# Patient Record
Sex: MALE | Race: WHITE | NOT HISPANIC OR LATINO | Employment: OTHER | URBAN - METROPOLITAN AREA
[De-identification: names, ages, dates, MRNs, and addresses within clinical notes are randomized per-mention and may not be internally consistent; named-entity substitution may affect disease eponyms.]

---

## 2017-04-11 ENCOUNTER — OFFICE VISIT (OUTPATIENT)
Dept: INTERNAL MEDICINE | Facility: CLINIC | Age: 82
End: 2017-04-11

## 2017-04-11 VITALS
BODY MASS INDEX: 28.09 KG/M2 | HEIGHT: 67 IN | WEIGHT: 179 LBS | DIASTOLIC BLOOD PRESSURE: 76 MMHG | SYSTOLIC BLOOD PRESSURE: 132 MMHG

## 2017-04-11 DIAGNOSIS — G47.00 INSOMNIA, UNSPECIFIED TYPE: ICD-10-CM

## 2017-04-11 DIAGNOSIS — I10 ESSENTIAL HYPERTENSION: ICD-10-CM

## 2017-04-11 DIAGNOSIS — E78.2 MIXED HYPERLIPIDEMIA: ICD-10-CM

## 2017-04-11 DIAGNOSIS — E11.8 TYPE 2 DIABETES MELLITUS WITH COMPLICATION, WITHOUT LONG-TERM CURRENT USE OF INSULIN (HCC): Primary | ICD-10-CM

## 2017-04-11 DIAGNOSIS — N40.1 BENIGN NON-NODULAR PROSTATIC HYPERPLASIA WITH LOWER URINARY TRACT SYMPTOMS: ICD-10-CM

## 2017-04-11 DIAGNOSIS — E03.9 ACQUIRED HYPOTHYROIDISM: ICD-10-CM

## 2017-04-11 LAB — HBA1C MFR BLD: 6.5 %

## 2017-04-11 PROCEDURE — 83036 HEMOGLOBIN GLYCOSYLATED A1C: CPT | Performed by: INTERNAL MEDICINE

## 2017-04-11 PROCEDURE — 99214 OFFICE O/P EST MOD 30 MIN: CPT | Performed by: INTERNAL MEDICINE

## 2017-04-11 RX ORDER — AMLODIPINE BESYLATE 2.5 MG/1
2.5 TABLET ORAL DAILY
Qty: 90 TABLET | Refills: 2 | Status: SHIPPED | OUTPATIENT
Start: 2017-04-11 | End: 2017-10-10 | Stop reason: SDUPTHER

## 2017-04-11 RX ORDER — PREGABALIN 75 MG/1
75 CAPSULE ORAL 2 TIMES DAILY
Qty: 180 CAPSULE | Refills: 0 | Status: SHIPPED | OUTPATIENT
Start: 2017-04-11 | End: 2017-07-11 | Stop reason: SDUPTHER

## 2017-04-11 RX ORDER — LEVOTHYROXINE SODIUM 0.05 MG/1
50 TABLET ORAL DAILY
Qty: 90 TABLET | Refills: 1 | Status: SHIPPED | OUTPATIENT
Start: 2017-04-11 | End: 2017-09-23 | Stop reason: SDUPTHER

## 2017-04-11 RX ORDER — LISINOPRIL 40 MG/1
40 TABLET ORAL DAILY
Qty: 90 TABLET | Refills: 2 | Status: SHIPPED | OUTPATIENT
Start: 2017-04-11 | End: 2017-07-11 | Stop reason: SDUPTHER

## 2017-04-11 RX ORDER — ZOLPIDEM TARTRATE 10 MG/1
10 TABLET ORAL NIGHTLY
Qty: 90 TABLET | Refills: 0 | Status: SHIPPED | OUTPATIENT
Start: 2017-04-11 | End: 2017-07-11 | Stop reason: SDUPTHER

## 2017-04-11 RX ORDER — TAMSULOSIN HYDROCHLORIDE 0.4 MG/1
1 CAPSULE ORAL DAILY
Qty: 90 CAPSULE | Refills: 2 | Status: SHIPPED | OUTPATIENT
Start: 2017-04-11 | End: 2017-07-11 | Stop reason: SDUPTHER

## 2017-04-11 RX ORDER — ATORVASTATIN CALCIUM 20 MG/1
20 TABLET, FILM COATED ORAL NIGHTLY
Qty: 90 TABLET | Refills: 2 | Status: SHIPPED | OUTPATIENT
Start: 2017-04-11 | End: 2017-12-02 | Stop reason: SDUPTHER

## 2017-04-11 NOTE — PROGRESS NOTES
Chief Complaint   Patient presents with   • Follow-up     Insomnia, diabetes       History of Present Illness  83 y.o.  gentleman, accompanied by his wife, presents for DM follow-up as well as zolpidem/lyrica RXs.  Returned from [place] 1 week ago.  Has resumed going to the gym; hurt neck last week.  Notes decreased exercise and suboptimal nutrition; not drinking sodas.    Review of Systems  Neck pain is improving.  Denies CP, palpitations, SOB, falls, lightheadedness.  No s/e to lyrica or zolpidem.  Has baselines numbness/tingling in the feet, which is stable with lyrica. All other ROS reviewed and negative.    [facility]  The following portions of the patient's history were reviewed and updated as appropriate: allergies, current medications, past family history, past medical history, past social history, past surgical history and problem list.      Current Outpatient Prescriptions:   •  amLODIPine (NORVASC) 2.5 MG tablet, Take 1 tablet by mouth Daily., Disp: 90 tablet, Rfl: 2  •  aspirin 81 MG tablet, Take 1 tablet by mouth daily., Disp: , Rfl:   •  atorvastatin (LIPITOR) 20 MG tablet, Take 1 tablet by mouth Every Night., Disp: 90 tablet, Rfl: 2  •  desmopressin (DDAVP) 0.2 MG tablet, Take 1 tablet by mouth every night., Disp: , Rfl:   •  glucose blood (FREESTYLE TEST STRIPS) test strip, Inject 1 stick under the skin daily., Disp: , Rfl:   •  Lancets misc, Use as directed, Disp: , Rfl:   •  levothyroxine (SYNTHROID, LEVOTHROID) 50 MCG tablet, Take 1 tablet by mouth Daily., Disp: 90 tablet, Rfl: 1  •  lisinopril (PRINIVIL,ZESTRIL) 40 MG tablet, Take 1 tablet by mouth Daily., Disp: 90 tablet, Rfl: 2  •  Misc Natural Products (GLUCOSAMINE CHONDROITIN ADV) tablet, Take  by mouth., Disp: , Rfl:   •  nabumetone (RELAFEN) 750 MG tablet, Take 1 tablet by mouth every 12 (twelve) hours., Disp: , Rfl:   •  omeprazole (PriLOSEC) 20 MG capsule, Take 1 capsule by mouth Daily., Disp: 90 capsule, Rfl: 1  •  pregabalin (LYRICA) 75  "MG capsule, Take 1 capsule by mouth 2 (Two) Times a Day., Disp: 180 capsule, Rfl: 0  •  tamsulosin (FLOMAX) 0.4 MG capsule 24 hr capsule, Take 1 capsule by mouth Daily., Disp: 90 capsule, Rfl: 2  •  Umeclidinium-Vilanterol 62.5-25 MCG/INH aerosol powder , Inhale 1 puff daily., Disp: 1 each, Rfl: 5  •  zolpidem (AMBIEN) 10 MG tablet, Take 1 tablet by mouth Every Night., Disp: 90 tablet, Rfl: 0    VITALS:  /76  Ht 67\" (170.2 cm)  Wt 179 lb (81.2 kg)  BMI 28.04 kg/m2    Physical Exam   Constitutional: He is oriented to person, place, and time. He appears well-developed and well-nourished.   Eyes: Conjunctivae and EOM are normal.   glasses   Cardiovascular: Normal rate, regular rhythm and normal heart sounds.    Pulmonary/Chest: Effort normal and breath sounds normal.   Abdominal: Soft. Bowel sounds are normal.    Filipe had a diabetic foot exam performed today.    Vascular Status -  His exam exhibits no right foot edema. His exam exhibits no left foot edema.   Skin Integrity  -  His right foot skin is intact.     Filipe 's left foot skin is intact. .  Neurological: He is alert and oriented to person, place, and time.   Psychiatric: He has a normal mood and affect. His behavior is normal.   Nursing note and vitals reviewed.      LABS  Results for orders placed or performed in visit on 04/11/17   POC Glycosylated Hemoglobin (Hb A1C)   Result Value Ref Range    Hemoglobin A1C 6.5 %   previously 6.7 (12/16)    ASSESSMENT/PLAN  Problem List Items Addressed This Visit     Diabetes mellitus with periph neuropathy - Primary     BG control improving with A1C 6.5; cont judicious and appropriate use of lyrica 75mg BID #180, 0RF.  LETHA was reviewed and appropriate.  The patient has read and signed the [facility] Controlled Substance Contract 10/16. Patient has been counseled and is aware of side effects, risks, potential for addiction/tolerance, interactions, and how to take medication correctly.            Relevant " Medications    pregabalin (LYRICA) 75 MG capsule    Other Relevant Orders    POC Glycosylated Hemoglobin (Hb A1C) (Completed)    BPH     Stable on tamsulosin 0.4mg QD #90, 2RF         Relevant Medications    tamsulosin (FLOMAX) 0.4 MG capsule 24 hr capsule    Hyperlipidemia     Notes cutting dose in half (down to 20mg dose for the last few weeks) for the last several weeks due to leg pains; will f/u lipids in 3 mos and decide whether 20mg atorvastatin #90, 2RF is adequate versus option to change to rosuvastatin         Relevant Medications    atorvastatin (LIPITOR) 20 MG tablet    Hypertension     BP stable on lisin 40mg QD and amlodipine 2.5mg QD, both #90, 2RF         Relevant Medications    amLODIPine (NORVASC) 2.5 MG tablet    lisinopril (PRINIVIL,ZESTRIL) 40 MG tablet    Hypothyroidism    Relevant Medications    levothyroxine (SYNTHROID, LEVOTHROID) 50 MCG tablet    Insomnia     Judicious and appropriate use of zolpidem 10mg qhs prn #90, 0RF (exception made due to insurance).  LETHA was reviewed and appropriate.  The patient has read and signed the [facility] Controlled Substance Contract 10/16. Patient has been counseled and is aware of side effects, risks, potential for addiction/tolerance, interactions, and how to take medication correctly.            Relevant Medications    zolpidem (AMBIEN) 10 MG tablet          FOLLOW-UP  1. Health maintenance - last eye exam with Dr. Moulton summer 2016 - will request records  2. RTC in 3 mos for zolpidem/lyrica RXs with LETHA and A1C, microalb, lipids    Electronically signed by:    Delilah Tatum MD  04/11/2017

## 2017-04-11 NOTE — ASSESSMENT & PLAN NOTE
BG control improving with A1C 6.5; cont judicious and appropriate use of lyrica 75mg BID #180, 0RF.  LETHA was reviewed and appropriate.  The patient has read and signed the [facility] Controlled Substance Contract 10/16. Patient has been counseled and is aware of side effects, risks, potential for addiction/tolerance, interactions, and how to take medication correctly.

## 2017-04-11 NOTE — ASSESSMENT & PLAN NOTE
Notes cutting dose in half (down to 20mg dose for the last few weeks) for the last several weeks due to leg pains; will f/u lipids in 3 mos and decide whether 20mg atorvastatin #90, 2RF is adequate versus option to change to rosuvastatin

## 2017-05-04 ENCOUNTER — TELEPHONE (OUTPATIENT)
Dept: INTERNAL MEDICINE | Facility: CLINIC | Age: 82
End: 2017-05-04

## 2017-06-13 ENCOUNTER — TELEPHONE (OUTPATIENT)
Dept: INTERNAL MEDICINE | Facility: CLINIC | Age: 82
End: 2017-06-13

## 2017-06-13 NOTE — TELEPHONE ENCOUNTER
PATIENT CALLED BACK IN REGARDS TO THE PRIOR AUTH ON THE PATIENT'S MEDICATION. SHE STATES THAT SHE WOULD LIKE FOR YOU TO GIVE HER A CALL BACK AS SOON AS POSSIBLE. THE PATIENT'S CALL BACK NUMBER [phone]

## 2017-06-15 NOTE — TELEPHONE ENCOUNTER
Spoke to Alden at [employer].     Script was being held due to birth date discrepancy.     Verified patient's date of birth and demographics. Script will be mailed out today.

## 2017-07-05 ENCOUNTER — LAB (OUTPATIENT)
Dept: INTERNAL MEDICINE | Facility: CLINIC | Age: 82
End: 2017-07-05

## 2017-07-05 DIAGNOSIS — N18.2 CHRONIC KIDNEY DISEASE, STAGE II (MILD): Primary | ICD-10-CM

## 2017-07-05 DIAGNOSIS — I10 ESSENTIAL HYPERTENSION: ICD-10-CM

## 2017-07-05 DIAGNOSIS — E78.2 MIXED HYPERLIPIDEMIA: ICD-10-CM

## 2017-07-05 DIAGNOSIS — R80.9 MICROALBUMINURIA: ICD-10-CM

## 2017-07-05 DIAGNOSIS — E11.8 TYPE 2 DIABETES MELLITUS WITH COMPLICATION, WITHOUT LONG-TERM CURRENT USE OF INSULIN (HCC): ICD-10-CM

## 2017-07-05 LAB
ARTICHOKE IGE QN: 62 MG/DL (ref 0–130)
CHOLEST SERPL-MCNC: 116 MG/DL (ref 0–200)
HBA1C MFR BLD: 6.2 % (ref 4.8–5.6)
HDLC SERPL-MCNC: 36 MG/DL (ref 40–60)
TRIGL SERPL-MCNC: 87 MG/DL (ref 0–150)

## 2017-07-05 PROCEDURE — 82570 ASSAY OF URINE CREATININE: CPT | Performed by: INTERNAL MEDICINE

## 2017-07-05 PROCEDURE — 80061 LIPID PANEL: CPT | Performed by: INTERNAL MEDICINE

## 2017-07-05 PROCEDURE — 83036 HEMOGLOBIN GLYCOSYLATED A1C: CPT | Performed by: INTERNAL MEDICINE

## 2017-07-05 PROCEDURE — 82043 UR ALBUMIN QUANTITATIVE: CPT | Performed by: INTERNAL MEDICINE

## 2017-07-06 LAB
CREAT 24H UR-MCNC: 117.8 MG/DL
MICROALB/CRT. RATIO UR: 37.2 MG/G CREAT (ref 0–30)
MICROALBUMIN UR-MCNC: 43.8 UG/ML

## 2017-07-11 ENCOUNTER — OFFICE VISIT (OUTPATIENT)
Dept: INTERNAL MEDICINE | Facility: CLINIC | Age: 82
End: 2017-07-11

## 2017-07-11 VITALS
BODY MASS INDEX: 28.19 KG/M2 | SYSTOLIC BLOOD PRESSURE: 128 MMHG | WEIGHT: 180 LBS | OXYGEN SATURATION: 98 % | HEART RATE: 62 BPM | DIASTOLIC BLOOD PRESSURE: 60 MMHG

## 2017-07-11 DIAGNOSIS — K21.9 GASTROESOPHAGEAL REFLUX DISEASE WITHOUT ESOPHAGITIS: ICD-10-CM

## 2017-07-11 DIAGNOSIS — N40.1 BENIGN NON-NODULAR PROSTATIC HYPERPLASIA WITH LOWER URINARY TRACT SYMPTOMS: ICD-10-CM

## 2017-07-11 DIAGNOSIS — I10 ESSENTIAL HYPERTENSION: Primary | ICD-10-CM

## 2017-07-11 DIAGNOSIS — E11.8 TYPE 2 DIABETES MELLITUS WITH COMPLICATION, WITHOUT LONG-TERM CURRENT USE OF INSULIN (HCC): ICD-10-CM

## 2017-07-11 DIAGNOSIS — E78.2 MIXED HYPERLIPIDEMIA: ICD-10-CM

## 2017-07-11 DIAGNOSIS — G47.00 INSOMNIA, UNSPECIFIED TYPE: ICD-10-CM

## 2017-07-11 PROCEDURE — 99214 OFFICE O/P EST MOD 30 MIN: CPT | Performed by: INTERNAL MEDICINE

## 2017-07-11 RX ORDER — PREGABALIN 75 MG/1
75 CAPSULE ORAL 2 TIMES DAILY
Qty: 180 CAPSULE | Refills: 0 | Status: SHIPPED | OUTPATIENT
Start: 2017-07-11 | End: 2017-10-10 | Stop reason: SDUPTHER

## 2017-07-11 RX ORDER — ZOLPIDEM TARTRATE 10 MG/1
10 TABLET ORAL NIGHTLY
Qty: 90 TABLET | Refills: 0 | Status: SHIPPED | OUTPATIENT
Start: 2017-07-11 | End: 2017-07-11 | Stop reason: SDUPTHER

## 2017-07-11 RX ORDER — ZOLPIDEM TARTRATE 5 MG/1
5 TABLET ORAL NIGHTLY
Qty: 90 TABLET | Refills: 0 | Status: SHIPPED | OUTPATIENT
Start: 2017-07-11 | End: 2017-10-10 | Stop reason: SDUPTHER

## 2017-07-11 RX ORDER — LISINOPRIL 40 MG/1
40 TABLET ORAL DAILY
Qty: 90 TABLET | Refills: 1 | Status: SHIPPED | OUTPATIENT
Start: 2017-07-11 | End: 2017-10-10 | Stop reason: SDUPTHER

## 2017-07-11 RX ORDER — OMEPRAZOLE 20 MG/1
20 CAPSULE, DELAYED RELEASE ORAL DAILY
Qty: 90 CAPSULE | Refills: 1 | Status: SHIPPED | OUTPATIENT
Start: 2017-07-11 | End: 2017-10-10 | Stop reason: SDUPTHER

## 2017-07-11 RX ORDER — FLUOCINONIDE TOPICAL SOLUTION USP, 0.05% 0.5 MG/ML
SOLUTION TOPICAL
COMMUNITY
Start: 2017-06-19 | End: 2022-04-19

## 2017-07-11 RX ORDER — KETOCONAZOLE 20 MG/ML
1 SHAMPOO TOPICAL 2 TIMES WEEKLY
COMMUNITY
Start: 2017-06-19

## 2017-07-11 RX ORDER — TAMSULOSIN HYDROCHLORIDE 0.4 MG/1
1 CAPSULE ORAL DAILY
Qty: 90 CAPSULE | Refills: 1 | Status: SHIPPED | OUTPATIENT
Start: 2017-07-11 | End: 2017-12-05 | Stop reason: SDUPTHER

## 2017-07-11 NOTE — PROGRESS NOTES
Chief Complaint   Patient presents with   • Type 2 diabetes mellitus with complication   • Insomnia   • Hypertension   • Hyperlipidemia   • Med Refill     omeprazole; lyrica; lisinopril; flomax; ambien       History of Present Illness  83 y.o.  gentleman presents for sugar and chol f/u as well as zolpidem/lyrica RFs.  No s/e to meds or other concerns; reports however only taking 1/2 zolpidem pill at nighttime - therefore taking 5mg dose only.  Plans to go back down to [place] in 1/18 and likely will fly this time.  Has not gotten a new dog.    Review of Systems  ROS neg for CP, palpitations, SOB, lightheadedness, falls.  Has baseline arthritis.      Reports Dr. Moulotn is retiring this month and he will be updating eye exam with the new associate upcoming. All other ROS reviewed and negative.    [facility]  The following portions of the patient's history were reviewed and updated as appropriate: allergies, current medications, past family history, past medical history, past social history, past surgical history and problem list.      Current Outpatient Prescriptions:   •  amLODIPine (NORVASC) 2.5 MG tablet, QD  •  aspirin 81 MG tablet, Take 1 tablet by mouth daily.  •  atorvastatin (LIPITOR) 20 MG tablet, QHS  •  desmopressin (DDAVP) 0.2 MG tablet, QHS  •  fluocinonide (LIDEX) 0.05 % external solution,  •  glucose blood (FREESTYLE TEST STRIPS) test strip,  •  ketoconazole (NIZORAL) 2 % shampoo, ,  •  Lancets misc, Use as directed,  •  levothyroxine 50 MCG tablet,QD  •  lisinopril (PRINIVIL,ZESTRIL) 40 MG tablet, QD  •  Misc Natural Products (GLUCOSAMINE CHONDROITIN ADV) tablet, QD  •  nabumetone (RELAFEN) 750 MG tablet, q12  •  omeprazole (priLOSEC) 20 MG capsule, QD  •  pregabalin (LYRICA) 75 MG capsule BID  •  tamsulosin (FLOMAX) 0.4 MG capsule QD  •  Umeclidinium-Vilanterol 62.5-25 MCG/INH aerosol powder ,1 puff QD  •  zolpidem (AMBIEN) 10 MG tablet, 1/2 qhs prn    VITALS:  /60  Pulse 62  Wt 180 lb (81.6  kg)  SpO2 98%  BMI 28.19 kg/m2    Physical Exam   Constitutional: He is oriented to person, place, and time. He appears well-developed and well-nourished.   Eyes: Conjunctivae and EOM are normal.   glasses   Cardiovascular: Normal rate, regular rhythm and normal heart sounds.    Pulmonary/Chest: Effort normal and breath sounds normal.   Abdominal: Soft. Bowel sounds are normal.   Neurological: He is alert and oriented to person, place, and time.   Psychiatric: He has a normal mood and affect. His behavior is normal.   Nursing note and vitals reviewed.      LABS  Results for orders placed or performed in visit on 07/05/17   Hemoglobin A1c   Result Value Ref Range    Hemoglobin A1C 6.20 (H) 4.80 - 5.60 %   Microalbumin / Creatinine Urine Ratio   Result Value Ref Range    Creatinine, Urine 117.8 Not Estab. mg/dL    Microalbumin, Urine 43.8 Not Estab. ug/mL    Microalbumin/Creatinine Ratio Urine 37.2 (H) 0.0 - 30.0 mg/g creat   Lipid panel   Result Value Ref Range    Total Cholesterol 116 0 - 200 mg/dL    Triglycerides 87 0 - 150 mg/dL    HDL Cholesterol 36 (L) 40 - 60 mg/dL    LDL Cholesterol  62 0 - 130 mg/dL   4/17 A1C 6.5  12/16 microalb 49.1    ASSESSMENT/PLAN  Problem List Items Addressed This Visit     Diabetes mellitus with periph neuropathy     Commended patient on cont'd improvement in BG control with A1C 6.2; encouraged reg phys activity to decr insulin resistance, moderation in unhealthy starches/sweets; f/u A1C in 6 mos    Continue appropriate use of lyrica 75mg BID #180, 0RF (exception given for 90d RX due to cost).  LETHA was reviewed and appropriate.  The patient has read and signed the [facility] Controlled Substance Contract 10/16. Patient has been counseled and is aware of side effects, risks, potential for addiction/tolerance, interactions, and how to take medication correctly.              Relevant Medications    pregabalin (LYRICA) 75 MG capsule    BPH    Relevant Medications    tamsulosin  (FLOMAX) 0.4 MG capsule 24 hr capsule    Gastroesophageal reflux disease     Stable on omeprazole 20mg QD #90, 1RF         Relevant Medications    omeprazole (priLOSEC) 20 MG capsule    Hyperlipidemia     Lipids remain stable/at goal despite decreased dose of atorvastatin; cont 20mg QD dose         Hypertension - Primary     BP stable on amlodipine 2.5mg QD, lisinopril 40mg QD #90, 3RF         Relevant Medications    lisinopril (PRINIVIL,ZESTRIL) 40 MG tablet    Insomnia     Judicious and appropriate use of zolpidem 5mg qhs prn #90, RF (exception given for 90d RX due to cost).  LETHA was reviewed and appropriate.  The patient has read and signed the [facility] Controlled Substance Contract 10/16. Patient has been counseled and is aware of side effects, risks, potential for addiction/tolerance, interactions, and how to take medication correctly.            Relevant Medications    zolpidem (AMBIEN) 5 MG tablet          FOLLOW-UP  1. RTC 3 mos for zolpidem/lyrica RXs with LETHA and contract, and A1C  2. RTC 6 mos (after 12/20/17) for wellness with fasting labs, LETHA, and zoplpidem/lyrica RXs  3. Needs updated DM eye exam and requested patient ask for ophtho to send us note    Electronically signed by:    Delilah Tatum MD  07/11/2017

## 2017-07-12 NOTE — ASSESSMENT & PLAN NOTE
Commended patient on cont'd improvement in BG control with A1C 6.2; encouraged reg phys activity to decr insulin resistance, moderation in unhealthy starches/sweets; f/u A1C in 6 mos    Continue appropriate use of lyrica 75mg BID #180, 0RF (exception given for 90d RX due to cost).  LETHA was reviewed and appropriate.  The patient has read and signed the [facility] Controlled Substance Contract 10/16. Patient has been counseled and is aware of side effects, risks, potential for addiction/tolerance, interactions, and how to take medication correctly.

## 2017-07-12 NOTE — ASSESSMENT & PLAN NOTE
Judicious and appropriate use of zolpidem 5mg qhs prn #90, RF (exception given for 90d RX due to cost).  LETHA was reviewed and appropriate.  The patient has read and signed the [facility] Controlled Substance Contract 10/16. Patient has been counseled and is aware of side effects, risks, potential for addiction/tolerance, interactions, and how to take medication correctly.

## 2017-09-11 ENCOUNTER — TELEPHONE (OUTPATIENT)
Dept: INTERNAL MEDICINE | Facility: CLINIC | Age: 82
End: 2017-09-11

## 2017-09-11 NOTE — TELEPHONE ENCOUNTER
Needs OV for further eval and treatment; gout is not on his problem list; needs updated uric level    Even if it is gout, cannot start allopurinol during acute gout attack

## 2017-09-11 NOTE — TELEPHONE ENCOUNTER
PATIENT HAS GUAT ON LEFT HAND OF MIDDLE FINGER  VERY SWOLLEN AND SKIN IS CRACKING.  PATIENT IS REQUESTING AN RX BE CALLED IN TO [facility]@ [phone]  REQUESTING ALPURINOL (?).  PATIENT HAS BEEN DRINKING A LOT OF CHERRY JUICE AND THIS HAS BEEN GOING ON FOR ABOUT ONE MONTH

## 2017-09-12 PROBLEM — I73.9 PERIPHERAL ARTERIAL DISEASE: Status: ACTIVE | Noted: 2017-09-12

## 2017-09-15 ENCOUNTER — OFFICE VISIT (OUTPATIENT)
Dept: INTERNAL MEDICINE | Facility: CLINIC | Age: 82
End: 2017-09-15

## 2017-09-15 VITALS
SYSTOLIC BLOOD PRESSURE: 124 MMHG | BODY MASS INDEX: 27.62 KG/M2 | WEIGHT: 176.37 LBS | DIASTOLIC BLOOD PRESSURE: 66 MMHG

## 2017-09-15 DIAGNOSIS — M10.042 ACUTE IDIOPATHIC GOUT OF LEFT HAND: Primary | ICD-10-CM

## 2017-09-15 DIAGNOSIS — N18.2 CHRONIC KIDNEY DISEASE, STAGE II (MILD): ICD-10-CM

## 2017-09-15 DIAGNOSIS — I10 ESSENTIAL HYPERTENSION: ICD-10-CM

## 2017-09-15 LAB
ANION GAP SERPL CALCULATED.3IONS-SCNC: 7 MMOL/L (ref 3–11)
BUN BLD-MCNC: 21 MG/DL (ref 9–23)
BUN/CREAT SERPL: 21 (ref 7–25)
CALCIUM SPEC-SCNC: 9 MG/DL (ref 8.7–10.4)
CHLORIDE SERPL-SCNC: 103 MMOL/L (ref 99–109)
CO2 SERPL-SCNC: 29 MMOL/L (ref 20–31)
CREAT BLD-MCNC: 1 MG/DL (ref 0.6–1.3)
GFR SERPL CREATININE-BSD FRML MDRD: 71 ML/MIN/1.73
GLUCOSE BLD-MCNC: 138 MG/DL (ref 70–100)
POTASSIUM BLD-SCNC: 4.3 MMOL/L (ref 3.5–5.5)
SODIUM BLD-SCNC: 139 MMOL/L (ref 132–146)
URATE SERPL-MCNC: 7.1 MG/DL (ref 3.7–9.2)

## 2017-09-15 PROCEDURE — G0008 ADMIN INFLUENZA VIRUS VAC: HCPCS | Performed by: INTERNAL MEDICINE

## 2017-09-15 PROCEDURE — 80048 BASIC METABOLIC PNL TOTAL CA: CPT | Performed by: INTERNAL MEDICINE

## 2017-09-15 PROCEDURE — 99214 OFFICE O/P EST MOD 30 MIN: CPT | Performed by: INTERNAL MEDICINE

## 2017-09-15 PROCEDURE — 84550 ASSAY OF BLOOD/URIC ACID: CPT | Performed by: INTERNAL MEDICINE

## 2017-09-15 PROCEDURE — 90662 IIV NO PRSV INCREASED AG IM: CPT | Performed by: INTERNAL MEDICINE

## 2017-09-15 NOTE — ASSESSMENT & PLAN NOTE
Discussed with dxs of CKD and CHF, he should not be taking nabumetone; patient is not sure and will check his med list - states wife is in charge and he has no idea what he is taking

## 2017-09-15 NOTE — PROGRESS NOTES
Chief Complaint   Patient presents with   • Swollen left pointer finger       History of Present Illness  83 y.o.  gentleman presents for further eval of swollen left index finger.  HPI started about 2 weeks ago with much more swollen and red index finger.  Was throbbing and now getting better.  Has not taken anything for it.  States dx of gout per ortho when right knee surgery was done.  Has never otherwise had gout evaluated.  No other joints have been affected and the last episode with this same finger was 2009.    Does not want any medication for the pain.  NOtes swelling and redness improving.  Denies related drainage or other sxs.    Review of Systems  ROS (+) for swollen left index finger with decreased throbbing pain, no drainage, numbness/tingling.  No other fingers or other joints affected.  Denies fevers. All other ROS reviewed and negative.    [facility]  The following portions of the patient's history were reviewed and updated as appropriate: allergies, current medications, past family history, past medical history, past social history, past surgical history and problem list.      Current Outpatient Prescriptions:   •  amLODIPine (NORVASC) 2.5 MG tablet, Take 1 tablet by mouth Daily., Disp: 90 tablet, Rfl: 2  •  aspirin 81 MG tablet, Take 1 tablet by mouth daily., Disp: , Rfl:   •  atorvastatin (LIPITOR) 20 MG tablet, Take 1 tablet by mouth Every Night., Disp: 90 tablet, Rfl: 2  •  desmopressin (DDAVP) 0.2 MG tablet, Take 1 tablet by mouth every night., Disp: , Rfl:   •  fluocinonide (LIDEX) 0.05 % external solution, , Disp: , Rfl:   •  glucose blood (FREESTYLE TEST STRIPS) test strip, Inject 1 stick under the skin daily., Disp: , Rfl:   •  ketoconazole (NIZORAL) 2 % shampoo, , Disp: , Rfl:   •  Lancets misc, Use as directed, Disp: , Rfl:   •  levothyroxine (SYNTHROID, LEVOTHROID) 50 MCG tablet, Take 1 tablet by mouth Daily., Disp: 90 tablet, Rfl: 1  •  lisinopril (PRINIVIL,ZESTRIL) 40 MG tablet,  Take 1 tablet by mouth Daily., Disp: 90 tablet, Rfl: 1  •  Misc Natural Products (GLUCOSAMINE CHONDROITIN ADV) tablet, Take  by mouth., Disp: , Rfl:   •  nabumetone (RELAFEN) 750 MG tablet, Take 1 tablet by mouth every 12 (twelve) hours., Disp: , Rfl:   •  omeprazole (priLOSEC) 20 MG capsule, Take 1 capsule by mouth Daily., Disp: 90 capsule, Rfl: 1  •  pregabalin (LYRICA) 75 MG capsule, Take 1 capsule by mouth 2 (Two) Times a Day., Disp: 180 capsule, Rfl: 0  •  tamsulosin (FLOMAX) 0.4 MG capsule 24 hr capsule, Take 1 capsule by mouth Daily., Disp: 90 capsule, Rfl: 1  •  Umeclidinium-Vilanterol 62.5-25 MCG/INH aerosol powder , Inhale 1 puff daily., Disp: 1 each, Rfl: 5  •  zolpidem (AMBIEN) 5 MG tablet, Take 1 tablet by mouth Every Night., Disp: 90 tablet, Rfl: 0    VITALS:  /66  Wt 176 lb 5.9 oz (80 kg)  BMI 27.62 kg/m2    Physical Exam   Constitutional: He is oriented to person, place, and time. He appears well-developed and well-nourished.   Eyes: Conjunctivae and EOM are normal.   Wears glasses   Cardiovascular: Normal rate, regular rhythm and normal heart sounds.    Pulmonary/Chest: Effort normal and breath sounds normal.   Musculoskeletal:   Left index finger limited flexion due to severe swelling with redness and warmth; < 2sec CR   Neurological: He is alert and oriented to person, place, and time.   Skin: Skin is warm and dry. There is erythema (severely swollen erythematous left index finger DIP joint with mild fluctuance, mild increased warmth, minimally tender to palpation, faint suggestion of tophaceous changes subcutaneously).   Psychiatric: He has a normal mood and affect. His behavior is normal.   Not in a good mood today   Nursing note and vitals reviewed.      LABS  Results for orders placed or performed in visit on 07/05/17   Hemoglobin A1c   Result Value Ref Range    Hemoglobin A1C 6.20 (H) 4.80 - 5.60 %   Microalbumin / Creatinine Urine Ratio   Result Value Ref Range    Creatinine, Urine  117.8 Not Estab. mg/dL    Microalbumin, Urine 43.8 Not Estab. ug/mL    Microalbumin/Creatinine Ratio Urine 37.2 (H) 0.0 - 30.0 mg/g creat   Lipid panel   Result Value Ref Range    Total Cholesterol 116 0 - 200 mg/dL    Triglycerides 87 0 - 150 mg/dL    HDL Cholesterol 36 (L) 40 - 60 mg/dL    LDL Cholesterol  62 0 - 130 mg/dL       ASSESSMENT/PLAN  Problem List Items Addressed This Visit     Chronic kidney disease, stage II (mild)     Discussed with dxs of CKD and CHF, he should not be taking nabumetone; patient is not sure and will check his med list - states wife is in charge and he has no idea what he is taking         Hypertension     BP remains stable on current meds           Other Visit Diagnoses     Possible gout left index finger    -  Primary    no prior dx of gout; check uric; discussed mcgowan NSAIDs due to CKD; discussed colchicine, which pt declines; consider allopurinol if (+)gout after sxs subside    Relevant Orders    Basic Metabolic Panel    Uric Acid          FOLLOW-UP  1. Health maintenance - flu vacc given today; eye exam pending (have asked patient to request note for us)  2. RTC 10/10/17 as scheduled for zolpidem and lyrica RXs, will need LETHA, updated contact and A1C as well    Electronically signed by:    Delilah Tatum MD  09/15/2017

## 2017-09-19 NOTE — PROGRESS NOTES
Kidney function is stable    Test for gout, uric acid, is only borderline elevated - is swollen finger continuing to improve?

## 2017-09-23 DIAGNOSIS — E03.9 ACQUIRED HYPOTHYROIDISM: ICD-10-CM

## 2017-09-24 RX ORDER — LEVOTHYROXINE SODIUM 0.05 MG/1
TABLET ORAL
Qty: 90 TABLET | Refills: 1 | Status: SHIPPED | OUTPATIENT
Start: 2017-09-24 | End: 2017-12-22 | Stop reason: SDUPTHER

## 2017-10-10 ENCOUNTER — OFFICE VISIT (OUTPATIENT)
Dept: INTERNAL MEDICINE | Facility: CLINIC | Age: 82
End: 2017-10-10

## 2017-10-10 VITALS
WEIGHT: 178.6 LBS | HEART RATE: 68 BPM | DIASTOLIC BLOOD PRESSURE: 70 MMHG | BODY MASS INDEX: 27.97 KG/M2 | SYSTOLIC BLOOD PRESSURE: 126 MMHG

## 2017-10-10 DIAGNOSIS — J43.9 PULMONARY EMPHYSEMA, UNSPECIFIED EMPHYSEMA TYPE (HCC): ICD-10-CM

## 2017-10-10 DIAGNOSIS — K21.9 GASTROESOPHAGEAL REFLUX DISEASE WITHOUT ESOPHAGITIS: ICD-10-CM

## 2017-10-10 DIAGNOSIS — E11.42 TYPE 2 DIABETES MELLITUS WITH DIABETIC POLYNEUROPATHY, WITHOUT LONG-TERM CURRENT USE OF INSULIN (HCC): Primary | ICD-10-CM

## 2017-10-10 DIAGNOSIS — N18.2 CHRONIC KIDNEY DISEASE, STAGE II (MILD): ICD-10-CM

## 2017-10-10 DIAGNOSIS — G47.00 INSOMNIA, UNSPECIFIED TYPE: ICD-10-CM

## 2017-10-10 DIAGNOSIS — I10 ESSENTIAL HYPERTENSION: ICD-10-CM

## 2017-10-10 DIAGNOSIS — M10.042 ACUTE IDIOPATHIC GOUT OF LEFT HAND: ICD-10-CM

## 2017-10-10 LAB — HBA1C MFR BLD: 5.8 %

## 2017-10-10 PROCEDURE — 83036 HEMOGLOBIN GLYCOSYLATED A1C: CPT | Performed by: INTERNAL MEDICINE

## 2017-10-10 PROCEDURE — 99214 OFFICE O/P EST MOD 30 MIN: CPT | Performed by: INTERNAL MEDICINE

## 2017-10-10 RX ORDER — AMLODIPINE BESYLATE 2.5 MG/1
2.5 TABLET ORAL DAILY
Qty: 90 TABLET | Refills: 0 | Status: SHIPPED | OUTPATIENT
Start: 2017-10-10 | End: 2017-12-05 | Stop reason: SDUPTHER

## 2017-10-10 RX ORDER — LISINOPRIL 40 MG/1
40 TABLET ORAL DAILY
Qty: 90 TABLET | Refills: 0 | Status: SHIPPED | OUTPATIENT
Start: 2017-10-10 | End: 2017-12-22 | Stop reason: SDUPTHER

## 2017-10-10 RX ORDER — OMEPRAZOLE 20 MG/1
20 CAPSULE, DELAYED RELEASE ORAL DAILY
Qty: 90 CAPSULE | Refills: 0 | Status: SHIPPED | OUTPATIENT
Start: 2017-10-10 | End: 2017-12-05 | Stop reason: SDUPTHER

## 2017-10-10 RX ORDER — PREGABALIN 75 MG/1
75 CAPSULE ORAL 2 TIMES DAILY
Qty: 180 CAPSULE | Refills: 0 | Status: SHIPPED | OUTPATIENT
Start: 2017-10-10 | End: 2017-12-22 | Stop reason: SDUPTHER

## 2017-10-10 RX ORDER — ZOLPIDEM TARTRATE 5 MG/1
5 TABLET ORAL NIGHTLY
Qty: 90 TABLET | Refills: 0 | Status: SHIPPED | OUTPATIENT
Start: 2017-10-10 | End: 2017-12-22 | Stop reason: SDUPTHER

## 2017-10-10 NOTE — ASSESSMENT & PLAN NOTE
Judicious and appropriate use of zolpidem 5mg qhs prn #90, RF (exception for patient for 90d supply due to cost).  LETHA was reviewed and appropriate.  The patient has read and signed the [facility] Controlled Substance Contract today 10/10/17. Patient has been counseled and is aware of side effects, risks, potential for addiction/tolerance, interactions, and how to take medication correctly.

## 2017-10-10 NOTE — ASSESSMENT & PLAN NOTE
Commended patient on cont'd improved BG control with A1C 5.8; monitor for hypoglycemia; no DM meds    Judicious and appropriate use of lyrica 75mg BID #180, 0RF (exception for patient for 90d supply due to cost).  LETHA was reviewed and appropriate.  The patient has read and signed the [facility] Controlled Substance Contract today 10/10/17. Patient has been counseled and is aware of side effects, risks, potential for addiction/tolerance, interactions, and how to take medication correctly.

## 2017-10-10 NOTE — PROGRESS NOTES
"Chief Complaint   Patient presents with   • Diabetes   • Osteoarthritis   • Insomnia       History of Present Illness  83 y.o.  gentleman presents for zolpidem/lyrica RFs as well as other med refills and f/u on gout.  Notes left index finger swelling is significantly improved, still swollen and mildly tender, but much improved.  Does not want to take anything for it since last flare-up was 9 yrs ago.    No s/e or other concerns with lyrica or zolpidem.    Has ongoing eval with Dr. Easley with plans for stent to the L. Leg in about 2 weeks.  Notes left foot becomes numb with pain after certain amt of walking.  Is limiting his physical activity.  The numbness sometimes spreads up to the L. Ankle.  R. Leg circulation current acceptable per patient.    Wife wants him to get a \"lung scan\" due to h/o smoking, patient states quitting 25 yrs ago but remote h/o of COPD dx.  Notes h/o incidental nodule removed by Dr. Alonso during heart surgery; was benign.     Review of Systems  ROS (+) for left foot/ankle numbness. ROS (+) for chronic neuropathy and chronic insomnia.  ROS (+) for left index finger gout decreasing, still with some tenderness. All other ROS reviewed and negative.    [facility]  The following portions of the patient's history were reviewed and updated as appropriate: allergies, current medications, past family history, past medical history, past social history, past surgical history and problem list.      Current Outpatient Prescriptions:   •  amLODIPine (NORVASC) 2.5 MG tablet, Take 1 tablet by mouth Daily., Disp: 90 tablet, Rfl: 0  •  aspirin 81 MG tablet, Take 1 tablet by mouth daily., Disp: , Rfl:   •  atorvastatin (LIPITOR) 20 MG tablet, Take 1 tablet by mouth Every Night., Disp: 90 tablet, Rfl: 2  •  desmopressin (DDAVP) 0.2 MG tablet, Take 1 tablet by mouth every night., Disp: , Rfl:   •  fluocinonide (LIDEX) 0.05 % external solution, , Disp: , Rfl:   •  glucose blood (FREESTYLE TEST STRIPS) test " strip, Inject 1 stick under the skin daily., Disp: , Rfl:   •  ketoconazole (NIZORAL) 2 % shampoo, , Disp: , Rfl:   •  Lancets misc, Use as directed, Disp: , Rfl:   •  levothyroxine (SYNTHROID, LEVOTHROID) 50 MCG tablet, TAKE 1 TABLET DAILY, Disp: 90 tablet, Rfl: 1  •  lisinopril (PRINIVIL,ZESTRIL) 40 MG tablet, Take 1 tablet by mouth Daily., Disp: 90 tablet, Rfl: 0  •  Misc Natural Products (GLUCOSAMINE CHONDROITIN ADV) tablet, Take  by mouth., Disp: , Rfl:   •  nabumetone (RELAFEN) 750 MG tablet, Take 1 tablet by mouth every 12 (twelve) hours., Disp: , Rfl:   •  omeprazole (priLOSEC) 20 MG capsule, Take 1 capsule by mouth Daily., Disp: 90 capsule, Rfl: 0  •  pregabalin (LYRICA) 75 MG capsule, Take 1 capsule by mouth 2 (Two) Times a Day., Disp: 180 capsule, Rfl: 0  •  tamsulosin (FLOMAX) 0.4 MG capsule 24 hr capsule, Take 1 capsule by mouth Daily., Disp: 90 capsule, Rfl: 1  •  Umeclidinium-Vilanterol 62.5-25 MCG/INH aerosol powder , Inhale 1 puff daily., Disp: 1 each, Rfl: 5  •  zolpidem (AMBIEN) 5 MG tablet, Take 1 tablet by mouth Every Night., Disp: 90 tablet, Rfl: 0    VITALS:  /70  Pulse 68  Wt 178 lb 9.6 oz (81 kg)  BMI 27.97 kg/m2    Physical Exam   Constitutional: He is oriented to person, place, and time. He appears well-developed and well-nourished.   Eyes: Conjunctivae and EOM are normal.   Cardiovascular: Normal rate, regular rhythm and normal heart sounds.    Pulmonary/Chest: Effort normal and breath sounds normal.   Abdominal: Soft. Bowel sounds are normal.   Musculoskeletal: He exhibits tenderness (distal left index finger with smooth erythema, mildly warm, mildly tend to palpation  middle and sital phalanx, with improved flexion near full currently). He exhibits no edema ( BLE).   Normal gait   Neurological: He is alert and oriented to person, place, and time.   Psychiatric: He has a normal mood and affect. His behavior is normal.   Nursing note and vitals reviewed.      LABS  Results for  orders placed or performed in visit on 10/10/17   POC Glycosylated Hemoglobin (Hb A1C)   Result Value Ref Range    Hemoglobin A1C 5.8 %   9/17 uric 7.1    Low-Dose CT: Lung Cancer Screening  Criteria:  • Age 55-77 years of age (CMS) , 55-80 years of age (Commercial) and;  o Patient Age: 83 y.o.  • 30 pack-year smoking history (# yrs smoked X avg #ppd = pack/yrs); if  pt has quit smoking it must be within <15yrs and;  • Asymptomatic for lung cancer  ICD-10 Codes:  • History of smoking  • Tobacco abuse/addiction  ? Z72.0 (current smoker)  ? Z87.891 (personal history of smoking/nicotine dependence) use with CMS   • Patient Smoking History  History   Smoking Status   • Former Smoker   • Packs/day: 2.00   • Years: 40.00   • Quit date: 1990     Comment: quit 1990s       ASSESSMENT/PLAN  Problem List Items Addressed This Visit     Chronic kidney disease, stage II (mild)     Recent labs per Dr. Easley showed Cr 1.5; rec hydration and repeat BMP prior to PAD vascular intervention         Diabetes mellitus with periph neuropathy - Primary     Commended patient on cont'd improved BG control with A1C 5.8; monitor for hypoglycemia; no DM meds    Judicious and appropriate use of lyrica 75mg BID #180, 0RF (exception for patient for 90d supply due to cost).  LETHA was reviewed and appropriate.  The patient has read and signed the [facility] Controlled Substance Contract today 10/10/17. Patient has been counseled and is aware of side effects, risks, potential for addiction/tolerance, interactions, and how to take medication correctly.            Relevant Medications    pregabalin (LYRICA) 75 MG capsule    Other Relevant Orders    POC Glycosylated Hemoglobin (Hb A1C) (Completed)    COPD     Does not qualify criteria for low-dose lung CT; has occ cough, no hemoptysis; order given for CXR as requested (last one 11/15)           Relevant Orders    XR Chest PA & Lateral    Gastroesophageal reflux disease    Relevant Medications     omeprazole (priLOSEC) 20 MG capsule    Hypertension     BP remains stable on lisin 40mg QD, amlod 2.5mg QD         Relevant Medications    amLODIPine (NORVASC) 2.5 MG tablet    lisinopril (PRINIVIL,ZESTRIL) 40 MG tablet    Insomnia     Judicious and appropriate use of zolpidem 5mg qhs prn #90, RF (exception for patient for 90d supply due to cost).  LETHA was reviewed and appropriate.  The patient has read and signed the [facility] Controlled Substance Contract today 10/10/17. Patient has been counseled and is aware of side effects, risks, potential for addiction/tolerance, interactions, and how to take medication correctly.            Relevant Medications    zolpidem (AMBIEN) 5 MG tablet    Gout     Improving; patient declines meds since last flare-up was 9 yrs ago               FOLLOW-UP  1. Health maintenance - flu vacc 9/17; eye exam pending later this year  2. RTC for wellness 1/5/18 as scheduled; fasting labs the week prior to appt (CBC, CMP, TSH, lipids, UA/micro, uric, A1C, microalb, CPK, FT4, UDS); also will need updated LETHA (contract 10/17)      Electronically signed by:    Delilah Tatum MD  10/10/2017

## 2017-10-10 NOTE — ASSESSMENT & PLAN NOTE
Recent labs per Dr. Easley showed Cr 1.5; rec hydration and repeat BMP prior to PAD vascular intervention

## 2017-10-11 NOTE — ASSESSMENT & PLAN NOTE
Does not qualify criteria for low-dose lung CT; has occ cough, no hemoptysis; order given for CXR as requested (last one 11/15)

## 2017-10-12 ENCOUNTER — HOSPITAL ENCOUNTER (OUTPATIENT)
Dept: GENERAL RADIOLOGY | Facility: HOSPITAL | Age: 82
Discharge: HOME | End: 2017-10-12
Attending: INTERNAL MEDICINE | Admitting: INTERNAL MEDICINE

## 2017-10-12 DIAGNOSIS — J43.9 PULMONARY EMPHYSEMA, UNSPECIFIED EMPHYSEMA TYPE (HCC): ICD-10-CM

## 2017-10-12 PROCEDURE — 71020 HC CHEST PA AND LATERAL: CPT

## 2017-10-16 ENCOUNTER — LAB (OUTPATIENT)
Dept: INTERNAL MEDICINE | Facility: CLINIC | Age: 82
End: 2017-10-16

## 2017-10-16 DIAGNOSIS — N18.2 CHRONIC KIDNEY DISEASE, STAGE II (MILD): Primary | ICD-10-CM

## 2017-10-16 LAB
ANION GAP SERPL CALCULATED.3IONS-SCNC: 6 MMOL/L (ref 3–11)
BUN BLD-MCNC: 23 MG/DL (ref 9–23)
BUN/CREAT SERPL: 19.2 (ref 7–25)
CALCIUM SPEC-SCNC: 8.9 MG/DL (ref 8.7–10.4)
CHLORIDE SERPL-SCNC: 108 MMOL/L (ref 99–109)
CO2 SERPL-SCNC: 29 MMOL/L (ref 20–31)
CREAT BLD-MCNC: 1.2 MG/DL (ref 0.6–1.3)
GFR SERPL CREATININE-BSD FRML MDRD: 58 ML/MIN/1.73
GLUCOSE BLD-MCNC: 83 MG/DL (ref 70–100)
POTASSIUM BLD-SCNC: 4.5 MMOL/L (ref 3.5–5.5)
SODIUM BLD-SCNC: 143 MMOL/L (ref 132–146)

## 2017-10-16 PROCEDURE — 80048 BASIC METABOLIC PNL TOTAL CA: CPT | Performed by: INTERNAL MEDICINE

## 2017-10-16 NOTE — PROGRESS NOTES
Kidney function back down close to normal; drink plenty of water; no further eval needed at this time

## 2017-12-02 DIAGNOSIS — E78.2 MIXED HYPERLIPIDEMIA: ICD-10-CM

## 2017-12-02 RX ORDER — ATORVASTATIN CALCIUM 20 MG/1
TABLET, FILM COATED ORAL
Qty: 90 TABLET | Refills: 2 | Status: SHIPPED | OUTPATIENT
Start: 2017-12-02 | End: 2017-12-22 | Stop reason: SDUPTHER

## 2017-12-05 DIAGNOSIS — K21.9 GASTROESOPHAGEAL REFLUX DISEASE WITHOUT ESOPHAGITIS: ICD-10-CM

## 2017-12-05 DIAGNOSIS — I10 ESSENTIAL HYPERTENSION: ICD-10-CM

## 2017-12-05 DIAGNOSIS — N40.1 BENIGN NON-NODULAR PROSTATIC HYPERPLASIA WITH LOWER URINARY TRACT SYMPTOMS: ICD-10-CM

## 2017-12-05 RX ORDER — AMLODIPINE BESYLATE 2.5 MG/1
TABLET ORAL
Qty: 90 TABLET | Refills: 1 | Status: SHIPPED | OUTPATIENT
Start: 2017-12-05 | End: 2018-06-26 | Stop reason: ALTCHOICE

## 2017-12-05 RX ORDER — OMEPRAZOLE 20 MG/1
CAPSULE, DELAYED RELEASE ORAL
Qty: 90 CAPSULE | Refills: 1 | Status: SHIPPED | OUTPATIENT
Start: 2017-12-05 | End: 2017-12-22 | Stop reason: SDUPTHER

## 2017-12-05 RX ORDER — TAMSULOSIN HYDROCHLORIDE 0.4 MG/1
CAPSULE ORAL
Qty: 90 CAPSULE | Refills: 1 | Status: SHIPPED | OUTPATIENT
Start: 2017-12-05 | End: 2017-12-22 | Stop reason: SDUPTHER

## 2017-12-18 ENCOUNTER — LAB (OUTPATIENT)
Dept: INTERNAL MEDICINE | Facility: CLINIC | Age: 82
End: 2017-12-18

## 2017-12-18 ENCOUNTER — TELEPHONE (OUTPATIENT)
Dept: INTERNAL MEDICINE | Facility: CLINIC | Age: 82
End: 2017-12-18

## 2017-12-18 DIAGNOSIS — M10.042 ACUTE IDIOPATHIC GOUT OF LEFT HAND: ICD-10-CM

## 2017-12-18 DIAGNOSIS — E11.42 TYPE 2 DIABETES MELLITUS WITH DIABETIC POLYNEUROPATHY, WITHOUT LONG-TERM CURRENT USE OF INSULIN (HCC): ICD-10-CM

## 2017-12-18 DIAGNOSIS — E78.2 MIXED HYPERLIPIDEMIA: ICD-10-CM

## 2017-12-18 DIAGNOSIS — N18.2 CHRONIC KIDNEY DISEASE, STAGE II (MILD): Primary | ICD-10-CM

## 2017-12-18 DIAGNOSIS — E03.9 ACQUIRED HYPOTHYROIDISM: ICD-10-CM

## 2017-12-18 DIAGNOSIS — I10 ESSENTIAL HYPERTENSION: ICD-10-CM

## 2017-12-18 DIAGNOSIS — R80.9 MICROALBUMINURIA: ICD-10-CM

## 2017-12-18 DIAGNOSIS — R06.02 SHORTNESS OF BREATH: ICD-10-CM

## 2017-12-18 DIAGNOSIS — Z79.899 HIGH RISK MEDICATION USE: ICD-10-CM

## 2017-12-18 LAB
ALBUMIN SERPL-MCNC: 4 G/DL (ref 3.2–4.8)
ALBUMIN/GLOB SERPL: 1.7 G/DL (ref 1.5–2.5)
ALP SERPL-CCNC: 77 U/L (ref 25–100)
ALT SERPL W P-5'-P-CCNC: 12 U/L (ref 7–40)
ANION GAP SERPL CALCULATED.3IONS-SCNC: 9 MMOL/L (ref 3–11)
ARTICHOKE IGE QN: 62 MG/DL (ref 0–130)
AST SERPL-CCNC: 15 U/L (ref 0–33)
BASOPHILS # BLD AUTO: 0.03 10*3/MM3 (ref 0–0.2)
BASOPHILS NFR BLD AUTO: 0.5 % (ref 0–1)
BILIRUB SERPL-MCNC: 0.5 MG/DL (ref 0.3–1.2)
BILIRUB UR QL STRIP: NEGATIVE
BUN BLD-MCNC: 15 MG/DL (ref 9–23)
BUN/CREAT SERPL: 13.6 (ref 7–25)
CALCIUM SPEC-SCNC: 8.5 MG/DL (ref 8.7–10.4)
CHLORIDE SERPL-SCNC: 103 MMOL/L (ref 99–109)
CHOLEST SERPL-MCNC: 116 MG/DL (ref 0–200)
CK SERPL-CCNC: 55 U/L (ref 26–174)
CLARITY UR: CLEAR
CO2 SERPL-SCNC: 29 MMOL/L (ref 20–31)
COLOR UR: YELLOW
CREAT BLD-MCNC: 1.1 MG/DL (ref 0.6–1.3)
DEPRECATED RDW RBC AUTO: 50.8 FL (ref 37–54)
EOSINOPHIL # BLD AUTO: 0.26 10*3/MM3 (ref 0–0.3)
EOSINOPHIL NFR BLD AUTO: 4.3 % (ref 0–3)
ERYTHROCYTE [DISTWIDTH] IN BLOOD BY AUTOMATED COUNT: 14.4 % (ref 11.3–14.5)
GFR SERPL CREATININE-BSD FRML MDRD: 64 ML/MIN/1.73
GLOBULIN UR ELPH-MCNC: 2.4 GM/DL
GLUCOSE BLD-MCNC: 112 MG/DL (ref 70–100)
GLUCOSE UR STRIP-MCNC: NEGATIVE MG/DL
HBA1C MFR BLD: 6.1 % (ref 4.8–5.6)
HCT VFR BLD AUTO: 43.3 % (ref 38.9–50.9)
HDLC SERPL-MCNC: 41 MG/DL (ref 40–60)
HGB BLD-MCNC: 13.6 G/DL (ref 13.1–17.5)
HGB UR QL STRIP.AUTO: NEGATIVE
IMM GRANULOCYTES # BLD: 0.02 10*3/MM3 (ref 0–0.03)
IMM GRANULOCYTES NFR BLD: 0.3 % (ref 0–0.6)
KETONES UR QL STRIP: NEGATIVE
LEUKOCYTE ESTERASE UR QL STRIP.AUTO: NEGATIVE
LYMPHOCYTES # BLD AUTO: 1.16 10*3/MM3 (ref 0.6–4.8)
LYMPHOCYTES NFR BLD AUTO: 19.3 % (ref 24–44)
MCH RBC QN AUTO: 30 PG (ref 27–31)
MCHC RBC AUTO-ENTMCNC: 31.4 G/DL (ref 32–36)
MCV RBC AUTO: 95.6 FL (ref 80–99)
MONOCYTES # BLD AUTO: 0.55 10*3/MM3 (ref 0–1)
MONOCYTES NFR BLD AUTO: 9.2 % (ref 0–12)
NEUTROPHILS # BLD AUTO: 3.98 10*3/MM3 (ref 1.5–8.3)
NEUTROPHILS NFR BLD AUTO: 66.4 % (ref 41–71)
NITRITE UR QL STRIP: NEGATIVE
PH UR STRIP.AUTO: 7 [PH] (ref 5–8)
PLATELET # BLD AUTO: 183 10*3/MM3 (ref 150–450)
PMV BLD AUTO: 10.9 FL (ref 6–12)
POTASSIUM BLD-SCNC: 4.5 MMOL/L (ref 3.5–5.5)
PROT SERPL-MCNC: 6.4 G/DL (ref 5.7–8.2)
PROT UR QL STRIP: ABNORMAL
RBC # BLD AUTO: 4.53 10*6/MM3 (ref 4.2–5.76)
SODIUM BLD-SCNC: 141 MMOL/L (ref 132–146)
SP GR UR STRIP: 1.02 (ref 1–1.03)
T4 FREE SERPL-MCNC: 1.1 NG/DL (ref 0.89–1.76)
TRIGL SERPL-MCNC: 98 MG/DL (ref 0–150)
TSH SERPL DL<=0.05 MIU/L-ACNC: 2.29 MIU/ML (ref 0.35–5.35)
URATE SERPL-MCNC: 6.1 MG/DL (ref 3.7–9.2)
UROBILINOGEN UR QL STRIP: ABNORMAL
WBC NRBC COR # BLD: 6 10*3/MM3 (ref 3.5–10.8)

## 2017-12-18 PROCEDURE — 81003 URINALYSIS AUTO W/O SCOPE: CPT | Performed by: INTERNAL MEDICINE

## 2017-12-18 PROCEDURE — 83036 HEMOGLOBIN GLYCOSYLATED A1C: CPT | Performed by: INTERNAL MEDICINE

## 2017-12-18 PROCEDURE — 85025 COMPLETE CBC W/AUTO DIFF WBC: CPT | Performed by: INTERNAL MEDICINE

## 2017-12-18 PROCEDURE — 82550 ASSAY OF CK (CPK): CPT | Performed by: INTERNAL MEDICINE

## 2017-12-18 PROCEDURE — 84550 ASSAY OF BLOOD/URIC ACID: CPT | Performed by: INTERNAL MEDICINE

## 2017-12-18 PROCEDURE — 84439 ASSAY OF FREE THYROXINE: CPT | Performed by: INTERNAL MEDICINE

## 2017-12-18 PROCEDURE — 82043 UR ALBUMIN QUANTITATIVE: CPT | Performed by: INTERNAL MEDICINE

## 2017-12-18 PROCEDURE — 84443 ASSAY THYROID STIM HORMONE: CPT | Performed by: INTERNAL MEDICINE

## 2017-12-18 PROCEDURE — 80053 COMPREHEN METABOLIC PANEL: CPT | Performed by: INTERNAL MEDICINE

## 2017-12-18 PROCEDURE — 80061 LIPID PANEL: CPT | Performed by: INTERNAL MEDICINE

## 2017-12-18 PROCEDURE — 82570 ASSAY OF URINE CREATININE: CPT | Performed by: INTERNAL MEDICINE

## 2017-12-18 NOTE — TELEPHONE ENCOUNTER
----- Message from Delilah Tatum MD sent at 12/3/2017 10:14 PM EST -----  Regarding: zolpidem 5mg  Yes - see 7/11/17 note.  Patient stated he was only taking 1/2 tab zolpidem at nighttime, therefore only 5mg QHS, therefore we changed the RX to zolpidem 5mg QHS to accurately reflect what he was taking in addition to taking away the hassle of cutting pills in half.      ----- Message -----     From: Felicity Sandoval MA     Sent: 12/1/2017   1:08 PM       To: Delliah Tatum MD    Script written on 10/10/17 for Ambien was written for 5mg qhs, he has always taken 10mg. Was this intentionally changed?     His wife said she would have reported this earlier but the pills were sent from mail order and could not be returned. He has been taking 2 tablets so he is almost out.

## 2017-12-19 LAB
CREAT 24H UR-MCNC: 94.6 MG/DL
MICROALBUMIN UR-MCNC: 90.7 UG/ML
MICROALBUMIN/CREAT UR: 95.9 MG/G CREAT (ref 0–30)

## 2017-12-22 ENCOUNTER — OFFICE VISIT (OUTPATIENT)
Dept: INTERNAL MEDICINE | Facility: CLINIC | Age: 82
End: 2017-12-22

## 2017-12-22 VITALS
DIASTOLIC BLOOD PRESSURE: 68 MMHG | BODY MASS INDEX: 28.91 KG/M2 | HEART RATE: 68 BPM | WEIGHT: 184.2 LBS | SYSTOLIC BLOOD PRESSURE: 128 MMHG | OXYGEN SATURATION: 94 % | HEIGHT: 67 IN

## 2017-12-22 DIAGNOSIS — Z00.00 MEDICARE ANNUAL WELLNESS VISIT, SUBSEQUENT: Primary | ICD-10-CM

## 2017-12-22 DIAGNOSIS — E03.9 ACQUIRED HYPOTHYROIDISM: ICD-10-CM

## 2017-12-22 DIAGNOSIS — M10.042 ACUTE IDIOPATHIC GOUT OF LEFT HAND: ICD-10-CM

## 2017-12-22 DIAGNOSIS — E78.2 MIXED HYPERLIPIDEMIA: ICD-10-CM

## 2017-12-22 DIAGNOSIS — N40.1 BENIGN NON-NODULAR PROSTATIC HYPERPLASIA WITH LOWER URINARY TRACT SYMPTOMS: ICD-10-CM

## 2017-12-22 DIAGNOSIS — N18.2 CHRONIC KIDNEY DISEASE, STAGE II (MILD): ICD-10-CM

## 2017-12-22 DIAGNOSIS — E11.42 TYPE 2 DIABETES MELLITUS WITH DIABETIC POLYNEUROPATHY, WITHOUT LONG-TERM CURRENT USE OF INSULIN (HCC): ICD-10-CM

## 2017-12-22 DIAGNOSIS — I10 ESSENTIAL HYPERTENSION: ICD-10-CM

## 2017-12-22 DIAGNOSIS — K21.9 GASTROESOPHAGEAL REFLUX DISEASE WITHOUT ESOPHAGITIS: ICD-10-CM

## 2017-12-22 DIAGNOSIS — G47.00 INSOMNIA, UNSPECIFIED TYPE: ICD-10-CM

## 2017-12-22 PROCEDURE — G0444 DEPRESSION SCREEN ANNUAL: HCPCS | Performed by: INTERNAL MEDICINE

## 2017-12-22 PROCEDURE — 93000 ELECTROCARDIOGRAM COMPLETE: CPT | Performed by: INTERNAL MEDICINE

## 2017-12-22 PROCEDURE — G0439 PPPS, SUBSEQ VISIT: HCPCS | Performed by: INTERNAL MEDICINE

## 2017-12-22 PROCEDURE — 99214 OFFICE O/P EST MOD 30 MIN: CPT | Performed by: INTERNAL MEDICINE

## 2017-12-22 RX ORDER — OMEPRAZOLE 20 MG/1
20 CAPSULE, DELAYED RELEASE ORAL DAILY
Qty: 90 CAPSULE | Refills: 3 | Status: SHIPPED | OUTPATIENT
Start: 2017-12-22 | End: 2019-01-03 | Stop reason: SDUPTHER

## 2017-12-22 RX ORDER — TAMSULOSIN HYDROCHLORIDE 0.4 MG/1
1 CAPSULE ORAL DAILY
Qty: 90 CAPSULE | Refills: 3 | Status: SHIPPED | OUTPATIENT
Start: 2017-12-22 | End: 2019-01-03 | Stop reason: SDUPTHER

## 2017-12-22 RX ORDER — LISINOPRIL 40 MG/1
40 TABLET ORAL DAILY
Qty: 90 TABLET | Refills: 3 | Status: SHIPPED | OUTPATIENT
Start: 2017-12-22 | End: 2018-12-17 | Stop reason: SDUPTHER

## 2017-12-22 RX ORDER — LEVOTHYROXINE SODIUM 0.05 MG/1
50 TABLET ORAL DAILY
Qty: 90 TABLET | Refills: 3 | Status: SHIPPED | OUTPATIENT
Start: 2017-12-22 | End: 2019-01-03 | Stop reason: SDUPTHER

## 2017-12-22 RX ORDER — ZOLPIDEM TARTRATE 10 MG/1
10 TABLET ORAL NIGHTLY
Qty: 90 TABLET | Refills: 0 | Status: SHIPPED | OUTPATIENT
Start: 2017-12-22 | End: 2018-04-10 | Stop reason: SDUPTHER

## 2017-12-22 RX ORDER — LISINOPRIL 40 MG/1
40 TABLET ORAL DAILY
Qty: 90 TABLET | Refills: 3 | Status: SHIPPED | OUTPATIENT
Start: 2017-12-22 | End: 2017-12-22 | Stop reason: SDUPTHER

## 2017-12-22 RX ORDER — ATORVASTATIN CALCIUM 20 MG/1
20 TABLET, FILM COATED ORAL DAILY
Qty: 90 TABLET | Refills: 3 | Status: SHIPPED | OUTPATIENT
Start: 2017-12-22 | End: 2019-01-03 | Stop reason: SDUPTHER

## 2017-12-22 RX ORDER — PREGABALIN 75 MG/1
75 CAPSULE ORAL 2 TIMES DAILY
Qty: 180 CAPSULE | Refills: 0 | Status: SHIPPED | OUTPATIENT
Start: 2017-12-22 | End: 2018-02-15 | Stop reason: SDUPTHER

## 2017-12-22 RX ORDER — LEVOTHYROXINE SODIUM 0.05 MG/1
50 TABLET ORAL DAILY
Qty: 90 TABLET | Refills: 1 | Status: SHIPPED | OUTPATIENT
Start: 2017-12-22 | End: 2017-12-22 | Stop reason: SDUPTHER

## 2017-12-22 NOTE — ASSESSMENT & PLAN NOTE
BG control stable with A1C 6.1; encouraged reg phys activity to decr insulin resistance, moderation in unhealthy starches/sweets; f/u A1C in 4 mos    Judicious and appropriate use of lyrica 75mg BID #180, 0RF (exception for 90d dur to insurance).  LETHA was reviewed and appropriate.  The patient has read and signed the [facility] Controlled Substance Contract 10/17. Patient has been counseled and is aware of side effects, risks, potential for addiction/tolerance, interactions, and how to take medication correctly.

## 2017-12-22 NOTE — ASSESSMENT & PLAN NOTE
Judicious and appropriate use of zolpidem back to 10mg qhs prn #90, 0RF (exception made for 90d due to insurance).  LETHA was reviewed and appropriate.  The patient has read and signed the [facility] Controlled Substance Contract 10/17. Patient has been counseled and is aware of side effects, risks, potential for addiction/tolerance, interactions, and how to take medication correctly.

## 2017-12-22 NOTE — ASSESSMENT & PLAN NOTE
Health maintenance - flu vacc 9/17; Prevnar 12/15, PVX 12/14, Tdap 10/11, ZOstavax 9/09; colonosc 1/115, repeat 2020 per Dr. Holland; no further prostate CA screening with age/comorbidities, confirmed with patient; eye exam 2017 with Dr. Quick - will request records again; dental exam done 12/17; (+) seat belt use    Consultants:  Patient Care Team:  Delilah Tatum MD as PCP - General  Burton Holland MD as Consulting Physician (Colon and Rectal Surgery)  Douglas Lazaro MD as Consulting Physician (General Surgery)  Rubio Rossi MD as Consulting Physician (Dermatology)  Douglas Brooks MD as Consulting Physician (Cardiology)  Jim Black MD as Consulting Physician (Cardiothoracic Surgery)  Yusef Bowles MD as Consulting Physician (Nephrology)  Tio Mcnally MD (Inactive) as Consulting Physician (Hand Surgery)  Marisol Easley MD as Consulting Physician (Cardiology)

## 2017-12-22 NOTE — PROGRESS NOTES
Subsequent Medicare Wellness Visit    ANNUAL WELLNESS VISIT    DRUG AND ALCOHOL USE      no alcohol use and no tobacco use    DIET AND PHYSICAL ACTIVITY     Diet: general and low sodium    Exercise: will walk more when gets down to [place]   Exercise Details: walking    MOOD DISORDER AND COGNITIVE SCREENING   Depression Screening Tool Used yes - see PHQ-9   Anxiety Screening Tool Used yes     Mini-Cog Performed   Yes    1. Tell Patient 3 Words apple, ofelia, watch    2. Administer Clock Test normal    3. Recall 3 words  apple, ofelia, watch    4. Number Correct Items 3    FUNCTIONAL ABILITY AND LEVEL OF SAFETY   Hearing no hearing loss     Wears Hearing Aids No       Current Activities Independent      none  - see Funct/Cog Status Intake     Fall Risk Assessment       Has difficulty with walking or balance  No         Timed Up and Go (TUG) Test  5 sec.       If >12 sec, normal    ADVANCED DIRECTIVE has an advance directive - a copy has been provided and is in file    PAIN SCREENING Do you have pain right now? no    Recent Hospitalizations:  No hospitalization(s) within the last year..     MEDICATION REVIEW   - updated and reviewed (see Medication List).   - reviewed for potentially harmful drug-disease interactions in the elderly.   - reviewed for high risk medications in the elderly.   - aspirin use: Yes, takes 81mg QD ASA    BMI  Body mass index is 28.85 kg/(m^2).    Patient's BMI is above normal parameters. Follow-up plan includes:  no follow-up required.    ___________________________________________________  CC: annual wellness visit.    HPI: pleasant 84yo  gentleman, accompanied by his wife, presents for updated wellness visit. Feels well overall without complaints.  States has been taking extra 5mg pills of zolpidem due to poor sleep.  Admits to doing some reading in bed prior to sleep.  No prior s/e with taking 10mg dose of zolpidem.    Review of Systems   Denies headaches, visual changes, CP, SOB,  "cough, abd pain, n/v/d, difficulty with urination, numbness/tingling, falls, mood changes, lightheadedness, hearing changes, rashes.    ROS (+) for occ palpitations.    ROS (+) for chronic insomnia.    Objective       Physical Exam   Constitutional: He is oriented to person, place, and time. He appears well-developed and well-nourished.   HENT:   Head: Normocephalic.   Right Ear: External ear normal.   Left Ear: External ear normal.   Nose: Nose normal.   Mouth/Throat: Oropharynx is clear and moist and mucous membranes are normal. No oropharyngeal exudate.   Eyes: Conjunctivae and EOM are normal. Pupils are equal, round, and reactive to light.   Wears glasses   Neck: Normal range of motion. Neck supple. Carotid bruit is not present (bilaterally). No thyromegaly present.   Cardiovascular: Normal rate, regular rhythm and normal heart sounds.    Pulmonary/Chest: Effort normal and breath sounds normal. No respiratory distress. He has no wheezes. He has no rales.   Abdominal: Soft. Bowel sounds are normal. He exhibits no distension and no mass. There is no hepatosplenomegaly. There is no tenderness.   Musculoskeletal: Normal range of motion. He exhibits no edema (BLE).   Lymphadenopathy:     He has no cervical adenopathy.   Neurological: He is alert and oriented to person, place, and time. He has normal reflexes. He displays normal reflexes. No cranial nerve deficit.   Skin: Skin is warm and dry. No rash noted.   Psychiatric: He has a normal mood and affect. His behavior is normal.   Nursing note and vitals reviewed.     Vitals:    12/22/17 1123   BP: 128/68   Pulse: 68   SpO2: 94%   Weight: 83.6 kg (184 lb 3.2 oz)   Height: 170.2 cm (67\")     LABS:  Results for orders placed or performed in visit on 12/18/17   Urinalysis With / Microscopic If Indicated - Urine, Clean Catch   Result Value Ref Range    Color, UA Yellow Yellow, Straw    Appearance, UA Clear Clear    pH, UA 7.0 5.0 - 8.0    Specific Gravity, UA 1.016 1.001 " - 1.030    Glucose, UA Negative Negative    Ketones, UA Negative Negative    Bilirubin, UA Negative Negative    Blood, UA Negative Negative    Protein, UA Trace (A) Negative    Leuk Esterase, UA Negative Negative    Nitrite, UA Negative Negative    Urobilinogen, UA 0.2 E.U./dL 0.2 - 1.0 E.U./dL   Microalbumin / Creatinine Urine Ratio - Urine, Clean Catch   Result Value Ref Range    Creatinine, Urine 94.6 Not Estab. mg/dL    Microalbumin, Urine 90.7 Not Estab. ug/mL    Microalbumin/Creatinine Ratio 95.9 (H) 0.0 - 30.0 mg/g creat   Comprehensive metabolic panel   Result Value Ref Range    Glucose 112 (H) 70 - 100 mg/dL    BUN 15 9 - 23 mg/dL    Creatinine 1.10 0.60 - 1.30 mg/dL    Sodium 141 132 - 146 mmol/L    Potassium 4.5 3.5 - 5.5 mmol/L    Chloride 103 99 - 109 mmol/L    CO2 29.0 20.0 - 31.0 mmol/L    Calcium 8.5 (L) 8.7 - 10.4 mg/dL    Total Protein 6.4 5.7 - 8.2 g/dL    Albumin 4.00 3.20 - 4.80 g/dL    ALT (SGPT) 12 7 - 40 U/L    AST (SGOT) 15 0 - 33 U/L    Alkaline Phosphatase 77 25 - 100 U/L    Total Bilirubin 0.5 0.3 - 1.2 mg/dL    eGFR Non African Amer 64 >60 mL/min/1.73    Globulin 2.4 gm/dL    A/G Ratio 1.7 1.5 - 2.5 g/dL    BUN/Creatinine Ratio 13.6 7.0 - 25.0    Anion Gap 9.0 3.0 - 11.0 mmol/L   TSH   Result Value Ref Range    TSH 2.294 0.350 - 5.350 mIU/mL   Lipid panel   Result Value Ref Range    Total Cholesterol 116 0 - 200 mg/dL    Triglycerides 98 0 - 150 mg/dL    HDL Cholesterol 41 40 - 60 mg/dL    LDL Cholesterol  62 0 - 130 mg/dL   Hemoglobin A1c   Result Value Ref Range    Hemoglobin A1C 6.10 (H) 4.80 - 5.60 %   CK   Result Value Ref Range    Creatine Kinase 55 26 - 174 U/L   Uric acid   Result Value Ref Range    Uric Acid 6.1 3.7 - 9.2 mg/dL   T4, free   Result Value Ref Range    Free T4 1.10 0.89 - 1.76 ng/dL   CBC Auto Differential   Result Value Ref Range    WBC 6.00 3.50 - 10.80 10*3/mm3    RBC 4.53 4.20 - 5.76 10*6/mm3    Hemoglobin 13.6 13.1 - 17.5 g/dL    Hematocrit 43.3 38.9 - 50.9 %     MCV 95.6 80.0 - 99.0 fL    MCH 30.0 27.0 - 31.0 pg    MCHC 31.4 (L) 32.0 - 36.0 g/dL    RDW 14.4 11.3 - 14.5 %    RDW-SD 50.8 37.0 - 54.0 fl    MPV 10.9 6.0 - 12.0 fL    Platelets 183 150 - 450 10*3/mm3    Neutrophil % 66.4 41.0 - 71.0 %    Lymphocyte % 19.3 (L) 24.0 - 44.0 %    Monocyte % 9.2 0.0 - 12.0 %    Eosinophil % 4.3 (H) 0.0 - 3.0 %    Basophil % 0.5 0.0 - 1.0 %    Immature Grans % 0.3 0.0 - 0.6 %    Neutrophils, Absolute 3.98 1.50 - 8.30 10*3/mm3    Lymphocytes, Absolute 1.16 0.60 - 4.80 10*3/mm3    Monocytes, Absolute 0.55 0.00 - 1.00 10*3/mm3    Eosinophils, Absolute 0.26 0.00 - 0.30 10*3/mm3    Basophils, Absolute 0.03 0.00 - 0.20 10*3/mm3    Immature Grans, Absolute 0.02 0.00 - 0.03 10*3/mm3       ECG 12 Lead  Date/Time: 12/22/2017 12:45 PM  Performed by: AMAYA BOTELLO  Authorized by: AMAYA BOTELLO   Previous ECG: no previous ECG available  Rhythm: sinus rhythm  Ectopy comments: PACs  Rate: normal  BPM: 71  ST Segments: ST segments normal  T Waves: T waves normal  QRS axis: left  Other findings: PRWP  Clinical impression: non-specific ECG          Problem List Items Addressed This Visit     Chronic kidney disease, stage II (mild)     Stable renal function; GFR 64, Cr 1.1         Diabetes mellitus with periph neuropathy     BG control stable with A1C 6.1; encouraged reg phys activity to decr insulin resistance, moderation in unhealthy starches/sweets; f/u A1C in 4 mos    Judicious and appropriate use of lyrica 75mg BID #180, 0RF (exception for 90d dur to insurance).  LETHA was reviewed and appropriate.  The patient has read and signed the [facility] Controlled Substance Contract 10/17. Patient has been counseled and is aware of side effects, risks, potential for addiction/tolerance, interactions, and how to take medication correctly.              Relevant Medications    pregabalin (LYRICA) 75 MG capsule    BPH    Relevant Medications    tamsulosin (FLOMAX) 0.4 MG capsule 24 hr capsule     Gastroesophageal reflux disease    Relevant Medications    omeprazole (priLOSEC) 20 MG capsule    Hyperlipidemia     Lipids, LFTs, CPK stable on atorvastatin 20mg QHS, refill as needed         Relevant Medications    atorvastatin (LIPITOR) 20 MG tablet    Hypertension     BP and electrolytes stable on lisin 40mg QD #90, 3RF and amlo 2.5mg QD, refill as needed         Relevant Medications    lisinopril (PRINIVIL,ZESTRIL) 40 MG tablet    Other Relevant Orders    ECG 12 Lead    Hypothyroidism     Euthyroid on levothyroxine 50mcg QD, refill as needed         Relevant Medications    levothyroxine (SYNTHROID, LEVOTHROID) 50 MCG tablet    Insomnia     Judicious and appropriate use of zolpidem back to 10mg qhs prn #90, 0RF (exception made for 90d due to insurance).  LETHA was reviewed and appropriate.  The patient has read and signed the [facility] Controlled Substance Contract 10/17. Patient has been counseled and is aware of side effects, risks, potential for addiction/tolerance, interactions, and how to take medication correctly.            Relevant Medications    zolpidem (AMBIEN) 10 MG tablet    Medicare annual wellness visit, subsequent - Primary     Health maintenance - flu vacc 9/17; Prevnar 12/15, PVX 12/14, Tdap 10/11, ZOstavax 9/09; colonosc 1/115, repeat 2020 per Dr. Holland; no further prostate CA screening with age/comorbidities, confirmed with patient; eye exam 2017 with Dr. Quick - will request records again; dental exam done 12/17; (+) seat belt use    Consultants:  Patient Care Team:  Delilah Tatum MD as PCP - General  Burton Holland MD as Consulting Physician (Colon and Rectal Surgery)  Douglas Lazaro MD as Consulting Physician (General Surgery)  Rubio Rossi MD as Consulting Physician (Dermatology)  Douglas Brooks MD as Consulting Physician (Cardiology)  Jim Black MD as Consulting Physician (Cardiothoracic Surgery)  Yusef Bowles MD as Consulting Physician  (Nephrology)  Tio Mcnally MD (Inactive) as Consulting Physician (Hand Surgery)  Marisol Easley MD as Consulting Physician (Cardiology)             Gout     asx with uric 6.1; no meds             FOLLOW-UP  RTC 3 mos (after return from [place]) for zolpidem/lyrica RXs with LETHA and A1C

## 2018-01-14 PROBLEM — H25.013 CORTICAL AGE-RELATED CATARACT OF BOTH EYES: Status: ACTIVE | Noted: 2018-01-14

## 2018-01-21 ENCOUNTER — TELEPHONE (OUTPATIENT)
Dept: INTERNAL MEDICINE | Facility: CLINIC | Age: 83
End: 2018-01-21

## 2018-01-21 NOTE — TELEPHONE ENCOUNTER
----- Message from Felicity Sandoval MA sent at 12/26/2017  7:53 AM EST -----      ----- Message -----     From: Delilah Tatum MD     Sent: 12/22/2017  12:51 PM       To: Felicity Sandoval MA    pls request eye exam report from Dr. Khanna (Dr. Horton's office)

## 2018-02-14 DIAGNOSIS — E11.42 TYPE 2 DIABETES MELLITUS WITH DIABETIC POLYNEUROPATHY, WITHOUT LONG-TERM CURRENT USE OF INSULIN (HCC): ICD-10-CM

## 2018-02-14 RX ORDER — PREGABALIN 75 MG/1
75 CAPSULE ORAL 2 TIMES DAILY
Qty: 180 CAPSULE | Refills: 0 | OUTPATIENT
Start: 2018-02-14

## 2018-02-14 NOTE — TELEPHONE ENCOUNTER
PT IS IN [place] AND [facility] HAS LOST THEIR SCRIPT FOR LYRICA.     PLEASE ADVISE.     PT WOULD LIKE THE SCRIPT MAILED TO THEM IN [place]:   [address] [place] [address]

## 2018-02-15 RX ORDER — PREGABALIN 75 MG/1
75 CAPSULE ORAL 2 TIMES DAILY
Qty: 60 CAPSULE | Refills: 0 | Status: SHIPPED | OUTPATIENT
Start: 2018-02-15 | End: 2018-04-10 | Stop reason: SDUPTHER

## 2018-02-15 RX ORDER — PREGABALIN 75 MG/1
75 CAPSULE ORAL 2 TIMES DAILY
Qty: 180 CAPSULE | Refills: 0 | Status: SHIPPED | OUTPATIENT
Start: 2018-02-15 | End: 2018-02-15 | Stop reason: SDUPTHER

## 2018-02-15 NOTE — ASSESSMENT & PLAN NOTE
2/15/18 ongoing issues as patient's son states Lyrica was lost by [facility]; indicated that already giving him exception for 90d RX; he has been advised to address med loss issue with [facility]; will tide him over with 30d supply and plan on continuing 30d RXs going forward unless issue can be resolved with [facility]; ok'd lyrica 75mg BID #60, 0RF

## 2018-02-15 NOTE — TELEPHONE ENCOUNTER
"The only phone number we have is Pt's home phone, which Michael (Pt's son) answered stating he is the one who called. Informed Michael of Dr. Tatum's message, he became very upset saying \"well he doesn't take it for no reason\", informed Michael that he is not on Pt's FAUSTO and we cannot discuss this. Pt states he has talked to Dr. Tatum numerous times in regards to his fathers care and would like to  a 30 day supply and send it to his father.    In the meantime, Mrs. Hamilton called and spoke with Pilar. She states [facility] gave her a tracking number and they are the ones who lost the rx, not the Pt. Pilar informed Pt and his wife that they need to contact [facility] and track this rx as it will not be replaced.     Dr. Tatum, please advise.   "

## 2018-02-15 NOTE — TELEPHONE ENCOUNTER
Delilah Tatum MD   You 19 hours ago (6:33 PM)                 lyrica refused - already giving him an exception to give him 90d and the contract specifically indicates no exceptions made for lost prescriptions.  This is one of the reasons it is refilled on a month-to-month basis.  The only way I will be able to refill this is to go back to month-to-month prescription.  Please apologize for this but it is a controlled medications and the rules are for month-to-month prescriptions (Routing comment)

## 2018-04-10 ENCOUNTER — OFFICE VISIT (OUTPATIENT)
Dept: INTERNAL MEDICINE | Facility: CLINIC | Age: 83
End: 2018-04-10

## 2018-04-10 VITALS
DIASTOLIC BLOOD PRESSURE: 70 MMHG | BODY MASS INDEX: 29.13 KG/M2 | HEART RATE: 76 BPM | RESPIRATION RATE: 16 BRPM | SYSTOLIC BLOOD PRESSURE: 124 MMHG | WEIGHT: 186 LBS

## 2018-04-10 DIAGNOSIS — I10 ESSENTIAL HYPERTENSION: ICD-10-CM

## 2018-04-10 DIAGNOSIS — E11.42 TYPE 2 DIABETES MELLITUS WITH DIABETIC POLYNEUROPATHY, WITHOUT LONG-TERM CURRENT USE OF INSULIN (HCC): ICD-10-CM

## 2018-04-10 DIAGNOSIS — G47.00 INSOMNIA, UNSPECIFIED TYPE: Primary | ICD-10-CM

## 2018-04-10 LAB — HBA1C MFR BLD: 6.1 %

## 2018-04-10 PROCEDURE — 99214 OFFICE O/P EST MOD 30 MIN: CPT | Performed by: INTERNAL MEDICINE

## 2018-04-10 PROCEDURE — 83036 HEMOGLOBIN GLYCOSYLATED A1C: CPT | Performed by: INTERNAL MEDICINE

## 2018-04-10 RX ORDER — PREGABALIN 75 MG/1
75 CAPSULE ORAL 2 TIMES DAILY
Qty: 180 CAPSULE | Refills: 0 | Status: SHIPPED | OUTPATIENT
Start: 2018-04-10 | End: 2018-06-26 | Stop reason: SDUPTHER

## 2018-04-10 RX ORDER — CLOPIDOGREL BISULFATE 75 MG/1
TABLET ORAL
COMMUNITY
Start: 2018-04-05 | End: 2021-06-24

## 2018-04-10 RX ORDER — ZOLPIDEM TARTRATE 10 MG/1
10 TABLET ORAL NIGHTLY
Qty: 90 TABLET | Refills: 0 | Status: SHIPPED | OUTPATIENT
Start: 2018-04-10 | End: 2018-06-26 | Stop reason: SDUPTHER

## 2018-04-10 RX ORDER — LANCETS 30 GAUGE
1 EACH MISCELLANEOUS DAILY
Qty: 100 EACH | Refills: 3 | Status: SHIPPED | OUTPATIENT
Start: 2018-04-10 | End: 2022-04-19

## 2018-04-10 NOTE — ASSESSMENT & PLAN NOTE
Judicious and appropriate use of zolpdeim 10mg qhs prn #90, 0RF (Exception given for 90d due to insurance but no other exceptions).  LETHA was reviewed and appropriate.  The patient has read and signed the [facility] Controlled Substance Contract 10/17. Patient has been counseled and is aware of side effects, risks, potential for addiction/tolerance, interactions, and how to take medication correctly.  RTC 3 mos for next RX

## 2018-04-10 NOTE — PROGRESS NOTES
Chief Complaint   Patient presents with   • Hypertension     fu       History of Present Illness  84 y.o.  gentleman presents for zolpidem and lyrica refills as well as DM and BP follow-up.    Review of Systems  Denies CP, palpitations, SOB, headaches, falls.  All other ROS reviewed and negative.    [facility]  The following portions of the patient's history were reviewed and updated as appropriate: allergies, current medications, past family history, past medical history, past social history, past surgical history and problem list.      Current Outpatient Prescriptions:   •  amLODIPine (NORVASC) 2.5 MG tablet, TAKE 1 TABLET DAILY, Disp: 90 tablet, Rfl: 1  •  aspirin 81 MG tablet, Take 1 tablet by mouth daily., Disp: , Rfl:   •  atorvastatin (LIPITOR) 20 MG tablet, Take 1 tablet by mouth Daily., Disp: 90 tablet, Rfl: 3  •  clopidogrel (PLAVIX) 75 MG tablet, , Disp: , Rfl:   •  desmopressin (DDAVP) 0.2 MG tablet, Take 1 tablet by mouth every night., Disp: , Rfl:   •  fluocinonide (LIDEX) 0.05 % external solution, , Disp: , Rfl:   •  glucose blood (FREESTYLE TEST STRIPS) test strip, 1 each by Other route Daily., Disp: 100 each, Rfl: 3  •  ketoconazole (NIZORAL) 2 % shampoo, , Disp: , Rfl:   •  Lancets misc, 1 Units Daily. Use as directed, Disp: 100 each, Rfl: 3  •  levothyroxine (SYNTHROID, LEVOTHROID) 50 MCG tablet, Take 1 tablet by mouth Daily., Disp: 90 tablet, Rfl: 3  •  lisinopril (PRINIVIL,ZESTRIL) 40 MG tablet, Take 1 tablet by mouth Daily., Disp: 90 tablet, Rfl: 3  •  Misc Natural Products (GLUCOSAMINE CHONDROITIN ADV) tablet, Take  by mouth., Disp: , Rfl:   •  nabumetone (RELAFEN) 750 MG tablet, Take 1 tablet by mouth every 12 (twelve) hours., Disp: , Rfl:   •  omeprazole (priLOSEC) 20 MG capsule, Take 1 capsule by mouth Daily., Disp: 90 capsule, Rfl: 3  •  pregabalin (LYRICA) 75 MG capsule, Take 1 capsule by mouth 2 (Two) Times a Day., Disp: 180 capsule, Rfl: 0  •  tamsulosin (FLOMAX) 0.4 MG capsule 24  hr capsule, Take 1 capsule by mouth Daily., Disp: 90 capsule, Rfl: 3  •  Umeclidinium-Vilanterol 62.5-25 MCG/INH aerosol powder , Inhale 1 puff daily., Disp: 1 each, Rfl: 5  •  zolpidem (AMBIEN) 10 MG tablet, Take 1 tablet by mouth Every Night., Disp: 90 tablet, Rfl: 0    VITALS:  /70 (BP Location: Right arm, Patient Position: Sitting)   Pulse 76   Resp 16   Wt 84.4 kg (186 lb)   BMI 29.13 kg/m²     Physical Exam   Constitutional: He is oriented to person, place, and time. He appears well-developed and well-nourished.   Eyes: Conjunctivae and EOM are normal.   Wears glasses   Cardiovascular: Normal rate, regular rhythm and normal heart sounds.    Pulmonary/Chest: Effort normal and breath sounds normal.   Abdominal: Soft. Bowel sounds are normal.   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   Neurological: He is alert and oriented to person, place, and time.   Psychiatric: He has a normal mood and affect. His behavior is normal.   Nursing note and vitals reviewed.      LABS    12/17 A1C 6.1    ASSESSMENT/PLAN  Problem List Items Addressed This Visit     Diabetes mellitus with periph neuropathy     BG control stable with A1C 6.1; on no DM meds    Judicious and appropriate use of lyrica 75mg BID #180, 0RF (exception made for 90d due to insurance but no other exceptions allowed).  LETHA was reviewed and appropriate.  The patient has read and signed the [facility] Controlled Substance Contract 9/17. Patient has been counseled and is aware of side effects, risks, potential for addiction/tolerance, interactions, and how to take medication correctly.  RTC 3 mos for next RX           Relevant Medications    pregabalin (LYRICA) 75 MG capsule    glucose blood (FREESTYLE TEST STRIPS) test strip    Lancets misc    Other Relevant Orders    POC Glycosylated Hemoglobin (Hb A1C) (Completed)    Hypertension     BP  remains stable; on amlo 2.5mg QD and lisin 40mg QD         Insomnia - Primary     Judicious and appropriate use of zolpdeim 10mg qhs prn #90, 0RF (Exception given for 90d due to insurance but no other exceptions).  LETHA was reviewed and appropriate.  The patient has read and signed the [facility] Controlled Substance Contract 10/17. Patient has been counseled and is aware of side effects, risks, potential for addiction/tolerance, interactions, and how to take medication correctly.  RTC 3 mos for next RX           Relevant Medications    zolpidem (AMBIEN) 10 MG tablet      Other Visit Diagnoses    None.         FOLLOW-UP  RTC 3 mos for zolpidem/lyrica RXs with LETHA (contract 10/17)    Electronically signed by:    Delilah Tatum MD  04/10/2018

## 2018-04-10 NOTE — ASSESSMENT & PLAN NOTE
BG control stable with A1C 6.1; on no DM meds    Judicious and appropriate use of lyrica 75mg BID #180, 0RF (exception made for 90d due to insurance but no other exceptions allowed).  LETHA was reviewed and appropriate.  The patient has read and signed the [facility] Controlled Substance Contract 9/17. Patient has been counseled and is aware of side effects, risks, potential for addiction/tolerance, interactions, and how to take medication correctly.  RTC 3 mos for next RX

## 2018-06-03 PROBLEM — L21.9 SEBORRHEIC DERMATITIS OF SCALP: Status: ACTIVE | Noted: 2018-05-21

## 2018-06-26 ENCOUNTER — OFFICE VISIT (OUTPATIENT)
Dept: INTERNAL MEDICINE | Facility: CLINIC | Age: 83
End: 2018-06-26

## 2018-06-26 ENCOUNTER — TELEPHONE (OUTPATIENT)
Dept: INTERNAL MEDICINE | Facility: CLINIC | Age: 83
End: 2018-06-26

## 2018-06-26 VITALS
DIASTOLIC BLOOD PRESSURE: 70 MMHG | SYSTOLIC BLOOD PRESSURE: 162 MMHG | BODY MASS INDEX: 28.04 KG/M2 | HEART RATE: 70 BPM | RESPIRATION RATE: 18 BRPM | WEIGHT: 179 LBS

## 2018-06-26 DIAGNOSIS — I10 ESSENTIAL HYPERTENSION: ICD-10-CM

## 2018-06-26 DIAGNOSIS — G47.00 INSOMNIA, UNSPECIFIED TYPE: Primary | ICD-10-CM

## 2018-06-26 DIAGNOSIS — E11.42 TYPE 2 DIABETES MELLITUS WITH DIABETIC POLYNEUROPATHY, WITHOUT LONG-TERM CURRENT USE OF INSULIN (HCC): ICD-10-CM

## 2018-06-26 LAB — HBA1C MFR BLD: 6 %

## 2018-06-26 PROCEDURE — 83036 HEMOGLOBIN GLYCOSYLATED A1C: CPT | Performed by: INTERNAL MEDICINE

## 2018-06-26 PROCEDURE — 99214 OFFICE O/P EST MOD 30 MIN: CPT | Performed by: INTERNAL MEDICINE

## 2018-06-26 RX ORDER — AMLODIPINE BESYLATE 5 MG/1
5 TABLET ORAL DAILY
Qty: 90 TABLET | Refills: 3 | Status: SHIPPED | OUTPATIENT
Start: 2018-06-26 | End: 2019-02-28 | Stop reason: SDUPTHER

## 2018-06-26 RX ORDER — ZOLPIDEM TARTRATE 10 MG/1
10 TABLET ORAL NIGHTLY
Qty: 90 TABLET | Refills: 0 | Status: SHIPPED | OUTPATIENT
Start: 2018-06-26 | End: 2018-10-01 | Stop reason: SDUPTHER

## 2018-06-26 RX ORDER — PREGABALIN 75 MG/1
75 CAPSULE ORAL 2 TIMES DAILY
Qty: 180 CAPSULE | Refills: 0 | Status: SHIPPED | OUTPATIENT
Start: 2018-06-26 | End: 2018-10-01 | Stop reason: SDUPTHER

## 2018-06-26 NOTE — ASSESSMENT & PLAN NOTE
Judicious and appropriate use of zolpidem 10mg qhs prn #90, 0RF (exception for 90d made due to insurance).  LETHA was reviewed and appropriate.  The patient has read and signed the [facility] Controlled Substance Contract 10/17. Patient has been counseled and is aware of side effects, risks, potential for addiction/tolerance, interactions, and how to take medication correctly.  RTC 3 mos for next RX with contract

## 2018-06-26 NOTE — TELEPHONE ENCOUNTER
The pt was seen in the office today 06/26 and the check out comment stated for the pt to return in 6 months for a wellness visit. The next available around the time frame was early January. However, the patient will be leaving for [place] at the beginning of January and will be gone until March. Pt wanted to see if he could be worked in at a earlier time than January upon Dr. Tatum's recommendation. Best call back # [phone]

## 2018-06-26 NOTE — ASSESSMENT & PLAN NOTE
BG control stable with A1C 6.0; no DM meds; encouraged reg phys activity to decr insulin resistance, moderation in unhealthy starches/sweets; f/u A1C in 6 mos    Judicious and appropriate use of lyrica 75mg BID #180, 0RF (exception for 90d made due to insurance)..  LETHA was reviewed and appropriate.  The patient has read and signed the [facility] Controlled Substance Contract 10/17. Patient has been counseled and is aware of side effects, risks, potential for addiction/tolerance, interactions, and how to take medication correctly.  RTC 3 mos for next RX with contract

## 2018-06-26 NOTE — ASSESSMENT & PLAN NOTE
BP elevated today with goal > 130/80; on lisin 40mg QD and will incr amlo to 5mg QD #90, 3RF; rec low Na diet, increased aerobic exercise; home BP monitoring and f/u in 1 month (do not rec prn clonidine)

## 2018-06-26 NOTE — TELEPHONE ENCOUNTER
Recommend to patient that it'd have to be the 1st week of Jan because between MCR requirements and his requirements, it'd have to be between xmas and New year's and I'm out of the office that week only.    See if he can do 1/3/18 or 1/4/18 before he leaves for [place]; thanks

## 2018-06-26 NOTE — PROGRESS NOTES
"Chief Complaint   Patient presents with   • Med Refill     ambien and lyrica       History of Present Illness  84 y.o.  gentleman presents for zolpidem/lyrica refills.  States stable without side effects or concerns.      Notes labile BPs, usually 120/70s, but occ spiking. States can feel it when BP is elevated and notes previous prn clonidine for spikes.    Review of Systems  ROS (+) for chronic insomnia.  ROS (+) for occ headaches with elevated BPs.  ROS (+) for leg pains with walking; has had current \"balloons\" in his legs for circulations.  Reports ongoing evaluation/treatment. All other ROS reviewed and negative.    [facility]  The following portions of the patient's history were reviewed and updated as appropriate: allergies, current medications, past family history, past medical history, past social history, past surgical history and problem list.      Current Outpatient Prescriptions:   •  amLODIPine (NORVASC) 2.5 MG tablet, QD  •  aspirin 81 MG tablet, QD  •  atorvastatin (LIPITOR) 20 MG tablet, QD  •  clopidogrel (PLAVIX) 75 MG tablet, ,QD  •  desmopressin (DDAVP) 0.2 MG tablet, QHS  •  fluocinonide (LIDEX) 0.05 % external solution, AD  •  glucose blood (FREESTYLE TEST STRIPS) test strip, 1QD  •  ketoconazole (NIZORAL) 2 % shampoo, QD  •  levothyroxine (SYNTHROID, LEVOTHROID) 50 MCG tablet, QD  •  lisinopril (PRINIVIL,ZESTRIL) 40 MG tablet, QD  •  Misc Natural Products (GLUCOSAMINE CHONDROITIN ADV) tablet, QD  •  nabumetone (RELAFEN) 750 MG tablet, q12  •  omeprazole (priLOSEC) 20 MG capsule, QD  •  pregabalin (LYRICA) 75 MG capsule, BID  •  tamsulosin (FLOMAX) 0.4 MG capsule QHS  •  Umeclidinium-Vilanterol 62.5-25 MCG/INH aerosol powder 1puff QD  •  zolpidem (AMBIEN) 10 MG tablet, QHS    VITALS:  /70 (BP Location: Right arm, Patient Position: Sitting)   Pulse 70   Resp 18   Wt 81.2 kg (179 lb)   BMI 28.04 kg/m²     Physical Exam   Constitutional: He is oriented to person, place, and time. He " appears well-developed and well-nourished.   Eyes: Conjunctivae and EOM are normal.   Wears glasses   Cardiovascular: Normal rate, regular rhythm and normal heart sounds.    Pulmonary/Chest: Effort normal and breath sounds normal. No respiratory distress.   Abdominal: Soft. Bowel sounds are normal.   Musculoskeletal:   Walks without assistance   Neurological: He is alert and oriented to person, place, and time.   Psychiatric: He has a normal mood and affect. His behavior is normal.   Nursing note and vitals reviewed.      LABS  Results for orders placed or performed in visit on 06/26/18   POC Glycosylated Hemoglobin (Hb A1C)   Result Value Ref Range    Hemoglobin A1C 6.0 %       ASSESSMENT/PLAN  Problem List Items Addressed This Visit     Diabetes mellitus with periph neuropathy     BG control stable with A1C 6.0; no DM meds; encouraged reg phys activity to decr insulin resistance, moderation in unhealthy starches/sweets; f/u A1C in 6 mos    Judicious and appropriate use of lyrica 75mg BID #180, 0RF (exception for 90d made due to insurance)..  LETHA was reviewed and appropriate.  The patient has read and signed the [facility] Controlled Substance Contract 10/17. Patient has been counseled and is aware of side effects, risks, potential for addiction/tolerance, interactions, and how to take medication correctly.  RTC 3 mos for next RX with contract             Relevant Medications    pregabalin (LYRICA) 75 MG capsule    Other Relevant Orders    POC Glycosylated Hemoglobin (Hb A1C) (Completed)    Hypertension     BP elevated today with goal > 130/80; on lisin 40mg QD and will incr amlo to 5mg QD #90, 3RF; rec low Na diet, increased aerobic exercise; home BP monitoring and f/u in 1 month (do not rec prn clonidine)         Relevant Medications    amLODIPine (NORVASC) 5 MG tablet    Insomnia - Primary     Judicious and appropriate use of zolpidem 10mg qhs prn #90, 0RF (exception for 90d made due to insurance).   LETHA was reviewed and appropriate.  The patient has read and signed the [facility] Controlled Substance Contract 10/17. Patient has been counseled and is aware of side effects, risks, potential for addiction/tolerance, interactions, and how to take medication correctly.  RTC 3 mos for next RX with contract           Relevant Medications    zolpidem (AMBIEN) 10 MG tablet          FOLLOW-UP  1. Health maintenance - rec Shingrix, counseling given  2. RTC 1 month for BP check (with increased amlo to 5mg QD)  3. RTC 3 mos for next zolpidem and lyrica RXs with LETHA/contract  4. RTC 6 mos for zolpidem/lyrica RXs with LETHA as well as updated wellness visit (after 12/22/18)    Electronically signed by:    Delilah Tatum MD  06/26/2018

## 2018-07-10 ENCOUNTER — TELEPHONE (OUTPATIENT)
Dept: INTERNAL MEDICINE | Facility: CLINIC | Age: 83
End: 2018-07-10

## 2018-07-10 NOTE — TELEPHONE ENCOUNTER
rec'vd PA request for Zolpidem 10mg, submitted but states Pt is inactive. Spoke with Pt's wife and informed her of this. She is going to contact the insurance company and call us back.

## 2018-07-11 NOTE — TELEPHONE ENCOUNTER
JANET SHAVER CALLED TO FOLLOW UP ON PA FOR RADHA'S ZOLPIDEM RX. SHE STATES SHE HAS CLARIFIED COVERAGE WITH HIS INSURANCE PROVIDER.    CALL BACK [phone]

## 2018-07-19 NOTE — TELEPHONE ENCOUNTER
Spoke with Kristen @ [employer] and this was denied, did start appeal while on the phone with her. Spoke with Pt's son and informed him of status, he verbalized understanding and much appr'c.

## 2018-07-31 ENCOUNTER — OFFICE VISIT (OUTPATIENT)
Dept: INTERNAL MEDICINE | Facility: CLINIC | Age: 83
End: 2018-07-31

## 2018-07-31 VITALS
BODY MASS INDEX: 28.07 KG/M2 | DIASTOLIC BLOOD PRESSURE: 70 MMHG | SYSTOLIC BLOOD PRESSURE: 128 MMHG | WEIGHT: 179.25 LBS | RESPIRATION RATE: 18 BRPM | HEART RATE: 72 BPM

## 2018-07-31 DIAGNOSIS — Z71.85 VACCINE COUNSELING: ICD-10-CM

## 2018-07-31 DIAGNOSIS — I10 ESSENTIAL HYPERTENSION: Primary | ICD-10-CM

## 2018-07-31 PROCEDURE — 99213 OFFICE O/P EST LOW 20 MIN: CPT | Performed by: INTERNAL MEDICINE

## 2018-07-31 NOTE — PROGRESS NOTES
Chief Complaint   Patient presents with   • Hypertension     1mo fu       History of Present Illness  84 y.o.  gentleman presents for BP follow-up.  Reports home readings have been averaging 130s/80s.  Denies s/e or other concerns with increased amlodipine dose to 5mg.    Review of Systems  ROS significant resolved gout at the left index finger.  Denies CP, palpitations, SOB.  Denies leg swelling.  All other ROS reviewed and negative.    [facility]  The following portions of the patient's history were reviewed and updated as appropriate: allergies, current medications, past family history, past medical history, past social history, past surgical history and problem list.    Current Outpatient Prescriptions:   •  amLODIPine (NORVASC) 5 MG tablet, QD  •  aspirin 81 MG tablet, QD  •  atorvastatin (LIPITOR) 20 MG tablet, QD  •  clopidogrel (PLAVIX) 75 MG tablet, QD  •  desmopressin (DDAVP) 0.2 MG tablet, QHS  •  fluocinonide (LIDEX) 0.05 % external solution, AD  •  glucose blood (FREESTYLE TEST STRIPS) test strip, QD  •  ketoconazole (NIZORAL) 2 % shampoo, AD  •  Lancets misc, 1 Units Daily  •  levothyroxine (SYNTHROID, LEVOTHROID) 50 MCG tablet, QD  •  lisinopril (PRINIVIL,ZESTRIL) 40 MG tablet, QD  •  Misc Natural Products (GLUCOSAMINE CHONDROITIN ADV) tablet, QD  •  nabumetone (RELAFEN) 750 MG tablet, q12  •  omeprazole (priLOSEC) 20 MG capsule, QD  •  pregabalin (LYRICA) 75 MG capsule, BID  •  tamsulosin (FLOMAX) 0.4 MG capsule QD  •  Umeclidinium-Vilanterol 62.5-25 MCG/INH aerosol powder  1 puff QD  •  zolpidem (AMBIEN) 10 MG tablet, QHS    VITALS:  /70 (BP Location: Right arm, Patient Position: Sitting)   Pulse 72   Resp 18   Wt 81.3 kg (179 lb 4 oz)   BMI 28.07 kg/m²     Physical Exam   Constitutional: He is oriented to person, place, and time. He appears well-developed and well-nourished.   Eyes: Conjunctivae and EOM are normal.   Wear glasses   Cardiovascular: Normal rate, regular rhythm and  normal heart sounds.    Pulmonary/Chest: Effort normal and breath sounds normal.   Abdominal: Soft. Bowel sounds are normal.   Neurological: He is alert and oriented to person, place, and time.   Skin:   Ecchymoses bilat forearms   Psychiatric: He has a normal mood and affect. His behavior is normal.   Nursing note and vitals reviewed.      LABS  Results for orders placed or performed in visit on 06/26/18   POC Glycosylated Hemoglobin (Hb A1C)   Result Value Ref Range    Hemoglobin A1C 6.0 %       ASSESSMENT/PLAN  Problem List Items Addressed This Visit     Hypertension - Primary     BP overall improved, somewhat labile; remains lisin 40mg QD and amlo 5mg QD; rec low Na diet, increased aerobic exercise; home BP monitoring with goal BP < 130/80             Other Visit Diagnoses     Vaccine counseling        rec Shingrix, counseling re: indication, risks, side effects; rec getting at pharmacy and obtaining documentation           FOLLOW-UP  1. Health maintenance - rec Shingrix, counseling done  2. RTC 10/1/18 as scheduled for zolpidem/lyrica RXs with LETHA and contract    Electronically signed by:    Delilah Tatum MD  07/31/2018

## 2018-07-31 NOTE — ASSESSMENT & PLAN NOTE
BP overall improved, somewhat labile; remains lisin 40mg QD and amlo 5mg QD; rec low Na diet, increased aerobic exercise; home BP monitoring with goal BP < 130/80

## 2018-10-01 ENCOUNTER — OFFICE VISIT (OUTPATIENT)
Dept: INTERNAL MEDICINE | Facility: CLINIC | Age: 83
End: 2018-10-01

## 2018-10-01 VITALS
SYSTOLIC BLOOD PRESSURE: 136 MMHG | DIASTOLIC BLOOD PRESSURE: 72 MMHG | BODY MASS INDEX: 28.72 KG/M2 | HEART RATE: 66 BPM | RESPIRATION RATE: 12 BRPM | WEIGHT: 183 LBS | OXYGEN SATURATION: 97 % | HEIGHT: 67 IN

## 2018-10-01 DIAGNOSIS — E11.42 TYPE 2 DIABETES MELLITUS WITH DIABETIC POLYNEUROPATHY, WITHOUT LONG-TERM CURRENT USE OF INSULIN (HCC): ICD-10-CM

## 2018-10-01 DIAGNOSIS — I10 ESSENTIAL HYPERTENSION: ICD-10-CM

## 2018-10-01 DIAGNOSIS — I73.9 PERIPHERAL ARTERIAL DISEASE (HCC): ICD-10-CM

## 2018-10-01 DIAGNOSIS — F51.01 PRIMARY INSOMNIA: Primary | ICD-10-CM

## 2018-10-01 PROCEDURE — 99214 OFFICE O/P EST MOD 30 MIN: CPT | Performed by: INTERNAL MEDICINE

## 2018-10-01 RX ORDER — ZOLPIDEM TARTRATE 10 MG/1
10 TABLET ORAL NIGHTLY
Qty: 90 TABLET | Refills: 0 | Status: SHIPPED | OUTPATIENT
Start: 2018-10-01 | End: 2019-01-03 | Stop reason: SDUPTHER

## 2018-10-01 RX ORDER — PREGABALIN 75 MG/1
75 CAPSULE ORAL 2 TIMES DAILY
Qty: 180 CAPSULE | Refills: 0 | Status: SHIPPED | OUTPATIENT
Start: 2018-10-01 | End: 2019-01-03 | Stop reason: SDUPTHER

## 2018-10-01 NOTE — ASSESSMENT & PLAN NOTE
Judicious and appropriate use of zolpidem 10mg qhs prn #90, 0RF (exception made for insurance).  LETHA was reviewed and appropriate.  The patient has read and signed the [facility] Controlled Substance Contract today 10/1/18. Patient has been counseled and is aware of side effects, risks, potential for addiction/tolerance, interactions, and how to take medication correctly.  RTC 1/3/19 for next RX with wellness

## 2018-10-01 NOTE — ASSESSMENT & PLAN NOTE
BG control stable/improved with A1C 5.4 (was 6.0 in 3.18); encouraged reg phys activity to decr insulin resistance, moderation in unhealthy starches/sweets; f/u A1C in 3 mos    Judicious and appropriate use of lyrica 75mg BID #180, 0RF (exception made for insurance.  LETHA was reviewed and appropriate.  The patient has read and signed the [facility] Controlled Substance Contract today 10/1/18. Patient has been counseled and is aware of side effects, risks, potential for addiction/tolerance, interactions, and how to take medication correctly.  RTC 1/3/19 for next RX with wellness

## 2018-10-01 NOTE — PROGRESS NOTES
"Chief Complaint   Patient presents with   • Hypertension   • Insomnia       History of Present Illness  84 y.o.  gentleman, accompanied by his wife, presents for zolpidem and lyrica RXs as well as BP and sugar f/u.  States legs much less purple and can now walk without leg pains.  Has had his 6th leg circulation procedure just recently.  States there was complication with bleed in left leg of small blood vessel, now recovered.    States BPs labile, averaging 140s systolic.    Review of Systems  ROS significant for no further leg pains.  Denies falls.  Denies CP, palpitations, SOB, leg swelling.  All other ROS reviewed and negative.    [facility]  The following portions of the patient's history were reviewed and updated as appropriate: allergies, current medications, past family history, past medical history, past social history, past surgical history and problem list.      Current Outpatient Prescriptions:   •  amLODIPine (NORVASC) 5 MG tablet, QD  •  aspirin 81 MG tablet, QD  •  atorvastatin (LIPITOR) 20 MG tablet, QD  •  clopidogrel (PLAVIX) 75 MG tablet, QD  •  ketoconazole (NIZORAL) 2 % shampoo, ad  •  levothyroxine (SYNTHROID, LEVOTHROID) 50 MCG QD  •  lisinopril (PRINIVIL,ZESTRIL) 40 MG tablet, QD  •  Misc Natural Products (GLUCOSAMINE CHONDROITIN ADV) tablet, QD  •  nabumetone (RELAFEN) 750 MG tablet, Q12  •  omeprazole (priLOSEC) 20 MG capsule, qd  •  tamsulosin (FLOMAX) 0.4 MG capsule QD  •  Umeclidinium-Vilanterol 62.5-25 MCG/INH aerosol powder ,QD  •  desmopressin (DDAVP) 0.2 MG tablet, QD  •  fluocinonide (LIDEX) 0.05 % external solution, AD  •  pregabalin (LYRICA) 75 MG capsule, BID  •  zolpidem (AMBIEN) 10 MG tablet, QHS    VITALS:  /72 (BP Location: Right arm, Patient Position: Sitting)   Pulse 66   Resp 12   Ht 170.2 cm (67\")   Wt 83 kg (183 lb)   SpO2 97%   BMI 28.66 kg/m²     Physical Exam   Constitutional: He is oriented to person, place, and time. He appears well-developed and " well-nourished.   Eyes: Conjunctivae and EOM are normal.   Wears glasses   Cardiovascular: Normal rate, regular rhythm and normal heart sounds.    Pulmonary/Chest: Effort normal and breath sounds normal.   Abdominal: Soft. Bowel sounds are normal.   Musculoskeletal: He exhibits edema (tr LE edema bilaterally, minimal and symmetric).   Normal gait   Neurological: He is alert and oriented to person, place, and time.   Skin:   Mild BLE hyperpigmentation, symmetric   Psychiatric: He has a normal mood and affect. His behavior is normal.   Nursing note and vitals reviewed.      LABS    Results for orders placed or performed in visit on 06/26/18   POC Glycosylated Hemoglobin (Hb A1C)   Result Value Ref Range    Hemoglobin A1C 6.0 %       ASSESSMENT/PLAN  Problem List Items Addressed This Visit     Diabetes mellitus with periph neuropathy     BG control stable/improved with A1C 5.4 (was 6.0 in 3.18); encouraged reg phys activity to decr insulin resistance, moderation in unhealthy starches/sweets; f/u A1C in 3 mos    Judicious and appropriate use of lyrica 75mg BID #180, 0RF (exception made for insurance.  LETHA was reviewed and appropriate.  The patient has read and signed the [facility] [place] Substance Contract today 10/1/18. Patient has been counseled and is aware of side effects, risks, potential for addiction/tolerance, interactions, and how to take medication correctly.  RTC 1/3/19 for next RX with wellness             Relevant Medications    pregabalin (LYRICA) 75 MG capsule    Hypertension     Repeat BP improved; currently on amlo 5mg QD, lisin 40mg QD; discussed goal BP < 130/80         Insomnia - Primary     Judicious and appropriate use of zolpidem 10mg qhs prn #90, 0RF (exception made for insurance).  LETHA was reviewed and appropriate.  The patient has read and signed the [employer] [facility] [place] Substance Contract today 10/1/18. Patient has been counseled and is aware of side effects, risks,  potential for addiction/tolerance, interactions, and how to take medication correctly.  RTC 1/3/19 for next RX with wellness           Relevant Medications    zolpidem (AMBIEN) 10 MG tablet    Peripheral arterial disease (CMS/HCC)     Now that he is leg-pain-free, encouraged increased walking; discussed also importance of good BG and lipids control; remains on atorvastatin, clopidogrel and ASA               FOLLOW-UP  1. Health maintenance - flu vacc done at [facility] 9/28/18; rec Shingrix and hep A vaccines  2. RTC for wellness 1/3/19; with fasting labs (CBC, CMP, TSH, lipids, UA/micro, UDS, A1C, microalb, CPK, FT4, uric), LETHA, and zolpidem/lyrica RXs    Electronically signed by:    Delilah Tatum MD  10/01/2018

## 2018-12-10 ENCOUNTER — TELEPHONE (OUTPATIENT)
Dept: INTERNAL MEDICINE | Facility: CLINIC | Age: 83
End: 2018-12-10

## 2018-12-10 NOTE — TELEPHONE ENCOUNTER
MRS SHAVER'S INSURANCE CARRIER HAS TOLD MRS SHAVER THAT MR SHAVER WILL NEED TO HAVE HIS PCP WRITE AN RX FOR THIS VACCINE AND THEN HE CAN TAKE THE RX TO HIS LOCAL PHARMACY TO HAVE THIS DONE.     CALL BACK [phone]

## 2018-12-12 ENCOUNTER — TELEPHONE (OUTPATIENT)
Dept: INTERNAL MEDICINE | Facility: CLINIC | Age: 83
End: 2018-12-12

## 2018-12-12 DIAGNOSIS — Z00.00 MEDICARE ANNUAL WELLNESS VISIT, SUBSEQUENT: Primary | ICD-10-CM

## 2018-12-12 DIAGNOSIS — E11.42 TYPE 2 DIABETES MELLITUS WITH DIABETIC POLYNEUROPATHY, WITHOUT LONG-TERM CURRENT USE OF INSULIN (HCC): ICD-10-CM

## 2018-12-12 DIAGNOSIS — M10.042 ACUTE IDIOPATHIC GOUT OF LEFT HAND: ICD-10-CM

## 2018-12-12 DIAGNOSIS — E78.2 MIXED HYPERLIPIDEMIA: ICD-10-CM

## 2018-12-12 DIAGNOSIS — E03.9 ACQUIRED HYPOTHYROIDISM: ICD-10-CM

## 2018-12-12 DIAGNOSIS — F51.01 PRIMARY INSOMNIA: ICD-10-CM

## 2018-12-12 DIAGNOSIS — R80.9 MICROALBUMINURIA: ICD-10-CM

## 2018-12-12 DIAGNOSIS — I10 ESSENTIAL HYPERTENSION: ICD-10-CM

## 2018-12-17 DIAGNOSIS — I10 ESSENTIAL HYPERTENSION: ICD-10-CM

## 2018-12-18 RX ORDER — LISINOPRIL 40 MG/1
40 TABLET ORAL DAILY
Qty: 90 TABLET | Refills: 3 | Status: SHIPPED | OUTPATIENT
Start: 2018-12-18 | End: 2020-01-06 | Stop reason: SDUPTHER

## 2018-12-21 ENCOUNTER — LAB (OUTPATIENT)
Dept: INTERNAL MEDICINE | Facility: CLINIC | Age: 83
End: 2018-12-21

## 2018-12-21 DIAGNOSIS — E11.42 TYPE 2 DIABETES MELLITUS WITH DIABETIC POLYNEUROPATHY, WITHOUT LONG-TERM CURRENT USE OF INSULIN (HCC): ICD-10-CM

## 2018-12-21 DIAGNOSIS — I10 ESSENTIAL HYPERTENSION: ICD-10-CM

## 2018-12-21 DIAGNOSIS — R80.9 MICROALBUMINURIA: ICD-10-CM

## 2018-12-21 DIAGNOSIS — E03.9 ACQUIRED HYPOTHYROIDISM: ICD-10-CM

## 2018-12-21 DIAGNOSIS — M10.042 ACUTE IDIOPATHIC GOUT OF LEFT HAND: ICD-10-CM

## 2018-12-21 DIAGNOSIS — Z00.00 MEDICARE ANNUAL WELLNESS VISIT, SUBSEQUENT: ICD-10-CM

## 2018-12-21 DIAGNOSIS — E78.2 MIXED HYPERLIPIDEMIA: ICD-10-CM

## 2018-12-21 LAB
ALBUMIN SERPL-MCNC: 4.12 G/DL (ref 3.2–4.8)
ALBUMIN/GLOB SERPL: 2 G/DL (ref 1.5–2.5)
ALP SERPL-CCNC: 73 U/L (ref 25–100)
ALT SERPL W P-5'-P-CCNC: 11 U/L (ref 7–40)
ANION GAP SERPL CALCULATED.3IONS-SCNC: 6 MMOL/L (ref 3–11)
ARTICHOKE IGE QN: 59 MG/DL (ref 0–130)
AST SERPL-CCNC: 12 U/L (ref 0–33)
BACTERIA UR QL AUTO: NORMAL /HPF
BASOPHILS # BLD AUTO: 0.02 10*3/MM3 (ref 0–0.2)
BASOPHILS NFR BLD AUTO: 0.4 % (ref 0–1)
BILIRUB SERPL-MCNC: 0.4 MG/DL (ref 0.3–1.2)
BILIRUB UR QL STRIP: NEGATIVE
BUN BLD-MCNC: 18 MG/DL (ref 9–23)
BUN/CREAT SERPL: 14.9 (ref 7–25)
CALCIUM SPEC-SCNC: 8.5 MG/DL (ref 8.7–10.4)
CHLORIDE SERPL-SCNC: 104 MMOL/L (ref 99–109)
CHOLEST SERPL-MCNC: 109 MG/DL (ref 0–200)
CK SERPL-CCNC: 57 U/L (ref 26–174)
CLARITY UR: CLEAR
CO2 SERPL-SCNC: 29 MMOL/L (ref 20–31)
COLOR UR: YELLOW
CREAT BLD-MCNC: 1.21 MG/DL (ref 0.6–1.3)
DEPRECATED RDW RBC AUTO: 51 FL (ref 37–54)
EOSINOPHIL # BLD AUTO: 0.24 10*3/MM3 (ref 0–0.3)
EOSINOPHIL NFR BLD AUTO: 4.4 % (ref 0–3)
ERYTHROCYTE [DISTWIDTH] IN BLOOD BY AUTOMATED COUNT: 14.7 % (ref 11.3–14.5)
GFR SERPL CREATININE-BSD FRML MDRD: 57 ML/MIN/1.73
GLOBULIN UR ELPH-MCNC: 2.1 GM/DL
GLUCOSE BLD-MCNC: 117 MG/DL (ref 70–100)
GLUCOSE UR STRIP-MCNC: NEGATIVE MG/DL
HBA1C MFR BLD: 6.2 % (ref 4.8–5.6)
HCT VFR BLD AUTO: 41.5 % (ref 38.9–50.9)
HDLC SERPL-MCNC: 41 MG/DL (ref 40–60)
HGB BLD-MCNC: 12.8 G/DL (ref 13.1–17.5)
HGB UR QL STRIP.AUTO: NEGATIVE
HYALINE CASTS UR QL AUTO: NORMAL /LPF
IMM GRANULOCYTES # BLD AUTO: 0.02 10*3/MM3 (ref 0–0.03)
IMM GRANULOCYTES NFR BLD AUTO: 0.4 % (ref 0–0.6)
KETONES UR QL STRIP: NEGATIVE
LEUKOCYTE ESTERASE UR QL STRIP.AUTO: NEGATIVE
LYMPHOCYTES # BLD AUTO: 1.43 10*3/MM3 (ref 0.6–4.8)
LYMPHOCYTES NFR BLD AUTO: 26.3 % (ref 24–44)
MCH RBC QN AUTO: 29.2 PG (ref 27–31)
MCHC RBC AUTO-ENTMCNC: 30.8 G/DL (ref 32–36)
MCV RBC AUTO: 94.7 FL (ref 80–99)
MONOCYTES # BLD AUTO: 0.46 10*3/MM3 (ref 0–1)
MONOCYTES NFR BLD AUTO: 8.5 % (ref 0–12)
NEUTROPHILS # BLD AUTO: 3.28 10*3/MM3 (ref 1.5–8.3)
NEUTROPHILS NFR BLD AUTO: 60.4 % (ref 41–71)
NITRITE UR QL STRIP: NEGATIVE
PH UR STRIP.AUTO: 6.5 [PH] (ref 5–8)
PLATELET # BLD AUTO: 188 10*3/MM3 (ref 150–450)
PMV BLD AUTO: 10.5 FL (ref 6–12)
POTASSIUM BLD-SCNC: 4.5 MMOL/L (ref 3.5–5.5)
PROT SERPL-MCNC: 6.2 G/DL (ref 5.7–8.2)
PROT UR QL STRIP: NEGATIVE
RBC # BLD AUTO: 4.38 10*6/MM3 (ref 4.2–5.76)
RBC # UR: NORMAL /HPF
REF LAB TEST METHOD: NORMAL
SODIUM BLD-SCNC: 139 MMOL/L (ref 132–146)
SP GR UR STRIP: 1.01 (ref 1–1.03)
SQUAMOUS #/AREA URNS HPF: NORMAL /HPF
T4 FREE SERPL-MCNC: 1.25 NG/DL (ref 0.89–1.76)
TRIGL SERPL-MCNC: 79 MG/DL (ref 0–150)
TSH SERPL DL<=0.05 MIU/L-ACNC: 1.23 MIU/ML (ref 0.35–5.35)
URATE SERPL-MCNC: 7 MG/DL (ref 3.7–9.2)
UROBILINOGEN UR QL STRIP: NORMAL
WBC NRBC COR # BLD: 5.43 10*3/MM3 (ref 3.5–10.8)
WBC UR QL AUTO: NORMAL /HPF

## 2018-12-21 PROCEDURE — 80061 LIPID PANEL: CPT | Performed by: INTERNAL MEDICINE

## 2018-12-21 PROCEDURE — 84439 ASSAY OF FREE THYROXINE: CPT | Performed by: INTERNAL MEDICINE

## 2018-12-21 PROCEDURE — 82550 ASSAY OF CK (CPK): CPT | Performed by: INTERNAL MEDICINE

## 2018-12-21 PROCEDURE — 82043 UR ALBUMIN QUANTITATIVE: CPT | Performed by: INTERNAL MEDICINE

## 2018-12-21 PROCEDURE — 84443 ASSAY THYROID STIM HORMONE: CPT | Performed by: INTERNAL MEDICINE

## 2018-12-21 PROCEDURE — 83036 HEMOGLOBIN GLYCOSYLATED A1C: CPT | Performed by: INTERNAL MEDICINE

## 2018-12-21 PROCEDURE — 80053 COMPREHEN METABOLIC PANEL: CPT | Performed by: INTERNAL MEDICINE

## 2018-12-21 PROCEDURE — 84550 ASSAY OF BLOOD/URIC ACID: CPT | Performed by: INTERNAL MEDICINE

## 2018-12-21 PROCEDURE — 85025 COMPLETE CBC W/AUTO DIFF WBC: CPT | Performed by: INTERNAL MEDICINE

## 2018-12-21 PROCEDURE — 82570 ASSAY OF URINE CREATININE: CPT | Performed by: INTERNAL MEDICINE

## 2018-12-21 PROCEDURE — 81001 URINALYSIS AUTO W/SCOPE: CPT | Performed by: INTERNAL MEDICINE

## 2018-12-23 LAB
CREAT 24H UR-MCNC: 104.6 MG/DL
MICROALBUMIN UR-MCNC: 29 UG/ML
MICROALBUMIN/CREAT UR: 27.7 MG/G CREAT (ref 0–30)

## 2019-01-03 ENCOUNTER — OFFICE VISIT (OUTPATIENT)
Dept: INTERNAL MEDICINE | Facility: CLINIC | Age: 84
End: 2019-01-03

## 2019-01-03 VITALS
HEIGHT: 67 IN | OXYGEN SATURATION: 100 % | BODY MASS INDEX: 28.88 KG/M2 | DIASTOLIC BLOOD PRESSURE: 64 MMHG | WEIGHT: 184 LBS | HEART RATE: 68 BPM | SYSTOLIC BLOOD PRESSURE: 120 MMHG

## 2019-01-03 DIAGNOSIS — E03.9 ACQUIRED HYPOTHYROIDISM: ICD-10-CM

## 2019-01-03 DIAGNOSIS — K21.9 GASTROESOPHAGEAL REFLUX DISEASE WITHOUT ESOPHAGITIS: ICD-10-CM

## 2019-01-03 DIAGNOSIS — E11.42 TYPE 2 DIABETES MELLITUS WITH DIABETIC POLYNEUROPATHY, WITHOUT LONG-TERM CURRENT USE OF INSULIN (HCC): ICD-10-CM

## 2019-01-03 DIAGNOSIS — M10.042 ACUTE IDIOPATHIC GOUT OF LEFT HAND: ICD-10-CM

## 2019-01-03 DIAGNOSIS — I65.23 BILATERAL CAROTID ARTERY STENOSIS: ICD-10-CM

## 2019-01-03 DIAGNOSIS — Z00.00 MEDICARE ANNUAL WELLNESS VISIT, SUBSEQUENT: Primary | ICD-10-CM

## 2019-01-03 DIAGNOSIS — N18.2 CHRONIC KIDNEY DISEASE, STAGE II (MILD): ICD-10-CM

## 2019-01-03 DIAGNOSIS — E78.2 MIXED HYPERLIPIDEMIA: ICD-10-CM

## 2019-01-03 DIAGNOSIS — F51.01 PRIMARY INSOMNIA: ICD-10-CM

## 2019-01-03 DIAGNOSIS — I73.9 PERIPHERAL ARTERIAL DISEASE (HCC): ICD-10-CM

## 2019-01-03 DIAGNOSIS — I10 ESSENTIAL HYPERTENSION: ICD-10-CM

## 2019-01-03 DIAGNOSIS — N40.1 BENIGN NON-NODULAR PROSTATIC HYPERPLASIA WITH LOWER URINARY TRACT SYMPTOMS: ICD-10-CM

## 2019-01-03 PROCEDURE — 93000 ELECTROCARDIOGRAM COMPLETE: CPT | Performed by: INTERNAL MEDICINE

## 2019-01-03 PROCEDURE — 99214 OFFICE O/P EST MOD 30 MIN: CPT | Performed by: INTERNAL MEDICINE

## 2019-01-03 PROCEDURE — G0444 DEPRESSION SCREEN ANNUAL: HCPCS | Performed by: INTERNAL MEDICINE

## 2019-01-03 PROCEDURE — G0439 PPPS, SUBSEQ VISIT: HCPCS | Performed by: INTERNAL MEDICINE

## 2019-01-03 RX ORDER — LEVOTHYROXINE SODIUM 0.05 MG/1
50 TABLET ORAL DAILY
Qty: 90 TABLET | Refills: 3 | Status: SHIPPED | OUTPATIENT
Start: 2019-01-03 | End: 2019-11-07 | Stop reason: SDUPTHER

## 2019-01-03 RX ORDER — TAMSULOSIN HYDROCHLORIDE 0.4 MG/1
1 CAPSULE ORAL DAILY
Qty: 90 CAPSULE | Refills: 3 | Status: SHIPPED | OUTPATIENT
Start: 2019-01-03 | End: 2020-01-06 | Stop reason: SDUPTHER

## 2019-01-03 RX ORDER — ATORVASTATIN CALCIUM 20 MG/1
20 TABLET, FILM COATED ORAL DAILY
Qty: 90 TABLET | Refills: 3 | Status: SHIPPED | OUTPATIENT
Start: 2019-01-03 | End: 2020-01-06 | Stop reason: SDUPTHER

## 2019-01-03 RX ORDER — PREGABALIN 75 MG/1
75 CAPSULE ORAL 2 TIMES DAILY
Qty: 180 CAPSULE | Refills: 0 | Status: SHIPPED | OUTPATIENT
Start: 2019-01-03 | End: 2019-04-09 | Stop reason: SDUPTHER

## 2019-01-03 RX ORDER — ZOLPIDEM TARTRATE 10 MG/1
10 TABLET ORAL NIGHTLY
Qty: 90 TABLET | Refills: 0 | Status: SHIPPED | OUTPATIENT
Start: 2019-01-03 | End: 2019-04-09 | Stop reason: SDUPTHER

## 2019-01-03 RX ORDER — OMEPRAZOLE 20 MG/1
20 CAPSULE, DELAYED RELEASE ORAL DAILY
Qty: 90 CAPSULE | Refills: 3 | Status: SHIPPED | OUTPATIENT
Start: 2019-01-03 | End: 2020-01-06 | Stop reason: SDUPTHER

## 2019-01-03 NOTE — ASSESSMENT & PLAN NOTE
Judicious and appropriate use of zolpidem 10mg qhs prn #90, 0RF (exception made for insurance).  LETHA was reviewed and appropriate.  The patient has read and signed the [facility] Controlled Substance Contract 10/18. Patient has been counseled and is aware of side effects, risks, potential for addiction/tolerance, interactions, and how to take medication correctly.  RTC 3 mos for next RX

## 2019-01-03 NOTE — ASSESSMENT & PLAN NOTE
On atorvastatin (LDL 59), ASA, plavix; last carotid u/s 2016 with Dr. Douglas Lazaro - overdue to be updated and patient ok with my ordring it upon return from [place] in the spring; note bilat carotid bruits on exam today

## 2019-01-03 NOTE — ASSESSMENT & PLAN NOTE
BP and electrolytes stable; on amlo 5mg QD, lisin 40mg QD; rec low Na diet, increased aerobic exercise; home BP monitoring with goal BP < 130/80

## 2019-01-03 NOTE — ASSESSMENT & PLAN NOTE
BG control stable with A1C 6.2; no meds; encouraged reg phys activity to decr insulin resistance, moderation in unhealthy starches/sweets; f/u A1C in 3 mos    Judicious and appropriate use of lyrica 75mg BID #180, 0RF (exception made due to insurance).  LETHA was reviewed and appropriate.  The patient has read and signed the [facility] Controlled Substance Contract 10/18. Patient has been counseled and is aware of side effects, risks, potential for addiction/tolerance, interactions, and how to take medication correctly.  RTC 3 mos for next RX

## 2019-01-03 NOTE — ASSESSMENT & PLAN NOTE
Lipids remain excellent, LDL 59 with goal < 100, ideally < 70; also stable LFTs, CPK; on atorvastatin 20mg QHS #90, 3RF

## 2019-01-03 NOTE — ASSESSMENT & PLAN NOTE
Health maintenance - flu vacc 9/18; Prevnar 12/15, PVX 12/14, Tdap 10/11, Zostavax 9/09; hep A #1 12/18 [facility] per pt (#2 due in 6/19); rec Shingrix - counseling done re: recs and getting at pharmacy, get on waiting list; colonosc 1/15, repeat 2020 per Dr. Holland; no further prostate CA screening with age/comorbidities, confirmed with pt; eye exam due with Dr. Valenzuela with palnned cataract surgery after return from [place];  dental exam UTD with root canal 12/18; (+) seat belt use    Consultants:  Patient Care Team:  Delilah Tatum MD as PCP - General  Burton Holland MD as Consulting Physician (Colon and Rectal Surgery)  Douglas Lazaro MD as Consulting Physician (General Surgery)  Rubio Rossi MD as Consulting Physician (Dermatology)  Douglas Brooks MD as Consulting Physician (Cardiology)  Jim Black MD as Consulting Physician (Cardiothoracic Surgery)  Yusef Bowles MD as Consulting Physician (Nephrology)  Tio Mcnally MD (Inactive) as Consulting Physician (Hand Surgery)  Marisol Easley MD as Consulting Physician (Cardiology)  Chayito Quick OD (Optometry)  Radha Hirsch APRN as Nurse Practitioner (Otolaryngology)

## 2019-01-03 NOTE — ASSESSMENT & PLAN NOTE
9/18 renal function stable with Cr 1.21, GFR 57; discussed importance of good BG and BP control; on  for nephroprotective effects; avoid NSAIDs (such as ibuprofen or naproxen); repeat in 6 mos

## 2019-01-03 NOTE — PROGRESS NOTES
ANNUAL WELLNESS VISIT    DRUG AND ALCOHOL USE      no alcohol use, no tobacco use and caffeine intake: 4 cups of caffeinated coffee per day    DIET AND PHYSICAL ACTIVITY     Diet: general    Exercise: frequently   Exercise Details: walking    MOOD DISORDER AND COGNITIVE SCREENING   Depression Screening Tool Used yes - see PHQ-9; components reviewed with patient; 15-min counseling done - reviewed questionnaire results with patient; has social interactions, going to [place] tomorrow, spends time with fam/friends, patient without concerns   Anxiety Screening Tool Used yes     Mini-Cog Performed   Yes    1. Tell Patient 3 Words apple table ofelia    2. Administer Clock Test normal    3. Recall 3 words  apple table ofelia    4. Number Correct Items 3    FUNCTIONAL ABILITY AND LEVEL OF SAFETY   Hearing mild hearing loss     Wears Hearing Aids No       Current Activities Independent      none  - see Funct/Cog Status Intake     Fall Risk Assessment       Has difficulty with walking or balance  No         Timed Up and Go (TUG) Test  8 sec.       If >12 sec, normal    ADVANCED DIRECTIVE has an advance directive - a copy HAS NOT been provided    PAIN SCREENING Do you have pain right now? no      If so, 1-10 scale: 0          Do you have pain every day? No      Probable chronic pain: No     Recent Hospitalizations:  No recent hospitalization(s)..     MEDICATION REVIEW   - updated and reviewed (see Medication List).   - reviewed for potentially harmful drug-disease interactions in the elderly.   - reviewed for high risk medications in the elderly.   - aspirin use: Yes    BMI  Body mass index is 28.82 kg/m².    Patient's Body mass index is 28.82 kg/m². BMI is above normal parameters. Recommendations include: exercise counseling and nutrition counseling.    _____________________________________________________  Chief Complaint   Patient presents with   • Medicare Wellness-subsequent       History of Present Illness  84 y.o.   gentleman presents for updated wellness visit.  Feels overall without concerns.  Has had several leg circulation procedures since last summer.  Has been advised to increase walking, which he is trying to do.    Admits to some weight gain, especially in the abdominal area.  Has intentions to increase his exercise.    Reports hepatitis A vaccine from a few weeks ago.  Has not had shingles.  Reports updated eye exam a few weeks ago, cataract surgery has been scheduled.  Had a root canal yesterday; tooth pain is decreased today.    Has not had any gout symptoms, previously present at the left index finger.      States that has been a while since his last carotid ultrasound.    Does not check BGs.    Review of Systems  Denies headaches, visual changes, CP, palpitations, SOB, cough, abd pain, n/v/d, difficulty with urination, numbness/tingling, falls, mood changes, lightheadedness, hearing changes, rashes.    No acute gouty attacks.    ROS (+) for weight gain. ROS (+) for tooth pain due to root canal, decreased today.    ROS (+) for chronic insomnia, takes zolpidem nightly.  Denies concerns with meds.     All other ROS reviewed and negative.    [facility]  The following portions of the patient's history were reviewed and updated as appropriate: allergies, current medications, past family history, past medical history, past social history, past surgical history and problem list.      Current Outpatient Medications:   •  amLODIPine (NORVASC) 5 MG QD  •  aspirin 81 MG tablet, QD  •  clopidogrel (PLAVIX) 75 MG tablet, , DQ  •  desmopressin (DDAVP) 0.2 MG tablet, QHS  •  fluocinonide (LIDEX) 0.05 % external solution prn  •  ketoconazole (NIZORAL) 2 % shampoo, prn  •  lisinopril (PRINIVIL,ZESTRIL) 40 MG tablet, QD  •  GLUCOSAMINE CHONDROITIN ADV) tablet, QD  •  atorvastatin (LIPITOR) 20 MG tablet, QD  •  levothyroxine (SYNTHROID, LEVOTHROID) 50 MCG QD  •  omeprazole (priLOSEC) 20 MG capsule, QD  •  pregabalin (LYRICA) 75 MG capsule,  "BID  •  tamsulosin (FLOMAX) 0.4 MG capsule QHS  •  zolpidem (AMBIEN) 10 MG tablet, QHS    VITALS:  /64   Pulse 68   Ht 170.2 cm (67\")   Wt 83.5 kg (184 lb)   SpO2 100%   BMI 28.82 kg/m²     Physical Exam   Constitutional: He is oriented to person, place, and time. He appears well-developed and well-nourished.   No significant wt change over the last 3 mos; up 5 lbs from 6 mos ago   HENT:   Head: Normocephalic.   Right Ear: External ear normal.   Left Ear: External ear normal.   Nose: Nose normal.   Mouth/Throat: Oropharynx is clear and moist and mucous membranes are normal. No oropharyngeal exudate.   Eyes: Conjunctivae and EOM are normal. Pupils are equal, round, and reactive to light.   Wears glasses   Neck: Normal range of motion. Neck supple. Carotid bruit is not present (bilat carotid bruits, more high-pitched and louder on right compared to left). No thyromegaly present.   Cardiovascular: Normal rate and regular rhythm.   Murmur (II/VI SM) heard.  Pulmonary/Chest: Effort normal and breath sounds normal. No respiratory distress.   Abdominal: Soft. Bowel sounds are normal. He exhibits no distension and no mass. There is no hepatosplenomegaly. There is no tenderness.   Genitourinary:   Genitourinary Comments: Gu/POOJA deferred by patient   Musculoskeletal: Normal range of motion. He exhibits deformity (bony changes at DIP joints of finger, incl left index finger, without erythema or tenderness). He exhibits no edema (BLE).   Normal gait   Lymphadenopathy:     He has no cervical adenopathy.   Neurological: He is alert and oriented to person, place, and time. He has normal reflexes. He displays normal reflexes. No cranial nerve deficit.   Skin: Skin is warm and dry. No rash noted.   Psychiatric: He has a normal mood and affect. His behavior is normal.   Nursing note and vitals reviewed.      LABS  Results for orders placed or performed in visit on 12/21/18   Comprehensive Metabolic Panel   Result Value Ref " Range    Glucose 117 (H) 70 - 100 mg/dL    BUN 18 9 - 23 mg/dL    Creatinine 1.21 0.60 - 1.30 mg/dL    Sodium 139 132 - 146 mmol/L    Potassium 4.5 3.5 - 5.5 mmol/L    Chloride 104 99 - 109 mmol/L    CO2 29.0 20.0 - 31.0 mmol/L    Calcium 8.5 (L) 8.7 - 10.4 mg/dL    Total Protein 6.2 5.7 - 8.2 g/dL    Albumin 4.12 3.20 - 4.80 g/dL    ALT (SGPT) 11 7 - 40 U/L    AST (SGOT) 12 0 - 33 U/L    Alkaline Phosphatase 73 25 - 100 U/L    Total Bilirubin 0.4 0.3 - 1.2 mg/dL    eGFR Non African Amer 57 (L) >60 mL/min/1.73    Globulin 2.1 gm/dL    A/G Ratio 2.0 1.5 - 2.5 g/dL    BUN/Creatinine Ratio 14.9 7.0 - 25.0    Anion Gap 6.0 3.0 - 11.0 mmol/L   Lipid Panel   Result Value Ref Range    Total Cholesterol 109 0 - 200 mg/dL    Triglycerides 79 0 - 150 mg/dL    HDL Cholesterol 41 40 - 60 mg/dL    LDL Cholesterol  59 0 - 130 mg/dL   TSH   Result Value Ref Range    TSH 1.227 0.350 - 5.350 mIU/mL   Microalbumin / Creatinine Urine Ratio - Urine, Clean Catch   Result Value Ref Range    Creatinine, Urine 104.6 Not Estab. mg/dL    Microalbumin, Urine 29.0 Not Estab. ug/mL    Microalbumin/Creatinine Ratio 27.7 0.0 - 30.0 mg/g creat   Hemoglobin A1c   Result Value Ref Range    Hemoglobin A1C 6.20 (H) 4.80 - 5.60 %   CK   Result Value Ref Range    Creatine Kinase 57 26 - 174 U/L   T4, Free   Result Value Ref Range    Free T4 1.25 0.89 - 1.76 ng/dL   Uric Acid   Result Value Ref Range    Uric Acid 7.0 3.7 - 9.2 mg/dL   CBC Auto Differential   Result Value Ref Range    WBC 5.43 3.50 - 10.80 10*3/mm3    RBC 4.38 4.20 - 5.76 10*6/mm3    Hemoglobin 12.8 (L) 13.1 - 17.5 g/dL    Hematocrit 41.5 38.9 - 50.9 %    MCV 94.7 80.0 - 99.0 fL    MCH 29.2 27.0 - 31.0 pg    MCHC 30.8 (L) 32.0 - 36.0 g/dL    RDW 14.7 (H) 11.3 - 14.5 %    RDW-SD 51.0 37.0 - 54.0 fl    MPV 10.5 6.0 - 12.0 fL    Platelets 188 150 - 450 10*3/mm3    Neutrophil % 60.4 41.0 - 71.0 %    Lymphocyte % 26.3 24.0 - 44.0 %    Monocyte % 8.5 0.0 - 12.0 %    Eosinophil % 4.4 (H) 0.0 -  3.0 %    Basophil % 0.4 0.0 - 1.0 %    Immature Grans % 0.4 0.0 - 0.6 %    Neutrophils, Absolute 3.28 1.50 - 8.30 10*3/mm3    Lymphocytes, Absolute 1.43 0.60 - 4.80 10*3/mm3    Monocytes, Absolute 0.46 0.00 - 1.00 10*3/mm3    Eosinophils, Absolute 0.24 0.00 - 0.30 10*3/mm3    Basophils, Absolute 0.02 0.00 - 0.20 10*3/mm3    Immature Grans, Absolute 0.02 0.00 - 0.03 10*3/mm3   Urinalysis without microscopic (no culture) - Urine, Clean Catch   Result Value Ref Range    Color, UA Yellow Yellow, Straw    Appearance, UA Clear Clear    pH, UA 6.5 5.0 - 8.0    Specific Gravity, UA 1.014 1.001 - 1.030    Glucose, UA Negative Negative    Ketones, UA Negative Negative    Bilirubin, UA Negative Negative    Blood, UA Negative Negative    Protein, UA Negative Negative    Leuk Esterase, UA Negative Negative    Nitrite, UA Negative Negative    Urobilinogen, UA 0.2 E.U./dL 0.2 - 1.0 E.U./dL   Urinalysis, Microscopic Only - Urine, Clean Catch   Result Value Ref Range    RBC, UA 0-2 None Seen, 0-2 /HPF    WBC, UA 0-2 None Seen, 0-2 /HPF    Bacteria, UA None Seen None Seen, Trace /HPF    Squamous Epithelial Cells, UA None Seen None Seen, 0-2 /HPF    Hyaline Casts, UA 0-6 0 - 6 /LPF    Methodology Automated Microscopy      6/18 A1C 6.0      ECG 12 Lead  Date/Time: 1/3/2019 12:19 PM  Performed by: Delilah Tatum MD  Authorized by: Delilah Tatum MD   Comparison: compared with previous ECG from 12/22/2017  Similar to previous ECG  Comparison to previous ECG: Except no PACs  Rhythm: sinus rhythm  Rate: normal  BPM: 67  Conduction: conduction normal  Conduction: non-specific intraventricular conduction delay  ST Segments: ST segments normal  T Waves: T waves normal  QRS axis: left  Other findings: PRWP  Clinical impression comment: stable EKG              ASSESSMENT/PLAN  Problem List Items Addressed This Visit     Stenosis of carotid artery     On atorvastatin (LDL 59), ASA, plavix; last carotid u/s 2016 with Dr. Douglas Lazaro - overdue to  be updated and patient ok with my ordring it upon return from [place] in the spring; note bilat carotid bruits on exam today         Chronic kidney disease, stage II (mild)     9/18 renal function stable with Cr 1.21, GFR 57; discussed importance of good BG and BP control; on  for nephroprotective effects; avoid NSAIDs (such as ibuprofen or naproxen); repeat in 6 mos           Relevant Orders    Basic Metabolic Panel    Type 2 diabetes mellitus with diabetic polyneuropathy, without long-term current use of insulin (CMS/[facility])     BG control stable with A1C 6.2; no meds; encouraged reg phys activity to decr insulin resistance, moderation in unhealthy starches/sweets; f/u A1C in 3 mos    Judicious and appropriate use of lyrica 75mg BID #180, 0RF (exception made due to insurance).  LETHA was reviewed and appropriate.  The patient has read and signed the [facility] Controlled Substance Contract 10/18. Patient has been counseled and is aware of side effects, risks, potential for addiction/tolerance, interactions, and how to take medication correctly.  RTC 3 mos for next RX             Relevant Orders    Hemoglobin A1c    BPH     Stable on tamsulosin 0.4mg QHS #90, 3RF         Relevant Medications    tamsulosin (FLOMAX) 0.4 MG capsule 24 hr capsule    Gastroesophageal reflux disease     Stable on omeprazole 20mg QAM #90, 3RF         Relevant Medications    omeprazole (priLOSEC) 20 MG capsule    Hyperlipidemia     Lipids remain excellent, LDL 59 with goal < 100, ideally < 70; also stable LFTs, CPK; on atorvastatin 20mg QHS #90, 3RF         Relevant Medications    atorvastatin (LIPITOR) 20 MG tablet    Hypertension     BP and electrolytes stable; on amlo 5mg QD, lisin 40mg QD; rec low Na diet, increased aerobic exercise; home BP monitoring with goal BP < 130/80           Relevant Orders    ECG 12 Lead    Hypothyroidism     Euthyroid on levothyroxie 50mcg QD #90, 3RF         Relevant Medications    levothyroxine (SYNTHROID,  LEVOTHROID) 50 MCG tablet    pregabalin (LYRICA) 75 MG capsule    Insomnia     Judicious and appropriate use of zolpidem 10mg qhs prn #90, 0RF (exception made for insurance).  LETHA was reviewed and appropriate.  The patient has read and signed the [facility] Controlled Substance Contract 10/18. Patient has been counseled and is aware of side effects, risks, potential for addiction/tolerance, interactions, and how to take medication correctly.  RTC 3 mos for next RX           Relevant Medications    zolpidem (AMBIEN) 10 MG tablet    Medicare annual wellness visit, subsequent - Primary     Health maintenance - flu vacc 9/18; Prevnar 12/15, PVX 12/14, Tdap 10/11, Zostavax 9/09; hep A #1 12/18 [facility] per pt (#2 due in 6/19); rec Shingrix - counseling done re: recs and getting at pharmacy, get on waiting list; colonosc 1/15, repeat 2020 per Dr. Holland; no further prostate CA screening with age/comorbidities, confirmed with pt; eye exam due with Dr. Valenzuela with palnned cataract surgery after return from [place];  dental exam UTD with root canal 12/18; (+) seat belt use    Consultants:  Patient Care Team:  Delilah Tatum MD as PCP - General  Burton Holland MD as Consulting Physician (Colon and Rectal Surgery)  Douglas Lazaro MD as Consulting Physician (General Surgery)  Rubio Rossi MD as Consulting Physician (Dermatology)  Douglas Brooks MD as Consulting Physician (Cardiology)  Jim Black MD as Consulting Physician (Cardiothoracic Surgery)  Yusef Bowles MD as Consulting Physician (Nephrology)  Tio Mcnally MD (Inactive) as Consulting Physician (Hand Surgery)  Marisol Easley MD as Consulting Physician (Cardiology)  Chayito Quick OD (Optometry)  Radha Hirsch APRN as Nurse Practitioner (Otolaryngology)             Peripheral arterial disease (CMS/HCC)     Encouraged consistent walking/exercise; followed by Dr. Easley         Gout     Mildly elevated uric at 7.0;  goal uric < 6.0; currently asx; monitor uric acid diet; f/u in 6 mos         Relevant Orders    Uric Acid          FOLLOW-UP  1. Hep A #2 due in 4/19  2. RTC 3 mos for zolpidem and lyrica RX with [facility] (contract 10/18); plan to order carotis u/s at that time as well  3. Plan to check A1C, uric, BMP at 6 mos f/u appt    Electronically signed by:    Delilah Tatum MD  01/03/2019

## 2019-01-21 DIAGNOSIS — F51.01 PRIMARY INSOMNIA: ICD-10-CM

## 2019-01-21 RX ORDER — ZOLPIDEM TARTRATE 10 MG/1
10 TABLET ORAL NIGHTLY
Qty: 90 TABLET | Refills: 0 | OUTPATIENT
Start: 2019-01-21

## 2019-01-21 NOTE — TELEPHONE ENCOUNTER
PATIENTS WIFE SENT THIS PRESCRIPTION TO [facility]. [facility] HAS NOT RECEIVED THIS PRESCRIPTION. PATIENT IS OUT OF MEDICATION AND IS CURRENTLY IN [place]. SHE NEEDS US TO SEND THIS PRESCRIPTION TO LOCAL PHARMACY IN [place]. PHARMACY IS [facility] [address] [place] [address] PHONE NUMBER [phone]

## 2019-01-22 NOTE — TELEPHONE ENCOUNTER
See patient note re: not getting zolpidem from [facility] and now requesting for RX to be sent to [facility] in [place].    Contract specifically states no exceptions.  Also, an exception was given to them to receive the 90d to save them money.  It is supposed to be a month-to-month prescription.  If we are going to call in to pharmacy, they will need to go back to 30d at local pharmacy.  This is the only way to guarantee that they get the prescription.      We need to abide by the terms of the contract.      Can you call [facility] and find out what happened?

## 2019-01-23 NOTE — TELEPHONE ENCOUNTER
I spoke to Marta at [facility] and they have received the prescription and it ships out on the 24th (tomorrow). I called the only number we have on file for [facility] and spoke to his son since they are out of town. I let him know the medication will ship out tomorrow and he will relay that message to them.

## 2019-02-28 DIAGNOSIS — I10 ESSENTIAL HYPERTENSION: ICD-10-CM

## 2019-02-28 RX ORDER — AMLODIPINE BESYLATE 5 MG/1
5 TABLET ORAL DAILY
Qty: 90 TABLET | Refills: 3 | Status: SHIPPED | OUTPATIENT
Start: 2019-02-28 | End: 2020-01-06 | Stop reason: SDUPTHER

## 2019-04-09 ENCOUNTER — OFFICE VISIT (OUTPATIENT)
Dept: INTERNAL MEDICINE | Facility: CLINIC | Age: 84
End: 2019-04-09

## 2019-04-09 VITALS
DIASTOLIC BLOOD PRESSURE: 56 MMHG | WEIGHT: 181.4 LBS | BODY MASS INDEX: 28.41 KG/M2 | RESPIRATION RATE: 12 BRPM | SYSTOLIC BLOOD PRESSURE: 134 MMHG

## 2019-04-09 DIAGNOSIS — E11.42 TYPE 2 DIABETES MELLITUS WITH DIABETIC POLYNEUROPATHY, WITHOUT LONG-TERM CURRENT USE OF INSULIN (HCC): ICD-10-CM

## 2019-04-09 DIAGNOSIS — F51.01 PRIMARY INSOMNIA: Primary | ICD-10-CM

## 2019-04-09 DIAGNOSIS — I73.9 PERIPHERAL ARTERIAL DISEASE (HCC): ICD-10-CM

## 2019-04-09 DIAGNOSIS — I65.23 BILATERAL CAROTID ARTERY STENOSIS: ICD-10-CM

## 2019-04-09 DIAGNOSIS — I10 ESSENTIAL HYPERTENSION: ICD-10-CM

## 2019-04-09 PROCEDURE — 99214 OFFICE O/P EST MOD 30 MIN: CPT | Performed by: INTERNAL MEDICINE

## 2019-04-09 RX ORDER — ZOLPIDEM TARTRATE 10 MG/1
10 TABLET ORAL NIGHTLY
Qty: 90 TABLET | Refills: 0 | Status: SHIPPED | OUTPATIENT
Start: 2019-04-09 | End: 2019-07-01 | Stop reason: SDUPTHER

## 2019-04-09 RX ORDER — PREGABALIN 75 MG/1
75 CAPSULE ORAL 2 TIMES DAILY
Qty: 180 CAPSULE | Refills: 0 | Status: SHIPPED | OUTPATIENT
Start: 2019-04-09 | End: 2019-07-01 | Stop reason: SDUPTHER

## 2019-04-09 NOTE — ASSESSMENT & PLAN NOTE
Will update A1C at next f/u in 3 mos; currently no meds    Judicious and appropriate use of lyrica 75mg BID #180, 0RF (exception made for 90d due to insurance).  LETHA was reviewed and appropriate.  The patient has read and signed the [facility] Controlled Substance Contract 10/18. Patient has been counseled and is aware of side effects, risks, potential for addiction/tolerance, interactions, and how to take medication correctly.  RTC 3 mos for next RX

## 2019-04-09 NOTE — ASSESSMENT & PLAN NOTE
Judicious and appropriate use of zolpidem 10mg qhs (takes nightly) #90, 0RF (exception made for 90d due to insurance).  LETHA was reviewed and appropriate.  The patient has read and signed the [facility] Controlled Substance Contract 10/18. Patient has been counseled and is aware of side effects, risks, potential for addiction/tolerance, interactions, and how to take medication correctly.  RTC 3 mos for next RX

## 2019-04-09 NOTE — ASSESSMENT & PLAN NOTE
With patient's vascular history, discussed that he benefits from combination aspirin and clopidogrel therapy; however, this comes with a risk of increased bleeding/bruising verbalizes understanding; he also remains on statin therapy

## 2019-04-09 NOTE — PROGRESS NOTES
Chief Complaint   Patient presents with   • Insomnia     zolpidem and lyrica rf       History of Present Illness  85 y.o.  gentleman presents for lyrica and zolpidem RFs.  Remains stable on these meds without s/e or other concerns.  Has returned from [place].      Inquires about whether he should to be taking baby aspirin since he is on another blood thinner.  Notes recent media on aspirin.    Review of Systems  States easy bleeding, even from small injuries.  Denies chest pain, palpitations, shortness of breath, falls.  ROS (+) for chronic insomnia. All other ROS reviewed and negative.    [facility]  The following portions of the patient's history were reviewed and updated as appropriate: allergies, current medications, past family history, past medical history, past social history, past surgical history and problem list.      Current Outpatient Medications:   •  amLODIPine (NORVASC) 5 MG tablet, QD  •  aspirin 81 MG tablet, QD  •  atorvastatin (LIPITOR) 20 MG tablet,QD  •  clopidogrel (PLAVIX) 75 MG tablet, , Disp: , Rfl:   •  desmopressin (DDAVP) 0.2 MG tablet, QD   •  fluocinonide (LIDEX) 0.05 % external solution, ,AD  •  ketoconazole (NIZORAL) 2 % shampoo, AD  •  levothyroxine (SYNTHROID, LEVOTHROID) 50 MCG tablet, QD  •  lisinopril (PRINIVIL,ZESTRIL) 40 MG tablet, QD  •  Misc Natural Products (GLUCOSAMINE CHONDROITIN ADV) tablet, QD  •  omeprazole (priLOSEC) 20 MG capsule, QD  •  pregabalin (LYRICA) 75 MG capsule, BID  •  tamsulosin (FLOMAX) 0.4 MG capsule QHS  •  zolpidem (AMBIEN) 10 MG tablet, QHS      VITALS:  /56 (BP Location: Left arm, Patient Position: Sitting)   Resp 12   Wt 82.3 kg (181 lb 6.4 oz)   BMI 28.41 kg/m²     Physical Exam   Constitutional: He appears well-developed and well-nourished.   Eyes: Conjunctivae and EOM are normal.   Wears glasses   Neck: Carotid bruit is present (bilat).   Cardiovascular: Normal rate, regular rhythm and normal heart sounds.   occ ectopy, pauses    Pulmonary/Chest: Effort normal and breath sounds normal.   Abdominal: Soft. Bowel sounds are normal.   Musculoskeletal:   Normal gait   Neurological: He is alert.   Psychiatric: He has a normal mood and affect. His behavior is normal.   Nursing note and vitals reviewed.      LABS  Results for orders placed or performed in visit on 12/21/18   Comprehensive Metabolic Panel   Result Value Ref Range    Glucose 117 (H) 70 - 100 mg/dL    BUN 18 9 - 23 mg/dL    Creatinine 1.21 0.60 - 1.30 mg/dL    Sodium 139 132 - 146 mmol/L    Potassium 4.5 3.5 - 5.5 mmol/L    Chloride 104 99 - 109 mmol/L    CO2 29.0 20.0 - 31.0 mmol/L    Calcium 8.5 (L) 8.7 - 10.4 mg/dL    Total Protein 6.2 5.7 - 8.2 g/dL    Albumin 4.12 3.20 - 4.80 g/dL    ALT (SGPT) 11 7 - 40 U/L    AST (SGOT) 12 0 - 33 U/L    Alkaline Phosphatase 73 25 - 100 U/L    Total Bilirubin 0.4 0.3 - 1.2 mg/dL    eGFR Non African Amer 57 (L) >60 mL/min/1.73    Globulin 2.1 gm/dL    A/G Ratio 2.0 1.5 - 2.5 g/dL    BUN/Creatinine Ratio 14.9 7.0 - 25.0    Anion Gap 6.0 3.0 - 11.0 mmol/L   Lipid Panel   Result Value Ref Range    Total Cholesterol 109 0 - 200 mg/dL    Triglycerides 79 0 - 150 mg/dL    HDL Cholesterol 41 40 - 60 mg/dL    LDL Cholesterol  59 0 - 130 mg/dL   TSH   Result Value Ref Range    TSH 1.227 0.350 - 5.350 mIU/mL   Microalbumin / Creatinine Urine Ratio - Urine, Clean Catch   Result Value Ref Range    Creatinine, Urine 104.6 Not Estab. mg/dL    Microalbumin, Urine 29.0 Not Estab. ug/mL    Microalbumin/Creatinine Ratio 27.7 0.0 - 30.0 mg/g creat   Hemoglobin A1c   Result Value Ref Range    Hemoglobin A1C 6.20 (H) 4.80 - 5.60 %   CK   Result Value Ref Range    Creatine Kinase 57 26 - 174 U/L   T4, Free   Result Value Ref Range    Free T4 1.25 0.89 - 1.76 ng/dL   Uric Acid   Result Value Ref Range    Uric Acid 7.0 3.7 - 9.2 mg/dL   CBC Auto Differential   Result Value Ref Range    WBC 5.43 3.50 - 10.80 10*3/mm3    RBC 4.38 4.20 - 5.76 10*6/mm3    Hemoglobin 12.8  (L) 13.1 - 17.5 g/dL    Hematocrit 41.5 38.9 - 50.9 %    MCV 94.7 80.0 - 99.0 fL    MCH 29.2 27.0 - 31.0 pg    MCHC 30.8 (L) 32.0 - 36.0 g/dL    RDW 14.7 (H) 11.3 - 14.5 %    RDW-SD 51.0 37.0 - 54.0 fl    MPV 10.5 6.0 - 12.0 fL    Platelets 188 150 - 450 10*3/mm3    Neutrophil % 60.4 41.0 - 71.0 %    Lymphocyte % 26.3 24.0 - 44.0 %    Monocyte % 8.5 0.0 - 12.0 %    Eosinophil % 4.4 (H) 0.0 - 3.0 %    Basophil % 0.4 0.0 - 1.0 %    Immature Grans % 0.4 0.0 - 0.6 %    Neutrophils, Absolute 3.28 1.50 - 8.30 10*3/mm3    Lymphocytes, Absolute 1.43 0.60 - 4.80 10*3/mm3    Monocytes, Absolute 0.46 0.00 - 1.00 10*3/mm3    Eosinophils, Absolute 0.24 0.00 - 0.30 10*3/mm3    Basophils, Absolute 0.02 0.00 - 0.20 10*3/mm3    Immature Grans, Absolute 0.02 0.00 - 0.03 10*3/mm3   Urinalysis without microscopic (no culture) - Urine, Clean Catch   Result Value Ref Range    Color, UA Yellow Yellow, Straw    Appearance, UA Clear Clear    pH, UA 6.5 5.0 - 8.0    Specific Gravity, UA 1.014 1.001 - 1.030    Glucose, UA Negative Negative    Ketones, UA Negative Negative    Bilirubin, UA Negative Negative    Blood, UA Negative Negative    Protein, UA Negative Negative    Leuk Esterase, UA Negative Negative    Nitrite, UA Negative Negative    Urobilinogen, UA 0.2 E.U./dL 0.2 - 1.0 E.U./dL   Urinalysis, Microscopic Only - Urine, Clean Catch   Result Value Ref Range    RBC, UA 0-2 None Seen, 0-2 /HPF    WBC, UA 0-2 None Seen, 0-2 /HPF    Bacteria, UA None Seen None Seen, Trace /HPF    Squamous Epithelial Cells, UA None Seen None Seen, 0-2 /HPF    Hyaline Casts, UA 0-6 0 - 6 /LPF    Methodology Automated Microscopy        ASSESSMENT/PLAN  Problem List Items Addressed This Visit     Stenosis of carotid artery     Update carotid u/s; already on statin, ASA, plavix         Relevant Orders    Duplex Carotid Ultrasound CAR    Type 2 diabetes mellitus with diabetic polyneuropathy, without long-term current use of insulin (CMS/[facility])     Will update A1C  at next f/u in 3 mos; currently no meds    Judicious and appropriate use of lyrica 75mg BID #180, 0RF (exception made for 90d due to insurance).  LETHA was reviewed and appropriate.  The patient has read and signed the [facility] [employer] Substance Contract 10/18. Patient has been counseled and is aware of side effects, risks, potential for addiction/tolerance, interactions, and how to take medication correctly.  RTC 3 mos for next RX           Relevant Medications    pregabalin (LYRICA) 75 MG capsule    Hypertension     BP remains stable, at goal; on amlo 5mg QD, lisin 40mg QD         Insomnia - Primary     Judicious and appropriate use of zolpidem 10mg qhs (takes nightly) #90, 0RF (exception made for 90d due to insurance).  LETHA was reviewed and appropriate.  The patient has read and signed the [facility] [employer] Substance Contract 10/18. Patient has been counseled and is aware of side effects, risks, potential for addiction/tolerance, interactions, and how to take medication correctly.  RTC 3 mos for next RX           Relevant Medications    zolpidem (AMBIEN) 10 MG tablet    Peripheral arterial disease (CMS/HCC)     With patient's vascular history, discussed that he benefits from combination aspirin and clopidogrel therapy; however, this comes with a risk of increased bleeding/bruising verbalizes understanding; he also remains on statin therapy               FOLLOW-UP  1. Health maintenance - HAV 12/18 (#2 due in 6/19); rec Shingrix  2. RTC 3 mos for zolpidem/lyrica RXs with LETHA (contract 10/18) as well as A1C, BMP, uric (prev escribed)    Electronically signed by:    Delilah Tatum MD  04/09/2019

## 2019-04-16 ENCOUNTER — HOSPITAL ENCOUNTER (OUTPATIENT)
Dept: CARDIOLOGY | Facility: HOSPITAL | Age: 84
Discharge: HOME | End: 2019-04-16
Admitting: INTERNAL MEDICINE

## 2019-04-16 DIAGNOSIS — I65.23 BILATERAL CAROTID ARTERY STENOSIS: ICD-10-CM

## 2019-04-16 LAB
BH CV XLRA MEAS LEFT CCA RATIO VEL: 106 CM/SEC
BH CV XLRA MEAS LEFT DIST CCA EDV: 31.4 CM/SEC
BH CV XLRA MEAS LEFT DIST CCA PSV: 107.1 CM/SEC
BH CV XLRA MEAS LEFT DIST ICA EDV: 19.2 CM/SEC
BH CV XLRA MEAS LEFT DIST ICA PSV: 73.3 CM/SEC
BH CV XLRA MEAS LEFT ICA RATIO VEL: 183 CM/SEC
BH CV XLRA MEAS LEFT ICA/CCA RATIO: 1.7
BH CV XLRA MEAS LEFT MID CCA EDV: 20.6 CM/SEC
BH CV XLRA MEAS LEFT MID CCA PSV: 100.2 CM/SEC
BH CV XLRA MEAS LEFT MID ICA EDV: 23.6 CM/SEC
BH CV XLRA MEAS LEFT MID ICA PSV: 141.4 CM/SEC
BH CV XLRA MEAS LEFT PROX CCA EDV: 21.6 CM/SEC
BH CV XLRA MEAS LEFT PROX CCA PSV: 105.1 CM/SEC
BH CV XLRA MEAS LEFT PROX ECA PSV: 169.5 CM/SEC
BH CV XLRA MEAS LEFT PROX ICA EDV: 50.5 CM/SEC
BH CV XLRA MEAS LEFT PROX ICA PSV: 184.1 CM/SEC
BH CV XLRA MEAS LEFT PROX SCLA PSV: 200.6 CM/SEC
BH CV XLRA MEAS LEFT VERTEBRAL A PSV: 54 CM/SEC
BH CV XLRA MEAS RIGHT CCA RATIO VEL: 90.8 CM/SEC
BH CV XLRA MEAS RIGHT DIST CCA EDV: 24.4 CM/SEC
BH CV XLRA MEAS RIGHT DIST CCA PSV: 91.7 CM/SEC
BH CV XLRA MEAS RIGHT DIST ICA EDV: 32.4 CM/SEC
BH CV XLRA MEAS RIGHT DIST ICA PSV: 102.1 CM/SEC
BH CV XLRA MEAS RIGHT ICA RATIO VEL: 107 CM/SEC
BH CV XLRA MEAS RIGHT ICA/CCA RATIO: 1.2
BH CV XLRA MEAS RIGHT MID CCA EDV: 18.7 CM/SEC
BH CV XLRA MEAS RIGHT MID CCA PSV: 87.4 CM/SEC
BH CV XLRA MEAS RIGHT MID ICA EDV: 31.4 CM/SEC
BH CV XLRA MEAS RIGHT MID ICA PSV: 103 CM/SEC
BH CV XLRA MEAS RIGHT PROX CCA EDV: 24.6 CM/SEC
BH CV XLRA MEAS RIGHT PROX CCA PSV: 101.2 CM/SEC
BH CV XLRA MEAS RIGHT PROX ECA PSV: 150.1 CM/SEC
BH CV XLRA MEAS RIGHT PROX ICA EDV: 20.1 CM/SEC
BH CV XLRA MEAS RIGHT PROX ICA PSV: 108.3 CM/SEC
BH CV XLRA MEAS RIGHT PROX SCLA PSV: 149.3 CM/SEC
BH CV XLRA MEAS RIGHT VERTEBRAL A PSV: 42.8 CM/SEC
LEFT ARM BP: NORMAL MMHG
RIGHT ARM BP: NORMAL MMHG

## 2019-04-16 PROCEDURE — 93880 EXTRACRANIAL BILAT STUDY: CPT | Performed by: INTERNAL MEDICINE

## 2019-04-16 PROCEDURE — 93880 EXTRACRANIAL BILAT STUDY: CPT

## 2019-04-17 NOTE — PROGRESS NOTES
Ultrasound of arteries in neck shows plaque on both sides and moderate blockage on the left side; should remains on statin and clopidogrel (pls confirm patient is still on clopidogrel)

## 2019-06-03 PROBLEM — L40.0 PSORIASIS VULGARIS: Status: ACTIVE | Noted: 2018-05-21

## 2019-07-01 ENCOUNTER — OFFICE VISIT (OUTPATIENT)
Dept: INTERNAL MEDICINE | Facility: CLINIC | Age: 84
End: 2019-07-01

## 2019-07-01 VITALS
HEART RATE: 57 BPM | WEIGHT: 177 LBS | BODY MASS INDEX: 27.78 KG/M2 | OXYGEN SATURATION: 98 % | SYSTOLIC BLOOD PRESSURE: 118 MMHG | DIASTOLIC BLOOD PRESSURE: 68 MMHG | HEIGHT: 67 IN

## 2019-07-01 DIAGNOSIS — M10.042 ACUTE IDIOPATHIC GOUT OF LEFT HAND: ICD-10-CM

## 2019-07-01 DIAGNOSIS — N18.2 CHRONIC KIDNEY DISEASE, STAGE II (MILD): ICD-10-CM

## 2019-07-01 DIAGNOSIS — E11.42 TYPE 2 DIABETES MELLITUS WITH DIABETIC POLYNEUROPATHY, WITHOUT LONG-TERM CURRENT USE OF INSULIN (HCC): Primary | ICD-10-CM

## 2019-07-01 DIAGNOSIS — F51.01 PRIMARY INSOMNIA: ICD-10-CM

## 2019-07-01 DIAGNOSIS — I10 ESSENTIAL HYPERTENSION: ICD-10-CM

## 2019-07-01 LAB
ANION GAP SERPL CALCULATED.3IONS-SCNC: 10.4 MMOL/L (ref 5–15)
BUN BLD-MCNC: 23 MG/DL (ref 8–23)
BUN/CREAT SERPL: 17.3 (ref 7–25)
CALCIUM SPEC-SCNC: 9.6 MG/DL (ref 8.6–10.5)
CHLORIDE SERPL-SCNC: 105 MMOL/L (ref 98–107)
CO2 SERPL-SCNC: 26.6 MMOL/L (ref 22–29)
CREAT BLD-MCNC: 1.33 MG/DL (ref 0.76–1.27)
GFR SERPL CREATININE-BSD FRML MDRD: 51 ML/MIN/1.73
GLUCOSE BLD-MCNC: 103 MG/DL (ref 65–99)
HBA1C MFR BLD: 5.8 %
POTASSIUM BLD-SCNC: 4.9 MMOL/L (ref 3.5–5.2)
SODIUM BLD-SCNC: 142 MMOL/L (ref 136–145)
URATE SERPL-MCNC: 6.7 MG/DL (ref 3.4–7)

## 2019-07-01 PROCEDURE — 80048 BASIC METABOLIC PNL TOTAL CA: CPT | Performed by: INTERNAL MEDICINE

## 2019-07-01 PROCEDURE — 99214 OFFICE O/P EST MOD 30 MIN: CPT | Performed by: INTERNAL MEDICINE

## 2019-07-01 PROCEDURE — 83036 HEMOGLOBIN GLYCOSYLATED A1C: CPT | Performed by: INTERNAL MEDICINE

## 2019-07-01 PROCEDURE — 84550 ASSAY OF BLOOD/URIC ACID: CPT | Performed by: INTERNAL MEDICINE

## 2019-07-01 RX ORDER — PREGABALIN 75 MG/1
75 CAPSULE ORAL 2 TIMES DAILY
Qty: 180 CAPSULE | Refills: 0 | Status: SHIPPED | OUTPATIENT
Start: 2019-07-01 | End: 2019-09-30 | Stop reason: SDUPTHER

## 2019-07-01 RX ORDER — ZOLPIDEM TARTRATE 10 MG/1
10 TABLET ORAL NIGHTLY
Qty: 90 TABLET | Refills: 0 | Status: SHIPPED | OUTPATIENT
Start: 2019-07-01 | End: 2019-09-30 | Stop reason: SDUPTHER

## 2019-07-01 NOTE — PROGRESS NOTES
"Chief Complaint   Patient presents with   • Insomnia   • Diabetes       History of Present Illness  85 y.o.  gentleman presents for lyrica and zolpidem RF as well as DM follow-up.  No s/e with lyrica or zolpidem. Has not had any gout flare-ups.  Continues to have leg sxs, pain with walking, unsure whether he wants to pursue further vascular procedures.    Review of Systems  ROS (+) for chronic insomnia and PAD leg pains.  Denies CP, palpitations, SOB, lightheadedness, falls, gout flare-ups. All other ROS reviewed and negative.    [facility]  The following portions of the patient's history were reviewed and updated as appropriate: allergies, current medications, past family history, past medical history, past social history, past surgical history and problem list.      Current Outpatient Medications:   •  amLODIPine (NORVASC) 5 MG QD  •  aspirin 81 MG tablet, QD  •  atorvastatin (LIPITOR) 20 MG QD  •  clopidogrel (PLAVIX) 75 MG QD  •  desmopressin (DDAVP) 0.2 MG QHS  •  fluocinonide (LIDEX) 0.05 % external solution prn  •  ketoconazole (NIZORAL) 2 % shampoo prn  •  levothyroxine 50 MCG QD  •  lisinopril (PRINIVIL,ZESTRIL) 40 MG QD  •  GLUCOSAMINE CHONDROITIN ADV QD  •  omeprazole (priLOSEC) 20 MG QD  •  pregabalin (LYRICA) 75 MG BID  •  tamsulosin (FLOMAX) 0.4 MG QHS  •  zolpidem (AMBIEN) 10 MG QHS      VITALS:  /68   Pulse 57   Ht 170.2 cm (67\")   Wt 80.3 kg (177 lb)   SpO2 98%   BMI 27.72 kg/m²     Physical Exam   Constitutional: He is oriented to person, place, and time. He appears well-developed and well-nourished.   Eyes: Conjunctivae and EOM are normal.   Wears glasses   Cardiovascular: Normal rate, regular rhythm and normal heart sounds.   Pulmonary/Chest: Effort normal and breath sounds normal. No respiratory distress.   Abdominal: Soft. Bowel sounds are normal.   Musculoskeletal:   Normal gait   Neurological: He is alert and oriented to person, place, and time.   Psychiatric: He has a normal " mood and affect. His behavior is normal.   Nursing note and vitals reviewed.      LABS  Results for orders placed or performed in visit on 07/01/19   Basic Metabolic Panel   Result Value Ref Range    Glucose 103 (H) 65 - 99 mg/dL    BUN 23 8 - 23 mg/dL    Creatinine 1.33 (H) 0.76 - 1.27 mg/dL    Sodium 142 136 - 145 mmol/L    Potassium 4.9 3.5 - 5.2 mmol/L    Chloride 105 98 - 107 mmol/L    CO2 26.6 22.0 - 29.0 mmol/L    Calcium 9.6 8.6 - 10.5 mg/dL    eGFR Non African Amer 51 (L) >60 mL/min/1.73    BUN/Creatinine Ratio 17.3 7.0 - 25.0    Anion Gap 10.4 5.0 - 15.0 mmol/L   Uric Acid   Result Value Ref Range    Uric Acid 6.7 3.4 - 7.0 mg/dL   POC Glycosylated Hemoglobin (Hb A1C)   Result Value Ref Range    Hemoglobin A1C 5.8 %     1218 uric 7.0, A1C 6.2, Cr 1.32, GFR 57    ASSESSMENT/PLAN  Problem List Items Addressed This Visit     Chronic kidney disease, stage II (mild)     Update renal function; discussed importance of good BG and BP control as well as recs to avoid NSAIDs; ADDENDUM: worsened Cr 1.33, GFR 51 - will have patient make sure he is well-hydrated and repeat in 1-2 weeks         Relevant Orders    Basic Metabolic Panel    Type 2 diabetes mellitus with diabetic polyneuropathy, without long-term current use of insulin (CMS/[facility]) - Primary     BG control stable/improved with A1C 5.8; no meds; encouraged reg phys activity to decr insulin resistance, moderation in unhealthy starches/sweets; f/u A1C in 6 mos    Judicious and appropriate use of lyrica 75mg BID #180, 0RF (exception made for insurance purposes).  LETHA was reviewed and appropriate.  The patient has read and signed the [facility] Controlled Substance Contract 10/18. Patient has been counseled and is aware of side effects, risks, potential for addiction/tolerance, interactions, and how to take medication correctly.  RTC 3 mos for next RX; needs updated UDS           Relevant Medications    pregabalin (LYRICA) 75 MG capsule    Other Relevant  Orders    POC Glycosylated Hemoglobin (Hb A1C) (Completed)    Hypertension     BP stable, at goal 118/68; remains on amlo 5mg QD, lisin 40mg QD         Insomnia     Chronic insomnia and chronically on zolpidem; Judicious and appropriate use of zolpidem 10mg QHS #90, 0RF (Exception for 90d due to insurance cost).  LETHA was reviewed and appropriate.  The patient has read and signed the [facility] [employer] Substance Contract 10/18. Patient has been counseled and is aware of side effects, risks, potential for addiction/tolerance, interactions, and how to take medication correctly.  RTC 3 mos for next RX; needs updated UDS           Relevant Medications    zolpidem (AMBIEN) 10 MG tablet    Other Relevant Orders    Urine Drug Screen - Urine, Clean Catch    Gout     Asx; discussed goal uric > 6; update uric acid level, no current meds; ADDENDUM: uric mildly improved down to 6.7, since asx, follow clinically               FOLLOW-UP  1. Health maintenance - due for HAV #2; rec Shingrix at pharmacy  2. RTC 3 mos for zolpidem/lyrica RXs with LETHA/contract  3. RTC 6 mos (after 1/3/20) for updated wellness, fasting labs, LETHA/controlled meds    Electronically signed by:    Delilah Tatum MD  07/01/2019

## 2019-07-01 NOTE — ASSESSMENT & PLAN NOTE
Chronic insomnia and chronically on zolpidem; Judicious and appropriate use of zolpidem 10mg QHS #90, 0RF (Exception for 90d due to insurance cost).  LETHA was reviewed and appropriate.  The patient has read and signed the [facility] Controlled Substance Contract 10/18. Patient has been counseled and is aware of side effects, risks, potential for addiction/tolerance, interactions, and how to take medication correctly.  RTC 3 mos for next RX; needs updated UDS

## 2019-07-01 NOTE — ASSESSMENT & PLAN NOTE
BG control stable/improved with A1C 5.8; no meds; encouraged reg phys activity to decr insulin resistance, moderation in unhealthy starches/sweets; f/u A1C in 6 mos    Judicious and appropriate use of lyrica 75mg BID #180, 0RF (exception made for insurance purposes).  LETHA was reviewed and appropriate.  The patient has read and signed the [facility] Controlled Substance Contract 10/18. Patient has been counseled and is aware of side effects, risks, potential for addiction/tolerance, interactions, and how to take medication correctly.  RTC 3 mos for next RX; needs updated UDS

## 2019-07-02 NOTE — PROGRESS NOTES
Gout test improved/stable; electrolytes stable except kidney function worsened.    Avoid NSAIDs; drink more water daily and repeat BMP in 2 weeks (nonfasting, well-hydrated)

## 2019-07-02 NOTE — ASSESSMENT & PLAN NOTE
Update renal function; discussed importance of good BG and BP control as well as recs to avoid NSAIDs; ADDENDUM: worsened Cr 1.33, GFR 51 - will have patient make sure he is well-hydrated and repeat in 1-2 weeks

## 2019-07-02 NOTE — ASSESSMENT & PLAN NOTE
Asx; discussed goal uric > 6; update uric acid level, no current meds; ADDENDUM: uric mildly improved down to 6.7, since asx, follow clinically

## 2019-07-08 ENCOUNTER — TELEPHONE (OUTPATIENT)
Dept: INTERNAL MEDICINE | Facility: CLINIC | Age: 84
End: 2019-07-08

## 2019-07-31 ENCOUNTER — TELEPHONE (OUTPATIENT)
Dept: INTERNAL MEDICINE | Facility: CLINIC | Age: 84
End: 2019-07-31

## 2019-07-31 NOTE — TELEPHONE ENCOUNTER
PLEASE CALL [facility] TO CLARIFY  OF PT  THEY HAVE RX FOR LYRICA FOR THE PT    PLEASE CALL [phone]  REFERENCE NUMBER [id number]

## 2019-10-01 ENCOUNTER — OFFICE VISIT (OUTPATIENT)
Dept: INTERNAL MEDICINE | Facility: CLINIC | Age: 84
End: 2019-10-01

## 2019-10-01 VITALS
WEIGHT: 175 LBS | HEIGHT: 67 IN | BODY MASS INDEX: 27.47 KG/M2 | SYSTOLIC BLOOD PRESSURE: 130 MMHG | OXYGEN SATURATION: 98 % | DIASTOLIC BLOOD PRESSURE: 68 MMHG | HEART RATE: 71 BPM

## 2019-10-01 DIAGNOSIS — F51.01 PRIMARY INSOMNIA: Primary | ICD-10-CM

## 2019-10-01 DIAGNOSIS — N18.2 CHRONIC KIDNEY DISEASE, STAGE II (MILD): ICD-10-CM

## 2019-10-01 DIAGNOSIS — E11.42 TYPE 2 DIABETES MELLITUS WITH DIABETIC POLYNEUROPATHY, WITHOUT LONG-TERM CURRENT USE OF INSULIN (HCC): ICD-10-CM

## 2019-10-01 DIAGNOSIS — I10 ESSENTIAL HYPERTENSION: ICD-10-CM

## 2019-10-01 PROCEDURE — 99214 OFFICE O/P EST MOD 30 MIN: CPT | Performed by: INTERNAL MEDICINE

## 2019-10-01 RX ORDER — ZOLPIDEM TARTRATE 10 MG/1
10 TABLET ORAL NIGHTLY
Qty: 90 TABLET | Refills: 0 | Status: SHIPPED | OUTPATIENT
Start: 2019-10-01 | End: 2020-01-06 | Stop reason: SDUPTHER

## 2019-10-01 RX ORDER — PREGABALIN 75 MG/1
75 CAPSULE ORAL 2 TIMES DAILY
Qty: 180 CAPSULE | Refills: 0 | Status: SHIPPED | OUTPATIENT
Start: 2019-10-01 | End: 2020-01-06 | Stop reason: SDUPTHER

## 2019-10-01 NOTE — ASSESSMENT & PLAN NOTE
Baseline Cr 1.1-1.2; most recent Cr 1.33 with GFR 51; return well-hydrated for repeat BMP; patient does not take NSAIDs

## 2019-10-01 NOTE — ASSESSMENT & PLAN NOTE
Judicious and appropriate use of zolpidem 10mg QHS #90, 0RF (exception made for 90d due to insurance).  LETHA was reviewed and appropriate.  The patient has read and signed the [facility] Controlled Substance Contract today 10/1/19. UDS 7/19. Patient has been counseled and is aware of side effects, risks, potential for addiction/tolerance, interactions, and how to take medication correctly.  RTC 3 mos for next RX with LETHA

## 2019-10-01 NOTE — ASSESSMENT & PLAN NOTE
Stable A1C 5.8 in 7/19;     Judicious and appropriate use of lyrica 75mg BID #180, 0RF (exception made for 90d due to insurance) .  LETHA was reviewed and appropriate.  The patient has read and signed the [facility] Controlled Substance Contract today 10/1/19. UDS 7/19. Patient has been counseled and is aware of side effects, risks, potential for addiction/tolerance, interactions, and how to take medication correctly.  RTC 3 mos for next RX

## 2019-10-01 NOTE — PROGRESS NOTES
"Chief Complaint   Patient presents with   • Insomnia       History of Present Illness  85 y.o.  gentleman presents for zolpidem and lyrica refills, taken chronically and without side effects or concerns today.  States he cannot sleep without zolpidem and still does not get 6 hrs even with zolpidem.    Notes eye exam pending later this month.  Has not done HAV #2 and will be getting flu vacc at .    Does not take any ibuprofen or naproxen.    Review of Systems  Denies CP, palpitations, SOB, lightheadedness, falls.  Has chronic LBP.  States dental visits up to date.  States he rarely has headaches.All other ROS reviewed and negative.    [facility]  The following portions of the patient's history were reviewed and updated as appropriate: allergies, current medications, past family history, past medical history, past social history, past surgical history and problem list.    Current Outpatient Medications:   •  amLODIPine (NORVASC) 5 MG QD  •  aspirin 81 MG tablet, QD  •  atorvastatin (LIPITOR) 20 MG QD  •  clopidogrel (PLAVIX) 75 MG tablet QD  •  desmopressin (DDAVP) 0.2 MG tablet,QD  •  fluocinonide (LIDEX) 0.05 % external solution, ,AD  •  levothyroxine (SYNTHROID, LEVOTHROID) 50 MCG QD  •  lisinopril (PRINIVIL,ZESTRIL) 40 MG QD  •  Misc Natural Products (GLUCOSAMINE CHONDROITIN ADV) QD  •  omeprazole (priLOSEC) 20 MG  QD  •  pregabalin (LYRICA) 75 MG BID  •  tamsulosin (FLOMAX) 0.4 MG capsule QHS  •  zolpidem (AMBIEN) 10 MG QHS (takes nightly)    VITALS:  /68   Pulse 71   Ht 170.2 cm (67\")   Wt 79.4 kg (175 lb)   SpO2 98%   BMI 27.41 kg/m²     Physical Exam   Constitutional: He is oriented to person, place, and time. He appears well-developed and well-nourished.   Eyes: Conjunctivae and EOM are normal.   Wears glasses   Cardiovascular: Normal rate, regular rhythm and normal heart sounds.   Pulmonary/Chest: Effort normal and breath sounds normal. No respiratory distress.   Musculoskeletal:   Normal " stable gait   Neurological: He is alert and oriented to person, place, and time.   Psychiatric: He has a normal mood and affect. His behavior is normal.   Nursing note and vitals reviewed.      LABS  Results for orders placed or performed in visit on 07/01/19   Basic Metabolic Panel   Result Value Ref Range    Glucose 103 (H) 65 - 99 mg/dL    BUN 23 8 - 23 mg/dL    Creatinine 1.33 (H) 0.76 - 1.27 mg/dL    Sodium 142 136 - 145 mmol/L    Potassium 4.9 3.5 - 5.2 mmol/L    Chloride 105 98 - 107 mmol/L    CO2 26.6 22.0 - 29.0 mmol/L    Calcium 9.6 8.6 - 10.5 mg/dL    eGFR Non African Amer 51 (L) >60 mL/min/1.73    BUN/Creatinine Ratio 17.3 7.0 - 25.0    Anion Gap 10.4 5.0 - 15.0 mmol/L   Uric Acid   Result Value Ref Range    Uric Acid 6.7 3.4 - 7.0 mg/dL   POC Glycosylated Hemoglobin (Hb A1C)   Result Value Ref Range    Hemoglobin A1C 5.8 %       ASSESSMENT/PLAN  Problem List Items Addressed This Visit     Type 2 diabetes mellitus with diabetic polyneuropathy, without long-term current use of insulin (CMS/[facility])     Stable A1C 5.8 in 7/19;     Judicious and appropriate use of lyrica 75mg BID #180, 0RF (exception made for 90d due to insurance) .  LETHA was reviewed and appropriate.  The patient has read and signed the [facility] [employer] Substance Contract today 10/1/19. UDS 7/19. Patient has been counseled and is aware of side effects, risks, potential for addiction/tolerance, interactions, and how to take medication correctly.  RTC 3 mos for next RX           Relevant Medications    pregabalin (LYRICA) 75 MG capsule    Insomnia - Primary     Judicious and appropriate use of zolpidem 10mg QHS #90, 0RF (exception made for 90d due to insurance).  LETHA was reviewed and appropriate.  The patient has read and signed the [facility] [employer] Substance Contract today 10/1/19. UDS 7/19. Patient has been counseled and is aware of side effects, risks, potential for addiction/tolerance, interactions, and how to take  medication correctly.  RTC 3 mos for next RX with LETHA           Relevant Medications    zolpidem (AMBIEN) 10 MG tablet    Hypertension     BP remains stable on amlo 5mg QD, lisin 40mg QD         Chronic kidney disease, stage II (mild)     Baseline Cr 1.1-1.2; most recent Cr 1.33 with GFR 51; return well-hydrated for repeat BMP; patient does not take NSAIDs         Relevant Orders    Basic Metabolic Panel          FOLLOW-UP  1. Health maintenance - states he will get flu vacc this wk at ; reminder he needs HAV #2; reminder re: recs for Shingrix; eye exam pending (need office note); dental exams UTD per pt  2. RTC 1/6/20 for wellness; fasting labs the week prior to appt (CBC, CMP, TSH, lipids, UA/micro, UDS, A1C, microalb, CPK, FT4, uric); also LETHA/zolpidem/lyrica (contract 10/19)    Electronically signed by:    Delilah Tatum MD  10/01/2019

## 2019-10-07 ENCOUNTER — LAB (OUTPATIENT)
Dept: INTERNAL MEDICINE | Facility: CLINIC | Age: 84
End: 2019-10-07

## 2019-10-07 DIAGNOSIS — N18.2 CHRONIC KIDNEY DISEASE, STAGE II (MILD): ICD-10-CM

## 2019-10-07 LAB
ANION GAP SERPL CALCULATED.3IONS-SCNC: 11.7 MMOL/L (ref 5–15)
BUN BLD-MCNC: 16 MG/DL (ref 8–23)
BUN/CREAT SERPL: 14.2 (ref 7–25)
CALCIUM SPEC-SCNC: 8.9 MG/DL (ref 8.6–10.5)
CHLORIDE SERPL-SCNC: 101 MMOL/L (ref 98–107)
CO2 SERPL-SCNC: 27.3 MMOL/L (ref 22–29)
CREAT BLD-MCNC: 1.13 MG/DL (ref 0.76–1.27)
GFR SERPL CREATININE-BSD FRML MDRD: 62 ML/MIN/1.73
GLUCOSE BLD-MCNC: 111 MG/DL (ref 65–99)
POTASSIUM BLD-SCNC: 4.7 MMOL/L (ref 3.5–5.2)
SODIUM BLD-SCNC: 140 MMOL/L (ref 136–145)

## 2019-10-07 PROCEDURE — 80048 BASIC METABOLIC PNL TOTAL CA: CPT | Performed by: INTERNAL MEDICINE

## 2019-10-08 ENCOUNTER — TELEPHONE (OUTPATIENT)
Dept: INTERNAL MEDICINE | Facility: CLINIC | Age: 84
End: 2019-10-08

## 2019-10-08 NOTE — TELEPHONE ENCOUNTER
----- Message from Delilah Tatum MD sent at 10/7/2019  8:35 PM EDT -----  Kidney function back to baseline (normal); continue to ensure enough water intake on a daily basis

## 2019-10-10 ENCOUNTER — PRIOR AUTHORIZATION (OUTPATIENT)
Dept: INTERNAL MEDICINE | Facility: CLINIC | Age: 84
End: 2019-10-10

## 2019-10-14 ENCOUNTER — TELEPHONE (OUTPATIENT)
Dept: INTERNAL MEDICINE | Facility: CLINIC | Age: 84
End: 2019-10-14

## 2019-10-14 NOTE — TELEPHONE ENCOUNTER
PATIENT'S SPOUSE CALLED TO CHECK STATUS OF PA FOR ZOLPIDEM. SHE GAVE A #  [phone] FOR PA FOR ZOLPIDEM. SHE SPOKE WITH HIS INSURANCE PROVIDER THIS MORNING AND WAS TOLD THEY HAVE NOT RECEIVED ANYTHING FROM THIS OFFICE YET REGARDING THIS PA.

## 2019-10-30 DIAGNOSIS — K21.9 GASTROESOPHAGEAL REFLUX DISEASE WITHOUT ESOPHAGITIS: ICD-10-CM

## 2019-10-30 RX ORDER — OMEPRAZOLE 20 MG/1
20 CAPSULE, DELAYED RELEASE ORAL DAILY
Qty: 90 CAPSULE | Refills: 3 | OUTPATIENT
Start: 2019-10-30

## 2019-11-07 DIAGNOSIS — E03.9 ACQUIRED HYPOTHYROIDISM: ICD-10-CM

## 2019-11-07 RX ORDER — LEVOTHYROXINE SODIUM 0.05 MG/1
TABLET ORAL
Qty: 90 TABLET | Refills: 4 | Status: SHIPPED | OUTPATIENT
Start: 2019-11-07 | End: 2021-03-12 | Stop reason: SDUPTHER

## 2019-12-23 ENCOUNTER — LAB (OUTPATIENT)
Dept: INTERNAL MEDICINE | Facility: CLINIC | Age: 84
End: 2019-12-23

## 2019-12-23 DIAGNOSIS — E78.2 MIXED HYPERLIPIDEMIA: ICD-10-CM

## 2019-12-23 DIAGNOSIS — E03.9 ACQUIRED HYPOTHYROIDISM: ICD-10-CM

## 2019-12-23 DIAGNOSIS — I10 ESSENTIAL HYPERTENSION: ICD-10-CM

## 2019-12-23 DIAGNOSIS — R80.9 MICROALBUMINURIA: ICD-10-CM

## 2019-12-23 DIAGNOSIS — F51.01 PRIMARY INSOMNIA: ICD-10-CM

## 2019-12-23 DIAGNOSIS — E11.42 TYPE 2 DIABETES MELLITUS WITH DIABETIC POLYNEUROPATHY, WITHOUT LONG-TERM CURRENT USE OF INSULIN (HCC): ICD-10-CM

## 2019-12-23 DIAGNOSIS — Z00.00 MEDICARE ANNUAL WELLNESS VISIT, SUBSEQUENT: Primary | ICD-10-CM

## 2019-12-23 DIAGNOSIS — M10.042 ACUTE IDIOPATHIC GOUT OF LEFT HAND: ICD-10-CM

## 2019-12-23 LAB
ALBUMIN SERPL-MCNC: 4.1 G/DL (ref 3.5–5.2)
ALBUMIN UR-MCNC: 3.2 MG/DL
ALBUMIN/GLOB SERPL: 1.4 G/DL
ALP SERPL-CCNC: 66 U/L (ref 39–117)
ALT SERPL W P-5'-P-CCNC: 8 U/L (ref 1–41)
ANION GAP SERPL CALCULATED.3IONS-SCNC: 11.5 MMOL/L (ref 5–15)
AST SERPL-CCNC: 12 U/L (ref 1–40)
BACTERIA UR QL AUTO: NORMAL /HPF
BASOPHILS # BLD AUTO: 0.01 10*3/MM3 (ref 0–0.2)
BASOPHILS NFR BLD AUTO: 0.2 % (ref 0–1.5)
BILIRUB SERPL-MCNC: 0.3 MG/DL (ref 0.2–1.2)
BILIRUB UR QL STRIP: NEGATIVE
BUN BLD-MCNC: 18 MG/DL (ref 8–23)
BUN/CREAT SERPL: 16.8 (ref 7–25)
CALCIUM SPEC-SCNC: 8.9 MG/DL (ref 8.6–10.5)
CHLORIDE SERPL-SCNC: 104 MMOL/L (ref 98–107)
CHOLEST SERPL-MCNC: 112 MG/DL (ref 0–200)
CK SERPL-CCNC: 55 U/L (ref 20–200)
CLARITY UR: CLEAR
CO2 SERPL-SCNC: 26.5 MMOL/L (ref 22–29)
COLOR UR: YELLOW
CREAT BLD-MCNC: 1.07 MG/DL (ref 0.76–1.27)
CREAT UR-MCNC: 113.8 MG/DL
DEPRECATED RDW RBC AUTO: 44.9 FL (ref 37–54)
EOSINOPHIL # BLD AUTO: 0.13 10*3/MM3 (ref 0–0.4)
EOSINOPHIL NFR BLD AUTO: 2.9 % (ref 0.3–6.2)
ERYTHROCYTE [DISTWIDTH] IN BLOOD BY AUTOMATED COUNT: 14.4 % (ref 12.3–15.4)
GFR SERPL CREATININE-BSD FRML MDRD: 66 ML/MIN/1.73
GLOBULIN UR ELPH-MCNC: 2.9 GM/DL
GLUCOSE BLD-MCNC: 110 MG/DL (ref 65–99)
GLUCOSE UR STRIP-MCNC: NEGATIVE MG/DL
HBA1C MFR BLD: 6.2 % (ref 4.8–5.6)
HCT VFR BLD AUTO: 36.8 % (ref 37.5–51)
HDLC SERPL-MCNC: 44 MG/DL (ref 40–60)
HGB BLD-MCNC: 11.9 G/DL (ref 13–17.7)
HGB UR QL STRIP.AUTO: NEGATIVE
HYALINE CASTS UR QL AUTO: NORMAL /LPF
IMM GRANULOCYTES # BLD AUTO: 0.02 10*3/MM3 (ref 0–0.05)
IMM GRANULOCYTES NFR BLD AUTO: 0.4 % (ref 0–0.5)
KETONES UR QL STRIP: NEGATIVE
LDLC SERPL CALC-MCNC: 54 MG/DL (ref 0–100)
LDLC/HDLC SERPL: 1.24 {RATIO}
LEUKOCYTE ESTERASE UR QL STRIP.AUTO: NEGATIVE
LYMPHOCYTES # BLD AUTO: 1.15 10*3/MM3 (ref 0.7–3.1)
LYMPHOCYTES NFR BLD AUTO: 25.6 % (ref 19.6–45.3)
MCH RBC QN AUTO: 28 PG (ref 26.6–33)
MCHC RBC AUTO-ENTMCNC: 32.3 G/DL (ref 31.5–35.7)
MCV RBC AUTO: 86.6 FL (ref 79–97)
MICROALBUMIN/CREAT UR: 28.1 MG/G
MONOCYTES # BLD AUTO: 0.35 10*3/MM3 (ref 0.1–0.9)
MONOCYTES NFR BLD AUTO: 7.8 % (ref 5–12)
NEUTROPHILS # BLD AUTO: 2.83 10*3/MM3 (ref 1.7–7)
NEUTROPHILS NFR BLD AUTO: 63.1 % (ref 42.7–76)
NITRITE UR QL STRIP: NEGATIVE
NRBC BLD AUTO-RTO: 0 /100 WBC (ref 0–0.2)
PH UR STRIP.AUTO: 6.5 [PH] (ref 5–8)
PLATELET # BLD AUTO: 163 10*3/MM3 (ref 140–450)
PMV BLD AUTO: 10.5 FL (ref 6–12)
POTASSIUM BLD-SCNC: 4.3 MMOL/L (ref 3.5–5.2)
PROT SERPL-MCNC: 7 G/DL (ref 6–8.5)
PROT UR QL STRIP: NEGATIVE
RBC # BLD AUTO: 4.25 10*6/MM3 (ref 4.14–5.8)
RBC # UR: NORMAL /HPF
REF LAB TEST METHOD: NORMAL
SODIUM BLD-SCNC: 142 MMOL/L (ref 136–145)
SP GR UR STRIP: 1.02 (ref 1–1.03)
SQUAMOUS #/AREA URNS HPF: NORMAL /HPF
T4 FREE SERPL-MCNC: 1.25 NG/DL (ref 0.93–1.7)
TRIGL SERPL-MCNC: 68 MG/DL (ref 0–150)
TSH SERPL DL<=0.05 MIU/L-ACNC: 2.86 UIU/ML (ref 0.27–4.2)
URATE SERPL-MCNC: 6.7 MG/DL (ref 3.4–7)
UROBILINOGEN UR QL STRIP: NORMAL
VLDLC SERPL-MCNC: 13.6 MG/DL (ref 5–40)
WBC NRBC COR # BLD: 4.49 10*3/MM3 (ref 3.4–10.8)
WBC UR QL AUTO: NORMAL /HPF

## 2019-12-23 PROCEDURE — 82570 ASSAY OF URINE CREATININE: CPT | Performed by: INTERNAL MEDICINE

## 2019-12-23 PROCEDURE — 84550 ASSAY OF BLOOD/URIC ACID: CPT | Performed by: INTERNAL MEDICINE

## 2019-12-23 PROCEDURE — 82550 ASSAY OF CK (CPK): CPT | Performed by: INTERNAL MEDICINE

## 2019-12-23 PROCEDURE — 82043 UR ALBUMIN QUANTITATIVE: CPT | Performed by: INTERNAL MEDICINE

## 2019-12-23 PROCEDURE — 84439 ASSAY OF FREE THYROXINE: CPT | Performed by: INTERNAL MEDICINE

## 2019-12-23 PROCEDURE — 80061 LIPID PANEL: CPT | Performed by: INTERNAL MEDICINE

## 2019-12-23 PROCEDURE — 81001 URINALYSIS AUTO W/SCOPE: CPT | Performed by: INTERNAL MEDICINE

## 2019-12-23 PROCEDURE — 83036 HEMOGLOBIN GLYCOSYLATED A1C: CPT | Performed by: INTERNAL MEDICINE

## 2019-12-23 PROCEDURE — 80053 COMPREHEN METABOLIC PANEL: CPT | Performed by: INTERNAL MEDICINE

## 2019-12-23 PROCEDURE — 84443 ASSAY THYROID STIM HORMONE: CPT | Performed by: INTERNAL MEDICINE

## 2019-12-23 PROCEDURE — 85025 COMPLETE CBC W/AUTO DIFF WBC: CPT | Performed by: INTERNAL MEDICINE

## 2020-01-06 ENCOUNTER — APPOINTMENT (OUTPATIENT)
Dept: LAB | Facility: HOSPITAL | Age: 85
End: 2020-01-06

## 2020-01-06 ENCOUNTER — OFFICE VISIT (OUTPATIENT)
Dept: INTERNAL MEDICINE | Facility: CLINIC | Age: 85
End: 2020-01-06

## 2020-01-06 VITALS
WEIGHT: 175 LBS | SYSTOLIC BLOOD PRESSURE: 128 MMHG | BODY MASS INDEX: 27.47 KG/M2 | HEART RATE: 60 BPM | HEIGHT: 67 IN | OXYGEN SATURATION: 98 % | DIASTOLIC BLOOD PRESSURE: 60 MMHG

## 2020-01-06 DIAGNOSIS — M10.042 ACUTE IDIOPATHIC GOUT OF LEFT HAND: ICD-10-CM

## 2020-01-06 DIAGNOSIS — E11.42 TYPE 2 DIABETES MELLITUS WITH DIABETIC POLYNEUROPATHY, WITHOUT LONG-TERM CURRENT USE OF INSULIN (HCC): ICD-10-CM

## 2020-01-06 DIAGNOSIS — K21.9 GASTROESOPHAGEAL REFLUX DISEASE WITHOUT ESOPHAGITIS: ICD-10-CM

## 2020-01-06 DIAGNOSIS — I10 ESSENTIAL HYPERTENSION: ICD-10-CM

## 2020-01-06 DIAGNOSIS — Z00.00 MEDICARE ANNUAL WELLNESS VISIT, SUBSEQUENT: Primary | ICD-10-CM

## 2020-01-06 DIAGNOSIS — F51.01 PRIMARY INSOMNIA: ICD-10-CM

## 2020-01-06 DIAGNOSIS — N40.1 BENIGN NON-NODULAR PROSTATIC HYPERPLASIA WITH LOWER URINARY TRACT SYMPTOMS: ICD-10-CM

## 2020-01-06 DIAGNOSIS — N18.2 CHRONIC KIDNEY DISEASE, STAGE II (MILD): ICD-10-CM

## 2020-01-06 DIAGNOSIS — E03.9 ACQUIRED HYPOTHYROIDISM: ICD-10-CM

## 2020-01-06 DIAGNOSIS — I65.23 BILATERAL CAROTID ARTERY STENOSIS: ICD-10-CM

## 2020-01-06 DIAGNOSIS — R80.9 MICROALBUMINURIA: ICD-10-CM

## 2020-01-06 DIAGNOSIS — E78.2 MIXED HYPERLIPIDEMIA: ICD-10-CM

## 2020-01-06 DIAGNOSIS — D64.9 NORMOCYTIC ANEMIA: ICD-10-CM

## 2020-01-06 LAB
BASOPHILS # BLD AUTO: 0.01 10*3/MM3 (ref 0–0.2)
BASOPHILS NFR BLD AUTO: 0.2 % (ref 0–1.5)
DEPRECATED RDW RBC AUTO: 45 FL (ref 37–54)
EOSINOPHIL # BLD AUTO: 0.07 10*3/MM3 (ref 0–0.4)
EOSINOPHIL NFR BLD AUTO: 1.4 % (ref 0.3–6.2)
ERYTHROCYTE [DISTWIDTH] IN BLOOD BY AUTOMATED COUNT: 14.5 % (ref 12.3–15.4)
FERRITIN SERPL-MCNC: 33.1 NG/ML (ref 30–400)
FOLATE SERPL-MCNC: 16.4 NG/ML (ref 4.78–24.2)
HCT VFR BLD AUTO: 37.4 % (ref 37.5–51)
HGB BLD-MCNC: 12 G/DL (ref 13–17.7)
IMM GRANULOCYTES # BLD AUTO: 0.03 10*3/MM3 (ref 0–0.05)
IMM GRANULOCYTES NFR BLD AUTO: 0.6 % (ref 0–0.5)
IRON 24H UR-MRATE: 103 MCG/DL (ref 59–158)
IRON SATN MFR SERPL: 24 % (ref 20–50)
LYMPHOCYTES # BLD AUTO: 1.1 10*3/MM3 (ref 0.7–3.1)
LYMPHOCYTES NFR BLD AUTO: 22.4 % (ref 19.6–45.3)
MCH RBC QN AUTO: 27.4 PG (ref 26.6–33)
MCHC RBC AUTO-ENTMCNC: 32.1 G/DL (ref 31.5–35.7)
MCV RBC AUTO: 85.4 FL (ref 79–97)
MONOCYTES # BLD AUTO: 0.35 10*3/MM3 (ref 0.1–0.9)
MONOCYTES NFR BLD AUTO: 7.1 % (ref 5–12)
NEUTROPHILS # BLD AUTO: 3.34 10*3/MM3 (ref 1.7–7)
NEUTROPHILS NFR BLD AUTO: 68.3 % (ref 42.7–76)
NRBC BLD AUTO-RTO: 0 /100 WBC (ref 0–0.2)
PLATELET # BLD AUTO: 178 10*3/MM3 (ref 140–450)
PMV BLD AUTO: 10.7 FL (ref 6–12)
RBC # BLD AUTO: 4.38 10*6/MM3 (ref 4.14–5.8)
RETICS # AUTO: 0.06 10*6/MM3 (ref 0.02–0.13)
RETICS/RBC NFR AUTO: 1.43 % (ref 0.7–1.9)
TIBC SERPL-MCNC: 432 MCG/DL (ref 298–536)
TRANSFERRIN SERPL-MCNC: 290 MG/DL (ref 200–360)
VIT B12 BLD-MCNC: 477 PG/ML (ref 211–946)
WBC NRBC COR # BLD: 4.9 10*3/MM3 (ref 3.4–10.8)

## 2020-01-06 PROCEDURE — 85025 COMPLETE CBC W/AUTO DIFF WBC: CPT | Performed by: INTERNAL MEDICINE

## 2020-01-06 PROCEDURE — 84466 ASSAY OF TRANSFERRIN: CPT | Performed by: INTERNAL MEDICINE

## 2020-01-06 PROCEDURE — 93000 ELECTROCARDIOGRAM COMPLETE: CPT | Performed by: INTERNAL MEDICINE

## 2020-01-06 PROCEDURE — G0439 PPPS, SUBSEQ VISIT: HCPCS | Performed by: INTERNAL MEDICINE

## 2020-01-06 PROCEDURE — 82728 ASSAY OF FERRITIN: CPT | Performed by: INTERNAL MEDICINE

## 2020-01-06 PROCEDURE — 85045 AUTOMATED RETICULOCYTE COUNT: CPT | Performed by: INTERNAL MEDICINE

## 2020-01-06 PROCEDURE — G0444 DEPRESSION SCREEN ANNUAL: HCPCS | Performed by: INTERNAL MEDICINE

## 2020-01-06 PROCEDURE — 82746 ASSAY OF FOLIC ACID SERUM: CPT | Performed by: INTERNAL MEDICINE

## 2020-01-06 PROCEDURE — 82607 VITAMIN B-12: CPT | Performed by: INTERNAL MEDICINE

## 2020-01-06 PROCEDURE — 99497 ADVNCD CARE PLAN 30 MIN: CPT | Performed by: INTERNAL MEDICINE

## 2020-01-06 PROCEDURE — 83540 ASSAY OF IRON: CPT | Performed by: INTERNAL MEDICINE

## 2020-01-06 PROCEDURE — 99397 PER PM REEVAL EST PAT 65+ YR: CPT | Performed by: INTERNAL MEDICINE

## 2020-01-06 RX ORDER — OMEPRAZOLE 20 MG/1
20 CAPSULE, DELAYED RELEASE ORAL DAILY
Qty: 90 CAPSULE | Refills: 3 | Status: SHIPPED | OUTPATIENT
Start: 2020-01-06 | End: 2021-01-08 | Stop reason: SDUPTHER

## 2020-01-06 RX ORDER — ATORVASTATIN CALCIUM 20 MG/1
20 TABLET, FILM COATED ORAL DAILY
Qty: 90 TABLET | Refills: 3 | Status: SHIPPED | OUTPATIENT
Start: 2020-01-06 | End: 2021-01-08 | Stop reason: SDUPTHER

## 2020-01-06 RX ORDER — LISINOPRIL 40 MG/1
40 TABLET ORAL DAILY
Qty: 90 TABLET | Refills: 3 | Status: SHIPPED | OUTPATIENT
Start: 2020-01-06 | End: 2021-01-08 | Stop reason: SDUPTHER

## 2020-01-06 RX ORDER — AMLODIPINE BESYLATE 5 MG/1
5 TABLET ORAL DAILY
Qty: 90 TABLET | Refills: 3 | Status: SHIPPED | OUTPATIENT
Start: 2020-01-06 | End: 2021-01-08 | Stop reason: SDUPTHER

## 2020-01-06 RX ORDER — ZOLPIDEM TARTRATE 10 MG/1
10 TABLET ORAL NIGHTLY
Qty: 90 TABLET | Refills: 0 | Status: SHIPPED | OUTPATIENT
Start: 2020-01-06 | End: 2020-04-06 | Stop reason: SDUPTHER

## 2020-01-06 RX ORDER — PREGABALIN 75 MG/1
75 CAPSULE ORAL 2 TIMES DAILY
Qty: 180 CAPSULE | Refills: 0 | Status: SHIPPED | OUTPATIENT
Start: 2020-01-06 | End: 2020-04-06 | Stop reason: SDUPTHER

## 2020-01-06 RX ORDER — TAMSULOSIN HYDROCHLORIDE 0.4 MG/1
1 CAPSULE ORAL DAILY
Qty: 90 CAPSULE | Refills: 3 | Status: SHIPPED | OUTPATIENT
Start: 2020-01-06 | End: 2021-01-08 | Stop reason: SDUPTHER

## 2020-01-06 NOTE — ASSESSMENT & PLAN NOTE
Stable bord bord uric 6.7; goal < 6 but patient asx and declines meds; cont to encourage gout-friendly diet

## 2020-01-06 NOTE — PROGRESS NOTES
ANNUAL WELLNESS VISIT    DRUG AND ALCOHOL USE      no alcohol use, no tobacco use and caffeine intake: 3 cups of caffeinated coffee per day    DIET AND PHYSICAL ACTIVITY     Diet: general    Exercise: infrequently   Exercise Details: walking    MOOD DISORDER AND COGNITIVE SCREENING   Depression Screening Tool Used yes - see PHQ-9; components reviewed with patient; 15-min counseling done -questionnaire items reviewed with patient and his wife; denies not having pleasure or interest in doing things; denies feeling down or depressed; denies feeling hopeless; has chronic insomnia and takes zolpidem nightly without side effects; denies any issues with appetite and in fact states that he has had intentional weight loss due to decreasing the starches in his diet; denies negative thoughts, feeling badly about himself, difficulty with concentration, or any issues regarding ADLs.  Heading down to [place] in 2 days, enjoys walking on beach there.     Anxiety Screening Tool Used yes     Mini-Cog Performed   Yes    1. Tell Patient 3 Words apple table ofelia    2. Administer Clock Test normal    3. Recall 3 words  apple table ofelia    4. Number Correct Items 3    FUNCTIONAL ABILITY AND LEVEL OF SAFETY   Hearing no hearing loss     Wears Hearing Aids No       Current Activities Independent      none  - see Funct/Cog Status Intake     Fall Risk Assessment       Has difficulty with walking or balance  No         Timed Up and Go (TUG) Test  10 sec.       If >12 sec, normal    ADVANCED DIRECTIVE Patient has an advance directive - a copy has not been provided. Have asked the patient to send this to us to add to record; states that he has advanced directive and living will, sharing that it states he does not want extraordinary life-sustaining measures; wants comfort measures; does not want shocks, intubation, ventilator, feeding tube, prolonged artificial life support.  Family is aware of his desires. 15 min counseling.    PAIN SCREENING Do  you have pain right now? no      If so, 1-10 scale: 0          Do you have pain every day? No      Probable chronic pain: No     Recent Hospitalizations:  No recent hospitalization(s)..     MEDICATION REVIEW   - updated and reviewed (see Medication List).   - reviewed for potentially harmful drug-disease interactions in the elderly.   - reviewed for high risk medications in the elderly.   - aspirin use: Yes    BMI  Body mass index is 27.41 kg/m².    Patient's Body mass index is 27.41 kg/m². BMI is above normal parameters. Recommendations include: exercise counseling and nutrition counseling.    _________________________________________________________  Chief Complaint   Patient presents with   • Medicare Wellness-subsequent   • Diabetes       History of Present Illness  85 y.o.  gentleman, accompanied by his wife, presents for a physical examination with wellness visit.  Is planning to head down to [place] in 2 days time.  States he had updated eye exam as well as dental exam just a few weeks ago 12/19.  Has had regular dental cleanings as well as repair of caps that were loose.    Reports that he has lost 9-10 pounds over the last year intentionally by cutting back his carbohydrates, mainly starches (cornbread).    Has not had any overt bleeding.  Denies any black stools or blood in his stools.  Denies lightheadedness or dizziness as well as any orthostasis. Is interested in updating colonoscopy.    Reports both he and his wife had ears cleaned out 1-2 mos ago and had nl hearing tests as well.    Review of Systems  Denies headaches, visual changes, CP, palpitations, SOB, cough, abd pain, n/v/d, difficulty with urination, numbness/tingling, falls, mood changes, lightheadedness, hearing changes, rashes.    Denies melena, hematochezia, rectal bleeding.    Denies gout sxs (prev in his fingers).     All other ROS reviewed and negative.    [facility]  The following portions of the patient's history were reviewed and  "updated as appropriate: allergies, current medications, past family history, past medical history, past social history, past surgical history and problem list.      Current Outpatient Medications:   •  aspirin 81 MG QD  •  clopidogrel (PLAVIX) 75 MG tablet, QD  •  desmopressin (DDAVP) 0.2 MG table QHS  •  fluocinonide (LIDEX) 0.05 % external solutio AD  •  ketoconazole (NIZORAL) 2 % shampoo, AD  •  levothyroxine 50 MCG QD  •  GLUCOSAMINE CHONDROITIN ADV tablet, QD  •  pregabalin (LYRICA) 75 MG BID  •  amLODIPine (NORVASC) 5 MG QD  •  atorvastatin (LIPITOR) 20 MG QD  •  lisinopril (PRINIVIL,ZESTRIL) 40 MG QD  •  omeprazole (priLOSEC) 20 MG QD  •  tamsulosin (FLOMAX) 0.4 MG QHS  •  zolpidem (AMBIEN) 10 MG qhs    VITALS:  /60   Pulse 60   Ht 170.2 cm (67\")   Wt 79.4 kg (175 lb)   SpO2 98%   BMI 27.41 kg/m²     Physical Exam   Constitutional: He is oriented to person, place, and time. He appears well-developed and well-nourished.   HENT:   Head: Normocephalic.   Right Ear: External ear normal.   Left Ear: External ear normal.   Nose: Nose normal.   Mouth/Throat: Oropharynx is clear and moist and mucous membranes are normal. No oropharyngeal exudate.   Eyes: Pupils are equal, round, and reactive to light. Conjunctivae and EOM are normal.   Wears glasses   Neck: Normal range of motion. Neck supple. Carotid bruit is not present (bilaterally). No thyromegaly present.   Cardiovascular: Normal rate, regular rhythm, normal heart sounds and intact distal pulses.   2+ PT pulses bilaterally   Pulmonary/Chest: Effort normal and breath sounds normal. No respiratory distress. He has no wheezes. He has no rales.   Abdominal: Soft. Bowel sounds are normal. He exhibits no distension and no mass. There is no hepatosplenomegaly. There is no tenderness.   Genitourinary:   Genitourinary Comments: /POOJA deferred per pt   Musculoskeletal: He exhibits no edema.   Lymphadenopathy:     He has no cervical adenopathy.   Neurological: " He is alert and oriented to person, place, and time. He displays normal reflexes. No cranial nerve deficit.   Skin: Skin is warm and dry. No rash noted.   Psychiatric: He has a normal mood and affect. His behavior is normal.   Nursing note and vitals reviewed.        LABS  Results for orders placed or performed in visit on 12/23/19   Comprehensive Metabolic Panel   Result Value Ref Range    Glucose 110 (H) 65 - 99 mg/dL    BUN 18 8 - 23 mg/dL    Creatinine 1.07 0.76 - 1.27 mg/dL    Sodium 142 136 - 145 mmol/L    Potassium 4.3 3.5 - 5.2 mmol/L    Chloride 104 98 - 107 mmol/L    CO2 26.5 22.0 - 29.0 mmol/L    Calcium 8.9 8.6 - 10.5 mg/dL    Total Protein 7.0 6.0 - 8.5 g/dL    Albumin 4.10 3.50 - 5.20 g/dL    ALT (SGPT) 8 1 - 41 U/L    AST (SGOT) 12 1 - 40 U/L    Alkaline Phosphatase 66 39 - 117 U/L    Total Bilirubin 0.3 0.2 - 1.2 mg/dL    eGFR Non African Amer 66 >60 mL/min/1.73    Globulin 2.9 gm/dL    A/G Ratio 1.4 g/dL    BUN/Creatinine Ratio 16.8 7.0 - 25.0    Anion Gap 11.5 5.0 - 15.0 mmol/L   Lipid Panel   Result Value Ref Range    Total Cholesterol 112 0 - 200 mg/dL    Triglycerides 68 0 - 150 mg/dL    HDL Cholesterol 44 40 - 60 mg/dL    LDL Cholesterol  54 0 - 100 mg/dL    VLDL Cholesterol 13.6 5 - 40 mg/dL    LDL/HDL Ratio 1.24    TSH   Result Value Ref Range    TSH 2.860 0.270 - 4.200 uIU/mL   Microalbumin / Creatinine Urine Ratio - Urine, Clean Catch   Result Value Ref Range    Microalbumin/Creatinine Ratio 28.1 mg/g    Creatinine, Urine 113.8 mg/dL    Microalbumin, Urine 3.2 mg/dL   Hemoglobin A1c   Result Value Ref Range    Hemoglobin A1C 6.20 (H) 4.80 - 5.60 %   CK   Result Value Ref Range    Creatine Kinase 55 20 - 200 U/L   T4, Free   Result Value Ref Range    Free T4 1.25 0.93 - 1.70 ng/dL   Uric Acid   Result Value Ref Range    Uric Acid 6.7 3.4 - 7.0 mg/dL   CBC Auto Differential   Result Value Ref Range    WBC 4.49 3.40 - 10.80 10*3/mm3    RBC 4.25 4.14 - 5.80 10*6/mm3    Hemoglobin 11.9 (L) 13.0  - 17.7 g/dL    Hematocrit 36.8 (L) 37.5 - 51.0 %    MCV 86.6 79.0 - 97.0 fL    MCH 28.0 26.6 - 33.0 pg    MCHC 32.3 31.5 - 35.7 g/dL    RDW 14.4 12.3 - 15.4 %    RDW-SD 44.9 37.0 - 54.0 fl    MPV 10.5 6.0 - 12.0 fL    Platelets 163 140 - 450 10*3/mm3    Neutrophil % 63.1 42.7 - 76.0 %    Lymphocyte % 25.6 19.6 - 45.3 %    Monocyte % 7.8 5.0 - 12.0 %    Eosinophil % 2.9 0.3 - 6.2 %    Basophil % 0.2 0.0 - 1.5 %    Immature Grans % 0.4 0.0 - 0.5 %    Neutrophils, Absolute 2.83 1.70 - 7.00 10*3/mm3    Lymphocytes, Absolute 1.15 0.70 - 3.10 10*3/mm3    Monocytes, Absolute 0.35 0.10 - 0.90 10*3/mm3    Eosinophils, Absolute 0.13 0.00 - 0.40 10*3/mm3    Basophils, Absolute 0.01 0.00 - 0.20 10*3/mm3    Immature Grans, Absolute 0.02 0.00 - 0.05 10*3/mm3    nRBC 0.0 0.0 - 0.2 /100 WBC   Urinalysis without microscopic (no culture) - Urine, Clean Catch   Result Value Ref Range    Color, UA Yellow Yellow, Straw    Appearance, UA Clear Clear    pH, UA 6.5 5.0 - 8.0    Specific Gravity, UA 1.019 1.005 - 1.030    Glucose, UA Negative Negative    Ketones, UA Negative Negative    Bilirubin, UA Negative Negative    Blood, UA Negative Negative    Protein, UA Negative Negative    Leuk Esterase, UA Negative Negative    Nitrite, UA Negative Negative    Urobilinogen, UA 0.2 E.U./dL 0.2 - 1.0 E.U./dL   Urinalysis, Microscopic Only - Urine, Clean Catch   Result Value Ref Range    RBC, UA 0-2 None Seen, 0-2 /HPF    WBC, UA 0-2 None Seen, 0-2 /HPF    Bacteria, UA None Seen None Seen /HPF    Squamous Epithelial Cells, UA 0-2 None Seen, 0-2 /HPF    Hyaline Casts, UA 0-2 None Seen /LPF    Methodology Automated Microscopy        ECG 12 Lead  Date/Time: 1/6/2020 7:40 PM  Performed by: Delilah Tatum MD  Authorized by: Delilah Tatum MD   Comparison: compared with previous ECG from 1/3/2019  Similar to previous ECG  Rhythm: sinus rhythm  Ectopy comments: PVC  Rate: normal  BPM: 60  Conduction: non-specific intraventricular conduction delay  ST  Segments: ST segments normal  T Waves: T waves normal  QRS axis: left  Other findings: poor R wave progression  Clinical impression comment: stable EKG            ASSESSMENT/PLAN  Problem List Items Addressed This Visit     Type 2 diabetes mellitus with diabetic polyneuropathy, without long-term current use of insulin (CMS/[facility])     Stable/bord BG control with A1C 6.2 (Was 5.8 in 7/19); encouraged reg phys activity to decr insulin resistance, moderation in unhealthy starches/sweets; f/u A1C in 3 mos           Relevant Medications    pregabalin (LYRICA) 75 MG capsule    Normocytic anemia     NEW abnlity with H&H 11.9/36.8; baseline Hct around 40 over the last 4-5 yrs; asx, denying lightheadedness, dizziness, SOB/MACK, melena/hematochezia, any other obvious bleeding; repeat CBC for verification and also check Fe panel, ferritin, B12, folate, stool guaiacs; due to colonosc this yr per Dr. Holland; note he is leaving for [place] in 2 days - further workup to be able to await return to [place] in spring or whether he needs cont'd eval while in [place] depends on repeat lab results         Relevant Orders    CBC & Differential    Ferritin    Iron Profile    Vitamin B12    Folate    Reticulocytes    CBC Auto Differential    Microalbuminuria     Stable bord microalb/Cr 27.1 with goal < 30; cont to encourage good BG and BP control; minimize NSAIDs         Medicare annual wellness visit, subsequent - Primary     Health maintenance - flu vacc 10/19; Prevnar 12/15, PVX 12/14, Tdap 10/11 (next Td due 2021), Zostavax 9/09; HAV done; rec Shingrix; colonosc 1/15, repeat this yr 2020 per Dr. Holland (note also concerns with new normocytic anemia); no further prostate CA screening with age/comorbidities, confirmed with pt; eye exam 12/19 with Dr. Valenzuela - request records; dental exam 12/19 per pt, also had f/u in the fall to repair loose caps; (+) seat belt use    Consultants:  Patient Care Team:  Delilah Tatum MD as PCP - Burton Lynne,  MD as Consulting Physician (Colon and Rectal Surgery)  Douglas Lazaro MD as Consulting Physician (General Surgery)  Rubio Rossi MD as Consulting Physician (Dermatology)  Douglas Brooks MD (Inactive) as Consulting Physician (Cardiology)  Jim Black MD as Consulting Physician (Cardiothoracic Surgery)  Yusef Bowles MD as Consulting Physician (Nephrology)  Tio Mcnally MD (Inactive) as Consulting Physician (Hand Surgery)  Marisol Easley MD as Consulting Physician (Cardiology)  Chayito Quick OD (Optometry)  Radha Hirsch APRN as Nurse Practitioner (Otolaryngology)             Insomnia     Judicious and appropriate use of zolpidem 10mg QHS #90, 0RF (exception made due to insurance).  LETHA was reviewed and appropriate.  The patient has read and signed the [facility] Controlled Substance Contract 10/19. UDS 7/19. Patient has been counseled and is aware of side effects, risks, potential for addiction/tolerance, interactions, and how to take medication correctly.  RTC 3 mos for next RX with LETHA           Relevant Medications    zolpidem (AMBIEN) 10 MG tablet    Hypothyroidism     Euthyroid on levothyroxine 50mcg QD #90, 3RF         Hypertension     BP bord/stable; on amlo 5mg QD #90, 3RF, lisin 40mg QD #90, 3RF; also takes tamsulosin 0.4mg QHS         Relevant Medications    lisinopril (PRINIVIL,ZESTRIL) 40 MG tablet    amLODIPine (NORVASC) 5 MG tablet    Hyperlipidemia     Lipids at goal, LDL 56 with stable LFT and CPK on atorvastatin 20mg QHS #90, 3RF         Relevant Medications    atorvastatin (LIPITOR) 20 MG tablet    Gout     Stable bord bord uric 6.7; goal < 6 but patient asx and declines meds; cont to encourage gout-friendly diet         Gastroesophageal reflux disease     Stable on omeprazole 20mg QD #90, 3RF         Relevant Medications    omeprazole (priLOSEC) 20 MG capsule    Chronic kidney disease, stage II (mild)     Stable renal function Cr 1.07,  GFR 66; maintain good BP and BG control; minimize NSAIDs; drink enough water daily         BPH     sxs stable on tamsulosin 0.4mg QHS #90, 3RF; no s/e         Relevant Medications    tamsulosin (FLOMAX) 0.4 MG capsule 24 hr capsule    Bilateral carotid artery stenosis     Asx; will order 1-yr f/u carotid u/s at his next visit in 3 mos; remains on statin, ASA, plavix               FOLLOW-UP  RTC 3 mos for zolpidem RX with LETHA; also poan to order 1yr f/u carotid u/s at that visit    Electronically signed by:    Delilah Tatum MD, FACP  01/06/2020    EMR Dragon/Transcription disclaimer:  Parts of this encounter note is an electronic transcription/translation of spoken language to printed text. Electronic translation of spoken language may permit erroneous, or at times, nonsensical words or phrases, to be inadvertently transcribed. Although I have reviewed the note for such errors, some may still exist.

## 2020-01-06 NOTE — ASSESSMENT & PLAN NOTE
Stable bord microalb/Cr 27.1 with goal < 30; cont to encourage good BG and BP control; minimize NSAIDs

## 2020-01-06 NOTE — ASSESSMENT & PLAN NOTE
Stable/bord BG control with A1C 6.2 (Was 5.8 in 7/19); encouraged reg phys activity to decr insulin resistance, moderation in unhealthy starches/sweets; f/u A1C in 3 mos

## 2020-01-06 NOTE — ASSESSMENT & PLAN NOTE
Health maintenance - flu vacc 10/19; Prevnar 12/15, PVX 12/14, Tdap 10/11 (next Td due 2021), Zostavax 9/09; HAV done; rec Shingrix; colonosc 1/15, repeat this yr 2020 per Dr. Holland (note also concerns with new normocytic anemia); no further prostate CA screening with age/comorbidities, confirmed with pt; eye exam 12/19 with Dr. Valenzuela - request records; dental exam 12/19 per pt, also had f/u in the fall to repair loose caps; (+) seat belt use    Consultants:  Patient Care Team:  Delilah Tatum MD as PCP - General  Skyler, Burton ECHEVARRIA MD as Consulting Physician (Colon and Rectal Surgery)  Douglas Lazaro MD as Consulting Physician (General Surgery)  Rubio Rossi MD as Consulting Physician (Dermatology)  Douglas Brooks MD (Inactive) as Consulting Physician (Cardiology)  Jim Black MD as Consulting Physician (Cardiothoracic Surgery)  Yusef Bowles MD as Consulting Physician (Nephrology)  Tio Mcnally MD (Inactive) as Consulting Physician (Hand Surgery)  Marisol Easley MD as Consulting Physician (Cardiology)  Chayito Quick OD (Optometry)  Radha Hirsch APRN as Nurse Practitioner (Otolaryngology)

## 2020-01-06 NOTE — ASSESSMENT & PLAN NOTE
Stable renal function Cr 1.07, GFR 66; maintain good BP and BG control; minimize NSAIDs; drink enough water daily

## 2020-01-06 NOTE — ASSESSMENT & PLAN NOTE
Judicious and appropriate use of zolpidem 10mg QHS #90, 0RF (exception made due to insurance).  LETHA was reviewed and appropriate.  The patient has read and signed the [facility] Controlled Substance Contract 10/19. UDS 7/19. Patient has been counseled and is aware of side effects, risks, potential for addiction/tolerance, interactions, and how to take medication correctly.  RTC 3 mos for next RX with LETHA

## 2020-01-07 NOTE — PROGRESS NOTES
Anemia stable but still mildly anemic; awaiting other parameters of anemia evaluation.  Also needs stool guaiacs x3

## 2020-01-07 NOTE — ASSESSMENT & PLAN NOTE
NEW abnlity with H&H 11.9/36.8; baseline Hct around 40 over the last 4-5 yrs; asx, denying lightheadedness, dizziness, SOB/MACK, melena/hematochezia, any other obvious bleeding; repeat CBC for verification and also check Fe panel, ferritin, B12, folate, stool guaiacs; due to colonosc this yr per Dr. Holland; note he is leaving for [place] in 2 days - further workup to be able to await return to [place] in spring or whether he needs cont'd eval while in [place] depends on repeat lab results

## 2020-01-07 NOTE — PROGRESS NOTES
Iron stores, B12, folate, and iron panel are all normal.    Get stool guaiacs as discussed and will repeat CBC in 3-4 weeks for cont'd f/u of anemia

## 2020-04-06 ENCOUNTER — TELEPHONE (OUTPATIENT)
Dept: INTERNAL MEDICINE | Facility: CLINIC | Age: 85
End: 2020-04-06

## 2020-04-06 DIAGNOSIS — E11.42 TYPE 2 DIABETES MELLITUS WITH DIABETIC POLYNEUROPATHY, WITHOUT LONG-TERM CURRENT USE OF INSULIN (HCC): ICD-10-CM

## 2020-04-06 DIAGNOSIS — F51.01 PRIMARY INSOMNIA: ICD-10-CM

## 2020-04-06 RX ORDER — ZOLPIDEM TARTRATE 10 MG/1
10 TABLET ORAL NIGHTLY
Qty: 90 TABLET | Refills: 0 | Status: CANCELLED | OUTPATIENT
Start: 2020-04-06

## 2020-04-06 RX ORDER — PREGABALIN 75 MG/1
75 CAPSULE ORAL 2 TIMES DAILY
Qty: 180 CAPSULE | Refills: 0 | Status: CANCELLED | OUTPATIENT
Start: 2020-04-06

## 2020-04-06 NOTE — TELEPHONE ENCOUNTER
Patient needs refills on his medication. His son Michael made him an appointment for tomorrow 4/7 at 1:15. Michael would like to know if this appointment can be a video visit or telephone visit.     I've sent him an email to set up [employer].     You can reach Michael at [phone]

## 2020-04-06 NOTE — TELEPHONE ENCOUNTER
Ok for changing to a video visit when his mychart get set up? I already printed out his LETHA as well.

## 2020-04-07 ENCOUNTER — TELEMEDICINE (OUTPATIENT)
Dept: INTERNAL MEDICINE | Facility: CLINIC | Age: 85
End: 2020-04-07

## 2020-04-07 VITALS — RESPIRATION RATE: 12 BRPM

## 2020-04-07 DIAGNOSIS — I10 ESSENTIAL HYPERTENSION: Primary | ICD-10-CM

## 2020-04-07 DIAGNOSIS — E11.42 TYPE 2 DIABETES MELLITUS WITH DIABETIC POLYNEUROPATHY, WITHOUT LONG-TERM CURRENT USE OF INSULIN (HCC): ICD-10-CM

## 2020-04-07 DIAGNOSIS — F51.01 PRIMARY INSOMNIA: ICD-10-CM

## 2020-04-07 PROCEDURE — 99214 OFFICE O/P EST MOD 30 MIN: CPT | Performed by: INTERNAL MEDICINE

## 2020-04-07 RX ORDER — PREGABALIN 75 MG/1
75 CAPSULE ORAL 2 TIMES DAILY
Qty: 180 CAPSULE | Refills: 0 | Status: SHIPPED | OUTPATIENT
Start: 2020-04-07 | End: 2020-07-12 | Stop reason: SDUPTHER

## 2020-04-07 RX ORDER — ZOLPIDEM TARTRATE 10 MG/1
10 TABLET ORAL NIGHTLY
Qty: 90 TABLET | Refills: 0 | Status: SHIPPED | OUTPATIENT
Start: 2020-04-07 | End: 2020-07-12 | Stop reason: SDUPTHER

## 2020-04-07 NOTE — ASSESSMENT & PLAN NOTE
Due for A1C but unable to do in office at this time due to COVID-19 concerns; rec home BG monitoring; for peripheral neuropathy, remains on judicious and appropriate use of lyrica 75mg BID #180, 0RF (90d exception due to insurance).  LETHA was reviewed and appropriate.  The patient has read and signed the [facility] Controlled Substance Contract 10/19. UDS 7/19. Patient has been counseled and is aware of side effects, risks, potential for addiction/tolerance, interactions, and how to take medication correctly.  RTC 3 mos for lyrica RX with LETHA/UDS and A1C

## 2020-04-07 NOTE — PROGRESS NOTES
Chief Complaint   Patient presents with   • Insomnia   • Peripheral Neuropathy     This was an audio and video enabled telemedicine encounter.  History of Present Illness  86 y.o.  gentleman, accompanied by his son and wife, presents by video visit for refills of chronically taken Lyrica for neuropathy and zolpidem for insomnia.  Denies side effects with either medications.  Denies any hallucinations, confusion, or other adverse effects.  Has taken the meds chronically without any concerns.    He reports blood pressures are stable.  He reports that he has been afebrile.  They had a good stay in [place].  Still thinking about getting a new dog.  No other concerns today.  Has not had any falls.    Review of Systems  ROS (+) chronic insomnia and stable peripheral neuropathy.  Denies any falls.  Denies chest pain, palpitations, shortness of breath, headaches, abnormal BPs.  All other ROS reviewed and negative.    [facility]  The following portions of the patient's history were reviewed and updated as appropriate: allergies, current medications, past family history, past medical history, past social history, past surgical history and problem list.      Current Outpatient Medications:   •  amLODIPine (NORVASC) 5 MG QD  •  aspirin 81 MG QD  •  atorvastatin (LIPITOR) 20 MG QD  •  clopidogrel (PLAVIX) 75 MG tablet QD  •  desmopressin (DDAVP) 0.2 MG QHS  •  fluocinonide (LIDEX) 0.05 % external solution, AD  •  ketoconazole (NIZORAL) 2 % shampoo AD  •  levothyroxine (SYNTHROID, LEVOTHROID) 50 MCG QD  •  lisinopril (PRINIVIL,ZESTRIL) 40 MG QD  •  Misc Natural Products (GLUCOSAMINE CHONDROITIN ADV) QD  •  omeprazole (priLOSEC) 20 MG QD  •  pregabalin (LYRICA) 75 MG BID  •  tamsulosin (FLOMAX) 0.4 MG QHS  •  zolpidem (AMBIEN) 10 MG QHS    VITALS:  Resp 12     Physical Exam   Constitutional: He is oriented to person, place, and time. He appears well-developed and well-nourished. No distress.   Eyes: Conjunctivae and EOM are  normal.   Pulmonary/Chest: Effort normal. No respiratory distress.   Neurological: He is alert and oriented to person, place, and time.   Psychiatric: He has a normal mood and affect. His behavior is normal.       LABS  12/19 A1c 6.2    ASSESSMENT/PLAN  Problem List Items Addressed This Visit     Type 2 diabetes mellitus with diabetic polyneuropathy, without long-term current use of insulin (CMS/[facility])     Due for A1C but unable to do in office at this time due to COVID-19 concerns; rec home BG monitoring; for peripheral neuropathy, remains on judicious and appropriate use of lyrica 75mg BID #180, 0RF (90d exception due to insurance).  LETHA was reviewed and appropriate.  The patient has read and signed the [facility] Substance Contract 10/19. UDS 7/19. Patient has been counseled and is aware of side effects, risks, potential for addiction/tolerance, interactions, and how to take medication correctly.  RTC 3 mos for lyrica RX with LETHA/UDS and A1C           Relevant Medications    pregabalin (Lyrica) 75 MG capsule    Insomnia     Judicious and appropriate use of zolpidem 10mg QHS #90, 0RF (90d exception made for insurance).  LETHA was reviewed and appropriate.  The patient has read and signed the [facility] Substance Contract 7/19. UDS 10/19. Patient has been counseled and is aware of side effects, risks, potential for addiction/tolerance, interactions, and how to take medication correctly. RTC 3 mos for next RX with LETHA/UDS           Relevant Medications    zolpidem (AMBIEN) 10 MG tablet    Hypertension - Primary     Patient reports normotensive readings at home; remains on lisin 40mg QD and amlo 5mg QD               FOLLOW-UP  RTC 3 mos for zolpidem/lyrica RXs with LETHA, UDS, and A1C    Electronically signed by:    Delilah Tatum MD, FACP  04/07/2020    EMR Dragon/Transcription disclaimer:  Parts of this encounter note is an electronic transcription/translation of spoken  language to printed text. Electronic translation of spoken language may permit erroneous, or at times, nonsensical words or phrases, to be inadvertently transcribed. Although I have reviewed the note for such errors, some may still exist.

## 2020-04-07 NOTE — ASSESSMENT & PLAN NOTE
Judicious and appropriate use of zolpidem 10mg QHS #90, 0RF (90d exception made for insurance).  LETHA was reviewed and appropriate.  The patient has read and signed the [facility] Controlled Substance Contract 7/19. UDS 10/19. Patient has been counseled and is aware of side effects, risks, potential for addiction/tolerance, interactions, and how to take medication correctly. RTC 3 mos for next RX with LETHA/LYNNE

## 2020-04-07 NOTE — TELEPHONE ENCOUNTER
Left VM with son Michael to call office back, needs to finish setting up mychart and schedule video visit, cannot be audio only for controlled substance refill.

## 2020-07-13 NOTE — ASSESSMENT & PLAN NOTE
BG control stable/good with A1C 5.9; no meds; reminded patient that he needs annual eye exam    Judicious and appropriate use of lyrica 75mg BID #180, 0RF (exception due to insurance).  LETHA was reviewed and appropriate.  The patient has read and signed the [facility] Controlled Substance Contract today 7/14/20. UDS 10/19. Patient has been counseled and is aware of side effects, risks, potential for addiction/tolerance, interactions, and how to take medication correctly.  RTC 3 mos for next RX with LETHA/NGUYỄNS

## 2020-07-14 ENCOUNTER — OFFICE VISIT (OUTPATIENT)
Dept: INTERNAL MEDICINE | Facility: CLINIC | Age: 85
End: 2020-07-14

## 2020-07-14 VITALS
DIASTOLIC BLOOD PRESSURE: 64 MMHG | BODY MASS INDEX: 27 KG/M2 | HEART RATE: 55 BPM | HEIGHT: 67 IN | SYSTOLIC BLOOD PRESSURE: 124 MMHG | WEIGHT: 172 LBS | OXYGEN SATURATION: 100 %

## 2020-07-14 DIAGNOSIS — I10 ESSENTIAL HYPERTENSION: ICD-10-CM

## 2020-07-14 DIAGNOSIS — I73.9 PERIPHERAL ARTERIAL DISEASE (HCC): ICD-10-CM

## 2020-07-14 DIAGNOSIS — E11.42 TYPE 2 DIABETES MELLITUS WITH DIABETIC POLYNEUROPATHY, WITHOUT LONG-TERM CURRENT USE OF INSULIN (HCC): ICD-10-CM

## 2020-07-14 DIAGNOSIS — I49.8 SINUS ARRHYTHMIA: ICD-10-CM

## 2020-07-14 DIAGNOSIS — F51.01 PRIMARY INSOMNIA: Primary | ICD-10-CM

## 2020-07-14 LAB — HBA1C MFR BLD: 5.9 %

## 2020-07-14 PROCEDURE — 83036 HEMOGLOBIN GLYCOSYLATED A1C: CPT | Performed by: INTERNAL MEDICINE

## 2020-07-14 PROCEDURE — 93000 ELECTROCARDIOGRAM COMPLETE: CPT | Performed by: INTERNAL MEDICINE

## 2020-07-14 PROCEDURE — 99214 OFFICE O/P EST MOD 30 MIN: CPT | Performed by: INTERNAL MEDICINE

## 2020-07-14 RX ORDER — ZOLPIDEM TARTRATE 10 MG/1
10 TABLET ORAL NIGHTLY
Qty: 90 TABLET | Refills: 0 | Status: SHIPPED | OUTPATIENT
Start: 2020-07-14 | End: 2020-10-11 | Stop reason: SDUPTHER

## 2020-07-14 RX ORDER — PREGABALIN 75 MG/1
75 CAPSULE ORAL 2 TIMES DAILY
Qty: 180 CAPSULE | Refills: 0 | Status: SHIPPED | OUTPATIENT
Start: 2020-07-14 | End: 2020-10-11 | Stop reason: SDUPTHER

## 2020-07-14 NOTE — PROGRESS NOTES
"Chief Complaint   Patient presents with   • Insomnia       History of Present Illness  86 y.o.  gentleman presents for follow-up for renewals of Lyrica and zolpidem, both taken chronically and without any side effects or concerns.  He notes follow-up with Dr. Kelley as further intervention will be needed for his legs (peripheral vascular disease).  Denies any side effects or concerns with Lyrica and/or zolpidem.  Reports eye exams are done annually, due in the fall.    Reports chronic on and off extra heartbeats.  Denies any palpitations, chest pain, shortness of breath, lightheadedness.    Review of Systems  ROS (+) for chronic insomnia and peripheral neuropathy. ROS (+) for recurrent/persistent leg pains attributed to peripheral vascular disease.  Denies chest pain, palpitations, shortness of breath, lightheadedness, fainting, falls.  Denies fever/chills.  All other ROS reviewed and negative.    [facility]  The following portions of the patient's history were reviewed and updated as appropriate: allergies, current medications, past family history, past medical history, past social history, past surgical history and problem list.    Current Outpatient Medications:   •  amLODIPine (NORVASC) 5 MG QD  •  aspirin 81 MG QD  •  atorvastatin (LIPITOR) 20 MG QD  •  clopidogrel (PLAVIX) 75 MG tablet QD  •  desmopressin (DDAVP) 0.2 MG QHS  •  fluocinonide (LIDEX) 0.05 % external solution,AD  •  ketoconazole (NIZORAL) 2 % shampoo, AD  •  levothyroxine (SYNTHROID, LEVOTHROID) 50 MCG QD  •  lisinopril (PRINIVIL,ZESTRIL) 40 MG QD  •  Misc Natural Products (GLUCOSAMINE CHONDROITIN ADV) tablet QD  •  omeprazole (priLOSEC) 20 MG QD  •  pregabalin (Lyrica) 75 MG BID  •  tamsulosin (FLOMAX) 0.4 MG capsule QHS  •  zolpidem (AMBIEN) 10 MG QHS    VITALS:  /64   Pulse 55   Ht 170.2 cm (67\")   Wt 78 kg (172 lb)   SpO2 100%   BMI 26.94 kg/m²     Physical Exam   Constitutional: He is oriented to person, place, and time. He " appears well-developed and well-nourished.   Eyes: Conjunctivae and EOM are normal.   Wears glasses   Cardiovascular: Normal rate and normal heart sounds.   Bigeminy with occ pauses   Pulmonary/Chest: Effort normal and breath sounds normal. No respiratory distress. He has no wheezes. He has no rales.   Abdominal: Soft. Bowel sounds are normal.   Musculoskeletal: He exhibits no edema (BLE).   Normal gait   Neurological: He is alert and oriented to person, place, and time.   Psychiatric: He has a normal mood and affect. His behavior is normal.   Nursing note and vitals reviewed.        LABS  Results for orders placed or performed in visit on 07/14/20   POC Glycosylated Hemoglobin (Hb A1C)   Result Value Ref Range    Hemoglobin A1C 5.9 %     12/19 A1c 6.2      ECG 12 Lead  Date/Time: 7/14/2020 12:53 PM  Performed by: Delilah Tatum MD  Authorized by: Delilah Tatum MD   Comparison: compared with previous ECG from 1/6/2020  Similar to previous ECG  Rhythm: sinus rhythm and sinus arrhythmia  Rhythm comments: note bigeminy  Rate: normal  BPM: 75  Conduction: conduction normal  Conduction: non-specific intraventricular conduction delay  ST Segments: ST segments normal  T Waves: T waves normal  QRS axis: left  Other findings: poor R wave progression  Clinical impression comment: stable EKG            ASSESSMENT/PLAN  Problem List Items Addressed This Visit        Cardiovascular and Mediastinum    Peripheral arterial disease (CMS/HCC)     Patient notes upcoming intervention per Dr. Easley; discussed that Dr. Easley provides clopidogrel Rx         Hypertension       Endocrine    Type 2 diabetes mellitus with diabetic polyneuropathy, without long-term current use of insulin (CMS/HCC)     BG control stable/good with A1C 5.9; no meds; reminded patient that he needs annual eye exam    Judicious and appropriate use of lyrica 75mg BID #180, 0RF (exception due to insurance).  LETHA was reviewed and appropriate.  The patient has  read and signed the [facility] Controlled Substance Contract today 7/14/20. UDS 10/19. Patient has been counseled and is aware of side effects, risks, potential for addiction/tolerance, interactions, and how to take medication correctly.  RTC 3 mos for next RX with LETHA/LYNNE           Relevant Medications    pregabalin (Lyrica) 75 MG capsule    Other Relevant Orders    POC Glycosylated Hemoglobin (Hb A1C) (Completed)       Other    Insomnia - Primary    Relevant Medications    zolpidem (AMBIEN) 10 MG tablet      Other Visit Diagnoses     Sinus arrhythmia        With bigeminy noted on EKG; hemodynamically stable; copies of EKG provided for patient to take to Dr. Easley later this week          FOLLOW-UP  1. Health maintenance - eye exam pending fall 2020, reminder that we need office note  2. RTC 3 mos for zolpidem/lyrica RXswith LETHA and UDS    Electronically signed by:    Delilah Tatum MD, FACP  07/14/2020       [History reviewed] : History reviewed. [Medications and Allergies reviewed] : Medications and allergies reviewed.

## 2020-07-14 NOTE — ASSESSMENT & PLAN NOTE
Patient notes upcoming intervention per Dr. Easley; discussed that Dr. Easley provides clopidogrel Rx

## 2020-10-11 NOTE — ASSESSMENT & PLAN NOTE
Judicious and appropriate use of lyrica 75mg BID #180, 0RF (exception due to insurance).  LETHA was reviewed and appropriate.  The patient has read and signed the [facility] Controlled Substance Contract 7/14/20. UDS today. Patient has been counseled and is aware of side effects, risks, potential for addiction/tolerance, interactions, and how to take medication correctly.  RTC 3 mos for next RX with LETHA    Request annual eye exam from Dr. Valenzuela (done a few wks ago per pt)

## 2020-10-11 NOTE — ASSESSMENT & PLAN NOTE
Judicious and appropriate use of zolpidem 10mg QHS #90, 0RF (90d exception made for insurance).  LETHA was reviewed and appropriate.  The patient has read and signed the [facility] Controlled Substance Contract 7/20. UDS obtained today Patient has been counseled and is aware of side effects, risks, potential for addiction/tolerance, interactions, and how to take medication correctly. RTC 3 mos for next RX with LETHA

## 2020-10-11 NOTE — ASSESSMENT & PLAN NOTE
rec updated carotid u/s -he will discuss this further with Dr. Easley (appointment tomorrow); remains on atorvastatin, clopidogrel, and ASA

## 2020-10-13 ENCOUNTER — OFFICE VISIT (OUTPATIENT)
Dept: INTERNAL MEDICINE | Facility: CLINIC | Age: 85
End: 2020-10-13

## 2020-10-13 VITALS
HEIGHT: 67 IN | TEMPERATURE: 98 F | WEIGHT: 174 LBS | DIASTOLIC BLOOD PRESSURE: 68 MMHG | BODY MASS INDEX: 27.31 KG/M2 | SYSTOLIC BLOOD PRESSURE: 130 MMHG | HEART RATE: 54 BPM

## 2020-10-13 DIAGNOSIS — F51.01 PRIMARY INSOMNIA: Primary | ICD-10-CM

## 2020-10-13 DIAGNOSIS — E11.42 TYPE 2 DIABETES MELLITUS WITH DIABETIC POLYNEUROPATHY, WITHOUT LONG-TERM CURRENT USE OF INSULIN (HCC): ICD-10-CM

## 2020-10-13 DIAGNOSIS — Z51.81 ENCOUNTER FOR THERAPEUTIC DRUG LEVEL MONITORING: ICD-10-CM

## 2020-10-13 DIAGNOSIS — I10 ESSENTIAL HYPERTENSION: ICD-10-CM

## 2020-10-13 DIAGNOSIS — I65.23 BILATERAL CAROTID ARTERY STENOSIS: ICD-10-CM

## 2020-10-13 PROCEDURE — 99214 OFFICE O/P EST MOD 30 MIN: CPT | Performed by: INTERNAL MEDICINE

## 2020-10-13 RX ORDER — PREGABALIN 75 MG/1
75 CAPSULE ORAL 2 TIMES DAILY
Qty: 180 CAPSULE | Refills: 0 | Status: SHIPPED | OUTPATIENT
Start: 2020-10-13 | End: 2021-01-08 | Stop reason: SDUPTHER

## 2020-10-13 RX ORDER — ZOLPIDEM TARTRATE 10 MG/1
10 TABLET ORAL NIGHTLY
Qty: 90 TABLET | Refills: 0 | Status: SHIPPED | OUTPATIENT
Start: 2020-10-13 | End: 2021-01-08 | Stop reason: SDUPTHER

## 2020-10-13 NOTE — PROGRESS NOTES
"Chief Complaint   Patient presents with   • Insomnia       History of Present Illness  86 y.o.  gentleman, accompanied by his wife presents for Lyrica and Ambien refills.  Takes the Ambien on a nightly basis without side effects.  Has had successful leg surgery for circulation and thinks he will need to go back for another surgery.  He is uncertain when the last carotid ultrasound was.  Remains on Lyrica and feels that it is helping without side effects.    Reports that they both had updated eye exams just a few weeks ago with Dr. Dionisio Valenzuela.    Review of Systems  ROS (+) for chronic insomnia. ROS (+) for chronic leg pain due to circulation but improved after current surgery.  Denies headaches, chest pain, palpitation, shortness of breath, falls.  All other ROS reviewed and negative.    [facility]  The following portions of the patient's history were reviewed and updated as appropriate: allergies, current medications, past family history, past medical history, past social history, past surgical history and problem list.    Current Outpatient Medications:   •  amLODIPine (NORVASC) 5 MG QD  •  aspirin 81 MG QD  •  atorvastatin (LIPITOR) 20 MG QD  •  clopidogrel (PLAVIX) 75 MG QD  •  desmopressin (DDAVP) 0.2 MGQD  •  fluocinonide (LIDEX) 0.05 % external AD  •  ketoconazole (NIZORAL) 2 % shampoo, AD  •  levothyroxine (SYNTHROID, LEVOTHROID) 50 MCG QD  •  lisinopril (PRINIVIL,ZESTRIL) 40 MG QD  •  Misc Natural Products (GLUCOSAMINE CHONDROITIN ADV) QD  •  omeprazole (priLOSEC) 20 MG cQD  •  pregabalin (Lyrica) 75 MG BID  •  tamsulosin (FLOMAX) 0.4 MG QD  •  zolpidem (AMBIEN) 10 MG QHS (nightly)    VITALS:  /68   Pulse 54   Temp 98 °F (36.7 °C)   Ht 170.2 cm (67\")   Wt 78.9 kg (174 lb)   BMI 27.25 kg/m²     Physical Exam  Vitals signs and nursing note reviewed.   Constitutional:       General: He is not in acute distress.     Appearance: Normal appearance.   Eyes:      Extraocular Movements: Extraocular " movements intact.      Conjunctiva/sclera: Conjunctivae normal.      Comments: Wears glasses   Cardiovascular:      Rate and Rhythm: Normal rate and regular rhythm.      Heart sounds: Normal heart sounds.   Pulmonary:      Effort: Pulmonary effort is normal. No respiratory distress.      Breath sounds: Normal breath sounds. No wheezing or rales.   Neurological:      Mental Status: He is alert and oriented to person, place, and time. Mental status is at baseline.      Gait: Gait normal.   Psychiatric:         Mood and Affect: Mood normal.         Behavior: Behavior normal.         LABS  Results for orders placed or performed in visit on 07/14/20   POC Glycosylated Hemoglobin (Hb A1C)    Specimen: Blood   Result Value Ref Range    Hemoglobin A1C 5.9 %       ASSESSMENT/PLAN  Problems Addressed this Visit        Cardiovascular and Mediastinum    Hypertension     BP stable with goal < 130/80; on lisin 40mg QD and amlo 5mg QD         Bilateral carotid artery stenosis     rec updated carotid u/s -he will discuss this further with Dr. Easley (appointment tomorrow); remains on atorvastatin, clopidogrel, and ASA            Endocrine    Type 2 diabetes mellitus with diabetic polyneuropathy, without long-term current use of insulin (CMS/[facility])     Judicious and appropriate use of lyrica 75mg BID #180, 0RF (exception due to insurance).  LETHA was reviewed and appropriate.  The patient has read and signed the [facility] Controlled Substance Contract 7/14/20. UDS today. Patient has been counseled and is aware of side effects, risks, potential for addiction/tolerance, interactions, and how to take medication correctly.  RTC 3 mos for next RX with LETHA    Request annual eye exam from Dr. Valenzuela (done a few wks ago per pt)           Relevant Medications    pregabalin (Lyrica) 75 MG capsule       Other    Insomnia - Primary     Judicious and appropriate use of zolpidem 10mg QHS #90, 0RF (90d exception made for insurance).  LETHA  was reviewed and appropriate.  The patient has read and signed the [facility] Controlled Substance Contract 7/20. UDS obtained today Patient has been counseled and is aware of side effects, risks, potential for addiction/tolerance, interactions, and how to take medication correctly. RTC 3 mos for next RX with LETHA           Relevant Medications    zolpidem (AMBIEN) 10 MG tablet    Other Relevant Orders    Urine Drug Screen - Urine, Clean Catch      Other Visit Diagnoses     Encounter for therapeutic drug level monitoring         Relevant Orders    Urine Drug Screen - Urine, Clean Catch      Diagnoses       Codes Comments    Insomnia    -  Primary ICD-10-CM: F51.01  ICD-9-CM: 307.42     Type 2 diabetes mellitus with diabetic polyneuropathy, without long-term current use of insulin (CMS/[facility])     ICD-10-CM: E11.42  ICD-9-CM: 250.60, 357.2     Bilateral carotid artery stenosis     ICD-10-CM: I65.23  ICD-9-CM: 433.10, 433.30     Hypertension     ICD-10-CM: I10  ICD-9-CM: 401.9     Encounter for therapeutic drug level monitoring      ICD-10-CM: Z51.81  ICD-9-CM: V58.83           FOLLOW-UP  1.  Health maintenance  -flu vaccine already done at pharmacy; request eye exam note from Dr. Dionisio Valenzuela 9/20  2.  RTC for wellness 1/8/21; fasting labs prior to appt (CBC, CMP, TSH, lipids, UA/micro, UDS, A1C, microalb, CPK, FT4, uric), LETHA, and zolpidem/lyrica RXs (contract 7/20)    Electronically signed by:    Delilah Tatum MD, FACP  10/13/2020    EMR Dragon/Transcription disclaimer:  Parts of this encounter note is an electronic transcription/translation of spoken language to printed text. Electronic translation of spoken language may permit erroneous, or at times, nonsensical words or phrases, to be inadvertently transcribed. Although I have reviewed the note for such errors, some may still exist.

## 2020-10-15 ENCOUNTER — LAB (OUTPATIENT)
Dept: LAB | Facility: HOSPITAL | Age: 85
End: 2020-10-15

## 2020-10-27 NOTE — ASSESSMENT & PLAN NOTE
Patient reports normotensive readings at home; remains on lisin 40mg QD and amlo 5mg QD   Subjective:   CC: Follow-up for premature adrenarche, concern for early puberty  History of present illness:  Robert Kay is a 3  y.o. 6  m.o. female who has been followed in endocrine clinic since 8/16/2019 for CC. She was present today with her father. Autism and GERD. Increased body odor first noticed at 15onths of age. Also concern about rapid growth in height. Referred to [facility] for further evaluation. Denies early morning vomiting, fatigue,constipation/diarrhea, polyuria,polydipsia. Denied exposure to exogenous estrogen,lavender/tree oil. Bone age done on 8/3/17 at [facility] 170 was read as 30months(advanced). Labs done on 9/18/2017. were significant for prepubertal LH level, normal thyroid studies,borderline elevated DHEAs. She also had normal 17OH progesterone ruling out late onset CAH. She was joann 2 for [facility] and joann 1 breast development at her visit in August 2019. Bone age xray done at [facility] 450 of 3yrs 6months was 5yrs 9months(advanced) .     Her last visit in endocrine clinic was on 8/13/2020. Since then, she has been in good health, with no significant illnesses. Family report noticing recent breast development. Denies tiredness,constipation, polyuria,polydipsia,acne or vaginal bleed. Reports no interval change in pubic hair. Screening labs done on 8/19/2020 significant for prepubertal LH, FSH and estradiol level. She also had normal 17-OH progesterone, DHEA-S, and testosterone level. Denies any exposure to tea tree or lavender oil. Past Medical History:   Diagnosis Date    Autism     Autism     Developmental delay     GERD (gastroesophageal reflux disease)     Hypertrophy of adenoids     Ill-defined condition     eczema    OM (otitis media), recurrent     Pneumonia     history x2 last june 2018       Social History:  No interval change    Review of Systems:    A comprehensive review of systems was negative except for that written in the HPI.     Medications:  Current Outpatient Medications   Medication Sig    famotidine (PEPCID) 40 mg/5 mL (8 mg/mL) suspension TAKE 1 ML BY MOUTH TWO (2) TIMES A DAY FOR 30 DAYS    Omega-3 Fatty Acids 60- mg cpDR Take  by mouth.  polyethylene glycol 3350 (MIRALAX PO) Take  by mouth.  pediatric multivitamin no.81 (POLY-VI-SOL PO) Take  by mouth.  magnesium hydroxide (PEDIA-LAX PO) Take  by mouth.  cetirizine (ZYRTEC) 5 mg tablet Take  by mouth.  fluticasone propionate (FLONASE ALLERGY RELIEF NA) by Nasal route. No current facility-administered medications for this visit. Allergies: Allergies   Allergen Reactions    Bactrim [Sulfamethoprim] Rash    Augmentin [Amoxicillin-Pot Clavulanate] Diarrhea    Omnicef [Cefdinir] Diarrhea           Objective:       Visit Vitals  Temp 97.1 °F (36.2 °C) (Temporal)   Ht (!) 3' 6.91\" (1.09 m)   Wt 44 lb (20 kg)   BMI 16.80 kg/m²       Height: 76 %ile (Z= 0.71) based on CDC (Girls, 2-20 Years) Stature-for-age data based on Stature recorded on 10/27/2020. Weight: 83 %ile (Z= 0.94) based on CDC (Girls, 2-20 Years) weight-for-age data using vitals from 10/27/2020. BMI: Body mass index is 16.8 kg/m². Percentile: 85 %ile (Z= 1.05) based on CDC (Girls, 2-20 Years) BMI-for-age based on BMI available as of 10/27/2020. Change in height: +7.5 cm in the last 14 months  Change in weight: +3.6 kg in the last 14 months    In general, Camella Ganser is alert, well-appearing and in no acute distress. HEENT: normocephalic, atraumatic. Oropharynx is clear, mucous membranes moist. Neck is supple without lymphadenopathy. Thyroid is smooth and not enlarged. Chest: Clear to auscultation bilaterally. CV: Normal S1/S2 without murmur. Abdomen is soft, nontender, nondistended, no hepatosplenomegaly. Skin is warm, without rash or macules. Extremities are within normal. Neuro demonstrates 2+ patellar reflexes bilaterally.   Sexual development: stage joann 1 breast (more of lipomastia than true breast tissues), joann 2 PH    Laboratory data:  Results for orders placed or performed in visit on 08/13/20   LUTEINIZING HORMONE, PEDIATRIC   Result Value Ref Range    Luteinizing Hormone (LH) 4.192 mIU/mL   FOLLICLE STIMULATING HORMONE   Result Value Ref Range    FSH 8.6 mIU/mL   ESTRADIOL   Result Value Ref Range    Estradiol <5.0 (L) 6.0 - 27.0 pg/mL   TESTOSTERONE, TOTAL, FEMALE/CHILD   Result Value Ref Range    Testosterone, Serum (Total) 5.6 ng/dL   DHEA SULFATE   Result Value Ref Range    DHEA Sulfate 43.2 1.8 - 97.2 ug/dL   17-OH PROGESTERONE LCMS   Result Value Ref Range    17-OH Progesterone 88 0 - 90 ng/dL       Bone age:        Assessment:       Jae Savage is a 3  y.o. 6  m.o. female presenting for follow up of premature adrenarche. She has been in good health since her last visit, and exam today is significant for joann 1 breast (more of lipomastia than true breast tissues), joann 2 PH. Puberty in girls starts on the average between 8 and 14 years. The first sign of puberty in girls is breast development. Exam today of Jae Savaeg shows more of lipomastia than true breast tissue. She is thus not in puberty. Screening labs on the morning revealed prepubertal LH, FSH and estradiol level. Findings more consistent with premature adrenarche; pubic hair, increased body odor without true breast development. Bone age x-ray done at chronological age of 4 years 8 months was 6 years [within 2 standard deviation of the mean]. [de-identified] of girls with premature adrenarche will start puberty on time. A very small subset might start puberty earlier. Reviewed the stages of true puberty and the average age when they occur with father in clinic today. No endocrine intervention at this time. We will like to see her back in clinic in 3 months or sooner if any concerns. Let me know if he notices any increase in breast size, increase in pubic hair or vaginal bleed.        Plan:   Reviewed charts and labs with family  Diagnosis, etiology, pathophysiology, risk/ benefits of rx, proposed eval, and expected follow up discussed with family and all questions answered  Reviewed the stages of puberty and the average age when they occur with family  Follow up in 3 months      Total time: 30minutes  Time spent counseling patient/family: 50%

## 2020-12-29 ENCOUNTER — TELEPHONE (OUTPATIENT)
Dept: INTERNAL MEDICINE | Facility: CLINIC | Age: 85
End: 2020-12-29

## 2020-12-29 DIAGNOSIS — E03.9 ACQUIRED HYPOTHYROIDISM: ICD-10-CM

## 2020-12-29 DIAGNOSIS — E11.42 TYPE 2 DIABETES MELLITUS WITH DIABETIC POLYNEUROPATHY, WITHOUT LONG-TERM CURRENT USE OF INSULIN (HCC): ICD-10-CM

## 2020-12-29 DIAGNOSIS — F51.01 PRIMARY INSOMNIA: ICD-10-CM

## 2020-12-29 DIAGNOSIS — I10 ESSENTIAL HYPERTENSION: ICD-10-CM

## 2020-12-29 DIAGNOSIS — M10.042 ACUTE IDIOPATHIC GOUT OF LEFT HAND: ICD-10-CM

## 2020-12-29 DIAGNOSIS — R80.9 MICROALBUMINURIA: ICD-10-CM

## 2020-12-29 DIAGNOSIS — Z00.00 MEDICARE ANNUAL WELLNESS VISIT, SUBSEQUENT: Primary | ICD-10-CM

## 2020-12-29 DIAGNOSIS — E78.2 MIXED HYPERLIPIDEMIA: ICD-10-CM

## 2021-01-04 ENCOUNTER — LAB (OUTPATIENT)
Dept: LAB | Facility: HOSPITAL | Age: 86
End: 2021-01-04

## 2021-01-04 DIAGNOSIS — R80.9 MICROALBUMINURIA: ICD-10-CM

## 2021-01-04 DIAGNOSIS — E03.9 ACQUIRED HYPOTHYROIDISM: ICD-10-CM

## 2021-01-04 DIAGNOSIS — E11.42 TYPE 2 DIABETES MELLITUS WITH DIABETIC POLYNEUROPATHY, WITHOUT LONG-TERM CURRENT USE OF INSULIN (HCC): ICD-10-CM

## 2021-01-04 DIAGNOSIS — I10 ESSENTIAL HYPERTENSION: ICD-10-CM

## 2021-01-04 DIAGNOSIS — M10.042 ACUTE IDIOPATHIC GOUT OF LEFT HAND: ICD-10-CM

## 2021-01-04 DIAGNOSIS — Z00.00 MEDICARE ANNUAL WELLNESS VISIT, SUBSEQUENT: ICD-10-CM

## 2021-01-04 DIAGNOSIS — E78.2 MIXED HYPERLIPIDEMIA: ICD-10-CM

## 2021-01-04 LAB
ALBUMIN SERPL-MCNC: 4.3 G/DL (ref 3.5–5.2)
ALBUMIN/GLOB SERPL: 2 G/DL
ALP SERPL-CCNC: 76 U/L (ref 39–117)
ALT SERPL W P-5'-P-CCNC: 12 U/L (ref 1–41)
ANION GAP SERPL CALCULATED.3IONS-SCNC: 9.8 MMOL/L (ref 5–15)
AST SERPL-CCNC: 13 U/L (ref 1–40)
BASOPHILS # BLD AUTO: 0.01 10*3/MM3 (ref 0–0.2)
BASOPHILS NFR BLD AUTO: 0.2 % (ref 0–1.5)
BILIRUB SERPL-MCNC: 0.4 MG/DL (ref 0–1.2)
BUN SERPL-MCNC: 16 MG/DL (ref 8–23)
BUN/CREAT SERPL: 14.7 (ref 7–25)
CALCIUM SPEC-SCNC: 9 MG/DL (ref 8.6–10.5)
CHLORIDE SERPL-SCNC: 102 MMOL/L (ref 98–107)
CHOLEST SERPL-MCNC: 100 MG/DL (ref 0–200)
CK SERPL-CCNC: 63 U/L (ref 20–200)
CO2 SERPL-SCNC: 26.2 MMOL/L (ref 22–29)
CREAT SERPL-MCNC: 1.09 MG/DL (ref 0.76–1.27)
DEPRECATED RDW RBC AUTO: 42.8 FL (ref 37–54)
EOSINOPHIL # BLD AUTO: 0.06 10*3/MM3 (ref 0–0.4)
EOSINOPHIL NFR BLD AUTO: 1.2 % (ref 0.3–6.2)
ERYTHROCYTE [DISTWIDTH] IN BLOOD BY AUTOMATED COUNT: 13.9 % (ref 12.3–15.4)
GFR SERPL CREATININE-BSD FRML MDRD: 64 ML/MIN/1.73
GLOBULIN UR ELPH-MCNC: 2.2 GM/DL
GLUCOSE SERPL-MCNC: 99 MG/DL (ref 65–99)
HBA1C MFR BLD: 6.21 % (ref 4.8–5.6)
HCT VFR BLD AUTO: 36.9 % (ref 37.5–51)
HDLC SERPL-MCNC: 49 MG/DL (ref 40–60)
HGB BLD-MCNC: 10.9 G/DL (ref 13–17.7)
IMM GRANULOCYTES # BLD AUTO: 0.02 10*3/MM3 (ref 0–0.05)
IMM GRANULOCYTES NFR BLD AUTO: 0.4 % (ref 0–0.5)
LDLC SERPL CALC-MCNC: 36 MG/DL (ref 0–100)
LDLC/HDLC SERPL: 0.76 {RATIO}
LYMPHOCYTES # BLD AUTO: 1.38 10*3/MM3 (ref 0.7–3.1)
LYMPHOCYTES NFR BLD AUTO: 27.1 % (ref 19.6–45.3)
MCH RBC QN AUTO: 25.3 PG (ref 26.6–33)
MCHC RBC AUTO-ENTMCNC: 29.5 G/DL (ref 31.5–35.7)
MCV RBC AUTO: 85.6 FL (ref 79–97)
MONOCYTES # BLD AUTO: 0.44 10*3/MM3 (ref 0.1–0.9)
MONOCYTES NFR BLD AUTO: 8.6 % (ref 5–12)
NEUTROPHILS NFR BLD AUTO: 3.18 10*3/MM3 (ref 1.7–7)
NEUTROPHILS NFR BLD AUTO: 62.5 % (ref 42.7–76)
NRBC BLD AUTO-RTO: 0 /100 WBC (ref 0–0.2)
PLATELET # BLD AUTO: 173 10*3/MM3 (ref 140–450)
PMV BLD AUTO: 10.1 FL (ref 6–12)
POTASSIUM SERPL-SCNC: 4.2 MMOL/L (ref 3.5–5.2)
PROT SERPL-MCNC: 6.5 G/DL (ref 6–8.5)
RBC # BLD AUTO: 4.31 10*6/MM3 (ref 4.14–5.8)
SODIUM SERPL-SCNC: 138 MMOL/L (ref 136–145)
T4 FREE SERPL-MCNC: 1.21 NG/DL (ref 0.93–1.7)
TRIGL SERPL-MCNC: 70 MG/DL (ref 0–150)
TSH SERPL DL<=0.05 MIU/L-ACNC: 3.62 UIU/ML (ref 0.27–4.2)
URATE SERPL-MCNC: 5.6 MG/DL (ref 3.4–7)
VLDLC SERPL-MCNC: 15 MG/DL (ref 5–40)
WBC # BLD AUTO: 5.09 10*3/MM3 (ref 3.4–10.8)

## 2021-01-04 PROCEDURE — 80061 LIPID PANEL: CPT

## 2021-01-04 PROCEDURE — 84550 ASSAY OF BLOOD/URIC ACID: CPT

## 2021-01-04 PROCEDURE — 82570 ASSAY OF URINE CREATININE: CPT

## 2021-01-04 PROCEDURE — 83036 HEMOGLOBIN GLYCOSYLATED A1C: CPT

## 2021-01-04 PROCEDURE — 80053 COMPREHEN METABOLIC PANEL: CPT

## 2021-01-04 PROCEDURE — 81001 URINALYSIS AUTO W/SCOPE: CPT

## 2021-01-04 PROCEDURE — 84439 ASSAY OF FREE THYROXINE: CPT

## 2021-01-04 PROCEDURE — 82550 ASSAY OF CK (CPK): CPT

## 2021-01-04 PROCEDURE — 85025 COMPLETE CBC W/AUTO DIFF WBC: CPT

## 2021-01-04 PROCEDURE — 84443 ASSAY THYROID STIM HORMONE: CPT

## 2021-01-04 PROCEDURE — 82043 UR ALBUMIN QUANTITATIVE: CPT

## 2021-01-05 LAB
ALBUMIN UR-MCNC: 11 MG/DL
BACTERIA UR QL AUTO: NORMAL /HPF
BILIRUB UR QL STRIP: NEGATIVE
CLARITY UR: CLEAR
COLOR UR: YELLOW
CREAT UR-MCNC: 89.9 MG/DL
GLUCOSE UR STRIP-MCNC: NEGATIVE MG/DL
HGB UR QL STRIP.AUTO: NEGATIVE
HYALINE CASTS UR QL AUTO: NORMAL /LPF
KETONES UR QL STRIP: NEGATIVE
LEUKOCYTE ESTERASE UR QL STRIP.AUTO: NEGATIVE
MICROALBUMIN/CREAT UR: 122.4 MG/G
NITRITE UR QL STRIP: NEGATIVE
PH UR STRIP.AUTO: 6.5 [PH] (ref 5–8)
PROT UR QL STRIP: ABNORMAL
RBC # UR: NORMAL /HPF
REF LAB TEST METHOD: NORMAL
SP GR UR STRIP: 1.02 (ref 1–1.03)
SQUAMOUS #/AREA URNS HPF: NORMAL /HPF
UROBILINOGEN UR QL STRIP: ABNORMAL
WBC UR QL AUTO: NORMAL /HPF

## 2021-01-08 ENCOUNTER — OFFICE VISIT (OUTPATIENT)
Dept: INTERNAL MEDICINE | Facility: CLINIC | Age: 86
End: 2021-01-08

## 2021-01-08 VITALS
BODY MASS INDEX: 27.97 KG/M2 | WEIGHT: 174 LBS | SYSTOLIC BLOOD PRESSURE: 124 MMHG | DIASTOLIC BLOOD PRESSURE: 70 MMHG | HEART RATE: 65 BPM | OXYGEN SATURATION: 98 % | HEIGHT: 66 IN

## 2021-01-08 DIAGNOSIS — Z00.00 MEDICARE ANNUAL WELLNESS VISIT, SUBSEQUENT: Primary | ICD-10-CM

## 2021-01-08 DIAGNOSIS — F51.01 PRIMARY INSOMNIA: ICD-10-CM

## 2021-01-08 DIAGNOSIS — E78.2 MIXED HYPERLIPIDEMIA: ICD-10-CM

## 2021-01-08 DIAGNOSIS — I73.9 PERIPHERAL ARTERIAL DISEASE (HCC): ICD-10-CM

## 2021-01-08 DIAGNOSIS — K21.9 GASTROESOPHAGEAL REFLUX DISEASE WITHOUT ESOPHAGITIS: ICD-10-CM

## 2021-01-08 DIAGNOSIS — E03.9 ACQUIRED HYPOTHYROIDISM: ICD-10-CM

## 2021-01-08 DIAGNOSIS — R80.9 MICROALBUMINURIA: ICD-10-CM

## 2021-01-08 DIAGNOSIS — E11.42 TYPE 2 DIABETES MELLITUS WITH DIABETIC POLYNEUROPATHY, WITHOUT LONG-TERM CURRENT USE OF INSULIN (HCC): ICD-10-CM

## 2021-01-08 DIAGNOSIS — I10 ESSENTIAL HYPERTENSION: ICD-10-CM

## 2021-01-08 DIAGNOSIS — M10.042 ACUTE IDIOPATHIC GOUT OF LEFT HAND: ICD-10-CM

## 2021-01-08 DIAGNOSIS — I65.23 BILATERAL CAROTID ARTERY STENOSIS: ICD-10-CM

## 2021-01-08 DIAGNOSIS — N40.1 BENIGN NON-NODULAR PROSTATIC HYPERPLASIA WITH LOWER URINARY TRACT SYMPTOMS: ICD-10-CM

## 2021-01-08 PROCEDURE — 1159F MED LIST DOCD IN RCRD: CPT | Performed by: INTERNAL MEDICINE

## 2021-01-08 PROCEDURE — 1170F FXNL STATUS ASSESSED: CPT | Performed by: INTERNAL MEDICINE

## 2021-01-08 PROCEDURE — 1126F AMNT PAIN NOTED NONE PRSNT: CPT | Performed by: INTERNAL MEDICINE

## 2021-01-08 PROCEDURE — G0444 DEPRESSION SCREEN ANNUAL: HCPCS | Performed by: INTERNAL MEDICINE

## 2021-01-08 PROCEDURE — 99497 ADVNCD CARE PLAN 30 MIN: CPT | Performed by: INTERNAL MEDICINE

## 2021-01-08 PROCEDURE — G0439 PPPS, SUBSEQ VISIT: HCPCS | Performed by: INTERNAL MEDICINE

## 2021-01-08 PROCEDURE — 99397 PER PM REEVAL EST PAT 65+ YR: CPT | Performed by: INTERNAL MEDICINE

## 2021-01-08 RX ORDER — TAMSULOSIN HYDROCHLORIDE 0.4 MG/1
1 CAPSULE ORAL DAILY
Qty: 90 CAPSULE | Refills: 3 | Status: SHIPPED | OUTPATIENT
Start: 2021-01-08 | End: 2022-01-08 | Stop reason: SDUPTHER

## 2021-01-08 RX ORDER — OMEGA-3S/DHA/EPA/FISH OIL/D3 300MG-1000
400 CAPSULE ORAL DAILY
COMMUNITY

## 2021-01-08 RX ORDER — AMLODIPINE BESYLATE 5 MG/1
5 TABLET ORAL DAILY
Qty: 90 TABLET | Refills: 3 | Status: SHIPPED | OUTPATIENT
Start: 2021-01-08 | End: 2021-06-24 | Stop reason: DRUGHIGH

## 2021-01-08 RX ORDER — LISINOPRIL 40 MG/1
40 TABLET ORAL DAILY
Qty: 90 TABLET | Refills: 3 | Status: SHIPPED | OUTPATIENT
Start: 2021-01-08 | End: 2021-06-24 | Stop reason: ALTCHOICE

## 2021-01-08 RX ORDER — OMEPRAZOLE 20 MG/1
20 CAPSULE, DELAYED RELEASE ORAL DAILY
Qty: 90 CAPSULE | Refills: 3 | Status: SHIPPED | OUTPATIENT
Start: 2021-01-08 | End: 2021-11-01

## 2021-01-08 RX ORDER — ATORVASTATIN CALCIUM 20 MG/1
20 TABLET, FILM COATED ORAL DAILY
Qty: 90 TABLET | Refills: 3 | Status: SHIPPED | OUTPATIENT
Start: 2021-01-08 | End: 2021-06-24 | Stop reason: DRUGHIGH

## 2021-01-08 RX ORDER — ZOLPIDEM TARTRATE 10 MG/1
10 TABLET ORAL NIGHTLY
Qty: 90 TABLET | Refills: 0 | Status: SHIPPED | OUTPATIENT
Start: 2021-01-08 | End: 2021-04-08 | Stop reason: SDUPTHER

## 2021-01-08 RX ORDER — PREGABALIN 75 MG/1
75 CAPSULE ORAL 2 TIMES DAILY
Qty: 180 CAPSULE | Refills: 0 | Status: SHIPPED | OUTPATIENT
Start: 2021-01-08 | End: 2021-04-08 | Stop reason: SDUPTHER

## 2021-01-08 NOTE — ASSESSMENT & PLAN NOTE
BG control worsened with A1C 6.21 (was 5.9 in 7/20); no meds; encouraged reg phys activity to decr insulin resistance, moderation in unhealthy starches/sweets; f/u A1C in 3 mos    For neuropathy, he remains on judicious and appropriate use of lyrica 75mg BID# 180, 2RF.  LETHA was reviewed and appropriate.  The patient has read and signed the [facility] Controlled Substance Contract 7/20. UDS 10/20. Patient has been counseled and is aware of side effects, risks, potential for addiction/tolerance, interactions, and how to take medication correctly.  RTC 3 mos for next RX with LETHA    Patient verbalizes understanding of additional risks of this medication in individuals > 65 yrs of age and wants to continue lyrica 64mg BID and zolpidem 10mg QHS.

## 2021-01-08 NOTE — ASSESSMENT & PLAN NOTE
Significantly worsened at 122.4 with goal < 30; discussed importance of good BG and BP control; avoid NSAIDs; f/u in 3 mos

## 2021-01-08 NOTE — ASSESSMENT & PLAN NOTE
Judicious and appropriate use of zolpidem 10mg QHS #90, 0RF (90d exception made for insurance).  LETHA was reviewed and appropriate.  The patient has read and signed the [facility] Controlled Substance Contract 7/20. UDS 10/20 Patient has been counseled and is aware of side effects, risks, potential for addiction/tolerance, interactions, and how to take medication correctly. RTC 3 mos for next RX with LETHA    Patient verbalizes understanding of additional risks of this medication in individuals > 65 yrs of age and wants to continue zolpidem 10mg QHS with additional risks of falls, decreased mental clarity, confusion

## 2021-01-08 NOTE — ASSESSMENT & PLAN NOTE
Health maintenance - flu vacc 10/20; Prevnar 12/15, PVX 12/14, Tdap 10/11 (due for Td this year - get at pharmacy), Zostavax 9/09; HAV done; rec Shingrix - counseling given; due for colonosc (1/15, repeat 2020 per Dr. Holland); no further prostate CA screening w/ age/comorbidities, confirmed with pt; eye exam 2020; dental exam UTD per pt; (+) seat belt use    Consultants:  Patient Care Team:  Delilah Tatum MD as PCP - General  Enoch, Burton ECHEVARRIA MD as Consulting Physician (Colon and Rectal Surgery)  Douglas Lazaro MD as Consulting Physician (General Surgery)  Rubio Rossi MD as Consulting Physician (Dermatology)  Douglas Brooks MD (Inactive) as Consulting Physician (Cardiology)  Jim Black MD as Consulting Physician (Cardiothoracic Surgery)  Yusef Bowles MD as Consulting Physician (Nephrology)  Tio Mcnally MD (Inactive) as Consulting Physician (Hand Surgery)  Marisol Easley MD as Consulting Physician (Cardiology)  Chayito Quick OD (Optometry)  Radha Hirsch APRN as Nurse Practitioner (Otolaryngology)  Dionisio Valenzuela MD as Consulting Physician (Ophthalmology)

## 2021-01-08 NOTE — PROGRESS NOTES
ANNUAL WELLNESS VISIT    DRUG AND ALCOHOL USE      no alcohol use, tobacco use: snuff and caffeine intake: 2 cups of caffeinated coffee per day    DIET AND PHYSICAL ACTIVITY     Diet: general    Exercise: infrequently   Exercise Details: weights    MOOD DISORDER AND COGNITIVE SCREENING   Depression Screening Tool Used yes - see PHQ-9; components reviewed with patient; 15-min counseling done - questionnaire items reviewed - denies feeling down, depressed, hopeless or not having interests. Denies concerns with appetite, energy, slow thoughts, slow movements, concentration, negative thoughts. Has chronic insomnia and takes nightly zolpidem. Screening is NEG for depression.   Anxiety Screening Tool Used yes     Mini-Cog Performed   Yes    1. Tell Patient 3 Words apple table ofelia    2. Administer Clock Test normal    3. Recall 3 words  apple table ofelia    4. Number Correct Items 3    FUNCTIONAL ABILITY AND LEVEL OF SAFETY   Hearing mild hearing loss     Wears Hearing Aids No       Current Activities Independent      none  - see Funct/Cog Status Intake     Fall Risk Assessment       Has difficulty with walking or balance  No         Timed Up and Go (TUG) Test  8 sec.       If >12 sec, normal    ADVANCED DIRECTIVE  Advance Care Planning   ACP discussion was held with the patient during this visit. Patient has an advance directive (not in EMR), copy requested.  Advance Care Planning Discussion:  16 min or more spent on counseling; patient has advanced directive and living will.  Reviewed desires for end of life care, which is to have comfort care.  Patient does not want extraordinary life-sustaining measures, including no chest compression, shocks, intubation/ventilator use, feeding tube, or prolonged artificial life support.  Reviewed code status options, meanings, desires. Patient 's code status is DNR.   Encouraged patient to ensure family is aware of desires/preferences; wife is present for ACP discussion  today.    PAIN SCREENING Do you have pain right now? no      If so, 1-10 scale: 0     Intermittent     Do you have pain every day? No      Probable chronic pain: No     Recent Hospitalizations:  No recent hospitalization(s)..     MEDICATION REVIEW   - updated and reviewed (see Medication List).   - reviewed for potentially harmful drug-disease interactions in the elderly.   - reviewed for high risk medications in the elderly.   - aspirin use: Yes    BMI  Body mass index is 28.08 kg/m².    Patient's Body mass index is 28.08 kg/m². BMI is above normal parameters. Recommendations include: exercise counseling and nutrition counseling.    _________________________________________________________    Chief Complaint   Patient presents with   • Medicare Wellness-subsequent   • Insomnia       History of Present Illness  86 y.o.  gentleman, accompaneied by his wife, presents for updated phys examination and wellness visit.    Reports no leg pains after leg vascular surgeries; just cannot walk as far in general.    Takes zolpidem nightly without side effects.    Review of Systems  Denies headaches, visual changes, CP, palpitations, SOB, cough, abd pain, n/v/d, difficulty with urination, falls, mood changes, lightheadedness, hearing changes, rashes. ROS (+) for chronic insomnia.     All other ROS reviewed and negative.    Current Outpatient Medications:   •  aspirin 81 MG QD  •  cholecalciferol (VITAMIN D3) 10 MCG (400 UNIT) QD   •  clopidogrel (PLAVIX) 75 MG QD  •  desmopressin (DDAVP) 0.2 MG QHS  •  fluocinonide (LIDEX) 0.05 % external solution AD  •  ketoconazole (NIZORAL) 2 % shampoo AD  •  levothyroxine 50 MCG QD  •  Misc Natural Products (GLUCOSAMINE CHONDROITIN ADV) QD  •  amLODIPine (NORVASC) 5 MG QD  •  atorvastatin (LIPITOR) 20 MG QD  •  lisinopril (PRINIVIL,ZESTRIL) 40 MG QD  •  omeprazole (priLOSEC) 20 MG QD  •  pregabalin (Lyrica) 75 MG BID  •  tamsulosin (FLOMAX) 0.4 MG QHS  •  zolpidem (AMBIEN) 10 MG  "QHS    VITALS:  /70   Pulse 65   Ht 167.6 cm (66\")   Wt 78.9 kg (174 lb)   SpO2 98%   BMI 28.08 kg/m²     Physical Exam  Vitals signs and nursing note reviewed.   Constitutional:       General: He is not in acute distress.     Appearance: Normal appearance. He is not ill-appearing.   HENT:      Right Ear: Tympanic membrane, ear canal and external ear normal.      Left Ear: Tympanic membrane, ear canal and external ear normal.   Eyes:      General:         Right eye: No discharge.         Left eye: No discharge.      Extraocular Movements: Extraocular movements intact.      Conjunctiva/sclera: Conjunctivae normal.      Comments: Wears glasses   Neck:      Vascular: Carotid bruit (left) present.   Cardiovascular:      Rate and Rhythm: Normal rate and regular rhythm.      Heart sounds: Normal heart sounds.   Pulmonary:      Effort: Pulmonary effort is normal. No respiratory distress.      Breath sounds: Normal breath sounds. No wheezing or rales.   Abdominal:      General: Bowel sounds are normal. There is no distension.      Palpations: Abdomen is soft.      Tenderness: There is no abdominal tenderness. There is no guarding.   Musculoskeletal:      Right lower leg: No edema.      Left lower leg: No edema.   Neurological:      Mental Status: He is alert and oriented to person, place, and time. Mental status is at baseline.      Gait: Gait normal.   Psychiatric:         Mood and Affect: Mood normal.         Behavior: Behavior normal.         LABS  Results for orders placed or performed in visit on 01/04/21   Comprehensive Metabolic Panel    Specimen: Blood   Result Value Ref Range    Glucose 99 65 - 99 mg/dL    BUN 16 8 - 23 mg/dL    Creatinine 1.09 0.76 - 1.27 mg/dL    Sodium 138 136 - 145 mmol/L    Potassium 4.2 3.5 - 5.2 mmol/L    Chloride 102 98 - 107 mmol/L    CO2 26.2 22.0 - 29.0 mmol/L    Calcium 9.0 8.6 - 10.5 mg/dL    Total Protein 6.5 6.0 - 8.5 g/dL    Albumin 4.30 3.50 - 5.20 g/dL    ALT (SGPT) 12 " 1 - 41 U/L    AST (SGOT) 13 1 - 40 U/L    Alkaline Phosphatase 76 39 - 117 U/L    Total Bilirubin 0.4 0.0 - 1.2 mg/dL    eGFR Non African Amer 64 >60 mL/min/1.73    Globulin 2.2 gm/dL    A/G Ratio 2.0 g/dL    BUN/Creatinine Ratio 14.7 7.0 - 25.0    Anion Gap 9.8 5.0 - 15.0 mmol/L   Lipid Panel    Specimen: Blood   Result Value Ref Range    Total Cholesterol 100 0 - 200 mg/dL    Triglycerides 70 0 - 150 mg/dL    HDL Cholesterol 49 40 - 60 mg/dL    LDL Cholesterol  36 0 - 100 mg/dL    VLDL Cholesterol 15 5 - 40 mg/dL    LDL/HDL Ratio 0.76    TSH    Specimen: Blood   Result Value Ref Range    TSH 3.620 0.270 - 4.200 uIU/mL   Hemoglobin A1c    Specimen: Blood   Result Value Ref Range    Hemoglobin A1C 6.21 (H) 4.80 - 5.60 %   T4, Free    Specimen: Blood   Result Value Ref Range    Free T4 1.21 0.93 - 1.70 ng/dL   Uric Acid    Specimen: Blood   Result Value Ref Range    Uric Acid 5.6 3.4 - 7.0 mg/dL   CK    Specimen: Blood   Result Value Ref Range    Creatine Kinase 63 20 - 200 U/L   CBC Auto Differential    Specimen: Blood   Result Value Ref Range    WBC 5.09 3.40 - 10.80 10*3/mm3    RBC 4.31 4.14 - 5.80 10*6/mm3    Hemoglobin 10.9 (L) 13.0 - 17.7 g/dL    Hematocrit 36.9 (L) 37.5 - 51.0 %    MCV 85.6 79.0 - 97.0 fL    MCH 25.3 (L) 26.6 - 33.0 pg    MCHC 29.5 (L) 31.5 - 35.7 g/dL    RDW 13.9 12.3 - 15.4 %    RDW-SD 42.8 37.0 - 54.0 fl    MPV 10.1 6.0 - 12.0 fL    Platelets 173 140 - 450 10*3/mm3    Neutrophil % 62.5 42.7 - 76.0 %    Lymphocyte % 27.1 19.6 - 45.3 %    Monocyte % 8.6 5.0 - 12.0 %    Eosinophil % 1.2 0.3 - 6.2 %    Basophil % 0.2 0.0 - 1.5 %    Immature Grans % 0.4 0.0 - 0.5 %    Neutrophils, Absolute 3.18 1.70 - 7.00 10*3/mm3    Lymphocytes, Absolute 1.38 0.70 - 3.10 10*3/mm3    Monocytes, Absolute 0.44 0.10 - 0.90 10*3/mm3    Eosinophils, Absolute 0.06 0.00 - 0.40 10*3/mm3    Basophils, Absolute 0.01 0.00 - 0.20 10*3/mm3    Immature Grans, Absolute 0.02 0.00 - 0.05 10*3/mm3    nRBC 0.0 0.0 - 0.2 /100 WBC    Microalbumin / Creatinine Urine Ratio - Urine, Clean Catch    Specimen: Urine, Clean Catch   Result Value Ref Range    Microalbumin/Creatinine Ratio 122.4 mg/g    Creatinine, Urine 89.9 mg/dL    Microalbumin, Urine 11.0 mg/dL   Urinalysis without microscopic (no culture) - Urine, Clean Catch    Specimen: Urine, Clean Catch   Result Value Ref Range    Color, UA Yellow Yellow, Straw    Appearance, UA Clear Clear    pH, UA 6.5 5.0 - 8.0    Specific Gravity, UA 1.016 1.005 - 1.030    Glucose, UA Negative Negative    Ketones, UA Negative Negative    Bilirubin, UA Negative Negative    Blood, UA Negative Negative    Protein, UA 30 mg/dL (1+) (A) Negative    Leuk Esterase, UA Negative Negative    Nitrite, UA Negative Negative    Urobilinogen, UA 0.2 E.U./dL 0.2 - 1.0 E.U./dL   Urinalysis, Microscopic Only - Urine, Clean Catch    Specimen: Urine, Clean Catch   Result Value Ref Range    RBC, UA 0-2 None Seen, 0-2 /HPF    WBC, UA 0-2 None Seen, 0-2 /HPF    Bacteria, UA None Seen None Seen /HPF    Squamous Epithelial Cells, UA 0-2 None Seen, 0-2 /HPF    Hyaline Casts, UA None Seen None Seen /LPF    Methodology Automated Microscopy        7/20 A1C 5.9    7/20 EKG SR, arrhythmia, nonspecific IVCD and poor R wave progression    12/19 microalb/Cr 28.1    ASSESSMENT/PLAN    Diagnoses and all orders for this visit:    1. Medicare annual wellness visit, subsequent (Primary)  Assessment & Plan:  Health maintenance - flu vacc 10/20; Prevnar 12/15, PVX 12/14, Tdap 10/11 (due for Td this year - get at pharmacy), Zostavax 9/09; HAV done; rec Shingrix - counseling given; due for colonosc (1/15, repeat 2020 per Dr. Holland); no further prostate CA screening w/ age/comorbidities, confirmed with pt; eye exam 2020; dental exam UTD per pt; (+) seat belt use    Consultants:  Patient Care Team:  Delilah Tatum MD as PCP - General Holland, Burton ECHEVARRIA MD as Consulting Physician (Colon and Rectal Surgery)  Douglas Lazaro MD as Consulting Physician  (General Surgery)  Rubio Rossi MD as Consulting Physician (Dermatology)  Douglas Brooks MD (Inactive) as Consulting Physician (Cardiology)  Jim Black MD as Consulting Physician (Cardiothoracic Surgery)  Yusef Bowles MD as Consulting Physician (Nephrology)  Tio Mcnally MD (Inactive) as Consulting Physician (Hand Surgery)  Marisol Easley MD as Consulting Physician (Cardiology)  Chayito Quick OD (Optometry)  Radha Hirsch APRN as Nurse Practitioner (Otolaryngology)  Dionisio Valenzuela MD as Consulting Physician (Ophthalmology)          2. Type 2 diabetes mellitus with diabetic polyneuropathy, without long-term current use of insulin (CMS/[facility])  Assessment & Plan:  BG control worsened with A1C 6.21 (was 5.9 in 7/20); no meds; encouraged reg phys activity to decr insulin resistance, moderation in unhealthy starches/sweets; f/u A1C in 3 mos    For neuropathy, he remains on judicious and appropriate use of lyrica 75mg BID# 180, 2RF.  LETHA was reviewed and appropriate.  The patient has read and signed the [facility] Controlled Substance Contract 7/20. UDS 10/20. Patient has been counseled and is aware of side effects, risks, potential for addiction/tolerance, interactions, and how to take medication correctly.  RTC 3 mos for next RX with LETHA    Patient verbalizes understanding of additional risks of this medication in individuals > 65 yrs of age and wants to continue lyrica 64mg BID and zolpidem 10mg QHS.        Orders:  -     pregabalin (Lyrica) 75 MG capsule; Take 1 capsule by mouth 2 (Two) Times a Day.  Dispense: 180 capsule; Refill: 0    3. Hypertension  Assessment & Plan:  BP stable but much worsened microalb/Cr 122.4; on lisin 40mg QD #90, 3RF and amlo 5mg QD #90, 3RF    Orders:  -     lisinopril (PRINIVIL,ZESTRIL) 40 MG tablet; Take 1 tablet by mouth Daily.  Dispense: 90 tablet; Refill: 3  -     amLODIPine (NORVASC) 5 MG tablet; Take 1 tablet by mouth  Daily.  Dispense: 90 tablet; Refill: 3    4. Hyperlipidemia  Assessment & Plan:  Lipids at goal with LDL 36; goal LDL < 70; cont atorvastatin 20mg QD #90, 3RF    Orders:  -     atorvastatin (LIPITOR) 20 MG tablet; Take 1 tablet by mouth Daily.  Dispense: 90 tablet; Refill: 3    5. Bilateral carotid artery stenosis  Assessment & Plan:  Rec updated carotid u/s; remains on statin, clopidogrel, and ASA; LDL is 36    Orders:  -     Duplex Carotid Ultrasound CAR; Future    6. Microalbuminuria  Assessment & Plan:  Significantly worsened at 122.4 with goal < 30; discussed importance of good BG and BP control; avoid NSAIDs; f/u in 3 mos      7. Gout  Assessment & Plan:  asx with uric 5.6 at goal (< 6.0); no meds      8. Insomnia  Assessment & Plan:  Judicious and appropriate use of zolpidem 10mg QHS #90, 0RF (90d exception made for insurance).  LETHA was reviewed and appropriate.  The patient has read and signed the [facility] Controlled Substance Contract 7/20. UDS 10/20 Patient has been counseled and is aware of side effects, risks, potential for addiction/tolerance, interactions, and how to take medication correctly. RTC 3 mos for next RX with LETHA    Patient verbalizes understanding of additional risks of this medication in individuals > 65 yrs of age and wants to continue zolpidem 10mg QHS with additional risks of falls, decreased mental clarity, confusion      Orders:  -     zolpidem (AMBIEN) 10 MG tablet; Take 1 tablet by mouth Every Night.  Dispense: 90 tablet; Refill: 0    9. Hypothyroidism  Assessment & Plan:  Euthyroid on levothyroxine 50mcg QD #90, 3RF      10. BPH  Assessment & Plan:  Stable on tamsulosin 0.4mg QHS #90, 3RF    Orders:  -     tamsulosin (FLOMAX) 0.4 MG capsule 24 hr capsule; Take 1 capsule by mouth Daily.  Dispense: 90 capsule; Refill: 3    11. Gastroesophageal reflux disease without esophagitis  Assessment & Plan:  Stable on omeprazole 20mg QD #90, 3RF    Orders:  -     omeprazole  (priLOSEC) 20 MG capsule; Take 1 capsule by mouth Daily.  Dispense: 90 capsule; Refill: 3    12. Peripheral arterial disease (CMS/HCC)  Assessment & Plan:  LDL at goal 36 (goal < 70); on atorvastatin 20mg QD, clopidogrel 75mg QD, and ASA 81mg QD; cont to encourage regular walking exercise        FOLLOW-UP  RTC 3 mos for zolpidem/lyrica RX with LETHA and A1C, microalb (contract 7/20, UDS 10/20)    Electronically signed by:    Delilah Tatum MD, FACP  01/08/2021

## 2021-01-09 DIAGNOSIS — K21.9 GASTROESOPHAGEAL REFLUX DISEASE WITHOUT ESOPHAGITIS: ICD-10-CM

## 2021-01-09 RX ORDER — OMEPRAZOLE 20 MG/1
CAPSULE, DELAYED RELEASE ORAL
Qty: 90 CAPSULE | Refills: 3 | OUTPATIENT
Start: 2021-01-09

## 2021-01-09 NOTE — ASSESSMENT & PLAN NOTE
LDL at goal 36 (goal < 70); on atorvastatin 20mg QD, clopidogrel 75mg QD, and ASA 81mg QD; cont to encourage regular walking exercise

## 2021-01-15 PROCEDURE — U0004 COV-19 TEST NON-CDC HGH THRU: HCPCS | Performed by: PHYSICIAN ASSISTANT

## 2021-01-20 ENCOUNTER — APPOINTMENT (OUTPATIENT)
Dept: CARDIOLOGY | Facility: HOSPITAL | Age: 86
End: 2021-01-20

## 2021-02-05 PROCEDURE — U0004 COV-19 TEST NON-CDC HGH THRU: HCPCS | Performed by: NURSE PRACTITIONER

## 2021-02-06 ENCOUNTER — TELEPHONE (OUTPATIENT)
Dept: URGENT CARE | Facility: CLINIC | Age: 86
End: 2021-02-06

## 2021-02-06 NOTE — TELEPHONE ENCOUNTER
----- Message from Becca OH DO sent at 2/6/2021  2:55 PM EST -----  Please notify the patient that he is COVID negative.    Patient notified, KIMBERLY

## 2021-02-08 ENCOUNTER — HOSPITAL ENCOUNTER (OUTPATIENT)
Dept: CARDIOLOGY | Facility: HOSPITAL | Age: 86
Discharge: HOME | End: 2021-02-08
Admitting: INTERNAL MEDICINE

## 2021-02-08 DIAGNOSIS — I65.23 BILATERAL CAROTID ARTERY STENOSIS: ICD-10-CM

## 2021-02-08 LAB
BH CV XLRA MEAS LEFT DIST CCA EDV: 24.6 CM/SEC
BH CV XLRA MEAS LEFT DIST CCA PSV: 144 CM/SEC
BH CV XLRA MEAS LEFT DIST ICA EDV: 18.9 CM/SEC
BH CV XLRA MEAS LEFT DIST ICA PSV: 67.2 CM/SEC
BH CV XLRA MEAS LEFT ICA/CCA RATIO: 1.1
BH CV XLRA MEAS LEFT MID CCA EDV: 32.2 CM/SEC
BH CV XLRA MEAS LEFT MID CCA PSV: 146 CM/SEC
BH CV XLRA MEAS LEFT MID ICA EDV: 36.3 CM/SEC
BH CV XLRA MEAS LEFT MID ICA PSV: 150 CM/SEC
BH CV XLRA MEAS LEFT PROX CCA EDV: 15.8 CM/SEC
BH CV XLRA MEAS LEFT PROX CCA PSV: 97.9 CM/SEC
BH CV XLRA MEAS LEFT PROX ECA EDV: 13.8 CM/SEC
BH CV XLRA MEAS LEFT PROX ECA PSV: 159 CM/SEC
BH CV XLRA MEAS LEFT PROX ICA EDV: 34 CM/SEC
BH CV XLRA MEAS LEFT PROX ICA PSV: 161 CM/SEC
BH CV XLRA MEAS LEFT PROX SCLA PSV: 239 CM/SEC
BH CV XLRA MEAS LEFT VERTEBRAL A EDV: 21.2 CM/SEC
BH CV XLRA MEAS LEFT VERTEBRAL A PSV: 104 CM/SEC
BH CV XLRA MEAS RIGHT DIST CCA EDV: 16.5 CM/SEC
BH CV XLRA MEAS RIGHT DIST CCA PSV: 95.9 CM/SEC
BH CV XLRA MEAS RIGHT DIST ICA EDV: 21.7 CM/SEC
BH CV XLRA MEAS RIGHT DIST ICA PSV: 99.7 CM/SEC
BH CV XLRA MEAS RIGHT ICA/CCA RATIO: 0.9
BH CV XLRA MEAS RIGHT MID CCA EDV: 22.3 CM/SEC
BH CV XLRA MEAS RIGHT MID CCA PSV: 92 CM/SEC
BH CV XLRA MEAS RIGHT MID ICA EDV: 17.6 CM/SEC
BH CV XLRA MEAS RIGHT MID ICA PSV: 99.1 CM/SEC
BH CV XLRA MEAS RIGHT PROX CCA EDV: 15.8 CM/SEC
BH CV XLRA MEAS RIGHT PROX CCA PSV: 75.6 CM/SEC
BH CV XLRA MEAS RIGHT PROX ECA EDV: 21.6 CM/SEC
BH CV XLRA MEAS RIGHT PROX ECA PSV: 146 CM/SEC
BH CV XLRA MEAS RIGHT PROX ICA EDV: 17.6 CM/SEC
BH CV XLRA MEAS RIGHT PROX ICA PSV: 87.4 CM/SEC
BH CV XLRA MEAS RIGHT PROX SCLA PSV: 245 CM/SEC
BH CV XLRA MEAS RIGHT VERTEBRAL A EDV: 10.6 CM/SEC
BH CV XLRA MEAS RIGHT VERTEBRAL A PSV: 51.5 CM/SEC
LEFT ARM BP: NORMAL MMHG
RIGHT ARM BP: NORMAL MMHG

## 2021-02-08 PROCEDURE — 93880 EXTRACRANIAL BILAT STUDY: CPT

## 2021-02-08 PROCEDURE — 93880 EXTRACRANIAL BILAT STUDY: CPT | Performed by: INTERNAL MEDICINE

## 2021-02-10 NOTE — PROGRESS NOTES
Ultrasound of neck arteries show plaque but no significant blockages. Should continue atorvastatin, clopidogrel, and aspirin daily.    There was incidental note of irregular rhythm. If he has upcoming follow-up with cardiology, let them know; otherwise let us know and we will order Holter for further evaluation of abnormal heart rhythm

## 2021-02-25 ENCOUNTER — TELEPHONE (OUTPATIENT)
Dept: INTERNAL MEDICINE | Facility: CLINIC | Age: 86
End: 2021-02-25

## 2021-02-25 NOTE — TELEPHONE ENCOUNTER
He will need OV 1st. We have not discussed breathing problems for a few years (last CXR was normal in 2017).

## 2021-02-25 NOTE — TELEPHONE ENCOUNTER
Pt's wife states that he has some lung problems, breathing problems and Dr. Tatum is aware of them. She states that she wants him to see a pulmonologist for these issues. Can you put in referral for him or does he need an appointment first? She did say that he just gets SOA fast but knows when she should get him to the ER if can't speak in complete sentences or if his O2 goes below 92.

## 2021-02-27 PROCEDURE — U0004 COV-19 TEST NON-CDC HGH THRU: HCPCS | Performed by: FAMILY MEDICINE

## 2021-03-12 DIAGNOSIS — E03.9 ACQUIRED HYPOTHYROIDISM: ICD-10-CM

## 2021-03-12 RX ORDER — LEVOTHYROXINE SODIUM 0.05 MG/1
50 TABLET ORAL DAILY
Qty: 90 TABLET | Refills: 3 | Status: SHIPPED | OUTPATIENT
Start: 2021-03-12 | End: 2022-01-08 | Stop reason: SDUPTHER

## 2021-03-12 NOTE — TELEPHONE ENCOUNTER
Caller: Taylor Hamilton    Relationship: Emergency Contact    Best call back number: [phone]    Medication needed:   Requested Prescriptions     Pending Prescriptions Disp Refills   • levothyroxine (SYNTHROID, LEVOTHROID) 50 MCG tablet 90 tablet 4     Sig: Take 1 tablet by mouth Daily.       When do you need the refill by: TODAY    What details did the patient provide when requesting the medication: PATIENT HAS ABOUT A WEEK OF MEDICATION REMAINING BUT IT IS COMING MAIL ORDER SO NEEDS TO BE SENT TODAY.    Does the patient have less than a 3 day supply:  [x] Yes  [] No    What is the patient's preferred pharmacy: [facility] - [facility], [place] [address] [phone] [facility] [phone]

## 2021-04-08 ENCOUNTER — LAB (OUTPATIENT)
Dept: LAB | Facility: HOSPITAL | Age: 86
End: 2021-04-08

## 2021-04-08 ENCOUNTER — OFFICE VISIT (OUTPATIENT)
Dept: INTERNAL MEDICINE | Facility: CLINIC | Age: 86
End: 2021-04-08

## 2021-04-08 VITALS
HEIGHT: 67 IN | OXYGEN SATURATION: 98 % | HEART RATE: 78 BPM | BODY MASS INDEX: 26.68 KG/M2 | WEIGHT: 170 LBS | SYSTOLIC BLOOD PRESSURE: 122 MMHG | DIASTOLIC BLOOD PRESSURE: 70 MMHG

## 2021-04-08 DIAGNOSIS — F51.01 PRIMARY INSOMNIA: ICD-10-CM

## 2021-04-08 DIAGNOSIS — E11.42 TYPE 2 DIABETES MELLITUS WITH DIABETIC POLYNEUROPATHY, WITHOUT LONG-TERM CURRENT USE OF INSULIN (HCC): Primary | ICD-10-CM

## 2021-04-08 DIAGNOSIS — I10 ESSENTIAL HYPERTENSION: ICD-10-CM

## 2021-04-08 DIAGNOSIS — R80.9 MICROALBUMINURIA: ICD-10-CM

## 2021-04-08 LAB
ALBUMIN UR-MCNC: 9.8 MG/DL
CREAT UR-MCNC: 116.1 MG/DL
HBA1C MFR BLD: 5.7 %
MICROALBUMIN/CREAT UR: 84.4 MG/G

## 2021-04-08 PROCEDURE — 82570 ASSAY OF URINE CREATININE: CPT

## 2021-04-08 PROCEDURE — 83036 HEMOGLOBIN GLYCOSYLATED A1C: CPT | Performed by: INTERNAL MEDICINE

## 2021-04-08 PROCEDURE — 99215 OFFICE O/P EST HI 40 MIN: CPT | Performed by: INTERNAL MEDICINE

## 2021-04-08 PROCEDURE — 82043 UR ALBUMIN QUANTITATIVE: CPT

## 2021-04-08 RX ORDER — PREGABALIN 75 MG/1
75 CAPSULE ORAL 2 TIMES DAILY
Qty: 180 CAPSULE | Refills: 0 | Status: SHIPPED | OUTPATIENT
Start: 2021-04-08 | End: 2021-07-21 | Stop reason: SDUPTHER

## 2021-04-08 RX ORDER — ZOLPIDEM TARTRATE 10 MG/1
10 TABLET ORAL NIGHTLY
Qty: 90 TABLET | Refills: 0 | Status: SHIPPED | OUTPATIENT
Start: 2021-04-08 | End: 2021-07-21 | Stop reason: SDUPTHER

## 2021-04-08 NOTE — PROGRESS NOTES
"Chief Complaint   Patient presents with   • Insomnia   • Diabetes       History of Present Illness  87 y.o.  gentleman, accompanied by his wife,  presents for zolpidem RF for chronic insomnia, taken nightly without side effects or concerns. Also RX for lyrica, taken BID for neuropathy, also no complaints of s/e.    Was seen at [facility] last week for sinus sxs and bronchitis, tx'd with omnicef, antihistamine, and guafenesin. Feels tired; reports no significant cough, just productive some phlegm.  Has already received both COVID-19 vaccines.    Has been busy doing yard work, raising a garden and mowing.    Review of Systems  ROS (+) for chronic insomnia. All other ROS reviewed and negative.    Current Outpatient Medications:   •  albuterol sulfate  (90 Base) MCG/ACT inhaler prn  •  amLODIPine (NORVASC) 5 MG QD  •  aspirin 81 MG QD  •  atorvastatin (LIPITOR) 20 MG QD  •  cefdinir (OMNICEF) 300 MG BID (until 4/10/21)  •  cholecalciferol (VITAMIN D3) 10 MCG (400 UNIT) QD   •  clopidogrel (PLAVIX) 75 MG QD   •  desmopressin (DDAVP) 0.2 MG QD  •  fluocinonide (LIDEX) 0.05 % external sol AD  •  guaiFENesin (MUCINEX) 600 MG prn  •  ketoconazole (NIZORAL) 2 % shampoo AD  •  levothyroxine 50 MCG QD  •  lisinopril  40 MG QD  •  loratadine (Claritin) 5 MG QD  •  GLUCOSAMINE CHONDROITIN ADV AD  •  omeprazole (priLOSEC) 20 MG QD  •  pregabalin (Lyrica) 75 MG BID  •  tamsulosin (FLOMAX) 0.4 MG QHS  •  zolpidem (AMBIEN) 10 MG QHS      VITALS:  /70   Pulse 78   Ht 170.2 cm (67\")   Wt 77.1 kg (170 lb)   SpO2 98%   BMI 26.63 kg/m²     Physical Exam  Constitutional:       General: He is not in acute distress.  Eyes:      Conjunctiva/sclera: Conjunctivae normal.   Pulmonary:      Effort: Pulmonary effort is normal. No respiratory distress.      Breath sounds: No wheezing or rales.   Neurological:      Mental Status: He is alert and oriented to person, place, and time.   Psychiatric:         Mood and Affect: Mood " normal.         Behavior: Behavior normal.         LABS  Results for orders placed or performed in visit on 04/08/21   POC Glycosylated Hemoglobin (Hb A1C)    Specimen: Blood   Result Value Ref Range    Hemoglobin A1C 5.7 %      Results for orders placed or performed during the hospital encounter of 02/27/21   COVID-19 PCR, [facility], NP SWAB IN [facility]SP[facility] MEDIA 24-30 HR TAT - Swab, Nasopharynx    Specimen: Nasopharynx; Swab   Result Value Ref Range    SARS-CoV-2 PHUONG Not Detected Not Detected      1/4/21 A1C 6.21, micro/alb 122.4      ASSESSMENT/PLAN    Diagnoses and all orders for this visit:    1. Type 2 diabetes mellitus with diabetic polyneuropathy, without long-term current use of insulin (CMS/[facility]) (Primary)  Assessment & Plan:  BG control stable/improved with A1C 5.7; no meds; encouraged reg phys activity to decr insulin resistance, moderation in unhealthy starches/sweets; f/u A1C in 3 mos    For neuropathy, he remains on judicious and appropriate use of lyrica 75mg BID# 180, 2RF.  LETHA was reviewed and appropriate.  The patient has read and signed the [facility] Substance Contract 7/20. UDS 10/20. Patient has been counseled and is aware of side effects, risks, potential for addiction/tolerance, interactions, and how to take medication correctly.  RTC 3 mos for next RX with LETHA and contract    Patient verbalizes understanding of additional risks of this medication in individuals > 65 yrs of age and wants to continue lyrica 64mg BID and zolpidem 10mg QHS.          Orders:  -     pregabalin (Lyrica) 75 MG capsule; Take 1 capsule by mouth 2 (Two) Times a Day.  Dispense: 180 capsule; Refill: 0  -     POC Glycosylated Hemoglobin (Hb A1C)    2. Insomnia  Assessment & Plan:  Judicious and appropriate use of zolpidem 10mg QHS #90, 0RF (90d exception made for insurance).  LETHA was reviewed and appropriate.  The patient has read and signed the [employer] Substance  Contract 7/20. UDS 10/20 Patient has been counseled and is aware of side effects, risks, potential for addiction/tolerance, interactions, and how to take medication correctly. RTC 3 mos for next RX with LETHA and marlon    Patient verbalizes understanding of additional risks of this medication in individuals > 65 yrs of age and wants to continue zolpidem 10mg QHS with additional risks of falls, decreased mental clarity, confusion      Orders:  -     zolpidem (AMBIEN) 10 MG tablet; Take 1 tablet by mouth Every Night.  Dispense: 90 tablet; Refill: 0    3. Hypertension  Assessment & Plan:  BP stable 122/70 on lisin 40mg QD and amlo 5mg QD; note microalbuminuria; rec low Na diet, increased aerobic exercise; home BP monitoring with goal BP < 130/80        4. Microalbuminuria  Assessment & Plan:  Repeat urine microalb/Cr; goal < 30; discussed importance of good BG and BP control; cont to avoid NSAIDs    Orders:  -     Microalbumin / Creatinine Urine Ratio - Urine, Clean Catch; Future    ,Time Documentation    Counseled patient and wife Taylor  I spent 25 minutes face to face and 15 minutes minutes non-face to face on today's office visit. Time was spent reviewing patient's previous notes, lab results, test results, vitals, and/or other records as well as examining the patient, providing counseling, coordinating care, answering questions, discussing evaluation and treatment plans, and writing this note.    Total time: 40 minutes      FOLLOW-UP  1. Go to lab for urine microalb/Cr on the way out the door  2. RTC 3 mos for zolpidem and lyrica RXs with LETHA/marlon and A1C    Electronically signed by:    Delilah Tatum MD, FACP  04/08/2021

## 2021-04-08 NOTE — ASSESSMENT & PLAN NOTE
BG control stable/improved with A1C 5.7; no meds; encouraged reg phys activity to decr insulin resistance, moderation in unhealthy starches/sweets; f/u A1C in 3 mos    For neuropathy, he remains on judicious and appropriate use of lyrica 75mg BID# 180, 2RF.  LETHA was reviewed and appropriate.  The patient has read and signed the [facility] Controlled Substance Contract 7/20. UDS 10/20. Patient has been counseled and is aware of side effects, risks, potential for addiction/tolerance, interactions, and how to take medication correctly.  RTC 3 mos for next RX with LETHA and contract    Patient verbalizes understanding of additional risks of this medication in individuals > 65 yrs of age and wants to continue lyrica 64mg BID and zolpidem 10mg QHS.

## 2021-04-08 NOTE — ASSESSMENT & PLAN NOTE
BP stable 122/70 on lisin 40mg QD and amlo 5mg QD; note microalbuminuria; rec low Na diet, increased aerobic exercise; home BP monitoring with goal BP < 130/80

## 2021-04-08 NOTE — ASSESSMENT & PLAN NOTE
Judicious and appropriate use of zolpidem 10mg QHS #90, 0RF (90d exception made for insurance).  LETHA was reviewed and appropriate.  The patient has read and signed the [facility] Controlled Substance Contract 7/20. UDS 10/20 Patient has been counseled and is aware of side effects, risks, potential for addiction/tolerance, interactions, and how to take medication correctly. RTC 3 mos for next RX with LETHA and contract    Patient verbalizes understanding of additional risks of this medication in individuals > 65 yrs of age and wants to continue zolpidem 10mg QHS with additional risks of falls, decreased mental clarity, confusion

## 2021-04-08 NOTE — ASSESSMENT & PLAN NOTE
Repeat urine microalb/Cr; goal < 30; discussed importance of good BG and BP control; cont to avoid NSAIDs

## 2021-04-09 ENCOUNTER — TELEPHONE (OUTPATIENT)
Dept: INTERNAL MEDICINE | Facility: CLINIC | Age: 86
End: 2021-04-09

## 2021-04-09 NOTE — TELEPHONE ENCOUNTER
----- Message from Delilah Tatum MD sent at 4/9/2021  1:33 AM EDT -----  Urine test looking for microscopic protein spilling from the kidneys is improved but still elevated at 84 4 (was 122.4 and the goal is < 30); rec drinking enough water on daily basis, keep BGs and BPs under good control; cont to avoid NSAIDs

## 2021-05-05 PROBLEM — I50.43 ACUTE ON CHRONIC COMBINED SYSTOLIC AND DIASTOLIC HEART FAILURE (HCC): Status: ACTIVE | Noted: 2021-05-05

## 2021-05-06 ENCOUNTER — TRANSITIONAL CARE MANAGEMENT TELEPHONE ENCOUNTER (OUTPATIENT)
Dept: CALL CENTER | Facility: HOSPITAL | Age: 86
End: 2021-05-06

## 2021-05-06 ENCOUNTER — READMISSION MANAGEMENT (OUTPATIENT)
Dept: CALL CENTER | Facility: HOSPITAL | Age: 86
End: 2021-05-06

## 2021-05-06 NOTE — PROGRESS NOTES
TRANSITIONAL CARE HOSPITAL FOLLOW-UP VISIT    Hospitalized at:    []   [facility]  [x]   [place]  []     []   Other -    Outside records reviewed. Pertinent radiology and labs reviewed  []   Records requested    Dates of hospitalization:  Admission - 5/3/21         Discharge - 5/4/21   If transferred to rehab facility, date of discharge : n/a    Hospital Course: admitted from ER without complaints of acute on chronic SOB. Was found to be hypoxic with acute worsening of SOB that night after showering. Had done yardwork that afternoon with decreased phys endurance and increased SOB. Showered to go to bed and did not feel well and headed to ER.    Evaluation included ECHO showing EF 35%. Cardiac enzymes were elevated and heart cath was performed. No intervention was recommended.    Patient empirically tx'd for COPD exacerbation with doxy and prednisone along with new CHF with diuretic and beta-blocker.     Lab/Radiology Results:   5/3/21 stable BMP (Cr 1.2, K 4.2)  CBC with H&H 11.4/39.6, WBC 7.0    5/3/21 ECHO: EF 35%, grade II agarwal dysfunction, mod MR, mild-mod TR, mild pulm HTN (RVSP 41.81mmHg), dilated RV    5/3/21 heart cath - results requested; no intervention recommended per discharge summary    New Medications or Medication Changes:   · Furosemide 40mg QD  · Kdur 10mg QD  · Metoprolol XL 25mg 1/2 QD     Current outpatient and discharge medications have been reconciled for the patient.  Reviewed by: Delilah Tatum MD      Status:  better    Current symptoms:   · SOB/MACK, stable  · Occ palpitations, unchanged  · Occ wheezing, takes spiriva    Post discharge concerns/issues: Concerned with caused of heart failure and acuity of sxs    Treatment plan   Med Changes:  •  reviewed and updated with patient      •  no other changes required     Referrals: none    Risk for Readmission (LACE) No data recorded    Follow-up appointments: [facility] - Dr. Easley on 5/14/21    Discussed with: patient and adult child  Josue  ___________________________________________________________  Chief Complaint   Patient presents with   • Hospital Follow Up Visit   • Congestive Heart Failure   • COPD       History of Present Illness  87 y.o.  gentleman, accompanied by his son Josue, presents for hospital f/u after admission for COPD and CHF exacerbation causing SOB and hypoxia. Admits to slowly decreased exertional capacity over last 6-12 mos but has not noticed acute SOB, leg swelling, or CP. Sleeps only on 1 pillow. Denies awakening gasping for air.    On day of admission, had done yardwork although more tired than usual. Had to sit and rest afterwards. Ate dinner, showered later, and then did not feel well enough to go to bed due to SOB.    ER eval as noted.    Son concerned about caused of CHF new dx. They told patient records indicate nl heart function 10 yrs ago. Will be finished with doxy and pred tomorrow. Using spiriva inhaler. Has not really needed alb inhaler. Has not noticed leg swelling.     Does eat some salt, such as with tomatoes. Does not feel well in general at this time.    Review of Systems  ROS (+) for occ palpitations, no CP. ROS (+) for fatigue/MACK but no significant shortness of breath at rest. ROS (+) for occ wheezing.    Denies fever/chills, abd pain, n/v, urinary sxs. Denies leg swelling. All other ROS reviewed and negative.    Current Outpatient Medications:   •  albuterol sulfate  (90 Base) MCG/ACT inhaler prn  •  amLODIPine (NORVASC) 5 MG QD  •  aspirin 81 MG QD  •  atorvastatin (LIPITOR) 20 MG QD  •  cholecalciferol (VITAMIN D3) 10 MCG (400 UNIT) QD  •  clopidogrel (PLAVIX) 75 MG QD  •  doxycycline (VIBRAMYCIN) 100 MG BID (until tomorrow)  •  furosemide (LASIX) 40 MG QD  •  guaifenesin-dextromethorphan (MUCINEX DM)  MG BID prn  •  ketoconazole (NIZORAL) 2 % shampoo AD  •  levothyroxine (SYNTHROID, LEVOTHROID) 50 MCG QD  •  lisinopril (PRINIVIL,ZESTRIL) 40 MG QD  •  loratadine (Claritin) 5  MG chewable QD  •  metoprolol succinate XL (TOPROL-XL) 25 MG 1/2 QD  •  Misc Natural Products (GLUCOSAMINE CHONDROITIN ADV) tablet QD  •  omeprazole (priLOSEC) 20 MG QD  •  potassium chloride 10 MEQ CR QD  •  predniSONE (DELTASONE) 20 MG 2 QD (until tomorrow)  •  pregabalin (Lyrica) 75 MG BID  •  tamsulosin (FLOMAX) 0.4 MG capsule QHS  •  zolpidem (AMBIEN) 10 MG QHS  •  Spiriva handihaler 1 puff QD    VITALS:  /68   Pulse 71   Temp 96.7 °F (35.9 °C)   Wt 78.7 kg (173 lb 9.6 oz)   SpO2 92%   BMI 27.19 kg/m²     Physical Exam  Vitals and nursing note reviewed.   Constitutional:       General: He is not in acute distress.     Appearance: Normal appearance.   Eyes:      Extraocular Movements: Extraocular movements intact.      Conjunctiva/sclera: Conjunctivae normal.      Comments: Wears glasses   Cardiovascular:      Rate and Rhythm: Normal rate and regular rhythm.      Heart sounds: Normal heart sounds.      Comments: Distant heart sounds; occ ectopy  Pulmonary:      Effort: Pulmonary effort is normal. No respiratory distress.      Breath sounds: Normal breath sounds. No wheezing, rhonchi or rales.      Comments: Speaks in complete sentences; barrel-chested  Chest:      Chest wall: No tenderness.   Abdominal:      General: Bowel sounds are normal.      Palpations: Abdomen is soft.      Tenderness: There is no abdominal tenderness.   Musculoskeletal:      Right lower leg: Edema (2+ pitting edema to mid-shins bilaterally) present.      Left lower leg: Edema present.   Skin:     General: Skin is warm and dry.   Neurological:      Mental Status: He is alert and oriented to person, place, and time. Mental status is at baseline.   Psychiatric:         Mood and Affect: Mood normal.         Behavior: Behavior normal.           LABS  Results for orders placed or performed in visit on 04/08/21   Microalbumin / Creatinine Urine Ratio - Urine, Clean Catch    Specimen: Urine, Clean Catch   Result Value Ref Range     Microalbumin/Creatinine Ratio 84.4 mg/g    Creatinine, Urine 116.1 mg/dL    Microalbumin, Urine 9.8 mg/dL     4/8/21 A1C 5.7    1/4/21 H&H 10.9/36.9, Cr 1.09, GFR 64, A1C 6.21      ECG 12 Lead    Date/Time: 5/7/2021 2:42 PM  Performed by: Delilah Tatum MD  Authorized by: Delilah Tatum MD   Comparison: compared with previous ECG from 7/14/2020  Similar to previous ECG  Rhythm: sinus rhythm  Rate: normal  BPM: 66  Conduction: conduction normal  Conduction: non-specific intraventricular conduction delay  ST Segments: ST segments normal  T Waves: T waves normal  QRS axis: left  Other findings: left atrial abnormality and poor R wave progression  Clinical impression comment: stable EKG              ASSESSMENT/PLAN  Diagnoses and all orders for this visit:    1. Acute on chronic combined systolic and diastolic heart failure (CMS/HCC) (Primary)  Assessment & Plan:  NEW dx of acute systolic and diastolic heart failure with EF 35%; symptomatic with fatigue and decreased exertional capacity; review of chart shows 53% EF on stress test in 2013; 2+ LE edema on exam but patient was unaware of leg swelling; also denies orthopnea/PND; on lasix 40mg QAM and Kdur 10mEq QD - check BMP on the way out the door; has f/u with Dr. Easley on 5/14/21 but wants to transfer care to [facility] cardiology - will put in referral to Dr. Amin; discussed heart failure meds should be optimized - currently lasix, metoprolol, lisinopril, ASA; BP stable - meds cannot be increased; referral to cardiac rehab; rec limiting Na in diet to < 2000mg/day, ideally < 1500mg/day; request records from heart cath at [facility]    Orders:  -     Ambulatory Referral to Cardiology  -     Cardiac Rehab Evaluation; Future  -     ECG 12 Lead    2. Chronic obstructive pulmonary disease with acute exacerbation (CMS/HCC)  Assessment & Plan:  Unsure whether SOB caused by new dx of CHF or also with concomitant COPD exacerbation; finishing doxy and pred tomorrow; change spiriva  handihaler to spiriva respimat 2 puffs QD - sample provided and instructions shown and reviewed in the office      Orders:  -     tiotropium bromide monohydrate (Spiriva Respimat) 2.5 MCG/ACT aerosol solution inhaler; Inhale 2 puffs Daily.  Dispense: 3 each; Refill: 1    3. Normocytic anemia  Assessment & Plan:  F/u CBC with complaints of SOB; note new dx of systolic/diastolic CHF    Orders:  -     CBC & Differential; Future    4. Chronic kidney disease, stage II (mild)  Assessment & Plan:  F/u renal function with addition of lasix and potassium    Orders:  -     Basic Metabolic Panel; Future  -     Magnesium; Future    5. Hypertension  Assessment & Plan:  BP stable but need to be careful of hypotension; currently on lisin 40mg QD, amlo 5mg QD, and metoprolol XL 25mg 1/2 QD      Time Documentation    Counseled patient and son Josue TOMLINSON spent 47 minutes face to face and 35 minutes non-face to face on today's office visit. Time was spent reviewing patient's previous notes, lab results, test results, vitals, and/or other records as well as examining the patient, providing counseling, coordinating care, answering questions, discussing evaluation and treatment plans, and writing this note.    Total time: 82 minutes        FOLLOW-UP  RTC 2 mos for f/u and next zolpidem/lyrica RX with LETHA/marlon and A1C    Electronically signed by:    Delilah Tatum MD, FACP  05/07/2021

## 2021-05-06 NOTE — OUTREACH NOTE
Call Center TCM Note      Responses   [facility] patient discharged from?  Non-   Does the patient have one of the following disease processes/diagnoses(primary or secondary)?  COPD/Pneumonia   TCM attempt successful?  Yes [Taylor-wife]   Call end time  1513   Discharge diagnosis  SOA, Hypoxia, COPD Exac, newly diag CHF   Person spoke with today (if not patient) and relationship  Michael-son   Meds reviewed with patient/caregiver?  Yes   Is the patient having any side effects they believe may be caused by any medication additions or changes?  No   Does the patient have all medications ordered at discharge?  Yes   Is the patient taking all medications as directed (includes completed medication regime)?  Yes   Does the patient have a primary care provider?   Yes   Did the patient receive a copy of their discharge instructions?  Yes   Nursing interventions  Reviewed instructions with patient   What is the patient's perception of their health status since discharge?  Same   Is the patient/caregiver able to teach back signs and symptoms related to disease process for when to call PCP?  Yes   Is the patient/caregiver able to teach back signs and symptoms related to disease process for when to call 911?  Yes   Is the patient/caregiver able to teach back the hierarchy of who to call/visit for symptoms/problems? PCP, Specialist, Home health nurse, Urgent Care, ED, 911  Yes   If the patient is a current smoker, are they able to teach back resources for cessation?  Not a smoker   TCM call completed?  Yes   Wrap up additional comments  -- [Michael, son, takes care of his father,  reports his father is not doing well. He is trying hard to find a cardiologist. RN will send note to office to have [facility] with Dr. Tatum so that PCP can refer patient to cardiologist. ]          Urszula Hawkins RN    5/6/2021, 15:17 EDT

## 2021-05-06 NOTE — OUTREACH NOTE
Prep Survey      Responses   [facility] patient discharged from?  Non-[facility]   Is LACE score < 7 ?  Non-BH Discharge   Emergency Room discharge w/ pulse ox?  No   Eligibility  [facility]   Date of Admission  05/03/21   Date of Discharge  05/04/21   Discharge diagnosis  SOA, Hypoxia, COPD Exac, newly diag CHF   Does the patient have one of the following disease processes/diagnoses(primary or secondary)?  COPD/Pneumonia   Prep survey completed?  Yes          Darby Kessler RN

## 2021-05-07 ENCOUNTER — LAB (OUTPATIENT)
Dept: LAB | Facility: HOSPITAL | Age: 86
End: 2021-05-07

## 2021-05-07 ENCOUNTER — OFFICE VISIT (OUTPATIENT)
Dept: INTERNAL MEDICINE | Facility: CLINIC | Age: 86
End: 2021-05-07

## 2021-05-07 VITALS
TEMPERATURE: 96.7 F | OXYGEN SATURATION: 92 % | WEIGHT: 173.6 LBS | SYSTOLIC BLOOD PRESSURE: 114 MMHG | HEART RATE: 71 BPM | BODY MASS INDEX: 27.19 KG/M2 | DIASTOLIC BLOOD PRESSURE: 68 MMHG

## 2021-05-07 DIAGNOSIS — I10 ESSENTIAL HYPERTENSION: ICD-10-CM

## 2021-05-07 DIAGNOSIS — I50.43 ACUTE ON CHRONIC COMBINED SYSTOLIC AND DIASTOLIC HEART FAILURE (HCC): Primary | ICD-10-CM

## 2021-05-07 DIAGNOSIS — J44.1 CHRONIC OBSTRUCTIVE PULMONARY DISEASE WITH ACUTE EXACERBATION (HCC): ICD-10-CM

## 2021-05-07 DIAGNOSIS — D64.9 NORMOCYTIC ANEMIA: ICD-10-CM

## 2021-05-07 DIAGNOSIS — N18.2 CHRONIC KIDNEY DISEASE, STAGE II (MILD): ICD-10-CM

## 2021-05-07 LAB
ANION GAP SERPL CALCULATED.3IONS-SCNC: 11.9 MMOL/L (ref 5–15)
BASOPHILS # BLD AUTO: 0.01 10*3/MM3 (ref 0–0.2)
BASOPHILS NFR BLD AUTO: 0.1 % (ref 0–1.5)
BUN SERPL-MCNC: 45 MG/DL (ref 8–23)
BUN/CREAT SERPL: 32.4 (ref 7–25)
CALCIUM SPEC-SCNC: 9 MG/DL (ref 8.6–10.5)
CHLORIDE SERPL-SCNC: 106 MMOL/L (ref 98–107)
CO2 SERPL-SCNC: 26.1 MMOL/L (ref 22–29)
CREAT SERPL-MCNC: 1.39 MG/DL (ref 0.76–1.27)
DEPRECATED RDW RBC AUTO: 44.1 FL (ref 37–54)
EOSINOPHIL # BLD AUTO: 0 10*3/MM3 (ref 0–0.4)
EOSINOPHIL NFR BLD AUTO: 0 % (ref 0.3–6.2)
ERYTHROCYTE [DISTWIDTH] IN BLOOD BY AUTOMATED COUNT: 14.5 % (ref 12.3–15.4)
GFR SERPL CREATININE-BSD FRML MDRD: 48 ML/MIN/1.73
GLUCOSE SERPL-MCNC: 152 MG/DL (ref 65–99)
HCT VFR BLD AUTO: 38.5 % (ref 37.5–51)
HGB BLD-MCNC: 11.4 G/DL (ref 13–17.7)
IMM GRANULOCYTES # BLD AUTO: 0.14 10*3/MM3 (ref 0–0.05)
IMM GRANULOCYTES NFR BLD AUTO: 1.7 % (ref 0–0.5)
LYMPHOCYTES # BLD AUTO: 0.55 10*3/MM3 (ref 0.7–3.1)
LYMPHOCYTES NFR BLD AUTO: 6.7 % (ref 19.6–45.3)
MAGNESIUM SERPL-MCNC: 2.4 MG/DL (ref 1.6–2.4)
MCH RBC QN AUTO: 24.9 PG (ref 26.6–33)
MCHC RBC AUTO-ENTMCNC: 29.6 G/DL (ref 31.5–35.7)
MCV RBC AUTO: 84.1 FL (ref 79–97)
MONOCYTES # BLD AUTO: 0.28 10*3/MM3 (ref 0.1–0.9)
MONOCYTES NFR BLD AUTO: 3.4 % (ref 5–12)
NEUTROPHILS NFR BLD AUTO: 7.22 10*3/MM3 (ref 1.7–7)
NEUTROPHILS NFR BLD AUTO: 88.1 % (ref 42.7–76)
NRBC BLD AUTO-RTO: 0.1 /100 WBC (ref 0–0.2)
PLATELET # BLD AUTO: 188 10*3/MM3 (ref 140–450)
PMV BLD AUTO: 10.7 FL (ref 6–12)
POTASSIUM SERPL-SCNC: 4.9 MMOL/L (ref 3.5–5.2)
RBC # BLD AUTO: 4.58 10*6/MM3 (ref 4.14–5.8)
SODIUM SERPL-SCNC: 144 MMOL/L (ref 136–145)
WBC # BLD AUTO: 8.2 10*3/MM3 (ref 3.4–10.8)

## 2021-05-07 PROCEDURE — 85025 COMPLETE CBC W/AUTO DIFF WBC: CPT

## 2021-05-07 PROCEDURE — 99496 TRANSJ CARE MGMT HIGH F2F 7D: CPT | Performed by: INTERNAL MEDICINE

## 2021-05-07 PROCEDURE — 93000 ELECTROCARDIOGRAM COMPLETE: CPT | Performed by: INTERNAL MEDICINE

## 2021-05-07 PROCEDURE — 80048 BASIC METABOLIC PNL TOTAL CA: CPT

## 2021-05-07 PROCEDURE — 83735 ASSAY OF MAGNESIUM: CPT

## 2021-05-07 RX ORDER — PREDNISONE 20 MG/1
20 TABLET ORAL DAILY
COMMUNITY
End: 2021-05-08

## 2021-05-07 RX ORDER — TIOTROPIUM BROMIDE INHALATION SPRAY 3.12 UG/1
2 SPRAY, METERED RESPIRATORY (INHALATION)
Qty: 3 EACH | Refills: 1 | Status: SHIPPED | OUTPATIENT
Start: 2021-05-07 | End: 2021-10-11 | Stop reason: SDUPTHER

## 2021-05-07 RX ORDER — POTASSIUM CHLORIDE 750 MG/1
10 TABLET, FILM COATED, EXTENDED RELEASE ORAL DAILY
COMMUNITY
End: 2022-04-19

## 2021-05-07 RX ORDER — FUROSEMIDE 40 MG/1
40 TABLET ORAL DAILY
COMMUNITY
End: 2021-06-24 | Stop reason: SDUPTHER

## 2021-05-07 RX ORDER — METOPROLOL SUCCINATE 25 MG/1
12.5 TABLET, EXTENDED RELEASE ORAL DAILY
COMMUNITY
Start: 2021-05-04 | End: 2021-06-24 | Stop reason: SDUPTHER

## 2021-05-07 RX ORDER — DOXYCYCLINE HYCLATE 100 MG/1
100 CAPSULE ORAL 2 TIMES DAILY
COMMUNITY
End: 2021-05-08

## 2021-05-08 NOTE — PROGRESS NOTES
Blood counts are stable.    Electrolytes shows potassium at higher limits of normal. Kidney function is worsened. Could be due to the lasix.  Needs to drink a little more water daily. Needs repeat BMP in 1 week for follow-up (nonfasting)

## 2021-05-10 ENCOUNTER — TELEPHONE (OUTPATIENT)
Dept: INTERNAL MEDICINE | Facility: CLINIC | Age: 86
End: 2021-05-10

## 2021-05-10 DIAGNOSIS — I70.1 RENAL ARTERY STENOSIS (HCC): Primary | ICD-10-CM

## 2021-05-10 NOTE — TELEPHONE ENCOUNTER
----- Message from Delilah Tatum MD sent at 5/7/2021 11:51 PM EDT -----  Blood counts are stable.    Electrolytes shows potassium at higher limits of normal. Kidney function is worsened. Could be due to the lasix.  Needs to drink a little more water daily. Needs repeat BMP in 1 week for follow-up (nonfasting)

## 2021-05-10 NOTE — TELEPHONE ENCOUNTER
Pt's wife notified of lab results and verbalized understanding. He will come in Friday for non fasting labs.

## 2021-05-11 ENCOUNTER — DOCUMENTATION (OUTPATIENT)
Dept: CARDIAC REHAB | Facility: HOSPITAL | Age: 86
End: 2021-05-11

## 2021-05-17 ENCOUNTER — TRANSCRIBE ORDERS (OUTPATIENT)
Dept: CARDIAC REHAB | Facility: HOSPITAL | Age: 86
End: 2021-05-17

## 2021-05-17 ENCOUNTER — LAB (OUTPATIENT)
Dept: LAB | Facility: HOSPITAL | Age: 86
End: 2021-05-17

## 2021-05-17 DIAGNOSIS — I70.1 RENAL ARTERY STENOSIS (HCC): ICD-10-CM

## 2021-05-17 DIAGNOSIS — I21.4 NSTEMI, INITIAL EPISODE OF CARE (HCC): Primary | ICD-10-CM

## 2021-05-17 LAB
ANION GAP SERPL CALCULATED.3IONS-SCNC: 8.3 MMOL/L (ref 5–15)
BUN SERPL-MCNC: 17 MG/DL (ref 8–23)
BUN/CREAT SERPL: 14.8 (ref 7–25)
CALCIUM SPEC-SCNC: 8.4 MG/DL (ref 8.6–10.5)
CHLORIDE SERPL-SCNC: 106 MMOL/L (ref 98–107)
CO2 SERPL-SCNC: 27.7 MMOL/L (ref 22–29)
CREAT SERPL-MCNC: 1.15 MG/DL (ref 0.76–1.27)
GFR SERPL CREATININE-BSD FRML MDRD: 60 ML/MIN/1.73
GLUCOSE SERPL-MCNC: 198 MG/DL (ref 65–99)
POTASSIUM SERPL-SCNC: 4.7 MMOL/L (ref 3.5–5.2)
SODIUM SERPL-SCNC: 142 MMOL/L (ref 136–145)

## 2021-05-17 PROCEDURE — 80048 BASIC METABOLIC PNL TOTAL CA: CPT

## 2021-05-19 NOTE — PROGRESS NOTES
Kidney function much improved. Continue to maintain adequate water intake on daily basis. Avoid NSAIDs (ibuprofen or naproxen). Note very elevated  (normal nonfasting sugar < 140)

## 2021-05-20 ENCOUNTER — TELEPHONE (OUTPATIENT)
Dept: INTERNAL MEDICINE | Facility: CLINIC | Age: 86
End: 2021-05-20

## 2021-05-20 NOTE — TELEPHONE ENCOUNTER
----- Message from Delilah Tatum MD sent at 5/19/2021 12:24 AM EDT -----  Kidney function much improved. Continue to maintain adequate water intake on daily basis. Avoid NSAIDs (ibuprofen or naproxen). Note very elevated  (normal nonfasting sugar < 140)

## 2021-06-09 ENCOUNTER — TELEPHONE (OUTPATIENT)
Dept: INTERNAL MEDICINE | Facility: CLINIC | Age: 86
End: 2021-06-09

## 2021-06-09 DIAGNOSIS — E87.5 HYPERKALEMIA: Primary | ICD-10-CM

## 2021-06-09 NOTE — TELEPHONE ENCOUNTER
There are no phone messages in Epic - the last telephone message was a phone call from us 5/20/21 advising him of lab results    His potassium has almost always been at the higher end of normal. Lisinopril also increases potassium but it is a good medication for the heart. It is unlikely that he will be able to take spironolactone.    Will order BMP as requested. Does not have to be fasting.

## 2021-06-09 NOTE — TELEPHONE ENCOUNTER
PT'S SON HAS CALLED ABOUT BLOOD WORK (BASIC METABOLIC PANEL) THAT DR HANSEN FROM THE [facility] WANTS ORDERED    PT'S SON STATED THAT HE CALLED ON Monday 6-7-2021 TO HAVE ORDERS PUT IN (NO ENCOUNTER FOUND), AND HIS FATHER CAME TO HAVE THE BLOOD WORK COMPLETED TODAY 6-9-2021 BUT THERE WERE NO ORDERS IN FOR THE PT    ACCORDING TO THE SON, [facility] SAYS HIS POTASIUMM IS TOO HIGH, NEEDS LOWERED IN ORDER TO START SPIRONOLACTONE    PLEASE ADVISE PT'S SON  ANTIONE MOORESHAVER [phone]

## 2021-06-10 ENCOUNTER — LAB (OUTPATIENT)
Dept: LAB | Facility: HOSPITAL | Age: 86
End: 2021-06-10

## 2021-06-10 DIAGNOSIS — E87.5 HYPERKALEMIA: ICD-10-CM

## 2021-06-10 PROCEDURE — 80048 BASIC METABOLIC PNL TOTAL CA: CPT

## 2021-06-11 ENCOUNTER — TELEPHONE (OUTPATIENT)
Dept: INTERNAL MEDICINE | Facility: CLINIC | Age: 86
End: 2021-06-11

## 2021-06-11 LAB
ANION GAP SERPL CALCULATED.3IONS-SCNC: 9.1 MMOL/L (ref 5–15)
BUN SERPL-MCNC: 19 MG/DL (ref 8–23)
BUN/CREAT SERPL: 16.1 (ref 7–25)
CALCIUM SPEC-SCNC: 8.5 MG/DL (ref 8.6–10.5)
CHLORIDE SERPL-SCNC: 103 MMOL/L (ref 98–107)
CO2 SERPL-SCNC: 27.9 MMOL/L (ref 22–29)
CREAT SERPL-MCNC: 1.18 MG/DL (ref 0.76–1.27)
GFR SERPL CREATININE-BSD FRML MDRD: 58 ML/MIN/1.73
GLUCOSE SERPL-MCNC: 167 MG/DL (ref 65–99)
POTASSIUM SERPL-SCNC: 4.2 MMOL/L (ref 3.5–5.2)
SODIUM SERPL-SCNC: 140 MMOL/L (ref 136–145)

## 2021-06-11 NOTE — TELEPHONE ENCOUNTER
----- Message from Delilah Tatum MD sent at 6/11/2021 12:23 AM EDT -----  Potassium level is normal. He should follow-up with cardiology as planned

## 2021-06-24 ENCOUNTER — TELEPHONE (OUTPATIENT)
Dept: INTERNAL MEDICINE | Facility: CLINIC | Age: 86
End: 2021-06-24

## 2021-06-24 DIAGNOSIS — E78.2 MIXED HYPERLIPIDEMIA: ICD-10-CM

## 2021-06-24 DIAGNOSIS — I25.118 CORONARY ARTERY DISEASE OF NATIVE HEART WITH STABLE ANGINA PECTORIS, UNSPECIFIED VESSEL OR LESION TYPE (HCC): ICD-10-CM

## 2021-06-24 DIAGNOSIS — I50.43 ACUTE ON CHRONIC COMBINED SYSTOLIC AND DIASTOLIC HEART FAILURE (HCC): ICD-10-CM

## 2021-06-24 DIAGNOSIS — I10 ESSENTIAL HYPERTENSION: Primary | ICD-10-CM

## 2021-06-24 RX ORDER — ATORVASTATIN CALCIUM 40 MG/1
40 TABLET, FILM COATED ORAL DAILY
COMMUNITY
Start: 2021-05-14 | End: 2022-02-03 | Stop reason: SDUPTHER

## 2021-06-24 RX ORDER — FUROSEMIDE 40 MG/1
40 TABLET ORAL DAILY
COMMUNITY
Start: 2021-05-14 | End: 2021-07-01 | Stop reason: ALTCHOICE

## 2021-06-24 RX ORDER — CLOPIDOGREL BISULFATE 75 MG/1
75 TABLET ORAL DAILY
COMMUNITY
Start: 2021-05-14

## 2021-06-24 RX ORDER — AMLODIPINE BESYLATE 2.5 MG/1
2.5 TABLET ORAL 2 TIMES DAILY
COMMUNITY
Start: 2021-05-14 | End: 2022-04-14 | Stop reason: SDDI

## 2021-06-24 RX ORDER — METOPROLOL SUCCINATE 25 MG/1
12.5 TABLET, EXTENDED RELEASE ORAL DAILY
COMMUNITY
Start: 2021-05-07 | End: 2021-07-11 | Stop reason: DRUGHIGH

## 2021-06-24 NOTE — TELEPHONE ENCOUNTER
Caller: SIVAKUMAR TALBOT    Relationship: Emergency Contact    Best call back number: [phone]    What orders are you requesting (i.e. lab or imaging): CARDIAC MRI    In what timeframe would the patient need to come in: ASAP    Where will you receive your lab/imaging services: [facility]    Additional notes:[facility] WILL DO ARTERIAL INTERVENTION

## 2021-07-11 DIAGNOSIS — I50.43 ACUTE ON CHRONIC COMBINED SYSTOLIC AND DIASTOLIC HEART FAILURE (HCC): Primary | ICD-10-CM

## 2021-07-11 DIAGNOSIS — I10 ESSENTIAL HYPERTENSION: ICD-10-CM

## 2021-07-11 RX ORDER — METOPROLOL SUCCINATE 50 MG/1
50 TABLET, EXTENDED RELEASE ORAL DAILY
COMMUNITY
Start: 2021-07-01

## 2021-07-11 RX ORDER — BUMETANIDE 1 MG/1
0.5 TABLET ORAL DAILY
COMMUNITY
Start: 2021-07-01

## 2021-07-21 PROBLEM — I50.43 ACUTE ON CHRONIC COMBINED SYSTOLIC AND DIASTOLIC HEART FAILURE (HCC): Chronic | Status: ACTIVE | Noted: 2021-05-05

## 2021-07-21 PROBLEM — D64.9 NORMOCYTIC ANEMIA: Chronic | Status: ACTIVE | Noted: 2020-01-06

## 2021-07-21 PROBLEM — I73.9 PERIPHERAL ARTERIAL DISEASE: Chronic | Status: ACTIVE | Noted: 2017-09-12

## 2021-07-21 PROBLEM — M10.042 ACUTE IDIOPATHIC GOUT OF LEFT HAND: Chronic | Status: ACTIVE | Noted: 2017-10-10

## 2021-07-21 PROBLEM — L40.0 PSORIASIS VULGARIS: Chronic | Status: ACTIVE | Noted: 2018-05-21

## 2021-07-21 PROBLEM — H25.013 CORTICAL AGE-RELATED CATARACT OF BOTH EYES: Chronic | Status: ACTIVE | Noted: 2018-01-14

## 2021-07-22 ENCOUNTER — OFFICE VISIT (OUTPATIENT)
Dept: INTERNAL MEDICINE | Facility: CLINIC | Age: 86
End: 2021-07-22

## 2021-07-22 VITALS
OXYGEN SATURATION: 99 % | BODY MASS INDEX: 26.21 KG/M2 | DIASTOLIC BLOOD PRESSURE: 68 MMHG | HEIGHT: 67 IN | WEIGHT: 167 LBS | SYSTOLIC BLOOD PRESSURE: 118 MMHG | HEART RATE: 47 BPM

## 2021-07-22 DIAGNOSIS — I10 ESSENTIAL HYPERTENSION: Chronic | ICD-10-CM

## 2021-07-22 DIAGNOSIS — F51.01 PRIMARY INSOMNIA: Primary | ICD-10-CM

## 2021-07-22 DIAGNOSIS — E11.42 TYPE 2 DIABETES MELLITUS WITH DIABETIC POLYNEUROPATHY, WITHOUT LONG-TERM CURRENT USE OF INSULIN (HCC): ICD-10-CM

## 2021-07-22 LAB — HBA1C MFR BLD: 6.1 %

## 2021-07-22 PROCEDURE — 99214 OFFICE O/P EST MOD 30 MIN: CPT | Performed by: INTERNAL MEDICINE

## 2021-07-22 PROCEDURE — 83036 HEMOGLOBIN GLYCOSYLATED A1C: CPT | Performed by: INTERNAL MEDICINE

## 2021-07-22 RX ORDER — PREGABALIN 75 MG/1
75 CAPSULE ORAL 2 TIMES DAILY
Qty: 180 CAPSULE | Refills: 0 | Status: SHIPPED | OUTPATIENT
Start: 2021-07-22 | End: 2021-10-06 | Stop reason: SDUPTHER

## 2021-07-22 RX ORDER — ZOLPIDEM TARTRATE 10 MG/1
10 TABLET ORAL NIGHTLY
Qty: 90 TABLET | Refills: 0 | Status: SHIPPED | OUTPATIENT
Start: 2021-07-22 | End: 2021-10-06 | Stop reason: SDUPTHER

## 2021-07-22 NOTE — ASSESSMENT & PLAN NOTE
/68; on amlo 2.5mg BID; metoprolol XL 50mg QD; also on CHF meds - spironolactone 12.5mg QD, entresto 97/103 BID, bumex 1mg QD

## 2021-07-22 NOTE — PROGRESS NOTES
"Chief Complaint   Patient presents with   • Diabetes   • Insomnia       History of Present Illness  87 y.o.  gentleman, accompanied by his wife, presents for zolpidem and lyrica RFs. Denies s/e or other concerns with medication. Has decreased eating potatoes and breads. Ongoing f/u with cardiology but states meds are helping him.    Review of Systems  ROS (+) for chronic insomnia and neuropathy. Breathing is stable; denies CP, palpitations, falls. All other ROS reviewed and negative.      Current Outpatient Medications:   •  albuterol sulfate  (90 Base) MCG/ACT inhaler prn  •  amLODIPine (NORVASC) 2.5 MG BID  •  aspirin 81 MG QD  •  atorvastatin (LIPITOR) 40 MG QD  •  bumetanide (BUMEX) 1 MG QD  •  cholecalciferol (VITAMIN D3) 10 MCG (400 UNIT) QD  •  fluocinonide (LIDEX) 0.05 % external solution AD  •  guaiFENesin (MUCINEX) 600 MG BID prn  •  ketoconazole (NIZORAL) 2 % shampoo AD  •  levothyroxine 50 MCG QD  •  loratadine (Claritin) 5 MG QD  •  metoprolol succinate XL (TOPROL-XL) 50 MG QD  •  Misc Natural Products (GLUCOSAMINE CHONDROITIN ADV) QD  •  omeprazole (priLOSEC) 20 MG QD  •  potassium chloride 10 MEQ QD  •  pregabalin (Lyrica) 75 MG BID  •  sacubitril-valsartan (ENTRESTO)  MG BID  •  spironolactone (ALDACTONE) 12.5 MG QD  •  tamsulosin (FLOMAX) 0.4 MG QHS  •  tiotropium bromide monohydrate (Spiriva Respimat) 2.5 MCG/ACT 2 puffs QD  •  zolpidem (AMBIEN) 10 MG QHS    VITALS:  /68   Pulse (!) 47   Ht 170.2 cm (67\")   Wt 75.8 kg (167 lb)   SpO2 99%   BMI 26.16 kg/m²     Physical Exam  Vitals and nursing note reviewed.   Constitutional:       General: He is not in acute distress.     Appearance: Normal appearance. He is not ill-appearing.   Eyes:      Extraocular Movements: Extraocular movements intact.      Conjunctiva/sclera: Conjunctivae normal.   Cardiovascular:      Rate and Rhythm: Bradycardia present.   Pulmonary:      Effort: Pulmonary effort is normal. No respiratory " distress.   Skin:     Findings: Bruising (extensive ecchymoses bilat forearms) present.   Neurological:      Mental Status: He is alert and oriented to person, place, and time. Mental status is at baseline.   Psychiatric:         Mood and Affect: Mood normal.         Behavior: Behavior normal.         LABS  Results for orders placed or performed in visit on 07/22/21   POC Glycosylated Hemoglobin (Hb A1C)    Specimen: Blood   Result Value Ref Range    Hemoglobin A1C 6.1 %     4/21 A1C 5.7    ASSESSMENT/PLAN    Diagnoses and all orders for this visit:    1. Insomnia (Primary)  Assessment & Plan:  Judicious and appropriate use of zolpidem 10mg QHS #90, 0RF (90d exception made for insurance).  LETHA was reviewed and appropriate.  The patient has read and signed the [facility] Given.to Substance Contract today 7/22/21. UDS 10/20 Patient has been counseled and is aware of side effects, risks, potential for addiction/tolerance, interactions, and how to take medication correctly. RTC 3 mos for next RX with LETHA and UDS    Patient verbalizes understanding of additional risks of this medication in individuals > 65 yrs of age and wants to continue zolpidem 10mg QHS with additional risks of falls, decreased mental clarity, confusion      Orders:  -     zolpidem (AMBIEN) 10 MG tablet; Take 1 tablet by mouth Every Night.  Dispense: 90 tablet; Refill: 0    2. Type 2 diabetes mellitus with diabetic polyneuropathy, without long-term current use of insulin (CMS/[facility])  Assessment & Plan:  BG control stable with A1C 6.1; no meds; ; encouraged reg phys activity to decr insulin resistance, moderation in unhealthy starches/sweets; f/u A1C in 6 mos with next wellness    For neuropathy, he remains on judicious and appropriate use of lyrica 75mg BID# 180, 0RF.  LETHA was reviewed and appropriate.  The patient has read and signed the [facility] Given.to Substance Contract today 7/22/21. UDS 10/20. Patient has been counseled and  is aware of side effects, risks, potential for addiction/tolerance, interactions, and how to take medication correctly.  RTC 3 mos for next RX with LETHA and UDS    Patient verbalizes understanding of additional risks of this medication in individuals > 65 yrs of age and wants to continue lyrica 75mg BID and zolpidem 10mg QHS.        Orders:  -     pregabalin (Lyrica) 75 MG capsule; Take 1 capsule by mouth 2 (Two) Times a Day.  Dispense: 180 capsule; Refill: 0  -     POC Glycosylated Hemoglobin (Hb A1C)    3. Hypertension  Assessment & Plan:  /68; on amlo 2.5mg BID; metoprolol XL 50mg QD; also on CHF meds - spironolactone 12.5mg QD, entresto 97/103 BID, bumex 1mg QD        FOLLOW-UP  1. Health maintenance - COVID19 vacc done; needs to update Td (Tdap 10/11) - counseling given and rec getting at pharmacy  2. RTC 3 mos for next zolpidem/lyrica RX with LETHA and UDS    Electronically signed by:    Delilah Tatum MD, FACP  07/22/2021

## 2021-07-22 NOTE — ASSESSMENT & PLAN NOTE
Judicious and appropriate use of zolpidem 10mg QHS #90, 0RF (90d exception made for insurance).  LETHA was reviewed and appropriate.  The patient has read and signed the [facility] Controlled Substance Contract today 7/22/21. UDS 10/20 Patient has been counseled and is aware of side effects, risks, potential for addiction/tolerance, interactions, and how to take medication correctly. RTC 3 mos for next RX with LETHA and LYNNE    Patient verbalizes understanding of additional risks of this medication in individuals > 65 yrs of age and wants to continue zolpidem 10mg QHS with additional risks of falls, decreased mental clarity, confusion

## 2021-07-22 NOTE — ASSESSMENT & PLAN NOTE
BG control stable with A1C 6.1; no meds; ; encouraged reg phys activity to decr insulin resistance, moderation in unhealthy starches/sweets; f/u A1C in 6 mos with next wellness    For neuropathy, he remains on judicious and appropriate use of lyrica 75mg BID# 180, 0RF.  LETHA was reviewed and appropriate.  The patient has read and signed the [facility] Controlled Substance Contract today 7/22/21. UDS 10/20. Patient has been counseled and is aware of side effects, risks, potential for addiction/tolerance, interactions, and how to take medication correctly.  RTC 3 mos for next RX with LETHA and LYNNE    Patient verbalizes understanding of additional risks of this medication in individuals > 65 yrs of age and wants to continue lyrica 75mg BID and zolpidem 10mg QHS.

## 2021-07-23 ENCOUNTER — TELEPHONE (OUTPATIENT)
Dept: INTERNAL MEDICINE | Facility: CLINIC | Age: 86
End: 2021-07-23

## 2021-07-23 NOTE — TELEPHONE ENCOUNTER
Caller: SIVAKUMAR    Relationship:  CARDIOLOGY/ DR. MURILLO OFFICE    Best call back number: [phone] [phone]    Caller requesting test results: RESULTS    What test was performed: BLOOD WORK    When was the test performed: 07/22/2021    Where was the test performed: [facility]    Additional notes: NA

## 2021-09-15 DIAGNOSIS — I25.118 CORONARY ARTERY DISEASE OF NATIVE HEART WITH STABLE ANGINA PECTORIS, UNSPECIFIED VESSEL OR LESION TYPE (HCC): ICD-10-CM

## 2021-09-15 NOTE — TELEPHONE ENCOUNTER
Caller: Taylor Hamilton    Relationship: Emergency Contact    Best call back number: [phone]    Medication needed:   Requested Prescriptions     Pending Prescriptions Disp Refills   • clopidogrel (PLAVIX) 75 MG tablet 30 tablet      Sig: Take 1 tablet by mouth Daily.       When do you need the refill by: ASAP    What additional details did the patient provide when requesting the medication: PATIENT IS COMPLETELY OUT OF MEDICATION AND WOULD NEED A REFILL ASAP    Does the patient have less than a 3 day supply:  [x] Yes  [] No    What is the patient's preferred pharmacy: [facility] [place] [address] [address] AT [facility] - [phone] [facility] [phone]

## 2021-09-17 RX ORDER — CLOPIDOGREL BISULFATE 75 MG/1
75 TABLET ORAL DAILY
Qty: 30 TABLET | OUTPATIENT
Start: 2021-09-17

## 2021-09-24 PROBLEM — I27.20 PULMONARY HYPERTENSION (HCC): Status: ACTIVE | Noted: 2021-09-24

## 2021-10-07 NOTE — ASSESSMENT & PLAN NOTE
/84 - discussed monitoring for hypotension; reviewed s/sxs low BP; per cards, on metoprolol XL 50mg QD, amlo 2.5mg BID; also takes bumex 2mg QD, entresto 97/103 BID, and spironolactone 12.5mg QD

## 2021-10-07 NOTE — PROGRESS NOTES
"Chief Complaint   Patient presents with   • Insomnia     Follow Up       History of Present Illness  87 y.o.  gentleman presents for zolpidem RF for chronic insomnia. Denies side effects with medication and no falls.    Reports worsened left knee pain. Has had right knee replacement in the past. Got name from neighbor and wants to see Dr. Dillon Judge  Pain clinic for knee injection. Has heart procedure coming out to \"clean out artery\" on 10/26/21. Reports overall function improving after starting entresto.     Reports also planning on bilat cataract surgery in 11/21.     Wants to wean off of gabapentin. Is not having a lot of pain in his legs. Thinks improved circulation has helped and wants to get off of lyrica.    Requesting Spiriva RF.    Review of Systems  ROS (+) for decreased leg pains. ROS (+) for decreased heart function but he states 10% improvement after starting entresto. ROS (+) for decreased vision and planning for cataract surgery - last eye exam with Dr. Dionisio Valenzuela in 8/21. All other ROS reviewed and negative.      Current Outpatient Medications:   •  albuterol sulfate  (90 Base) MCG/ACT inh prn  •  amLODIPine (NORVASC) 2.5 MG BID  •  aspirin 81 MG QD  •  atorvastatin (LIPITOR) 40 MG QD  •  bumetanide (BUMEX) 1 MG 2 QD  •  cholecalciferol (VITAMIN D3) 10 MCG (400 UNIT) QD  •  clopidogrel (PLAVIX) 75 MG QD  •  fluocinonide (LIDEX) 0.05 % external  AD  •  guaiFENesin (MUCINEX) 600 MG bid prn  •  ketoconazole (NIZORAL) 2 % shampoo AD  •  levothyroxine  50 MCG QD  •  loratadine (Claritin) 5 MG QD  •  metoprolol succinate XL (TOPROL-XL) 50 MG QD  •  GLUCOSAMINE CHONDROITIN ADV QD  •  omeprazole (priLOSEC) 20 MG QD  •  potassium chloride 10 MEQ QD  •  pregabalin (Lyrica) 75 MG BID  •  sacubitril-valsartan (ENTRESTO)  MG BID  •  spironolactone (ALDACTONE) 12.5 MG QD  •  tamsulosin (FLOMAX) 0.4 MG QHS  •  tiotropium bromide monohydrate (Spiriva Respimat) 2.5 MCG/ACT aerosol solution 2 " puffs QD  •  zolpidem (AMBIEN) 10 MG QHS      VITALS:  /84   Pulse 78   Wt 74.6 kg (164 lb 6.4 oz)   SpO2 95%   BMI 25.75 kg/m²     Physical Exam  Vitals and nursing note reviewed.   Constitutional:       General: He is not in acute distress.     Appearance: Normal appearance. He is not ill-appearing.   Eyes:      Extraocular Movements: Extraocular movements intact.      Conjunctiva/sclera: Conjunctivae normal.      Comments: Wearing glasses   Pulmonary:      Effort: Pulmonary effort is normal. No respiratory distress.   Musculoskeletal:         General: Deformity (arthritic changes bilat knees, mild swelling, no erythema/warmth) present.      Right lower leg: Edema (mild, symmetric) present.      Left lower leg: Edema present.   Neurological:      Mental Status: He is alert and oriented to person, place, and time. Mental status is at baseline.   Psychiatric:         Mood and Affect: Mood normal.         Behavior: Behavior normal.         LABS  Results for orders placed or performed in visit on 07/22/21   POC Glycosylated Hemoglobin (Hb A1C)    Specimen: Blood   Result Value Ref Range    Hemoglobin A1C 6.1 %         ASSESSMENT/PLAN    Diagnoses and all orders for this visit:    1. Insomnia (Primary)  Assessment & Plan:  Judicious and appropriate use of zolpidem 10mg QHS #90, 0RF (90d exception made for insurance).  LETHA was reviewed and appropriate.  The patient has read and signed the [facility] Controlled Substance Contract 7/22/21. UDS today 10/11/21.  Patient has been counseled and is aware of side effects, risks, potential for addiction/tolerance, interactions, and how to take medication correctly. RTC 3 mos for next RX with LETHA    Patient verbalizes understanding of additional risks of this medication in individuals > 65 yrs of age and wants to continue zolpidem 10mg QHS and lyrica 75mg BID with additional risks of falls, decreased mental clarity, confusion      Orders:  -     zolpidem  (AMBIEN) 10 MG tablet; Take 1 tablet by mouth Every Night.  Dispense: 90 tablet; Refill: 0  -     Drug Analysis,Comp,Oral Fluid - Saliva,; Future  -     Drug Analysis,Comp,Oral Fluid - Saliva,    2. Hypertension  Assessment & Plan:  /84 - discussed monitoring for hypotension; reviewed s/sxs low BP; per cards, on metoprolol XL 50mg QD, amlo 2.5mg BID; also takes bumex 2mg QD, entresto 97/103 BID, and spironolactone 12.5mg QD      3. Type 2 diabetes mellitus with diabetic polyneuropathy, without long-term current use of insulin (HCC)  Assessment & Plan:  F/u A1C in 3 mos with next wellness    For neuropathy, he remains on judicious and appropriate use of lyrica 75mg BID# 60, 0RF as he wants to wean off of med - rec decr to 75mg QHS x 2 weeks, then every other night for 1-2 weeks then stop.  LETHA was reviewed and appropriate.  The patient has read and signed the [facility] Controlled Substance Contract 7/22/21. UDS today 10/11/21. Patient has been counseled and is aware of side effects, risks, potential for addiction/tolerance, interactions, and how to take medication correctly.  No plans to renew unless sxs flare-up off of lyrica.    Patient verbalizes understanding of additional risks of this medication in individuals > 65 yrs of age and wants to continue lyrica 75mg BID and zolpidem 10mg QHS but will be trying to wean off of lyrica as noted    Orders:  -     pregabalin (Lyrica) 75 MG capsule; Take 1 capsule by mouth 2 (Two) Times a Day.  Dispense: 60 capsule; Refill: 0    4. Chronic obstructive pulmonary disease with acute exacerbation (HCC)  Assessment & Plan:  Remains on spiriva 2 puffs QD #3, 3RF      Orders:  -     tiotropium bromide monohydrate (Spiriva Respimat) 2.5 MCG/ACT aerosol solution inhaler; Inhale 2 puffs Daily.  Dispense: 3 each; Refill: 3    5. Osteoarthritis of both knees  Assessment & Plan:  Has h/o R. TKA; now with progressively worsening left knee pain; requesting pain clinic  referral Dr. Dillon Judge at  for injection therapy    Orders:  -     Ambulatory Referral to Pain Management      FOLLOW-UP  1. Health maintenance - COVID19 vacc done, counseling to proceed with Pfizer #3 dose; flu vacc done 9/21; Td TBD at pharmacy per pt; eye exam 8/21 with Dr. Dionisio Valenzuela - will request records, planning on cataract surgery in 11/21 (needs annually)  2. Note patient is weaning off of lyrica  3. RTC for wellness 1/10/22; fasting labs prior to appt (CBC, CMP, TSH, lipids, UA/micro, UDS, A1C, microalb, CPK, FT4, uric), LETHA, and zolpidem/lyrica RXs    Electronically signed by:    Delilah Tatum MD, FACP  10/11/2021

## 2021-10-07 NOTE — ASSESSMENT & PLAN NOTE
Judicious and appropriate use of zolpidem 10mg QHS #90, 0RF (90d exception made for insurance).  LETHA was reviewed and appropriate.  The patient has read and signed the [facility] Controlled Substance Contract 7/22/21. UDS today 10/11/21.  Patient has been counseled and is aware of side effects, risks, potential for addiction/tolerance, interactions, and how to take medication correctly. RTC 3 mos for next RX with LETHA    Patient verbalizes understanding of additional risks of this medication in individuals > 65 yrs of age and wants to continue zolpidem 10mg QHS and lyrica 75mg BID with additional risks of falls, decreased mental clarity, confusion

## 2021-10-07 NOTE — ASSESSMENT & PLAN NOTE
F/u A1C in 3 mos with next wellness    For neuropathy, he remains on judicious and appropriate use of lyrica 75mg BID# 60, 0RF as he wants to wean off of med - rec decr to 75mg QHS x 2 weeks, then every other night for 1-2 weeks then stop.  LETHA was reviewed and appropriate.  The patient has read and signed the [facility] Controlled Substance Contract 7/22/21. UDS today 10/11/21. Patient has been counseled and is aware of side effects, risks, potential for addiction/tolerance, interactions, and how to take medication correctly.  No plans to renew unless sxs flare-up off of lyrica.    Patient verbalizes understanding of additional risks of this medication in individuals > 65 yrs of age and wants to continue lyrica 75mg BID and zolpidem 10mg QHS but will be trying to wean off of lyrica as noted

## 2021-10-11 ENCOUNTER — OFFICE VISIT (OUTPATIENT)
Dept: INTERNAL MEDICINE | Facility: CLINIC | Age: 86
End: 2021-10-11

## 2021-10-11 VITALS
OXYGEN SATURATION: 95 % | WEIGHT: 164.4 LBS | HEART RATE: 78 BPM | DIASTOLIC BLOOD PRESSURE: 84 MMHG | BODY MASS INDEX: 25.75 KG/M2 | SYSTOLIC BLOOD PRESSURE: 100 MMHG

## 2021-10-11 DIAGNOSIS — F51.01 PRIMARY INSOMNIA: Primary | ICD-10-CM

## 2021-10-11 DIAGNOSIS — E11.42 TYPE 2 DIABETES MELLITUS WITH DIABETIC POLYNEUROPATHY, WITHOUT LONG-TERM CURRENT USE OF INSULIN (HCC): Chronic | ICD-10-CM

## 2021-10-11 DIAGNOSIS — I10 ESSENTIAL HYPERTENSION: Chronic | ICD-10-CM

## 2021-10-11 DIAGNOSIS — J44.1 CHRONIC OBSTRUCTIVE PULMONARY DISEASE WITH ACUTE EXACERBATION (HCC): ICD-10-CM

## 2021-10-11 DIAGNOSIS — M17.0 PRIMARY OSTEOARTHRITIS OF BOTH KNEES: ICD-10-CM

## 2021-10-11 PROCEDURE — 99214 OFFICE O/P EST MOD 30 MIN: CPT | Performed by: INTERNAL MEDICINE

## 2021-10-11 RX ORDER — PREGABALIN 75 MG/1
75 CAPSULE ORAL 2 TIMES DAILY
Qty: 60 CAPSULE | Refills: 0 | Status: SHIPPED | OUTPATIENT
Start: 2021-10-11 | End: 2021-11-19 | Stop reason: SDUPTHER

## 2021-10-11 RX ORDER — ZOLPIDEM TARTRATE 10 MG/1
10 TABLET ORAL NIGHTLY
Qty: 90 TABLET | Refills: 0 | Status: SHIPPED | OUTPATIENT
Start: 2021-10-11 | End: 2022-01-08 | Stop reason: SDUPTHER

## 2021-10-11 RX ORDER — LISINOPRIL 40 MG/1
1 TABLET ORAL DAILY
COMMUNITY
End: 2021-11-08 | Stop reason: ALTCHOICE

## 2021-10-11 RX ORDER — TIOTROPIUM BROMIDE INHALATION SPRAY 3.12 UG/1
2 SPRAY, METERED RESPIRATORY (INHALATION)
Qty: 3 EACH | Refills: 3 | Status: SHIPPED | OUTPATIENT
Start: 2021-10-11 | End: 2021-10-19

## 2021-10-11 NOTE — ASSESSMENT & PLAN NOTE
Has h/o R. TKA; now with progressively worsening left knee pain; requesting pain clinic referral Dr. Dillon Judge at  for injection therapy

## 2021-10-15 PROBLEM — H34.232 BRANCH RETINAL ARTERY OCCLUSION OF LEFT EYE: Status: ACTIVE | Noted: 2021-10-15

## 2021-10-18 DIAGNOSIS — J44.1 CHRONIC OBSTRUCTIVE PULMONARY DISEASE WITH ACUTE EXACERBATION (HCC): ICD-10-CM

## 2021-10-18 LAB
6MAM SAL CFM-MCNC: NOT DETECTED NG/ML
6MAM SAL QL CFM: NEGATIVE
ALPRAZ SAL CFM-MCNC: NOT DETECTED NG/ML
AMPHET SAL CFM-MCNC: NOT DETECTED NG/ML
AMPHET SAL QL CFM: NEGATIVE
ANTICONVULSANTS SAL QL CFM: NEGATIVE
BENZODIAZ SAL QL CFM: NEGATIVE
BUPRENORPHINE SAL CFM-MCNC: NOT DETECTED NG/ML
BUPRENORPHINE SAL QL CFM: NEGATIVE
BZE SAL CFM-MCNC: NOT DETECTED NG/ML
CANNABINOIDS SAL QL SCN: NEGATIVE
CARBOXYTHC SAL CFM-MCNC: NOT DETECTED NG/ML
CARISOPRODOL SAL CFM-MCNC: NOT DETECTED NG/ML
CLONAZEPAM SAL CFM-MCNC: NOT DETECTED NG/ML
COC+MET SAL QL CFM: NEGATIVE
COCAINE SAL CFM-MCNC: NOT DETECTED NG/ML
CODEINE SAL CFM-MCNC: NOT DETECTED NG/ML
COTININE SAL CFM-MCNC: 195.1 NG/ML
COTININE SAL QL CFM: ABNORMAL
CYCLOBENZAPRINE SAL CFM-MCNC: NOT DETECTED NG/ML
DHC SAL CFM-MCNC: NOT DETECTED NG/ML
DIAZEPAM SAL CFM-MCNC: NOT DETECTED NG/ML
DULOXETINE SAL CFM-MCNC: NOT DETECTED NG/ML
DULOXETINE SAL QL CFM: NEGATIVE
EDDP SAL QL CFM: NOT DETECTED NG/ML
FENTANYL SAL CFM-MCNC: NEGATIVE NG/ML
FENTANYL SAL QL SCN: NOT DETECTED NG/ML
FLUNITRAZEPAM SAL CFM-MCNC: NOT DETECTED NG/ML
FLURAZEPAM SAL CFM-MCNC: NOT DETECTED NG/ML
GABAPENTIN SAL CFM-MCNC: NOT DETECTED UG/ML
HYDROCODONE SAL CFM-MCNC: NOT DETECTED NG/ML
HYDROMORPHONE SAL CFM-MCNC: NOT DETECTED NG/ML
HYPNOTICS SAL QL CFM: NEGATIVE
LORAZEPAM SAL-MCNC: NOT DETECTED NG/ML
MDMA SAL CFM-MCNC: NOT DETECTED NG/ML
MEPERIDINE SAL CFM-MCNC: NOT DETECTED NG/ML
MEPROBAMATE SAL CFM-MCNC: NOT DETECTED NG/ML
METHADONE SAL CFM-MCNC: NOT DETECTED NG/ML
METHADONE SAL QL CFM: NEGATIVE
METHAMPHET SAL CFM-MCNC: NOT DETECTED NG/ML
MIDAZOLAM SAL CFM-MCNC: NOT DETECTED NG/ML
MORPHINE SAL CFM-MCNC: NOT DETECTED NG/ML
MUSCLE RELAXANTS: NEGATIVE
NARCOTICS SAL QL CFM: NEGATIVE
NORBUPRENORPHINE SAL CFM-MCNC: NOT DETECTED NG/ML
NORDIAZEPAM SAL-MCNC: NOT DETECTED NG/ML
NORHYDROCODONE SAL CFM-MCNC: NOT DETECTED NG/ML
NOROXYCODONE SAL CFM-MCNC: NOT DETECTED NG/ML
OPIATES SAL QL CFM: NEGATIVE
OXAZEPAM SAL CFM-MCNC: NOT DETECTED NG/ML
OXYCODONE SAL CFM-MCNC: NOT DETECTED NG/ML
OXYCODONE SAL QL CFM: NEGATIVE
OXYMORPHONE SAL CFM-MCNC: NOT DETECTED NG/ML
PCP SAL CFM-MCNC: NOT DETECTED NG/ML
PCP SAL QL CFM: NEGATIVE
PREGABALIN SAL CFM-MCNC: NOT DETECTED UG/ML
PROPOXYPH SAL CFM-MCNC: NOT DETECTED NG/ML
TAPENTADOL SAL CFM-MCNC: NOT DETECTED NG/ML
TAPENTADOL SAL QL SCN: NEGATIVE
TEMAZEPAM SAL CFM-MCNC: NOT DETECTED NG/ML
TRAMADOL SAL CFM-MCNC: NOT DETECTED NG/ML
TRIAZOLAM SAL CFM-MCNC: NOT DETECTED NG/ML
ZOLPIDEM SAL CFM-MCNC: NOT DETECTED NG/ML

## 2021-10-19 ENCOUNTER — TELEPHONE (OUTPATIENT)
Dept: INTERNAL MEDICINE | Facility: CLINIC | Age: 86
End: 2021-10-19

## 2021-10-19 RX ORDER — TIOTROPIUM BROMIDE INHALATION SPRAY 3.12 UG/1
SPRAY, METERED RESPIRATORY (INHALATION)
Qty: 12 G | Refills: 3 | Status: SHIPPED | OUTPATIENT
Start: 2021-10-19 | End: 2023-01-12 | Stop reason: SDDI

## 2021-10-19 NOTE — TELEPHONE ENCOUNTER
----- Message from Delilah Tatum MD sent at 10/18/2021  9:45 PM EDT -----  Drug screen is inappropriate. Neg for sleeping pills and positive for nicotine. With all his ongoing heart and circulation issues, he should definitely not be using any tobacco products!  Will not be able to continue zolpidem RXs with inappropriate drug screen. Can drop by for urine drug screen if he would like.

## 2021-10-19 NOTE — PROGRESS NOTES
Drug screen is inappropriate. Neg for sleeping pills and positive for nicotine. With all his ongoing heart and circulation issues, he should definitely not be using any tobacco products!  Will not be able to continue zolpidem RXs with inappropriate drug screen. Can drop by for urine drug screen if he would like.

## 2021-11-01 DIAGNOSIS — K21.9 GASTROESOPHAGEAL REFLUX DISEASE WITHOUT ESOPHAGITIS: ICD-10-CM

## 2021-11-01 RX ORDER — OMEPRAZOLE 20 MG/1
CAPSULE, DELAYED RELEASE ORAL
Qty: 90 CAPSULE | Refills: 3 | Status: SHIPPED | OUTPATIENT
Start: 2021-11-01 | End: 2022-10-27

## 2021-11-11 ENCOUNTER — TELEPHONE (OUTPATIENT)
Dept: INTERNAL MEDICINE | Facility: CLINIC | Age: 86
End: 2021-11-11

## 2021-11-11 NOTE — TELEPHONE ENCOUNTER
PT WOULD LIKE TO GET A REFERRAL FOR DR ROYAL EASTMAN AT [facility] FOR RT HIP PAIN.  HE CURRENTLY SEES THEM FOR HIS KNEE PAIN BUT THEY SAID THEY WOULD NEED A NEW REFERRAL FOR THE HIP ISSUE    I DID EXPLAIN TO HIS WIFE THAT BECAUSE THIS IS A NEW ISSUE, DR BOTELLO MAY REQUEST TO SEE HIM BEFORE PLACING THE REFERRAL. WIFE VOICED UNDERSTANDING.

## 2021-11-12 ENCOUNTER — TELEPHONE (OUTPATIENT)
Dept: INTERNAL MEDICINE | Facility: CLINIC | Age: 86
End: 2021-11-12

## 2021-11-12 NOTE — TELEPHONE ENCOUNTER
Incoming Refill Request      Medication requested (name and dose): pregabalin (Lyrica) 75 MG capsule    Pharmacy where request should be sent: [facility]    Additional details provided by patient: PATIENT HAS ONE WEEK LEFT    Best call back number: [phone]     Does the patient have less than a 3 day supply:  [] Yes  [x] No    Juan Luis Christopher Rep  11/12/21, 08:52 EST

## 2021-11-12 NOTE — TELEPHONE ENCOUNTER
pls remind him that he told us at his visit 10/11/21 that he wanted to wean off of lyrica so I recommended decreasing to 75mg QHS x 2 weeks, then every other night for 2 weeks, then stop. He should be almost finished with that regimen at this time.    Did he not follow the directions I gave him?

## 2021-11-12 NOTE — TELEPHONE ENCOUNTER
We have no documentation of a hip issue so we would need an OV to document medical necessity for insurance to cover the referral

## 2021-11-16 NOTE — TELEPHONE ENCOUNTER
Pt's wife states that he tried to wean off Lyrica but his feet just stayed in too much pain so he is not wantig to try to wean off it, wants to continue on Lyrica.

## 2021-11-16 NOTE — TELEPHONE ENCOUNTER
He has appt on 11/19/21 and can RF at that time. Also we do not send controlled medications to [facility]

## 2021-11-19 ENCOUNTER — OFFICE VISIT (OUTPATIENT)
Dept: INTERNAL MEDICINE | Facility: CLINIC | Age: 86
End: 2021-11-19

## 2021-11-19 VITALS
DIASTOLIC BLOOD PRESSURE: 70 MMHG | BODY MASS INDEX: 26.16 KG/M2 | WEIGHT: 167 LBS | HEART RATE: 59 BPM | SYSTOLIC BLOOD PRESSURE: 132 MMHG | OXYGEN SATURATION: 96 %

## 2021-11-19 DIAGNOSIS — Z79.899 HIGH RISK MEDICATION USE: ICD-10-CM

## 2021-11-19 DIAGNOSIS — M16.0 PRIMARY OSTEOARTHRITIS OF BOTH HIPS: Primary | ICD-10-CM

## 2021-11-19 DIAGNOSIS — E11.42 TYPE 2 DIABETES MELLITUS WITH DIABETIC POLYNEUROPATHY, WITHOUT LONG-TERM CURRENT USE OF INSULIN (HCC): Chronic | ICD-10-CM

## 2021-11-19 DIAGNOSIS — I10 ESSENTIAL HYPERTENSION: Chronic | ICD-10-CM

## 2021-11-19 LAB
EXPIRATION DATE: NORMAL
HBA1C MFR BLD: 6.4 %
Lab: NORMAL

## 2021-11-19 PROCEDURE — 83036 HEMOGLOBIN GLYCOSYLATED A1C: CPT | Performed by: INTERNAL MEDICINE

## 2021-11-19 PROCEDURE — 3044F HG A1C LEVEL LT 7.0%: CPT | Performed by: INTERNAL MEDICINE

## 2021-11-19 PROCEDURE — 99214 OFFICE O/P EST MOD 30 MIN: CPT | Performed by: INTERNAL MEDICINE

## 2021-11-19 RX ORDER — PREGABALIN 75 MG/1
75 CAPSULE ORAL 2 TIMES DAILY
Qty: 60 CAPSULE | Refills: 1 | Status: SHIPPED | OUTPATIENT
Start: 2021-11-19 | End: 2022-01-08 | Stop reason: SDUPTHER

## 2021-11-19 RX ORDER — PREGABALIN 75 MG/1
75 CAPSULE ORAL 2 TIMES DAILY
Qty: 60 CAPSULE | Refills: 1 | Status: SHIPPED | OUTPATIENT
Start: 2021-11-19 | End: 2021-11-19 | Stop reason: SDUPTHER

## 2021-11-19 NOTE — PROGRESS NOTES
Chief Complaint   Patient presents with   • Referral for hip pain     Follow Up   • Diabetes   • Peripheral Neuropathy       History of Present Illness  87 y.o.  gentleman presents for follow-up re: left hip pain and request to continue lyrica. Reports he just had left knee injection and would like injection in the left hip but states they need a separate refill - [facility] Judge at Pain clinic. Reports  worsened feet pain when trying to decrease lyrica so he wants to stay on medication.    Reports no significant leg pains and denies currently any cardiac issues, incl no CP or SOB.    Review of Systems  ROS (+) for hip pain and leg pain, chronic. All other ROS reviewed and negative.    Current Outpatient Medications:   •  albuterol sulfate  (90 Base) MCG/ACT inhaler prn  •  amLODIPine (NORVASC) 2.5 MG BID  •  aspirin 81 MG QD  •  atorvastatin (LIPITOR) 40 MGQD  •  bumetanide (BUMEX) 1 MG 2 QD  •  cholecalciferol (VITAMIN D3) 10 MCG (400 UNIT) QD  •  clopidogrel (PLAVIX) 75 MG QD:   •  fluocinonide (LIDEX) 0.05 % external solution AD  •  guaiFENesin (MUCINEX) 600 MG 2 q12 prn  •  ketoconazole (NIZORAL) 2 % shampoo AD  •  Levothyroxine 50 MCG QD  •  loratadine (Claritin) 5 MG QD  •  metoprolol succinate XL 50 MG QD  • GLUCOSAMINE CHONDROITIN ADV QD   •  omeprazole (priLOSEC) 20 MG QD  •  potassium chloride 10 MEQ CR QD  •  pregabalin (Lyrica) 75 MG BID  •  sacubitril-valsartan (ENTRESTO)  MG BID   •  Spiriva Respimat 2.5 MCG/ACT aerosol solution 2 puffs QD  •  spironolactone (ALDACTONE) 12.5 MG 1/2 QD  •  tamsulosin (FLOMAX) 0.4 MG QHS  •  zolpidem (AMBIEN) 10 MG QHS         VITALS:  /70   Pulse 59   Wt 75.8 kg (167 lb)   SpO2 96%   BMI 26.16 kg/m²     Physical Exam  Vitals and nursing note reviewed.   Constitutional:       General: He is not in acute distress.     Appearance: Normal appearance. He is not ill-appearing.   Eyes:      Extraocular Movements: Extraocular movements intact.       Conjunctiva/sclera: Conjunctivae normal.   Pulmonary:      Effort: Pulmonary effort is normal. No respiratory distress.   Neurological:      Mental Status: He is alert and oriented to person, place, and time. Mental status is at baseline.   Psychiatric:         Mood and Affect: Mood normal.         Behavior: Behavior normal.         LABS  Results for orders placed or performed in visit on 11/19/21   POC Glycosylated Hemoglobin (Hb A1C)    Specimen: Blood   Result Value Ref Range    Hemoglobin A1C 6.4 %    Lot Number [id number]     Expiration Date 08/30/2023      Results for orders placed or performed in visit on 10/11/21   Drug Analysis,Comp,Oral Fluid - ,   Result Value Ref Range    AMPHETAMINE Negative CUTOFF:10    METHAMPHETAMINE Not Detected ng/mL    Amphetamine Confirmation Not Detected ng/mL    MDMA Not Detected ng/mL    Benzodiazepines Negative CUTOFF:0.5    Diazepam Not Detected ng/mL    Desmethyldiazepam Not Detected ng/mL    Oxazepam Not Detected ng/mL    Temazepam Not Detected ng/mL    Alprazolam Not Detected ng/mL    FLURAZEPAM Not Detected ng/mL    Lorazepam Not Detected ng/mL    Triazolam Not Detected ng/mL    Clonazepam Not Detected ng/mL    Midazolam Not Detected ng/mL    Flunitrazepam Not Detected ng/mL    COCAINE / METABOLITE Negative CUTOFF:2.5    COCAINE Not Detected ng/mL    Benzoylecgonine Confirmation Not Detected ng/mL    CANNABINOIDS Negative CUTOFF:2.5    Tetrahydrocannabinol(THC) Not Detected ng/mL    Opiate Class Negative CUTOFF:2.5    Codeine Not Detected ng/mL    Morphine Not Detected ng/mL    Hydrocodone Not Detected ng/mL    Hydromorphone Not Detected ng/mL    Dihydrocodeine Not Detected ng/mL    Norhydrocodone Not Detected ng/mL    6-ACETYLMORPHINE Negative CUTOFF:1.0    6-acetylmorphine Not Detected ng/mL    OXYCODONE CLASS Negative CUTOFF:2.5    Oxycodone Not Detected ng/mL    OXYMORPHONE Not Detected ng/mL    Noroxycodone Not Detected ng/mL    Methadone Negative CUTOFF:5.0     Methadone Not Detected ng/mL    EDDP Not Detected ng/mL    Fentanyl Negative CUTOFF:0.1    FENTANYL Not Detected ng/mL    Buprenorphine, Screen, Urine Negative CUTOFF:1.0    BUPRENORPHINE Not Detected ng/mL    Norbuprenorphine Not Detected ng/mL    Tapentadol Negative CUTOFF:5.0    TAPENTADOL Not Detected ng/mL    OTHER OPIOIDS Negative CUTOFF:5.0    Meperidine Not Detected ng/mL    Propoxyphene Not Detected ng/mL    Tramadol Not Detected ng/mL    Phencyclidine Screen Negative CUTOFF:10    PHENCYCLIDINE Not Detected ng/mL    ANTICONVULSANTS Negative CUTOFF:1.0    Gabapentin Not Detected ug/mL    Pregabalin Not Detected ug/mL    MUSCLE RELAXANTS Negative     Carisoprodol Not Detected CUTOFF:2.5 ng/mL    Meprobamate Level Not Detected CUTOFF:2.5 ng/mL    Cyclobenzaprine (Flexeril) Not Detected CUTOFF:1.0 ng/mL    SEDATIVES/HYPNOTICS Negative CUTOFF:1.0    Zolpidem Not Detected ng/mL    DULOXETINE Negative CUTOFF:5.0    DULOXETINE Not Detected ng/mL    NICOTINE METABOLITE ++POSITIVE++ (A) CUTOFF:5.0    CONTININE 195.1 ng/mL     7/21 A1C 6.1    ASSESSMENT/PLAN    Diagnoses and all orders for this visit:    1. Osteoarthritis of both hips (Primary)  -     Ambulatory Referral to Pain Management    2. Type 2 diabetes mellitus with diabetic polyneuropathy, without long-term current use of insulin (HCC)  Assessment & Plan:  BG control stable with A1C 6.4; on no DM meds but wants to cont Lyrica for DM neuropathy; reviewed that his UDS was inappropriate 10/11/21, neg for both zolpidem and lyrica    Judicious and appropriate use of lyrica 75mg BID #60, 1RF but no further RFs for lyrica or zolpidem if UDS not appropriate again..  LETHA was reviewed and appropriate.  The patient has read and signed the [facility] Controlled Substance Contract 7/21. UDS repeat again - inappropriate 10/11/21, neg for either zolpidem or lyrica - patient states he has to take cup home. Patient has been counseled and is aware of side effects,  risks, potential for addiction/tolerance, interactions, and how to take medication correctly.  RFs are dependent on UDS results    Patient verbalizes understanding of additional risks of this medication in individuals > 65 yrs of age and wants to continue both lyrica and zolpidem..        Orders:  -     Discontinue: pregabalin (Lyrica) 75 MG capsule; Take 1 capsule by mouth 2 (Two) Times a Day.  Dispense: 60 capsule; Refill: 1  -     POC Glycosylated Hemoglobin (Hb A1C)  -     pregabalin (Lyrica) 75 MG capsule; Take 1 capsule by mouth 2 (Two) Times a Day.  Dispense: 60 capsule; Refill: 1    3. Hypertension  Assessment & Plan:  BP stable 132/70; cont to monitor for hypotension; no change in meds      4. High risk medication use  -     Compliance Drug Analysis, Ur - Urine, Clean Catch; Future  -     Compliance Drug Analysis, Ur - Urine, Clean Catch      FOLLOW-UP  1. Health maintenanc e- COVID19 vacc done, including 3rd dose Pfizer; flu vacc 9/21  2. Will send RF for lyrica 12/19/21 if UDS appropriate; otherwise no further zolpidem or lyrica  3. RTC for wellness 1/10/22; fasting labs prior to appt (CBC, CMP, TSH, lipids, UA/micro, A1C, microalb, CPK, FT4, uric)    Electronically signed by:    Delilah Tatum MD, FACP  11/19/2021

## 2021-11-19 NOTE — ASSESSMENT & PLAN NOTE
BG control stable with A1C 6.4; on no DM meds but wants to cont Lyrica for DM neuropathy; reviewed that his UDS was inappropriate 10/11/21, neg for both zolpidem and lyrica    Judicious and appropriate use of lyrica 75mg BID #60, 1RF but no further RFs for lyrica or zolpidem if UDS not appropriate again..  LETHA was reviewed and appropriate.  The patient has read and signed the [facility] Controlled Substance Contract 7/21. UDS repeat again - inappropriate 10/11/21, neg for either zolpidem or lyrica - patient states he has to take cup home. Patient has been counseled and is aware of side effects, risks, potential for addiction/tolerance, interactions, and how to take medication correctly.  RFs are dependent on UDS results    Patient verbalizes understanding of additional risks of this medication in individuals > 65 yrs of age and wants to continue both lyrica and zolpidem..

## 2021-11-26 LAB — DRUGS UR: NORMAL

## 2021-12-21 ENCOUNTER — TELEPHONE (OUTPATIENT)
Dept: INTERNAL MEDICINE | Facility: CLINIC | Age: 86
End: 2021-12-21

## 2021-12-21 DIAGNOSIS — I10 ESSENTIAL HYPERTENSION: ICD-10-CM

## 2021-12-21 DIAGNOSIS — E78.2 MIXED HYPERLIPIDEMIA: ICD-10-CM

## 2021-12-21 DIAGNOSIS — E11.42 TYPE 2 DIABETES MELLITUS WITH DIABETIC POLYNEUROPATHY, WITHOUT LONG-TERM CURRENT USE OF INSULIN (HCC): ICD-10-CM

## 2021-12-21 DIAGNOSIS — M10.042 ACUTE IDIOPATHIC GOUT OF LEFT HAND: ICD-10-CM

## 2021-12-21 DIAGNOSIS — Z00.00 MEDICARE ANNUAL WELLNESS VISIT, SUBSEQUENT: Primary | ICD-10-CM

## 2021-12-21 DIAGNOSIS — E03.9 ACQUIRED HYPOTHYROIDISM: ICD-10-CM

## 2022-01-05 ENCOUNTER — LAB (OUTPATIENT)
Dept: LAB | Facility: HOSPITAL | Age: 87
End: 2022-01-05

## 2022-01-05 DIAGNOSIS — M10.042 ACUTE IDIOPATHIC GOUT OF LEFT HAND: ICD-10-CM

## 2022-01-05 DIAGNOSIS — Z00.00 MEDICARE ANNUAL WELLNESS VISIT, SUBSEQUENT: ICD-10-CM

## 2022-01-05 DIAGNOSIS — E03.9 ACQUIRED HYPOTHYROIDISM: ICD-10-CM

## 2022-01-05 DIAGNOSIS — E11.42 TYPE 2 DIABETES MELLITUS WITH DIABETIC POLYNEUROPATHY, WITHOUT LONG-TERM CURRENT USE OF INSULIN: ICD-10-CM

## 2022-01-05 DIAGNOSIS — E78.2 MIXED HYPERLIPIDEMIA: ICD-10-CM

## 2022-01-05 DIAGNOSIS — I10 ESSENTIAL HYPERTENSION: ICD-10-CM

## 2022-01-05 LAB
ALBUMIN SERPL-MCNC: 4.1 G/DL (ref 3.5–5.2)
ALBUMIN/GLOB SERPL: 1.7 G/DL
ALP SERPL-CCNC: 86 U/L (ref 39–117)
ALT SERPL W P-5'-P-CCNC: 8 U/L (ref 1–41)
ANION GAP SERPL CALCULATED.3IONS-SCNC: 9.3 MMOL/L (ref 5–15)
AST SERPL-CCNC: 15 U/L (ref 1–40)
BASOPHILS # BLD AUTO: 0.01 10*3/MM3 (ref 0–0.2)
BASOPHILS NFR BLD AUTO: 0.3 % (ref 0–1.5)
BILIRUB SERPL-MCNC: 0.3 MG/DL (ref 0–1.2)
BUN SERPL-MCNC: 26 MG/DL (ref 8–23)
BUN/CREAT SERPL: 17.2 (ref 7–25)
CALCIUM SPEC-SCNC: 9.2 MG/DL (ref 8.6–10.5)
CHLORIDE SERPL-SCNC: 105 MMOL/L (ref 98–107)
CHOLEST SERPL-MCNC: 102 MG/DL (ref 0–200)
CK SERPL-CCNC: 57 U/L (ref 20–200)
CO2 SERPL-SCNC: 29.7 MMOL/L (ref 22–29)
CREAT SERPL-MCNC: 1.51 MG/DL (ref 0.76–1.27)
DEPRECATED RDW RBC AUTO: 39.2 FL (ref 37–54)
EOSINOPHIL # BLD AUTO: 0.1 10*3/MM3 (ref 0–0.4)
EOSINOPHIL NFR BLD AUTO: 2.6 % (ref 0.3–6.2)
ERYTHROCYTE [DISTWIDTH] IN BLOOD BY AUTOMATED COUNT: 12.8 % (ref 12.3–15.4)
GFR SERPL CREATININE-BSD FRML MDRD: 44 ML/MIN/1.73
GLOBULIN UR ELPH-MCNC: 2.4 GM/DL
GLUCOSE SERPL-MCNC: 130 MG/DL (ref 65–99)
HBA1C MFR BLD: 6.33 % (ref 4.8–5.6)
HCT VFR BLD AUTO: 37.1 % (ref 37.5–51)
HDLC SERPL-MCNC: 40 MG/DL (ref 40–60)
HGB BLD-MCNC: 11.8 G/DL (ref 13–17.7)
IMM GRANULOCYTES # BLD AUTO: 0.01 10*3/MM3 (ref 0–0.05)
IMM GRANULOCYTES NFR BLD AUTO: 0.3 % (ref 0–0.5)
LDLC SERPL CALC-MCNC: 44 MG/DL (ref 0–100)
LDLC/HDLC SERPL: 1.07 {RATIO}
LYMPHOCYTES # BLD AUTO: 1.51 10*3/MM3 (ref 0.7–3.1)
LYMPHOCYTES NFR BLD AUTO: 39.1 % (ref 19.6–45.3)
MCH RBC QN AUTO: 27.2 PG (ref 26.6–33)
MCHC RBC AUTO-ENTMCNC: 31.8 G/DL (ref 31.5–35.7)
MCV RBC AUTO: 85.5 FL (ref 79–97)
MONOCYTES # BLD AUTO: 0.33 10*3/MM3 (ref 0.1–0.9)
MONOCYTES NFR BLD AUTO: 8.5 % (ref 5–12)
NEUTROPHILS NFR BLD AUTO: 1.9 10*3/MM3 (ref 1.7–7)
NEUTROPHILS NFR BLD AUTO: 49.2 % (ref 42.7–76)
NRBC BLD AUTO-RTO: 0 /100 WBC (ref 0–0.2)
PLATELET # BLD AUTO: 137 10*3/MM3 (ref 140–450)
PMV BLD AUTO: 10 FL (ref 6–12)
POTASSIUM SERPL-SCNC: 4.6 MMOL/L (ref 3.5–5.2)
PROT SERPL-MCNC: 6.5 G/DL (ref 6–8.5)
RBC # BLD AUTO: 4.34 10*6/MM3 (ref 4.14–5.8)
SODIUM SERPL-SCNC: 144 MMOL/L (ref 136–145)
T4 FREE SERPL-MCNC: 1.25 NG/DL (ref 0.93–1.7)
TRIGL SERPL-MCNC: 96 MG/DL (ref 0–150)
TSH SERPL DL<=0.05 MIU/L-ACNC: 4.59 UIU/ML (ref 0.27–4.2)
URATE SERPL-MCNC: 9.8 MG/DL (ref 3.4–7)
VLDLC SERPL-MCNC: 18 MG/DL (ref 5–40)
WBC NRBC COR # BLD: 3.86 10*3/MM3 (ref 3.4–10.8)

## 2022-01-05 PROCEDURE — 80053 COMPREHEN METABOLIC PANEL: CPT

## 2022-01-05 PROCEDURE — 84439 ASSAY OF FREE THYROXINE: CPT

## 2022-01-05 PROCEDURE — 85025 COMPLETE CBC W/AUTO DIFF WBC: CPT

## 2022-01-05 PROCEDURE — 82550 ASSAY OF CK (CPK): CPT

## 2022-01-05 PROCEDURE — 84443 ASSAY THYROID STIM HORMONE: CPT

## 2022-01-05 PROCEDURE — 84550 ASSAY OF BLOOD/URIC ACID: CPT

## 2022-01-05 PROCEDURE — 83036 HEMOGLOBIN GLYCOSYLATED A1C: CPT

## 2022-01-05 PROCEDURE — 80061 LIPID PANEL: CPT

## 2022-01-08 PROBLEM — E11.42 DIABETIC PERIPHERAL NEUROPATHY (HCC): Status: ACTIVE | Noted: 2022-01-08

## 2022-01-08 NOTE — ASSESSMENT & PLAN NOTE
BG control stable with A1C 6.33; no meds; encouraged reg phys activity to decr insulin resistance, moderation in unhealthy starches/sweets; f/u A1C in 3 mos

## 2022-01-08 NOTE — ASSESSMENT & PLAN NOTE
BP stable 118/60; on amlo 2.5mg BID and metoprolol XL 50mg QD, also on bumetanide 1mg 2 QD, spironolactone 25mg 1/2 QD, tamsulosin 0.4mg QHS, and entresto 97/103 BID; all meds per cardiology

## 2022-01-08 NOTE — ASSESSMENT & PLAN NOTE
Cont'd judicious and appropriate use of lyrica 75mg BID# 180, 0RF (exception made due to insurance). LETHA was reviewed and appropriate.  The patient has read and signed the [facility] Controlled Substance Contract 7/22/21. UDS 10/11/21. Patient has been counseled and is aware of side effects, risks, potential for addiction/tolerance, interactions, and how to take medication correctly.  RTC 3 mos for next RX    I cannot see it in the chart but discussed usually lyrica is used after gabapentin failure or inadequate response. Discussed also we would need to figure what dosing of gabapentin would work and discussed risk of sedation, perez daytime dosing. Patient opts to cont with lyrica 75mg BID despite it no longer being on formulary.    Patient verbalizes understanding of additional risks of this medication in individuals > 65 yrs of age and wants to continue lyrica 75mg BID and zolpidem 10mg QHS

## 2022-01-08 NOTE — ASSESSMENT & PLAN NOTE
asx but uric acid level significantly worsened at 9.8; goal < 6; no current meds; discussed dietary component; drink more water daily; repeat level in 3 mos

## 2022-01-08 NOTE — ASSESSMENT & PLAN NOTE
Renal function worsened with Cr 1.51, GFR 44; discussed importance of good BG and BP control as well as avoidance of NSAIDs, perez since he is also on clopidogrel; repeat BMP in 3 mos in well-hydrated state

## 2022-01-08 NOTE — ASSESSMENT & PLAN NOTE
Judicious and appropriate use of zolpidem 10mg QHS #30, 2RF). LETHA was reviewed and appropriate.  The patient has read and signed the [facility] Controlled Substance Contract 7/22/21. UDS 10/11/21.  Patient has been counseled and is aware of side effects, risks, potential for addiction/tolerance, interactions, and how to take medication correctly. RTC 3 mos for next RX with LETHA    Patient verbalizes understanding of additional risks of this medication in individuals > 65 yrs of age and wants to continue zolpidem 10mg QHS and lyrica 75mg BID with additional risks of falls, decreased mental clarity, confusion

## 2022-01-08 NOTE — ASSESSMENT & PLAN NOTE
Health maintenance - COVID19 vacc don, incl 3rd dose Pfizer; flu vacc 9/21; Prevnar 12/15, PVX 12/14, Td due as Tap done 10/11 (good for 10 yrs), Zostavax 9/09; HAV done; rec Shingrix - cousneling given; colonosc 1/15, repeat 2020 per Dr. Holland but patient has opted for no further screening unless abnlities; no further prostate CA screening w/ age/comorbidities, confirmed with pt; eye exam ongoing - right cataract surgery pending this Wed; dental exam ongoing - implants pending per pt; (+) seat belt use    Consultants:  Patient Care Team:  Delilah Tatum MD as PCP - General  Skyler, Burton ECHEVARRIA MD as Consulting Physician (Colon and Rectal Surgery)  Douglas Lazaro MD as Consulting Physician (General Surgery)  Rubio Rossi MD as Consulting Physician (Dermatology)  Douglas Brooks MD (Inactive) as Consulting Physician (Cardiology)  Jim Black MD as Consulting Physician (Cardiothoracic Surgery)  Yusef Bowles MD as Consulting Physician (Nephrology)  Tio Mcnally MD (Inactive) as Consulting Physician (Hand Surgery)  Marisol Easley MD as Consulting Physician (Cardiology)  Chayito Quick OD (Optometry)  Radha Hirsch APRN as Nurse Practitioner (Otolaryngology)  Dionisio Valenzuela MD as Consulting Physician (Ophthalmology)  Elisa Conde MD as Consulting Physician (Pulmonary Disease)  Dillon Judge MD as Consulting Physician (Anesthesiology)

## 2022-01-08 NOTE — PROGRESS NOTES
ANNUAL WELLNESS VISIT    DRUG AND ALCOHOL USE      alcohol intake:1 beers per week, tobacco use: snuff and caffeine intake: 2 cups of caffeinated coffee per day    DIET AND PHYSICAL ACTIVITY     Diet: general    Exercise: infrequently   Exercise Details: generally sedentary    MOOD DISORDER AND COGNITIVE SCREENING   Depression Screening Tool Used yes - see PHQ-9; components reviewed with patient; Denies feeling blue, hopeless, depressed or not having pleasure doing things. Reports no changes concerns with sleep (on chronic zolpidem) and no new concerns with energy. Reports no concerns with appetite. Denies slow thoughts, slow movements, issues with concentration, or negative thoughts. Screening is NEG for active depression.      Anxiety Screening Tool Used yes     Mini-Cog Performed   Yes    1. Tell Patient 3 Words car tie pen    2. Administer Clock Test abnormal    3. Recall 3 words  car tie    4. Number Correct Items 2    FUNCTIONAL ABILITY AND LEVEL OF SAFETY   Hearing mild hearing loss     Wears Hearing Aids No       Current Activities Independent      none  - see Funct/Cog Status Intake     Fall Risk Assessment       Has difficulty with walking or balance  No         Timed Up and Go (TUG) Test  9 sec.       If >12 sec, normal    ADVANCED DIRECTIVE  Advance Care Planning   ACP discussion was held with the patient during this visit. Patient has an advance directive (not in EMR), copy requested.     Advance Care Planning Discussion:  16 min or more spent on counseling; patient has advanced directive and living will.  Reviewed desires for end of life care, which is to have comfort care.  Patient does not want extraordinary life-sustaining measures, including no chest compression, shocks, intubation/ventilator use, feeding tube, or prolonged artificial life support.  Reviewed code status options, meanings, desires. Patient 's code status is Full Code.   Encouraged patient to ensure family is aware of  desires/preferences. Confirmed wife is his healthcare surrogate.    PAIN SCREENING Do you have pain right now? no      If so, 1-10 scale: 0     Intermittent     Do you have pain every day? No      Probable chronic pain: No     Recent Hospitalizations:  Recently treated at the following:  Other: [facility].     MEDICATION REVIEW   - updated and reviewed (see Medication List).   - reviewed for potentially harmful drug-disease interactions in the elderly.   - reviewed for high risk medications in the elderly.   - aspirin use: Yes, 81mg QD and clopidogrel 75mg QD    BMI  Body mass index is 26.79 kg/m².    Patient's Body mass index is 26.79 kg/m². indicating that he is overweight (BMI 25-29.9). Obesity-related health conditions include the following: hypertension, coronary heart disease, diabetes mellitus, dyslipidemias, osteoarthritis, peripheral vascular disease and pulmonary hypertension, COPD, gout. Obesity is unchanged. BMI is is above average; BMI management plan is completed. We discussed portion control and increasing exercise..    _________________________________________________________    Chief Complaint   Patient presents with   • Medicare Wellness-subsequent   • Insomnia   • Diabetes       History of Present Illness  87 y.o.  gentleman, accompanied by his wife, presents for updated phys examination and wellness visit as well as f/u on cholesterol, DM, and BP. Had left cataract surgery a few weeks ago and has right cataract surgery pending on Wed 1/12/22.    States that he was told he had hernia at [facility] and wants surgery for it. States he does not have sxs and does not know where the hernia is.    Denies any further leg pains with walking. Still needs lyrica because feet pains increased with discontinuation. Brings [facility] letter indicating pregabalin is not on formulary but gabapentin is.    Review of Systems  Denies headaches, CP, palpitations, SOB, cough, abd pain, n/v/d,  "difficulty with urination, falls, mood changes, lightheadedness, hearing changes, rashes.    Denies gout flare-up.    ROS (+) for chronic insomnia. ROS (+) for chronic neuropathic pains in legs/feet, worsened when he stopped lyrica..     All other ROS reviewed and negative.    Current Outpatient Medications:   •  albuterol sulfate  (90 Base) MCG/ACT inhaler prn  •  amLODIPine (NORVASC) 2.5 MG BID  •  aspirin 81 MG QD  •  atorvastatin (LIPITOR) 40 MG QD  •  bumetanide (BUMEX) 1 MG 2 QD  •  cholecalciferol (VITAMIN D3) 10 MCG (400 UNIT) QD:   •  clopidogrel (PLAVIX) 75 MG QD  •  fluocinonide (LIDEX) 0.05 % external solution AD  •  guaiFENesin (MUCINEX) 600 MG prn  •  ketoconazole (NIZORAL) 2 % shampoo AD  •  levothyroxine 50 MCG QD  •  loratadine (Claritin) 5 MG QD  •  metoprolol succinate XL (TOPROL-XL) 50 MG QD  •  GLUCOSAMINE CHONDROITIN ADV QD  •  omeprazole (priLOSEC) 20 MG QD  •  potassium chloride 10 MEQ QD  •  pregabalin (Lyrica) 75 MG BID  •  sacubitril-valsartan (ENTRESTO)  MG BID  •  Spiriva Respimat 2.5 MCG/ACT 1 inh QD  •  spironolactone (ALDACTONE) 25mg 1/2 QD  •  tamsulosin (FLOMAX) 0.4 MG QHS  •  zolpidem (AMBIEN) 10 MG QHS      VITALS:  /60   Pulse 65   Ht 167.6 cm (66\")   Wt 75.3 kg (166 lb)   SpO2 97%   BMI 26.79 kg/m²     Physical Exam  Vitals and nursing note reviewed.   Constitutional:       General: He is not in acute distress.     Appearance: Normal appearance. He is well-developed.   HENT:      Head: Normocephalic.      Right Ear: Ear canal and external ear normal.      Left Ear: Ear canal and external ear normal.      Nose: Nose normal.   Eyes:      Extraocular Movements: Extraocular movements intact.      Conjunctiva/sclera: Conjunctivae normal.      Pupils: Pupils are equal, round, and reactive to light.      Comments: Wearing glasses   Neck:      Vascular: No carotid bruit (bilaterally).   Cardiovascular:      Rate and Rhythm: Normal rate and regular rhythm.      " Heart sounds: Normal heart sounds.   Pulmonary:      Effort: Pulmonary effort is normal. No respiratory distress.      Breath sounds: Normal breath sounds.   Abdominal:      General: Bowel sounds are normal. There is no distension.      Palpations: Abdomen is soft. There is no mass.      Tenderness: There is no abdominal tenderness.   Musculoskeletal:      Cervical back: Normal range of motion and neck supple.      Right lower leg: No edema.      Left lower leg: No edema.   Lymphadenopathy:      Cervical: No cervical adenopathy.   Skin:     General: Skin is warm and dry.      Findings: Bruising (left temple - attributed to cataract surgery per patient) present. No rash.   Neurological:      Mental Status: He is alert and oriented to person, place, and time.      Cranial Nerves: No cranial nerve deficit.      Deep Tendon Reflexes: Reflexes normal.   Psychiatric:         Mood and Affect: Mood normal.         Behavior: Behavior normal.         LABS  Results for orders placed or performed in visit on 01/05/22   Comprehensive Metabolic Panel    Specimen: Blood   Result Value Ref Range    Glucose 130 (H) 65 - 99 mg/dL    BUN 26 (H) 8 - 23 mg/dL    Creatinine 1.51 (H) 0.76 - 1.27 mg/dL    Sodium 144 136 - 145 mmol/L    Potassium 4.6 3.5 - 5.2 mmol/L    Chloride 105 98 - 107 mmol/L    CO2 29.7 (H) 22.0 - 29.0 mmol/L    Calcium 9.2 8.6 - 10.5 mg/dL    Total Protein 6.5 6.0 - 8.5 g/dL    Albumin 4.10 3.50 - 5.20 g/dL    ALT (SGPT) 8 1 - 41 U/L    AST (SGOT) 15 1 - 40 U/L    Alkaline Phosphatase 86 39 - 117 U/L    Total Bilirubin 0.3 0.0 - 1.2 mg/dL    eGFR Non African Amer 44 (L) >60 mL/min/1.73    Globulin 2.4 gm/dL    A/G Ratio 1.7 g/dL    BUN/Creatinine Ratio 17.2 7.0 - 25.0    Anion Gap 9.3 5.0 - 15.0 mmol/L   Lipid Panel    Specimen: Blood   Result Value Ref Range    Total Cholesterol 102 0 - 200 mg/dL    Triglycerides 96 0 - 150 mg/dL    HDL Cholesterol 40 40 - 60 mg/dL    LDL Cholesterol  44 0 - 100 mg/dL    VLDL  Cholesterol 18 5 - 40 mg/dL    LDL/HDL Ratio 1.07    TSH    Specimen: Blood   Result Value Ref Range    TSH 4.590 (H) 0.270 - 4.200 uIU/mL   Hemoglobin A1c    Specimen: Blood   Result Value Ref Range    Hemoglobin A1C 6.33 (H) 4.80 - 5.60 %   CK    Specimen: Blood   Result Value Ref Range    Creatine Kinase 57 20 - 200 U/L   T4, Free    Specimen: Blood   Result Value Ref Range    Free T4 1.25 0.93 - 1.70 ng/dL   Uric Acid    Specimen: Blood   Result Value Ref Range    Uric Acid 9.8 (H) 3.4 - 7.0 mg/dL   CBC Auto Differential    Specimen: Blood   Result Value Ref Range    WBC 3.86 3.40 - 10.80 10*3/mm3    RBC 4.34 4.14 - 5.80 10*6/mm3    Hemoglobin 11.8 (L) 13.0 - 17.7 g/dL    Hematocrit 37.1 (L) 37.5 - 51.0 %    MCV 85.5 79.0 - 97.0 fL    MCH 27.2 26.6 - 33.0 pg    MCHC 31.8 31.5 - 35.7 g/dL    RDW 12.8 12.3 - 15.4 %    RDW-SD 39.2 37.0 - 54.0 fl    MPV 10.0 6.0 - 12.0 fL    Platelets 137 (L) 140 - 450 10*3/mm3    Neutrophil % 49.2 42.7 - 76.0 %    Lymphocyte % 39.1 19.6 - 45.3 %    Monocyte % 8.5 5.0 - 12.0 %    Eosinophil % 2.6 0.3 - 6.2 %    Basophil % 0.3 0.0 - 1.5 %    Immature Grans % 0.3 0.0 - 0.5 %    Neutrophils, Absolute 1.90 1.70 - 7.00 10*3/mm3    Lymphocytes, Absolute 1.51 0.70 - 3.10 10*3/mm3    Monocytes, Absolute 0.33 0.10 - 0.90 10*3/mm3    Eosinophils, Absolute 0.10 0.00 - 0.40 10*3/mm3    Basophils, Absolute 0.01 0.00 - 0.20 10*3/mm3    Immature Grans, Absolute 0.01 0.00 - 0.05 10*3/mm3    nRBC 0.0 0.0 - 0.2 /100 WBC     11/21 A1C 6.4    10/21 Cr 1.2, GFR > 60    1/21 uric 5.6    ASSESSMENT/PLAN    Diagnoses and all orders for this visit:    1. Medicare annual wellness visit, subsequent (Primary)  Assessment & Plan:  Health maintenance - COVID19 vacc don, incl 3rd dose Pfizer; flu vacc 9/21; Prevnar 12/15, PVX 12/14, Td due as Tap done 10/11 (good for 10 yrs), Zostavax 9/09; HAV done; rec Shingrix - cousneling given; colonosc 1/15, repeat 2020 per Dr. Holland but patient has opted for no further  screening unless abnlities; no further prostate CA screening w/ age/comorbidities, confirmed with pt; eye exam ongoing - right cataract surgery pending this Wed; dental exam ongoing - implants pending per pt; (+) seat belt use    Consultants:  Patient Care Team:  Delilah Tatum MD as PCP - General  SveticBurton MD as Consulting Physician (Colon and Rectal Surgery)  Douglas Lazaro MD as Consulting Physician (General Surgery)  Rubio Rossi MD as Consulting Physician (Dermatology)  Douglas Brooks MD (Inactive) as Consulting Physician (Cardiology)  Jim Black MD as Consulting Physician (Cardiothoracic Surgery)  Yusef Bowles MD as Consulting Physician (Nephrology)  Tio Mcnally MD (Inactive) as Consulting Physician (Hand Surgery)  Marisol Easley MD as Consulting Physician (Cardiology)  Chayito Quick OD (Optometry)  Radha Hirsch APRN as Nurse Practitioner (Otolaryngology)  Dionisio Valenzuela MD as Consulting Physician (Ophthalmology)  Elisa Conde MD as Consulting Physician (Pulmonary Disease)  Dillon Judge MD as Consulting Physician (Anesthesiology)          2. Type 2 diabetes mellitus with diabetic polyneuropathy, without long-term current use of insulin (HCC)  Assessment & Plan:  BG control stable with A1C 6.33; no meds; encouraged reg phys activity to decr insulin resistance, moderation in unhealthy starches/sweets; f/u A1C in 3 mos        Orders:  -     pregabalin (Lyrica) 75 MG capsule; Take 1 capsule by mouth 2 (Two) Times a Day.  Dispense: 180 capsule; Refill: 0  -     Hemoglobin A1c; Future    3. Hypertension  Assessment & Plan:  BP stable 118/60; on amlo 2.5mg BID and metoprolol XL 50mg QD, also on bumetanide 1mg 2 QD, spironolactone 25mg 1/2 QD, tamsulosin 0.4mg QHS, and entresto 97/103 BID; all meds per cardiology      4. Hyperlipidemia  Assessment & Plan:  Lipids at goal with LDL 44; cont atorvastatin 40mg QD per cards Dr. Easley      5.  Hypothyroidism  Assessment & Plan:  Bord TFTs c/w subclin hypoothyroidism; cont levothyroxine 50mcg QD #90, 3RF    Orders:  -     levothyroxine (SYNTHROID, LEVOTHROID) 50 MCG tablet; Take 1 tablet by mouth Daily.  Dispense: 90 tablet; Refill: 3    6. Stage 3b chronic kidney disease (HCC)  Assessment & Plan:  Renal function worsened with Cr 1.51, GFR 44; discussed importance of good BG and BP control as well as avoidance of NSAIDs, perez since he is also on clopidogrel; repeat BMP in 3 mos in well-hydrated state    Orders:  -     Basic Metabolic Panel; Future    7. Gout  Assessment & Plan:  asx but uric acid level significantly worsened at 9.8; goal < 6; no current meds; discussed dietary component; drink more water daily; repeat level in 3 mos    Orders:  -     Uric Acid; Future    8. BPH  Assessment & Plan:  Stable on tamsulosin 0.4mg QHS #90, 3RF    Orders:  -     tamsulosin (FLOMAX) 0.4 MG capsule 24 hr capsule; Take 1 capsule by mouth Daily.  Dispense: 90 capsule; Refill: 3    9. Insomnia  Assessment & Plan:  Judicious and appropriate use of zolpidem 10mg QHS #30, 2RF). LETHA was reviewed and appropriate.  The patient has read and signed the [facility] Controlled Substance Contract 7/22/21. UDS 10/11/21.  Patient has been counseled and is aware of side effects, risks, potential for addiction/tolerance, interactions, and how to take medication correctly. RTC 3 mos for next RX with LETHA    Patient verbalizes understanding of additional risks of this medication in individuals > 65 yrs of age and wants to continue zolpidem 10mg QHS and lyrica 75mg BID with additional risks of falls, decreased mental clarity, confusion      Orders:  -     zolpidem (AMBIEN) 10 MG tablet; Take 1 tablet by mouth Every Night.  Dispense: 30 tablet; Refill: 2    10. Diabetic peripheral neuropathy (HCC)  Assessment & Plan:  Cont'd judicious and appropriate use of lyrica 75mg BID# 180, 0RF (exception made due to insurance). LETHA was  reviewed and appropriate.  The patient has read and signed the [facility] Controlled Substance Contract 7/22/21. UDS 10/11/21. Patient has been counseled and is aware of side effects, risks, potential for addiction/tolerance, interactions, and how to take medication correctly.  RTC 3 mos for next RX    I cannot see it in the chart but discussed usually lyrica is used after gabapentin failure or inadequate response. Discussed also we would need to figure what dosing of gabapentin would work and discussed risk of sedation, perez daytime dosing. Patient opts to cont with lyrica 75mg BID despite it no longer being on formulary.    Patient verbalizes understanding of additional risks of this medication in individuals > 65 yrs of age and wants to continue lyrica 75mg BID and zolpidem 10mg QHS      11. ??Hernia - reviewed different types of hernias, including hiatal, inguinal, umbilical, ventral, incisional; discussed also unlikely indication for surgery if he is asx; he states he will discuss with his son what the diagnosis is but he would like it fixed.    FOLLOW-UP  RTC 3 mos for next zolpidem and lyrica RXs (contract 7/21, UDS 11/21) with A1C, BMP, uric (escribed) - well-hydrated and nonfasting    Electronically signed by:    Delilah Tatum MD, FACP  01/10/2022

## 2022-01-10 ENCOUNTER — OFFICE VISIT (OUTPATIENT)
Dept: INTERNAL MEDICINE | Facility: CLINIC | Age: 87
End: 2022-01-10

## 2022-01-10 ENCOUNTER — LAB (OUTPATIENT)
Dept: LAB | Facility: HOSPITAL | Age: 87
End: 2022-01-10

## 2022-01-10 VITALS
SYSTOLIC BLOOD PRESSURE: 118 MMHG | WEIGHT: 166 LBS | HEIGHT: 66 IN | HEART RATE: 65 BPM | OXYGEN SATURATION: 97 % | BODY MASS INDEX: 26.68 KG/M2 | DIASTOLIC BLOOD PRESSURE: 60 MMHG

## 2022-01-10 DIAGNOSIS — N40.1 BENIGN NON-NODULAR PROSTATIC HYPERPLASIA WITH LOWER URINARY TRACT SYMPTOMS: ICD-10-CM

## 2022-01-10 DIAGNOSIS — F51.01 PRIMARY INSOMNIA: ICD-10-CM

## 2022-01-10 DIAGNOSIS — E11.42 TYPE 2 DIABETES MELLITUS WITH DIABETIC POLYNEUROPATHY, WITHOUT LONG-TERM CURRENT USE OF INSULIN: Chronic | ICD-10-CM

## 2022-01-10 DIAGNOSIS — Z00.00 MEDICARE ANNUAL WELLNESS VISIT, SUBSEQUENT: Primary | Chronic | ICD-10-CM

## 2022-01-10 DIAGNOSIS — E78.2 MIXED HYPERLIPIDEMIA: Chronic | ICD-10-CM

## 2022-01-10 DIAGNOSIS — E11.42 DIABETIC PERIPHERAL NEUROPATHY: ICD-10-CM

## 2022-01-10 DIAGNOSIS — I10 ESSENTIAL HYPERTENSION: Chronic | ICD-10-CM

## 2022-01-10 DIAGNOSIS — N18.32 STAGE 3B CHRONIC KIDNEY DISEASE: Chronic | ICD-10-CM

## 2022-01-10 DIAGNOSIS — E03.9 ACQUIRED HYPOTHYROIDISM: Chronic | ICD-10-CM

## 2022-01-10 DIAGNOSIS — M10.042 ACUTE IDIOPATHIC GOUT OF LEFT HAND: Chronic | ICD-10-CM

## 2022-01-10 DIAGNOSIS — I10 ESSENTIAL HYPERTENSION: ICD-10-CM

## 2022-01-10 LAB
BACTERIA UR QL AUTO: NORMAL /HPF
BILIRUB UR QL STRIP: NEGATIVE
CLARITY UR: CLEAR
COLOR UR: YELLOW
GLUCOSE UR STRIP-MCNC: NEGATIVE MG/DL
HGB UR QL STRIP.AUTO: NEGATIVE
HYALINE CASTS UR QL AUTO: NORMAL /LPF
KETONES UR QL STRIP: NEGATIVE
LEUKOCYTE ESTERASE UR QL STRIP.AUTO: NEGATIVE
NITRITE UR QL STRIP: NEGATIVE
PH UR STRIP.AUTO: 5.5 [PH] (ref 5–8)
PROT UR QL STRIP: NEGATIVE
RBC # UR STRIP: NORMAL /HPF
REF LAB TEST METHOD: NORMAL
SP GR UR STRIP: 1.01 (ref 1–1.03)
SQUAMOUS #/AREA URNS HPF: NORMAL /HPF
UROBILINOGEN UR QL STRIP: NORMAL
WBC # UR STRIP: NORMAL /HPF

## 2022-01-10 PROCEDURE — 99397 PER PM REEVAL EST PAT 65+ YR: CPT | Performed by: INTERNAL MEDICINE

## 2022-01-10 PROCEDURE — 1170F FXNL STATUS ASSESSED: CPT | Performed by: INTERNAL MEDICINE

## 2022-01-10 PROCEDURE — 99497 ADVNCD CARE PLAN 30 MIN: CPT | Performed by: INTERNAL MEDICINE

## 2022-01-10 PROCEDURE — G0439 PPPS, SUBSEQ VISIT: HCPCS | Performed by: INTERNAL MEDICINE

## 2022-01-10 PROCEDURE — 82043 UR ALBUMIN QUANTITATIVE: CPT

## 2022-01-10 PROCEDURE — 82570 ASSAY OF URINE CREATININE: CPT

## 2022-01-10 PROCEDURE — 1160F RVW MEDS BY RX/DR IN RCRD: CPT | Performed by: INTERNAL MEDICINE

## 2022-01-10 PROCEDURE — 81001 URINALYSIS AUTO W/SCOPE: CPT

## 2022-01-10 PROCEDURE — 1126F AMNT PAIN NOTED NONE PRSNT: CPT | Performed by: INTERNAL MEDICINE

## 2022-01-10 RX ORDER — LEVOTHYROXINE SODIUM 0.05 MG/1
50 TABLET ORAL DAILY
Qty: 90 TABLET | Refills: 3 | Status: SHIPPED | OUTPATIENT
Start: 2022-01-10 | End: 2022-04-15 | Stop reason: SDUPTHER

## 2022-01-10 RX ORDER — ZOLPIDEM TARTRATE 10 MG/1
10 TABLET ORAL NIGHTLY
Qty: 30 TABLET | Refills: 2 | Status: SHIPPED | OUTPATIENT
Start: 2022-01-10 | End: 2022-04-14 | Stop reason: SDUPTHER

## 2022-01-10 RX ORDER — PREGABALIN 75 MG/1
75 CAPSULE ORAL 2 TIMES DAILY
Qty: 180 CAPSULE | Refills: 0 | Status: SHIPPED | OUTPATIENT
Start: 2022-01-10 | End: 2022-04-14 | Stop reason: SDUPTHER

## 2022-01-10 RX ORDER — TAMSULOSIN HYDROCHLORIDE 0.4 MG/1
1 CAPSULE ORAL DAILY
Qty: 90 CAPSULE | Refills: 3 | Status: SHIPPED | OUTPATIENT
Start: 2022-01-10 | End: 2023-01-11 | Stop reason: SDUPTHER

## 2022-01-11 LAB
ALBUMIN UR-MCNC: <1.2 MG/DL
CREAT UR-MCNC: 66.1 MG/DL
MICROALBUMIN/CREAT UR: NORMAL MG/G{CREAT}

## 2022-01-31 DIAGNOSIS — N40.1 BENIGN NON-NODULAR PROSTATIC HYPERPLASIA WITH LOWER URINARY TRACT SYMPTOMS: ICD-10-CM

## 2022-01-31 RX ORDER — TAMSULOSIN HYDROCHLORIDE 0.4 MG/1
CAPSULE ORAL
Qty: 90 CAPSULE | Refills: 3 | OUTPATIENT
Start: 2022-01-31

## 2022-02-03 DIAGNOSIS — E78.2 MIXED HYPERLIPIDEMIA: ICD-10-CM

## 2022-02-03 RX ORDER — ATORVASTATIN CALCIUM 40 MG/1
40 TABLET, FILM COATED ORAL DAILY
Qty: 90 TABLET | Refills: 3 | Status: SHIPPED | OUTPATIENT
Start: 2022-02-03 | End: 2023-01-11 | Stop reason: SDUPTHER

## 2022-02-03 NOTE — TELEPHONE ENCOUNTER
Caller: Taylor Hamilton    Relationship: Emergency Contact    Best call back number: [phone]     Requested Prescriptions:   Requested Prescriptions     Pending Prescriptions Disp Refills   • atorvastatin (LIPITOR) 40 MG tablet       Sig: Take 1 tablet by mouth Daily.        Pharmacy where request should be sent: [facility] - [address] [phone] [facility] [phone]      Additional details provided by patient: PATIENT HAS 5 DAYS LEFT    Does the patient have less than a 3 day supply:  [] Yes  [x] No    Juan Luis Ríos Rep   02/03/22 11:01 EST

## 2022-02-08 ENCOUNTER — TELEPHONE (OUTPATIENT)
Dept: INTERNAL MEDICINE | Facility: CLINIC | Age: 87
End: 2022-02-08

## 2022-02-08 DIAGNOSIS — E78.2 MIXED HYPERLIPIDEMIA: ICD-10-CM

## 2022-02-08 RX ORDER — ATORVASTATIN CALCIUM 40 MG/1
40 TABLET, FILM COATED ORAL DAILY
Qty: 90 TABLET | Refills: 3 | Status: CANCELLED | OUTPATIENT
Start: 2022-02-08

## 2022-02-08 NOTE — TELEPHONE ENCOUNTER
Caller: Taylor Hamilton    Relationship: Emergency Contact    Best call back number: [phone]    Requested Prescriptions:   Requested Prescriptions     Pending Prescriptions Disp Refills   • atorvastatin (LIPITOR) 40 MG tablet 90 tablet 3     Sig: Take 1 tablet by mouth Daily.    SHOULD BE 20MG PER WIFE    Pharmacy where request should be sent: [facility] - [address] [phone] [facility] [phone] [place]     Additional details provided by patient: PLEASE CONFIRM DOSAGE- WIFE THINKS SHOULD BE 20 MG NOT 40 MG    Does the patient have less than a 3 day supply:  [x] Yes  [] No 3-4 DAYS WORTH LEFT  Juan Luis Vo Rep   02/08/22 13:11 EST

## 2022-02-08 NOTE — TELEPHONE ENCOUNTER
Pt's wife notified that Dr pascual sent in 40 mg Atorvastatin to [facility] on 02/03/22. They should get that refill in the mail soon. She verbalized understanding.

## 2022-02-26 PROCEDURE — 87186 SC STD MICRODIL/AGAR DIL: CPT | Performed by: NURSE PRACTITIONER

## 2022-02-26 PROCEDURE — 87070 CULTURE OTHR SPECIMN AEROBIC: CPT | Performed by: NURSE PRACTITIONER

## 2022-02-26 PROCEDURE — 87205 SMEAR GRAM STAIN: CPT | Performed by: NURSE PRACTITIONER

## 2022-02-26 PROCEDURE — 87077 CULTURE AEROBIC IDENTIFY: CPT | Performed by: NURSE PRACTITIONER

## 2022-03-04 ENCOUNTER — TELEPHONE (OUTPATIENT)
Dept: INTERNAL MEDICINE | Facility: CLINIC | Age: 87
End: 2022-03-04

## 2022-03-04 DIAGNOSIS — F51.01 PRIMARY INSOMNIA: ICD-10-CM

## 2022-03-04 RX ORDER — ZOLPIDEM TARTRATE 10 MG/1
10 TABLET ORAL NIGHTLY
Qty: 30 TABLET | Refills: 2 | Status: CANCELLED | OUTPATIENT
Start: 2022-03-04

## 2022-03-04 NOTE — TELEPHONE ENCOUNTER
Caller: Taylor Hamilton    Relationship: Emergency Contact    Best call back number: [phone]    Requested Prescriptions:   Requested Prescriptions     Pending Prescriptions Disp Refills   • zolpidem (AMBIEN) 10 MG tablet 30 tablet 2     Sig: Take 1 tablet by mouth Every Night.    PLEASE NOTE SHOULD BE 90 DAY SUPPLY    [facility] [facility] [phone] [facility] [phone] FX        Additional details provided by patient: PATIENT NEEDS 90 DAY SUPPLY, PLEASE CALL IN THE 90 DAY SUPPLY IN PLACE OF THE 30 DAY      Does the patient have less than a 3 day supply:  ? Yes  ? No GOING OUT OF TOWN- NEEDS TO HAVE FILLED PRIOR TO SNOW BIRDING TO [place]      Juan Luis Vo Rep   03/04/22 09:49 EST

## 2022-03-25 ENCOUNTER — LAB (OUTPATIENT)
Dept: LAB | Facility: HOSPITAL | Age: 87
End: 2022-03-25

## 2022-03-25 DIAGNOSIS — M70.21 OLECRANON BURSITIS OF RIGHT ELBOW: ICD-10-CM

## 2022-03-25 DIAGNOSIS — R50.9 FEVER, UNSPECIFIED FEVER CAUSE: ICD-10-CM

## 2022-03-25 DIAGNOSIS — M70.21 OLECRANON BURSITIS OF RIGHT ELBOW: Primary | ICD-10-CM

## 2022-03-25 LAB — CRP SERPL-MCNC: 1.29 MG/DL (ref 0–0.5)

## 2022-03-25 PROCEDURE — 36415 COLL VENOUS BLD VENIPUNCTURE: CPT

## 2022-03-25 PROCEDURE — 85652 RBC SED RATE AUTOMATED: CPT

## 2022-03-25 PROCEDURE — 86140 C-REACTIVE PROTEIN: CPT

## 2022-03-25 PROCEDURE — 87070 CULTURE OTHR SPECIMN AEROBIC: CPT

## 2022-03-25 PROCEDURE — 85025 COMPLETE CBC W/AUTO DIFF WBC: CPT

## 2022-03-25 PROCEDURE — 87075 CULTR BACTERIA EXCEPT BLOOD: CPT

## 2022-03-25 PROCEDURE — 87205 SMEAR GRAM STAIN: CPT

## 2022-03-26 LAB
BASOPHILS # BLD AUTO: 0.03 10*3/MM3 (ref 0–0.2)
BASOPHILS NFR BLD AUTO: 0.1 % (ref 0–1.5)
DEPRECATED RDW RBC AUTO: 39.8 FL (ref 37–54)
EOSINOPHIL # BLD AUTO: 0.01 10*3/MM3 (ref 0–0.4)
EOSINOPHIL NFR BLD AUTO: 0 % (ref 0.3–6.2)
ERYTHROCYTE [DISTWIDTH] IN BLOOD BY AUTOMATED COUNT: 12.8 % (ref 12.3–15.4)
ERYTHROCYTE [SEDIMENTATION RATE] IN BLOOD: 14 MM/HR (ref 0–20)
HCT VFR BLD AUTO: 34.2 % (ref 37.5–51)
HGB BLD-MCNC: 10.7 G/DL (ref 13–17.7)
LYMPHOCYTES # BLD AUTO: 0.91 10*3/MM3 (ref 0.7–3.1)
LYMPHOCYTES NFR BLD AUTO: 4.5 % (ref 19.6–45.3)
MCH RBC QN AUTO: 27.1 PG (ref 26.6–33)
MCHC RBC AUTO-ENTMCNC: 31.3 G/DL (ref 31.5–35.7)
MCV RBC AUTO: 86.6 FL (ref 79–97)
MONOCYTES # BLD AUTO: 1.6 10*3/MM3 (ref 0.1–0.9)
MONOCYTES NFR BLD AUTO: 7.9 % (ref 5–12)
NEUTROPHILS NFR BLD AUTO: 17.52 10*3/MM3 (ref 1.7–7)
NEUTROPHILS NFR BLD AUTO: 87.1 % (ref 42.7–76)
PLATELET # BLD AUTO: 124 10*3/MM3 (ref 140–450)
PMV BLD AUTO: 10.7 FL (ref 6–12)
RBC # BLD AUTO: 3.95 10*6/MM3 (ref 4.14–5.8)
WBC NRBC COR # BLD: 20.15 10*3/MM3 (ref 3.4–10.8)

## 2022-03-30 LAB
BACTERIA FLD CULT: NORMAL
BACTERIA SPEC ANAEROBE CULT: NORMAL
GRAM STN SPEC: NORMAL
GRAM STN SPEC: NORMAL

## 2022-04-05 ENCOUNTER — APPOINTMENT (OUTPATIENT)
Dept: CT IMAGING | Facility: HOSPITAL | Age: 87
End: 2022-04-05

## 2022-04-05 ENCOUNTER — APPOINTMENT (OUTPATIENT)
Dept: GENERAL RADIOLOGY | Facility: HOSPITAL | Age: 87
End: 2022-04-05

## 2022-04-05 ENCOUNTER — HOSPITAL ENCOUNTER (INPATIENT)
Facility: HOSPITAL | Age: 87
LOS: 3 days | Discharge: LEFT BEFORE DISCHARGE | End: 2022-04-08
Attending: EMERGENCY MEDICINE | Admitting: HOSPITALIST

## 2022-04-05 DIAGNOSIS — D64.9 ACUTE ON CHRONIC ANEMIA: ICD-10-CM

## 2022-04-05 DIAGNOSIS — J40 BRONCHITIS: ICD-10-CM

## 2022-04-05 DIAGNOSIS — R77.8 ELEVATED TROPONIN: ICD-10-CM

## 2022-04-05 DIAGNOSIS — K55.9 MESENTERIC ISCHEMIA: ICD-10-CM

## 2022-04-05 DIAGNOSIS — D50.0 ANEMIA DUE TO GI BLOOD LOSS: ICD-10-CM

## 2022-04-05 DIAGNOSIS — R65.20 SEPSIS WITH ACUTE RENAL FAILURE WITHOUT SEPTIC SHOCK, DUE TO UNSPECIFIED ORGANISM, UNSPECIFIED ACUTE RENAL FAILURE TYPE: Primary | ICD-10-CM

## 2022-04-05 DIAGNOSIS — E86.0 ACUTE DEHYDRATION: ICD-10-CM

## 2022-04-05 DIAGNOSIS — R10.9 ACUTE ABDOMINAL PAIN: ICD-10-CM

## 2022-04-05 DIAGNOSIS — N17.9 SEPSIS WITH ACUTE RENAL FAILURE WITHOUT SEPTIC SHOCK, DUE TO UNSPECIFIED ORGANISM, UNSPECIFIED ACUTE RENAL FAILURE TYPE: Primary | ICD-10-CM

## 2022-04-05 DIAGNOSIS — A41.9 SEPSIS WITH ACUTE RENAL FAILURE WITHOUT SEPTIC SHOCK, DUE TO UNSPECIFIED ORGANISM, UNSPECIFIED ACUTE RENAL FAILURE TYPE: Primary | ICD-10-CM

## 2022-04-05 LAB
ABO GROUP BLD: NORMAL
ABO GROUP BLD: NORMAL
ALBUMIN SERPL-MCNC: 2.4 G/DL (ref 3.5–5.2)
ALBUMIN/GLOB SERPL: 0.8 G/DL
ALP SERPL-CCNC: 70 U/L (ref 39–117)
ALT SERPL W P-5'-P-CCNC: 60 U/L (ref 1–41)
ANION GAP SERPL CALCULATED.3IONS-SCNC: 16 MMOL/L (ref 5–15)
APTT PPP: 35.6 SECONDS (ref 22–39)
AST SERPL-CCNC: 85 U/L (ref 1–40)
BASOPHILS # BLD AUTO: 0.03 10*3/MM3 (ref 0–0.2)
BASOPHILS NFR BLD AUTO: 0.1 % (ref 0–1.5)
BILIRUB SERPL-MCNC: 0.6 MG/DL (ref 0–1.2)
BILIRUB UR QL STRIP: NEGATIVE
BLD GP AB SCN SERPL QL: NEGATIVE
BUN SERPL-MCNC: 55 MG/DL (ref 8–23)
BUN/CREAT SERPL: 20.5 (ref 7–25)
CALCIUM SPEC-SCNC: 7.8 MG/DL (ref 8.6–10.5)
CHLORIDE SERPL-SCNC: 97 MMOL/L (ref 98–107)
CLARITY UR: CLEAR
CO2 SERPL-SCNC: 22 MMOL/L (ref 22–29)
COLOR UR: YELLOW
CREAT SERPL-MCNC: 2.68 MG/DL (ref 0.76–1.27)
D-LACTATE SERPL-SCNC: 2 MMOL/L (ref 0.5–2)
DEPRECATED RDW RBC AUTO: 42.3 FL (ref 37–54)
EGFRCR SERPLBLD CKD-EPI 2021: 22.2 ML/MIN/1.73
EOSINOPHIL # BLD AUTO: 0 10*3/MM3 (ref 0–0.4)
EOSINOPHIL NFR BLD AUTO: 0 % (ref 0.3–6.2)
ERYTHROCYTE [DISTWIDTH] IN BLOOD BY AUTOMATED COUNT: 13.3 % (ref 12.3–15.4)
FLUAV RNA RESP QL NAA+PROBE: NOT DETECTED
FLUBV RNA RESP QL NAA+PROBE: NOT DETECTED
GLOBULIN UR ELPH-MCNC: 3 GM/DL
GLUCOSE SERPL-MCNC: 148 MG/DL (ref 65–99)
GLUCOSE UR STRIP-MCNC: NEGATIVE MG/DL
HBA1C MFR BLD: 6.5 % (ref 4.8–5.6)
HCT VFR BLD AUTO: 26.7 % (ref 37.5–51)
HGB BLD-MCNC: 8.5 G/DL (ref 13–17.7)
HGB UR QL STRIP.AUTO: NEGATIVE
HOLD SPECIMEN: NORMAL
IMM GRANULOCYTES # BLD AUTO: 0.24 10*3/MM3 (ref 0–0.05)
IMM GRANULOCYTES NFR BLD AUTO: 1 % (ref 0–0.5)
INR PPP: 1.35 (ref 0.84–1.13)
KETONES UR QL STRIP: ABNORMAL
LEUKOCYTE ESTERASE UR QL STRIP.AUTO: NEGATIVE
LIPASE SERPL-CCNC: 8 U/L (ref 13–60)
LYMPHOCYTES # BLD AUTO: 0.38 10*3/MM3 (ref 0.7–3.1)
LYMPHOCYTES NFR BLD AUTO: 1.5 % (ref 19.6–45.3)
MCH RBC QN AUTO: 28 PG (ref 26.6–33)
MCHC RBC AUTO-ENTMCNC: 31.8 G/DL (ref 31.5–35.7)
MCV RBC AUTO: 87.8 FL (ref 79–97)
MONOCYTES # BLD AUTO: 1.13 10*3/MM3 (ref 0.1–0.9)
MONOCYTES NFR BLD AUTO: 4.6 % (ref 5–12)
NEUTROPHILS NFR BLD AUTO: 22.86 10*3/MM3 (ref 1.7–7)
NEUTROPHILS NFR BLD AUTO: 92.8 % (ref 42.7–76)
NITRITE UR QL STRIP: NEGATIVE
NRBC BLD AUTO-RTO: 0 /100 WBC (ref 0–0.2)
PH UR STRIP.AUTO: <=5 [PH] (ref 5–8)
PLATELET # BLD AUTO: 249 10*3/MM3 (ref 140–450)
PMV BLD AUTO: 10.2 FL (ref 6–12)
POTASSIUM SERPL-SCNC: 3.4 MMOL/L (ref 3.5–5.2)
PROCALCITONIN SERPL-MCNC: 1.66 NG/ML (ref 0–0.25)
PROT SERPL-MCNC: 5.4 G/DL (ref 6–8.5)
PROT UR QL STRIP: ABNORMAL
PROTHROMBIN TIME: 16.6 SECONDS (ref 11.4–14.4)
QT INTERVAL: 378 MS
QTC INTERVAL: 482 MS
RBC # BLD AUTO: 3.04 10*6/MM3 (ref 4.14–5.8)
RH BLD: POSITIVE
RH BLD: POSITIVE
SARS-COV-2 RNA RESP QL NAA+PROBE: NOT DETECTED
SODIUM SERPL-SCNC: 135 MMOL/L (ref 136–145)
SP GR UR STRIP: 1.02 (ref 1–1.03)
T&S EXPIRATION DATE: NORMAL
TROPONIN T SERPL-MCNC: 0.38 NG/ML (ref 0–0.03)
TROPONIN T SERPL-MCNC: 0.53 NG/ML (ref 0–0.03)
TROPONIN T SERPL-MCNC: 0.57 NG/ML (ref 0–0.03)
UROBILINOGEN UR QL STRIP: ABNORMAL
WBC NRBC COR # BLD: 24.64 10*3/MM3 (ref 3.4–10.8)
WHOLE BLOOD HOLD SPECIMEN: NORMAL
WHOLE BLOOD HOLD SPECIMEN: NORMAL

## 2022-04-05 PROCEDURE — 87636 SARSCOV2 & INF A&B AMP PRB: CPT | Performed by: EMERGENCY MEDICINE

## 2022-04-05 PROCEDURE — 99285 EMERGENCY DEPT VISIT HI MDM: CPT

## 2022-04-05 PROCEDURE — 86923 COMPATIBILITY TEST ELECTRIC: CPT

## 2022-04-05 PROCEDURE — 85730 THROMBOPLASTIN TIME PARTIAL: CPT | Performed by: SURGERY

## 2022-04-05 PROCEDURE — 85025 COMPLETE CBC W/AUTO DIFF WBC: CPT | Performed by: EMERGENCY MEDICINE

## 2022-04-05 PROCEDURE — 74176 CT ABD & PELVIS W/O CONTRAST: CPT

## 2022-04-05 PROCEDURE — 93005 ELECTROCARDIOGRAM TRACING: CPT | Performed by: EMERGENCY MEDICINE

## 2022-04-05 PROCEDURE — 84484 ASSAY OF TROPONIN QUANT: CPT | Performed by: EMERGENCY MEDICINE

## 2022-04-05 PROCEDURE — 25010000002 PIPERACILLIN SOD-TAZOBACTAM PER 1 G: Performed by: INTERNAL MEDICINE

## 2022-04-05 PROCEDURE — 81003 URINALYSIS AUTO W/O SCOPE: CPT | Performed by: EMERGENCY MEDICINE

## 2022-04-05 PROCEDURE — 83605 ASSAY OF LACTIC ACID: CPT | Performed by: EMERGENCY MEDICINE

## 2022-04-05 PROCEDURE — 83605 ASSAY OF LACTIC ACID: CPT | Performed by: INTERNAL MEDICINE

## 2022-04-05 PROCEDURE — 86901 BLOOD TYPING SEROLOGIC RH(D): CPT | Performed by: SURGERY

## 2022-04-05 PROCEDURE — 86900 BLOOD TYPING SEROLOGIC ABO: CPT | Performed by: SURGERY

## 2022-04-05 PROCEDURE — 84484 ASSAY OF TROPONIN QUANT: CPT | Performed by: INTERNAL MEDICINE

## 2022-04-05 PROCEDURE — 84145 PROCALCITONIN (PCT): CPT | Performed by: INTERNAL MEDICINE

## 2022-04-05 PROCEDURE — 86850 RBC ANTIBODY SCREEN: CPT | Performed by: SURGERY

## 2022-04-05 PROCEDURE — 25010000002 VANCOMYCIN 10 G RECONSTITUTED SOLUTION: Performed by: EMERGENCY MEDICINE

## 2022-04-05 PROCEDURE — 25010000002 ONDANSETRON PER 1 MG: Performed by: EMERGENCY MEDICINE

## 2022-04-05 PROCEDURE — 86900 BLOOD TYPING SEROLOGIC ABO: CPT

## 2022-04-05 PROCEDURE — 83036 HEMOGLOBIN GLYCOSYLATED A1C: CPT | Performed by: INTERNAL MEDICINE

## 2022-04-05 PROCEDURE — 36415 COLL VENOUS BLD VENIPUNCTURE: CPT

## 2022-04-05 PROCEDURE — 71045 X-RAY EXAM CHEST 1 VIEW: CPT

## 2022-04-05 PROCEDURE — 80053 COMPREHEN METABOLIC PANEL: CPT | Performed by: EMERGENCY MEDICINE

## 2022-04-05 PROCEDURE — 87040 BLOOD CULTURE FOR BACTERIA: CPT | Performed by: EMERGENCY MEDICINE

## 2022-04-05 PROCEDURE — 85610 PROTHROMBIN TIME: CPT | Performed by: SURGERY

## 2022-04-05 PROCEDURE — 83690 ASSAY OF LIPASE: CPT | Performed by: EMERGENCY MEDICINE

## 2022-04-05 PROCEDURE — 86901 BLOOD TYPING SEROLOGIC RH(D): CPT

## 2022-04-05 PROCEDURE — 99223 1ST HOSP IP/OBS HIGH 75: CPT | Performed by: INTERNAL MEDICINE

## 2022-04-05 PROCEDURE — 87086 URINE CULTURE/COLONY COUNT: CPT | Performed by: EMERGENCY MEDICINE

## 2022-04-05 PROCEDURE — 25010000002 PIPERACILLIN SOD-TAZOBACTAM PER 1 G: Performed by: EMERGENCY MEDICINE

## 2022-04-05 RX ORDER — PANTOPRAZOLE SODIUM 40 MG/10ML
40 INJECTION, POWDER, LYOPHILIZED, FOR SOLUTION INTRAVENOUS
Status: DISCONTINUED | OUTPATIENT
Start: 2022-04-06 | End: 2022-04-08 | Stop reason: HOSPADM

## 2022-04-05 RX ORDER — PREGABALIN 75 MG/1
75 CAPSULE ORAL 2 TIMES DAILY
Status: DISCONTINUED | OUTPATIENT
Start: 2022-04-05 | End: 2022-04-08 | Stop reason: HOSPADM

## 2022-04-05 RX ORDER — ACETAMINOPHEN 500 MG
1000 TABLET ORAL ONCE
Status: COMPLETED | OUTPATIENT
Start: 2022-04-05 | End: 2022-04-05

## 2022-04-05 RX ORDER — SODIUM CHLORIDE 0.9 % (FLUSH) 0.9 %
10 SYRINGE (ML) INJECTION EVERY 12 HOURS SCHEDULED
Status: DISCONTINUED | OUTPATIENT
Start: 2022-04-05 | End: 2022-04-08 | Stop reason: HOSPADM

## 2022-04-05 RX ORDER — SODIUM CHLORIDE 9 MG/ML
75 INJECTION, SOLUTION INTRAVENOUS CONTINUOUS
Status: DISCONTINUED | OUTPATIENT
Start: 2022-04-05 | End: 2022-04-08 | Stop reason: HOSPADM

## 2022-04-05 RX ORDER — MIDODRINE HYDROCHLORIDE 5 MG/1
5 TABLET ORAL
Status: DISCONTINUED | OUTPATIENT
Start: 2022-04-06 | End: 2022-04-08 | Stop reason: HOSPADM

## 2022-04-05 RX ORDER — LEVOTHYROXINE SODIUM 0.05 MG/1
50 TABLET ORAL DAILY
Status: DISCONTINUED | OUTPATIENT
Start: 2022-04-05 | End: 2022-04-08 | Stop reason: HOSPADM

## 2022-04-05 RX ORDER — SODIUM CHLORIDE 9 MG/ML
10 INJECTION INTRAVENOUS AS NEEDED
Status: DISCONTINUED | OUTPATIENT
Start: 2022-04-05 | End: 2022-04-08 | Stop reason: HOSPADM

## 2022-04-05 RX ORDER — DEXTROSE MONOHYDRATE 25 G/50ML
25 INJECTION, SOLUTION INTRAVENOUS
Status: DISCONTINUED | OUTPATIENT
Start: 2022-04-05 | End: 2022-04-08 | Stop reason: HOSPADM

## 2022-04-05 RX ORDER — BISACODYL 10 MG
10 SUPPOSITORY, RECTAL RECTAL DAILY PRN
Status: DISCONTINUED | OUTPATIENT
Start: 2022-04-05 | End: 2022-04-08 | Stop reason: HOSPADM

## 2022-04-05 RX ORDER — ONDANSETRON 2 MG/ML
4 INJECTION INTRAMUSCULAR; INTRAVENOUS EVERY 6 HOURS PRN
Status: DISCONTINUED | OUTPATIENT
Start: 2022-04-05 | End: 2022-04-08 | Stop reason: HOSPADM

## 2022-04-05 RX ORDER — ZOLPIDEM TARTRATE 5 MG/1
5 TABLET ORAL NIGHTLY PRN
Status: DISCONTINUED | OUTPATIENT
Start: 2022-04-05 | End: 2022-04-08 | Stop reason: HOSPADM

## 2022-04-05 RX ORDER — SODIUM CHLORIDE 0.9 % (FLUSH) 0.9 %
10 SYRINGE (ML) INJECTION AS NEEDED
Status: DISCONTINUED | OUTPATIENT
Start: 2022-04-05 | End: 2022-04-08 | Stop reason: HOSPADM

## 2022-04-05 RX ORDER — ALBUTEROL SULFATE 90 UG/1
2 AEROSOL, METERED RESPIRATORY (INHALATION) EVERY 6 HOURS PRN
Status: DISCONTINUED | OUTPATIENT
Start: 2022-04-05 | End: 2022-04-08 | Stop reason: HOSPADM

## 2022-04-05 RX ORDER — MORPHINE SULFATE 2 MG/ML
2 INJECTION, SOLUTION INTRAMUSCULAR; INTRAVENOUS EVERY 4 HOURS PRN
Status: DISCONTINUED | OUTPATIENT
Start: 2022-04-05 | End: 2022-04-08 | Stop reason: HOSPADM

## 2022-04-05 RX ORDER — ONDANSETRON 2 MG/ML
4 INJECTION INTRAMUSCULAR; INTRAVENOUS ONCE
Status: COMPLETED | OUTPATIENT
Start: 2022-04-05 | End: 2022-04-05

## 2022-04-05 RX ORDER — AMOXICILLIN 250 MG
2 CAPSULE ORAL 2 TIMES DAILY
Status: DISCONTINUED | OUTPATIENT
Start: 2022-04-05 | End: 2022-04-08 | Stop reason: HOSPADM

## 2022-04-05 RX ORDER — POLYETHYLENE GLYCOL 3350 17 G/17G
17 POWDER, FOR SOLUTION ORAL DAILY PRN
Status: DISCONTINUED | OUTPATIENT
Start: 2022-04-05 | End: 2022-04-08 | Stop reason: HOSPADM

## 2022-04-05 RX ORDER — NICOTINE POLACRILEX 4 MG
15 LOZENGE BUCCAL
Status: DISCONTINUED | OUTPATIENT
Start: 2022-04-05 | End: 2022-04-08 | Stop reason: HOSPADM

## 2022-04-05 RX ORDER — BISACODYL 5 MG/1
5 TABLET, DELAYED RELEASE ORAL DAILY PRN
Status: DISCONTINUED | OUTPATIENT
Start: 2022-04-05 | End: 2022-04-08 | Stop reason: HOSPADM

## 2022-04-05 RX ADMIN — TAZOBACTAM SODIUM AND PIPERACILLIN SODIUM 3.38 G: 375; 3 INJECTION, SOLUTION INTRAVENOUS at 22:47

## 2022-04-05 RX ADMIN — SODIUM CHLORIDE 1000 ML: 9 INJECTION, SOLUTION INTRAVENOUS at 17:31

## 2022-04-05 RX ADMIN — SODIUM CHLORIDE 1000 ML: 9 INJECTION, SOLUTION INTRAVENOUS at 15:49

## 2022-04-05 RX ADMIN — Medication 10 ML: at 21:46

## 2022-04-05 RX ADMIN — LEVOTHYROXINE SODIUM 50 MCG: 50 TABLET ORAL at 22:47

## 2022-04-05 RX ADMIN — ACETAMINOPHEN 1000 MG: 500 TABLET ORAL at 15:49

## 2022-04-05 RX ADMIN — TAZOBACTAM SODIUM AND PIPERACILLIN SODIUM 3.38 G: 375; 3 INJECTION, SOLUTION INTRAVENOUS at 17:31

## 2022-04-05 RX ADMIN — ONDANSETRON 4 MG: 2 INJECTION INTRAMUSCULAR; INTRAVENOUS at 15:49

## 2022-04-05 RX ADMIN — SODIUM CHLORIDE 1000 ML: 9 INJECTION, SOLUTION INTRAVENOUS at 18:25

## 2022-04-05 RX ADMIN — VANCOMYCIN HYDROCHLORIDE 1500 MG: 10 INJECTION, POWDER, LYOPHILIZED, FOR SOLUTION INTRAVENOUS at 18:23

## 2022-04-05 RX ADMIN — SODIUM CHLORIDE 75 ML/HR: 9 INJECTION, SOLUTION INTRAVENOUS at 21:46

## 2022-04-05 RX ADMIN — SENNOSIDES AND DOCUSATE SODIUM 2 TABLET: 50; 8.6 TABLET ORAL at 22:47

## 2022-04-05 RX ADMIN — PREGABALIN 75 MG: 75 CAPSULE ORAL at 22:47

## 2022-04-05 NOTE — H&P
[facility] Medicine Services  HISTORY AND PHYSICAL    Patient Name: Filipe Hamilton Jr.  : 1934  MRN: [id number]  Primary Care Physician: Delilah Tatum MD  Date of admission: 2022      Subjective   Subjective     Chief Complaint:  Abdominal pain    HPI:  Filipe Hamilton Jr. is a 88 y.o. male with history of diabetes, peripheral neuropathy, cardiomyopathy on Bumex and spironolactone, follows with  cardiology admitted for worsening abdominal pain over the last 3 to 4 days.  Son helps provide some history at bedside.  He has not been able to tolerate any oral intake and has been having nausea and vomiting along with diarrhea that worsened today.  He was evaluated by surgery in the ER who recommended conservative management due to increased risk of liborio-operative, post-op complications for now.    Discussed case with general surgery who had already evaluated patient prior to my exam.       Review of Systems   Constitutional: Positive for chills. Negative for fatigue and fever.   Respiratory: Negative for shortness of breath.    Cardiovascular: Negative for chest pain and leg swelling.   Gastrointestinal: Positive for abdominal distention, abdominal pain, diarrhea, nausea and vomiting.   Genitourinary: Positive for difficulty urinating. Negative for dysuria.   Skin: Negative for rash and wound.   Neurological: Negative for weakness and light-headedness.        All other systems reviewed and are negative.     Personal History     Past Medical History:   Diagnosis Date   • Abnormal ankle brachial index 2017    AUGUST (17) nl 1.04 on R, bord 0.91 on L; abdullahi - Dr. Easley   • Basal cell carcinoma (BCC) of left shoulder 2020    s/p blade bx L. shoulder (20); superficial BCC with (+) periph margin; darron Hoffmann   • Basal cell carcinoma of hand 1995    L. wrist (1/3/95); darron Hoffmann   • Hx of cardiovascular stress test 2013    SPECT  stress test (3/19/13) EF 53%, c/w prior MI    • Hx of carotid ultrasound 04/16/2019    carotid u/s (4/16/19): bilat plaque, 0-49% stenosis right, 50-69% stenosis left   • Hx of carotid ultrasound 02/08/2021    carotid u/s (2/8/21): bilat 0-49% stenosis; marked diffuse prominent extracranial carotid artery atherosclerotic changes   • Hx of cath aortogram with L. angiogram 10/03/2017    aortogram and L. angio (10/3/17): no AAA, occluded L. SFA; will pursue L.SFA intervention; abdullahi Easley   • Hx of chest x-ray 10/12/2017    nl CXR (10/12/17)   • Hx of colonoscopy 11/10/2015    hyperplastic polyps (11/15), repeat in 5 yrs; QUINTON Holland   • Hx of echocardiogram 05/03/2021    ECHO (5/3/21, [facility] hospitalized): EF 35%, grade II agarwal dysfunction, mod MR, mild-mod TR, mild pulm HTN (RVSP 41.81mmHg), dilated RV   • Hx of exercise stress test 06/10/2016    nl GXT (6/16), noting only increased ventricular ectopy with exercise; abdullahi Conrad   • Hx of tobacco use     h/o 2ppd x 40 yrs; quit 1990s   • Squamous cell carcinoma in situ of skin 11/11/2016    SCC in-situ L. chest (11/11/16); derm - Dr. 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 Shun             Past Surgical History:   Procedure Laterality Date   • ATHERECTOMY Right 09/15/2020    s/p peripheral atherectomy/PTA (9/15/20) for PAD; abdullahi Easley   • CARDIAC CATHETERIZATION  10/26/2021    s/p PCI to LAD (10/26/21): at [facility]   • CAROTID ENDARTERECTOMY Right 01/2005    surg - Dr. Douglas Lazaro   • CATARACT EXTRACTION Bilateral 12/15/2021    Left 12/15/21, Right 1/12/22; ophtho - Dr. Fountain   • CORONARY ARTERY BYPASS GRAFT  04/2011    CABG x2 (4/11)   • HEMORRHOIDECTOMY     • HYDROCELE EXCISION / REPAIR Left     s/p L. hydrocelectomy; uro - Dr. Hobson   • POPLITEAL ARTERY ANGIOPLASTY Left 11/03/2017    s/p successful DCB angioplasty of L. SDA and popliteal  arteries, patent CFA, DFA (11/3/17); CTS - Dr. Easley   • RENAL ARTERY STENT  01/2005    for Renal artery stenosis (1/05)   • SKIN BIOPSY Left 12/11/2020    s/p blade bx L. shoulder (12/11/20): path - superficial BCC with (+) periph margin; derm - Dr. Hoffmann   • TOTAL KNEE ARTHROPLASTY Right 01/2008       Family History:  family history includes Heart disease in his brother, father, and mother; Hypertension in his father and mother. Otherwise pertinent FHx was reviewed and unremarkable.     Social History:  reports that he quit smoking about 35 years ago. He has a 135.00 pack-year smoking history. He quit smokeless tobacco use 7 days ago.  His smokeless tobacco use included chew. He reports previous alcohol use. Drug use questions deferred to the physician.  Social History     Social History Narrative   • Not on file       Medications:  Available home medication information reviewed.  Medications Prior to Admission   Medication Sig Dispense Refill Last Dose   • albuterol sulfate  (90 Base) MCG/ACT inhaler Inhale 2 puffs Every 6 (Six) Hours As Needed for Wheezing. (Patient not taking: Reported on 4/5/2022) 18 g 0 Not Taking at Unknown time   • amLODIPine (NORVASC) 2.5 MG tablet Take 2.5 mg by mouth 2 (two) times a day. (Patient not taking: Reported on 4/5/2022)   Not Taking at Unknown time   • aspirin 81 MG tablet Take 1 tablet by mouth daily.      • atorvastatin (LIPITOR) 40 MG tablet Take 1 tablet by mouth Daily. 90 tablet 3    • bumetanide (BUMEX) 1 MG tablet Take 2 mg by mouth Daily.      • cholecalciferol (VITAMIN D3) 10 MCG (400 UNIT) tablet Take 400 Units by mouth Daily.      • clopidogrel (PLAVIX) 75 MG tablet Take 75 mg by mouth Daily.      • fluocinonide (LIDEX) 0.05 % external solution       • glucose blood (FREESTYLE TEST STRIPS) test strip 1 each by Other route Daily. 100 each 3    • guaiFENesin (MUCINEX) 600 MG 12 hr tablet Take 2 tablets by mouth 2 (Two) Times a Day. 30 tablet 0    •  ketoconazole (NIZORAL) 2 % shampoo       • Lancets misc 1 Units Daily. Use as directed 100 each 3    • levothyroxine (SYNTHROID, LEVOTHROID) 50 MCG tablet Take 1 tablet by mouth Daily. 90 tablet 3    • loratadine (Claritin) 5 MG chewable tablet Chew 1 tablet Daily. 30 tablet 0    • metoprolol succinate XL (TOPROL-XL) 50 MG 24 hr tablet Take 50 mg by mouth Daily. Increased per Dr. Ramirez      • Misc Natural Products (GLUCOSAMINE CHONDROITIN ADV) tablet Take  by mouth.      • omeprazole (priLOSEC) 20 MG capsule TAKE 1 CAPSULE DAILY 90 capsule 3    • potassium chloride 10 MEQ CR tablet Take 10 mEq by mouth Daily.      • pregabalin (Lyrica) 75 MG capsule Take 1 capsule by mouth 2 (Two) Times a Day. 180 capsule 0    • sacubitril-valsartan (ENTRESTO)  MG tablet Take 1 tablet by mouth 2 (Two) Times a Day.      • Spiriva Respimat 2.5 MCG/ACT aerosol solution inhaler USE 2 INHALATIONS DAILY 12 g 3    • spironolactone (ALDACTONE) 12.5 MG tablet half tablet Take  by mouth Daily. 25mg 1/2 QD      • tamsulosin (FLOMAX) 0.4 MG capsule 24 hr capsule Take 1 capsule by mouth Daily. 90 capsule 3    • zolpidem (AMBIEN) 10 MG tablet Take 1 tablet by mouth Every Night. 30 tablet 2        Allergies   Allergen Reactions   • Niacin      flushing       Objective   Objective     Vital Signs:   Temp:  [99.7 °F (37.6 °C)] 99.7 °F (37.6 °C)  Heart Rate:  [] 90  Resp:  [16-22] 16  BP: ()/(49-73) 103/54       Physical Exam  Constitutional:       Appearance: Normal appearance.   HENT:      Head: Normocephalic and atraumatic.      Nose: Nose normal.   Cardiovascular:      Rate and Rhythm: Regular rhythm. Tachycardia present.      Heart sounds: No murmur heard.  Pulmonary:      Effort: Pulmonary effort is normal. No respiratory distress.      Breath sounds: Normal breath sounds.   Abdominal:      General: There is distension.      Tenderness: There is abdominal tenderness. There is no guarding.   Musculoskeletal:         General: No  swelling.   Skin:     General: Skin is warm and dry.      Capillary Refill: Capillary refill takes 2 to 3 seconds.   Neurological:      General: No focal deficit present.      Mental Status: He is alert and oriented to person, place, and time.   Psychiatric:         Mood and Affect: Mood normal.            Result Review:  I have personally reviewed the results from the time of this admission to 4/5/2022 21:24 EDT and agree with these findings:  [x]  Laboratory  []  Microbiology  [x]  Radiology  [x]  EKG/Telemetry   []  Cardiology/Vascular   []  Pathology  [x]  Old records  []  Other:  Most notable findings include:       LAB RESULTS:      Lab 04/05/22 2034 04/05/22  1449   WBC  --  24.64*   HEMOGLOBIN  --  8.5*   HEMATOCRIT  --  26.7*   PLATELETS  --  249   NEUTROS ABS  --  22.86*   IMMATURE GRANS (ABS)  --  0.24*   LYMPHS ABS  --  0.38*   MONOS ABS  --  1.13*   EOS ABS  --  0.00   MCV  --  87.8   PROCALCITONIN  --  1.66*   LACTATE  --  2.0   PROTIME  --  16.6*   INR  --  1.35*   APTT 35.6  --          Lab 04/05/22  1449   SODIUM 135*   POTASSIUM 3.4*   CHLORIDE 97*   CO2 22.0   ANION GAP 16.0*   BUN 55*   CREATININE 2.68*   EGFR 22.2*   GLUCOSE 148*   CALCIUM 7.8*         Lab 04/05/22  1449   TOTAL PROTEIN 5.4*   ALBUMIN 2.40*   GLOBULIN 3.0   ALT (SGPT) 60*   AST (SGOT) 85*   BILIRUBIN 0.6   ALK PHOS 70   LIPASE 8*         Lab 04/05/22  1449   TROPONIN T 0.379*                 UA    Urinalysis 1/10/22 1/10/22 4/5/22    1449 1449    Squamous Epithelial Cells, UA  0-2    Specific Gravity, UA 1.014  1.019   Ketones, UA Negative  Trace (A)   Blood, UA Negative  Negative   Leukocytes, UA Negative  Negative   Nitrite, UA Negative  Negative   RBC, UA  0-2    WBC, UA  0-2    Bacteria, UA  None Seen    (A) Abnormal value              Microbiology Results (last 10 days)     Procedure Component Value - Date/Time    COVID PRE-OP / PRE-PROCEDURE SCREENING ORDER (NO ISOLATION) - Swab, Nasopharynx [[id number]]  (Normal)  Collected: 04/05/22 1558    Lab Status: Final result Specimen: Swab from Nasopharynx Updated: 04/05/22 1632    Narrative:      The following orders were created for panel order COVID PRE-OP / PRE-PROCEDURE SCREENING ORDER (NO ISOLATION) - Swab, Nasopharynx.  Procedure                               Abnormality         Status                     ---------                               -----------         ------                     COVID-19 and FLU A/B PCR...[[id number]]  Normal              Final result                 Please view results for these tests on the individual orders.    COVID-19 and FLU A/B PCR - Swab, Nasopharynx [[id number]]  (Normal) Collected: 04/05/22 1558    Lab Status: Final result Specimen: Swab from Nasopharynx Updated: 04/05/22 1632     COVID19 Not Detected     Influenza A PCR Not Detected     Influenza B PCR Not Detected    Narrative:      Fact sheet for providers: https://www.fda.gov/media/441766/download    Fact sheet for patients: https://www.fda.gov/media/098471/download    Test performed by PCR.          CT Abdomen Pelvis Without Contrast    Result Date: 4/5/2022  DATE OF EXAM: 4/5/2022 4:49 PM  PROCEDURE: CT ABDOMEN PELVIS WO CONTRAST-  INDICATIONS: nausea/vomiting/diarrhea  COMPARISON: 1/31/2013 abdomen pelvis CT scan  TECHNIQUE: Routine transaxial slices were obtained through the abdomen and pelvis without the administration of intravenous contrast. Reconstructed coronal and sagittal images were also obtained. Automated exposure control and iterative construction methods were used.  The radiation dose reduction device was turned on for each scan per the ALARA (As Low as Reasonably Achievable) protocol.  FINDINGS: History indicates generalized abdominal pain nausea vomiting and diarrhea.  The included lower lungs show mild new peribronchial thickening in the middle lobe and right lower lobe, possibly bronchitis. No focal pneumonia or effusion is seen.  Granulomas calcifications are seen in  "the normal-sized spleen. There is mild fatty liver change. No significant abnormalities are seen of the gallbladder, pancreas, adrenal glands, or kidneys for a non-IV contrast enhanced exam. No upper abdominal free air ascites or adenopathy is seen.  In the left upper quadrant, there is inflammatory fat stranding around the splenic flexure of the colon, but also focal small bowel mesenteric edema in a separate but adjacent location left upper quadrant, with mild mucosal thickening of a loop of adjacent small bowel. It is uncertain if these two areas inflammation represent the same underlying pathology. No abscess, pneumatosis or extraluminal air is seen. The colonic inflammatory focus is not associated with significant diverticular disease.  Regarding the lower abdomen pelvis, no additional inflamed bowel loops are identified. There is, however, an unusual appearance involving both the distal colon and adjacent loop of small bowel from axial image 86 through image 102, with a partial \"whirl sign \"and marked decreased caliber of small bowel at this level as well as diminished caliber of the colon, possibly a very low-level obstruction, as from underlying adhesion, very small internal hernia etc. The small bowel loop can also be seen in the left lower quadrant coronal image 36 through 26.  Terminal ileum cecum and appendix appear normal. No intrapelvic free fluid is seen. Bladder is normally distended and normal in appearance. Incidental note is made of heavy atherosclerotic calcification of the abdominal aorta, extending into the lumen on axial image 71, suggesting there is likely a high degree of stenosis. Heavy atherosclerotic calcification is seen of the patient's small iliac arteries as well. Similar appearance is present in 2013, with calcified material in the aortic lumen, although the aorta was patent on that study.       Impression:    1. Unusual appearance of the bowel in the left abdomen, in the left upper " quadrant with separate areas and inflammation of small bowel and proximal descending colon. More distal area in the left abdomen that resembles a whirl sign involving both large and small bowel, without evidence of significant ongoing bowel obstruction. Consider possible internal hernia or adhesion with resulting vascular compromise of bowel. 2. No other evidence of acute intra-abdominal or intrapelvic disease elsewhere. 3. Heavy atherosclerotic calcification of the distal abdominal aorta and iliac arteries, but also present in 2013. Potentially significant luminal stenosis of the distal abdominal aorta is a consideration. 4. Mild new interstitial disease the right middle and lower lobe, potentially bronchitis, or more likely with history of vomiting, mild aspiration.  Note: Preliminary findings of this study were discussed with Dr. Longo at approximately 5:25 PM 4/5/2022.  This report was finalized on 4/5/2022 5:26 PM by Dr. Gonzalez Dillard MD.      XR Chest 1 View    Result Date: 4/5/2022  DATE OF EXAM: 4/5/2022 3:08 PM  PROCEDURE: XR CHEST 1 VW-  INDICATIONS: nausea/vomitng/fatigue  COMPARISON: 2/27/2021  TECHNIQUE: Single radiographic AP view of the chest was obtained.  FINDINGS: No focal airspace opacity. No pleural effusion or pneumothorax. Normal heart and mediastinal contours. Sternotomy wires are intact.      Impression: No evidence of acute disease in the chest.  This report was finalized on 4/5/2022 3:30 PM by Yusef Rausch.            Assessment/Plan   Assessment & Plan     Active Hospital Problems    Diagnosis  POA   • Abdominal pain [R10.9]  Unknown   • Severe sepsis (HCC) [A41.9, R65.20]  Unknown   • Sepsis with acute renal failure without septic shock, due to unspecified organism, unspecified acute renal failure type (HCC) [A41.9, R65.20, N17.9]  Yes   • Hypertension [I10]  Yes     10/11/21 /84; on metoprolol XL 50mg QD, amlo 2.5mg BID; also takes bumex 2mg QD, entresto 97/103 BID, and  spironolactone 12.5mg QD; 5/7/21 /68 on lisin 40mg QD, amlo 5mg QD, and metoprolol XL 25mg 1/2 QD; 4/8/21 /70; 10/13/2020 /68 on amlodipine 5 mg QD, lisinopril 40 mg QD; impression: 1/3/19 BP stable 120/64, on amlo 5mg QD, lisin 40mg QD; 04/19/2016 - BP has been running high per patient; patient will check to see if actually taking amlodipine (no RXs in EHR since 2014; incr lisin to 20mg BID #180, 2RF; rec low Na diet, cont monitoring BPs; f/u in 1 month  Impression: 12/18/2015 - BP stable on current meds; stable electrolytes  Impression: 09/11/2015 - BP excellent  Impression: 06/08/2015 - BP remains excellent on current med  Impression: 03/09/2015 - BP stable  Impression: 01/12/2015 - BP stable  Impression: 09/09/2014 - BP remains stable on lisinopril;      • Type 2 diabetes mellitus with diabetic polyneuropathy, without long-term current use of insulin (HCC) [E11.42]  Yes     1/5/22 A1C 6.33; 11/19/21 UDS appropriate (+) zolpidem/lyrica; 11/19/21 A1C 6.4; 10/11/21 wants to wean off of lyrica - rec 75mg QHS x 2 wks, then QOD x 1-2 wks, then stop; 7/22/21 A1C 6.1; 5/17/21 abnl NFBG 198; 4/8/21 A1C better 5.7; 1/4/21 A1C 6.21; 10/15/20 UDS appropriate (+) zolpidem/lyrica; 9/10/20 ophalba/Dr. Dionisio Valenzuela - cataract OU, RTC 1 yr; 7/14/2020 A1c 5.9; no meds; 1/6/20 A1C bord 6.2 (was 5.8 in 7/19), also remains on lyrica 64mg BID #180, 0RF (exception due to insurance); 09/09/2014 - offered option to increase gabapentin to 300mg QAM and 600mg QHS with is complaints of increased nighttime sxs; patient prefers trial of Lyrica 75mg BID #60,2RF and will discontinue gabapentin;cautions also given with concomitant use of zolpidem with lyrica, specifically to monitor for excessive sedating effects; LETHA done and controlled med contract signed today; f/u in 3 mos for next prescription - could consider adjusting dose depending on results;   Impression: 12/18/2015 - BG control remains stable; healthy diet and phys  activtiy; no meds; f/u A1C In 6 mos  Impression: 06/08/2015 - BG control remains stable; healthy diet and phys activity as tolerated  Impression: 12/09/2014 - BG control remains stable; cont healthy diet and phys acitivty; repeat A1C in 6 mos  Impression: 03/07/2014 - stable A1C; cont healthy diet and exercise;        Sepsis  Leukocytosis  Abd pain  -suspect due to abdominal source   -blood cultures pending  -continue zosyn  - discussed case with general surgery, high risk of morbidity and mortality  -plavix on hold  -NPO      HTN  -add midodrine  -hold amlodipine and BB for now  -check ECHO    Abdominal pain  Elevated Lactic acid  -trend, gentle IVF      NSTEMI  -cardio consult  -trend troponins  -avoid further boluses, suspect EF is low at baseline      KHARI on CKD  -secondary to sepsis, overdiuresis  -recheck in AM  -urinary retention protocol      DM with neuropathy  -SSI while npo    DVT prophylaxis:  On hold, may need possible surgery      CODE STATUS:  Full  Code Status and Medical Interventions:   Ordered at: 04/05/22 6146     Level Of Support Discussed With:    Patient     Code Status (Patient has no pulse and is not breathing):    CPR (Attempt to Resuscitate)     Medical Interventions (Patient has pulse or is breathing):    Full Support         Helen Ely, DO  04/05/22

## 2022-04-05 NOTE — ED PROVIDER NOTES
Subjective   88-year-old male who presents with a complaint of abdominal pain.  He reports that the abdominal pain has been present for some time, he is not able to specify exactly how long.  He states that for close to 1 week he has been hurting and had decreased appetite with associated nausea and intermittent vomiting.  He denies definitively recorded fever but he does report chills.  He denies focal chest pain cough or shortness of breath.  No previous abdominal surgeries to the abdomen.  He reports that his bowel function has been decreased, and cannot clarify the last time he passed gas.  No acute urinary symptoms include no dysuria, frequency, or urgency.  He has not tried any medication for these current symptoms.  From review the records the patient does appear to have history of vessel disease.  He has had a previous renal artery stent, coronary artery bypass grafting, popliteal artery angioplasty, and carotid endarterectomy.          Review of Systems   Constitutional: Positive for appetite change, chills, fatigue and fever.   HENT: Negative for congestion, ear pain, postnasal drip, sinus pressure and sore throat.    Eyes: Negative for pain, redness and visual disturbance.   Respiratory: Negative for cough, chest tightness and shortness of breath.    Cardiovascular: Negative for chest pain, palpitations and leg swelling.   Gastrointestinal: Positive for abdominal pain, nausea and vomiting. Negative for anal bleeding, blood in stool and diarrhea.   Endocrine: Negative for polydipsia and polyuria.   Genitourinary: Negative for difficulty urinating, dysuria, frequency and urgency.   Musculoskeletal: Negative for arthralgias, back pain and neck pain.   Skin: Negative for pallor and rash.   Allergic/Immunologic: Negative for environmental allergies and immunocompromised state.   Neurological: Negative for dizziness, weakness and headaches.   Hematological: Negative for adenopathy.   Psychiatric/Behavioral:  Negative for confusion, self-injury and suicidal ideas. The patient is not nervous/anxious.    All other systems reviewed and are negative.      Past Medical History:   Diagnosis Date   • Abnormal ankle brachial index 09/12/2017    AUGUST (9/12/17) nl 1.04 on R, bord 0.91 on L; abdullahi Easley   • Basal cell carcinoma (BCC) of left shoulder 12/11/2020    s/p blade bx L. shoulder (12/11/20); superficial BCC with (+) periph margin; darron Hoffmann   • Basal cell carcinoma of hand 01/03/1995    L. wrist (1/3/95); darron Hoffmann   • Hx of cardiovascular stress test 03/19/2013    SPECT stress test (3/19/13) EF 53%, c/w prior MI    • Hx of carotid ultrasound 04/16/2019    carotid u/s (4/16/19): bilat plaque, 0-49% stenosis right, 50-69% stenosis left   • Hx of carotid ultrasound 02/08/2021    carotid u/s (2/8/21): bilat 0-49% stenosis; marked diffuse prominent extracranial carotid artery atherosclerotic changes   • Hx of cath aortogram with L. angiogram 10/03/2017    aortogram and L. angio (10/3/17): no AAA, occluded L. SFA; will pursue L.SFA intervention; abdullahi Easley   • Hx of chest x-ray 10/12/2017    nl CXR (10/12/17)   • Hx of colonoscopy 11/10/2015    hyperplastic polyps (11/15), repeat in 5 yrs; QUINTON Holland   • Hx of echocardiogram 05/03/2021    ECHO (5/3/21, [facility] hospitalized): EF 35%, grade II agarwal dysfunction, mod MR, mild-mod TR, mild pulm HTN (RVSP 41.81mmHg), dilated RV   • Hx of exercise stress test 06/10/2016    nl GXT (6/16), noting only increased ventricular ectopy with exercise; abdullahi Conrad   • Hx of tobacco use     h/o 2ppd x 40 yrs; quit 1990s   • Squamous cell carcinoma in situ of skin 11/11/2016    SCC in-situ L. chest (11/11/16); derm - Dr. 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 Hoffmann       Allergies   Allergen Reactions   • Niacin      flushing       Past Surgical History:    Procedure Laterality Date   • ATHERECTOMY Right 09/15/2020    s/p peripheral atherectomy/PTA (9/15/20) for PAD; cards - Dr. Easley   • CARDIAC CATHETERIZATION  10/26/2021    s/p PCI to LAD (10/26/21): at [facility]   • CAROTID ENDARTERECTOMY Right 2005    surg - Dr. Douglas Lazaro   • CATARACT EXTRACTION Bilateral 12/15/2021    Left 12/15/21, Right 22; ophtho - Dr. Fountain   • CORONARY ARTERY BYPASS GRAFT  2011    CABG x2 ()   • HEMORRHOIDECTOMY     • HYDROCELE EXCISION / REPAIR Left     s/p L. hydrocelectomy; uro - Dr. Hobson   • POPLITEAL ARTERY ANGIOPLASTY Left 2017    s/p successful DCB angioplasty of L. SDA and popliteal arteries, patent CFA, DFA (11/3/17); CTS - Dr. Easley   • RENAL ARTERY STENT  2005    for Renal artery stenosis ()   • SKIN BIOPSY Left 2020    s/p blade bx L. shoulder (20): path - superficial BCC with (+) periph margin; derm - Dr. Hoffmann   • TOTAL KNEE ARTHROPLASTY Right 2008       Family History   Problem Relation Age of Onset   • Heart disease Mother    • Hypertension Mother    • Heart disease Father          age 46   • Hypertension Father    • Heart disease Brother         x2, 1 brother  age 33, another  age 54       Social History     Socioeconomic History   • Marital status:      Spouse name: Adelina   Tobacco Use   • Smoking status: Former Smoker     Packs/day: 3.00     Years: 45.00     Pack years: 135.00     Quit date:      Years since quittin.2   • Smokeless tobacco: Former User     Types: Chew     Quit date: 3/29/2022   • Tobacco comment: quit ; 3ppd x 45 yrs, quit age age 53   Vaping Use   • Vaping Use: Never used   Substance and Sexual Activity   • Alcohol use: Not Currently     Comment: less than weekly   • Drug use: Defer   • Sexual activity: Defer           Objective   Physical Exam  Vitals and nursing note reviewed.   Constitutional:       General: He is not in acute distress.     Appearance: Normal  appearance. He is well-developed. He is not toxic-appearing or diaphoretic.   HENT:      Head: Normocephalic and atraumatic.      Right Ear: External ear normal.      Left Ear: External ear normal.      Nose: Nose normal.   Eyes:      General: Lids are normal.      Pupils: Pupils are equal, round, and reactive to light.   Neck:      Trachea: No tracheal deviation.   Cardiovascular:      Rate and Rhythm: Regular rhythm. Tachycardia present.      Pulses: No decreased pulses.      Heart sounds: Normal heart sounds. No murmur heard.    No friction rub. No gallop.   Pulmonary:      Effort: Pulmonary effort is normal. No respiratory distress.      Breath sounds: Examination of the right-lower field reveals rales. Examination of the left-lower field reveals rales. Rales present. No decreased breath sounds, wheezing or rhonchi.   Abdominal:      General: Bowel sounds are normal.      Palpations: Abdomen is soft.      Tenderness: There is abdominal tenderness in the periumbilical area, left upper quadrant and left lower quadrant. There is left CVA tenderness. There is no guarding or rebound.      Hernia: No hernia is present.       Musculoskeletal:         General: No deformity. Normal range of motion.      Cervical back: Normal range of motion and neck supple.   Lymphadenopathy:      Cervical: No cervical adenopathy.   Skin:     General: Skin is warm and dry.      Findings: No rash.   Neurological:      Mental Status: He is alert and oriented to person, place, and time.      Cranial Nerves: No cranial nerve deficit.      Sensory: No sensory deficit.   Psychiatric:         Speech: Speech normal.         Behavior: Behavior normal.         Thought Content: Thought content normal.         Judgment: Judgment normal.         Critical Care  Performed by: Jeanne Longo MD  Authorized by: Jeanne Longo MD     Critical care provider statement:     Critical care time (minutes):  45    Critical care was necessary to  treat or prevent imminent or life-threatening deterioration of the following conditions:  Sepsis and dehydration    Critical care was time spent personally by me on the following activities:  Development of treatment plan with patient or surrogate, discussions with consultants, evaluation of patient's response to treatment, examination of patient, obtaining history from patient or surrogate, ordering and performing treatments and interventions, ordering and review of laboratory studies, ordering and review of radiographic studies, pulse oximetry, re-evaluation of patient's condition and review of old charts               ED Course                                                 MDM  Number of Diagnoses or Management Options  Acute abdominal pain: new and requires workup  Acute dehydration: new and requires workup  Acute on chronic anemia: new and requires workup  Bronchitis: new and requires workup  Elevated troponin: new and requires workup  Sepsis with acute renal failure without septic shock, due to unspecified organism, unspecified acute renal failure type (HCC): new and requires workup  Diagnosis management comments: Evaluation shows significant leukocytosis with signs of dehydration and acute renal insufficiency.  Creatinine is 2.68 significantly elevated from his previous baseline.  He also appears to have acute on chronic anemia.  Troponin is likewise elevated felt to be secondary to acute renal insufficiency and dehydration.  He denies any chest pain.    Covid and influenza test are negative.    CT scan of the abdomen pelvis reports of oral sign concerning for possible internal hernia.  No definitive evidence of bowel obstruction reported.  Significant inflammation identified involving the small and large bowel primarily throughout the left abdomen.    IV fluids have been initiated, blood cultures and lactic acid have been ordered, broad-spectrum antibiotics ordered.    I discussed the patient with the  on-call surgeon, Dr. Oakes.  He recommends hydration, admission to the hospital, and he will evaluate further for possible surgical intervention.    I informed Dr. Paz.  The hospital service will consult on the patient to determine status of admission.       Amount and/or Complexity of Data Reviewed  Clinical lab tests: ordered and reviewed  Tests in the radiology section of CPT®: ordered and reviewed  Decide to obtain previous medical records or to obtain history from someone other than the patient: yes  Obtain history from someone other than the patient: yes  Review and summarize past medical records: yes  Discuss the patient with other providers: yes  Independent visualization of images, tracings, or specimens: yes        Final diagnoses:   Sepsis with acute renal failure without septic shock, due to unspecified organism, unspecified acute renal failure type (HCC)   Acute dehydration   Acute abdominal pain   Bronchitis   Elevated troponin   Acute on chronic anemia       ED Disposition  ED Disposition     ED Disposition   Decision to Admit    Condition   --    Comment   --             No follow-up provider specified.       Medication List      No changes were made to your prescriptions during this visit.          Jeanne Longo MD  04/05/22 0287

## 2022-04-05 NOTE — CONSULTS
Patient Name:  Filipe Hamilton Jr.  YOB: 1934  [id number]       Patient Care Team:  Delilah Tatum MD as PCP - General  [facility]ing Physician (Colon and Rectal Surgery)  Douglas Lazaro MD as Consulting Physician (General Surgery)  Rubio Rossi MD as Consulting Physician (Dermatology)  Douglas Brooks MD (Inactive) as Consulting Physician (Cardiology)  Jim Black MD as Consulting Physician (Cardiothoracic Surgery)  Yusef Bowles MD as Consulting Physician (Nephrology)  Tio Mcnally MD (Inactive) as Consulting Physician (Hand Surgery)  Marisol Easley MD as Consulting Physician (Cardiology)  Chayito Quick OD (Optometry)  Radha Hirsch APRN as Nurse Practitioner (Otolaryngology)  Dionisio Valenzuela MD as Consulting Physician (Ophthalmology)  Elisa Conde MD as Consulting Physician (Pulmonary Disease)  Dillon Judge MD as Consulting Physician (Anesthesiology)      General Surgery Consult Note     Date of Consultation: 04/05/22    Consulting Physician - Helen Ely,*    Reason for Consult - abdominal pain and bowel inflammation    Subjective     I have been asked to see  Filipe Hamilton Jr. , a 88 y.o. male in consultation forabdominal pain and bowel inflammation  from Dr. Ely. The patient has a long standing history of intermittent nausea, vomiting, and diarrhea, which has gotten worse recently. Then, over the past 10 days, he has had intermittent episodes of diffuse abdominal pain that resolve spontaneously. Today's episode has not been worse than previous episodes. Has had multiple BMs today. No subjective fevers/chills. No hematochezia or melana.       Allergy:   Allergies   Allergen Reactions   • Niacin      flushing       Medications:  vancomycin, 20 mg/kg, Intravenous, Once      sodium chloride, 75 mL/hr      No current facility-administered medications on file prior to encounter.     Current  Outpatient Medications on File Prior to Encounter   Medication Sig   • albuterol sulfate  (90 Base) MCG/ACT inhaler Inhale 2 puffs Every 6 (Six) Hours As Needed for Wheezing.   • amLODIPine (NORVASC) 2.5 MG tablet Take 2.5 mg by mouth 2 (two) times a day.   • aspirin 81 MG tablet Take 1 tablet by mouth daily.   • atorvastatin (LIPITOR) 40 MG tablet Take 1 tablet by mouth Daily.   • bumetanide (BUMEX) 1 MG tablet Take 2 mg by mouth Daily.   • cholecalciferol (VITAMIN D3) 10 MCG (400 UNIT) tablet Take 400 Units by mouth Daily.   • clopidogrel (PLAVIX) 75 MG tablet Take 75 mg by mouth Daily.   • fluocinonide (LIDEX) 0.05 % external solution    • glucose blood (FREESTYLE TEST STRIPS) test strip 1 each by Other route Daily.   • guaiFENesin (MUCINEX) 600 MG 12 hr tablet Take 2 tablets by mouth 2 (Two) Times a Day.   • ketoconazole (NIZORAL) 2 % shampoo    • Lancets misc 1 Units Daily. Use as directed   • levothyroxine (SYNTHROID, LEVOTHROID) 50 MCG tablet Take 1 tablet by mouth Daily.   • loratadine (Claritin) 5 MG chewable tablet Chew 1 tablet Daily.   • metoprolol succinate XL (TOPROL-XL) 50 MG 24 hr tablet Take 50 mg by mouth Daily. Increased per Dr. Ramirez   • Misc Natural Products (GLUCOSAMINE CHONDROITIN ADV) tablet Take  by mouth.   • omeprazole (priLOSEC) 20 MG capsule TAKE 1 CAPSULE DAILY   • potassium chloride 10 MEQ CR tablet Take 10 mEq by mouth Daily.   • pregabalin (Lyrica) 75 MG capsule Take 1 capsule by mouth 2 (Two) Times a Day.   • sacubitril-valsartan (ENTRESTO)  MG tablet Take 1 tablet by mouth 2 (Two) Times a Day.   • Spiriva Respimat 2.5 MCG/ACT aerosol solution inhaler USE 2 INHALATIONS DAILY   • spironolactone (ALDACTONE) 12.5 MG tablet half tablet Take  by mouth Daily. 25mg 1/2 QD   • tamsulosin (FLOMAX) 0.4 MG capsule 24 hr capsule Take 1 capsule by mouth Daily.   • zolpidem (AMBIEN) 10 MG tablet Take 1 tablet by mouth Every Night.       PMHx:   Past Medical History:   Diagnosis Date    • Abnormal ankle brachial index 09/12/2017    AUGUST (9/12/17) nl 1.04 on R, bord 0.91 on L; abdullahi Easley   • Basal cell carcinoma (BCC) of left shoulder 12/11/2020    s/p blade bx L. shoulder (12/11/20); superficial BCC with (+) periph margin; darron Hoffmann   • Basal cell carcinoma of hand 01/03/1995    L. wrist (1/3/95); darron Hoffmann   • Hx of cardiovascular stress test 03/19/2013    SPECT stress test (3/19/13) EF 53%, c/w prior MI    • Hx of carotid ultrasound 04/16/2019    carotid u/s (4/16/19): bilat plaque, 0-49% stenosis right, 50-69% stenosis left   • Hx of carotid ultrasound 02/08/2021    carotid u/s (2/8/21): bilat 0-49% stenosis; marked diffuse prominent extracranial carotid artery atherosclerotic changes   • Hx of cath aortogram with L. angiogram 10/03/2017    aortogram and L. angio (10/3/17): no AAA, occluded L. SFA; will pursue L.SFA intervention; abdullahi Easley   • Hx of chest x-ray 10/12/2017    nl CXR (10/12/17)   • Hx of colonoscopy 11/10/2015    hyperplastic polyps (11/15), repeat in 5 yrs; QUINTON Holland   • Hx of echocardiogram 05/03/2021    ECHO (5/3/21, [facility] hospitalized): EF 35%, grade II agarwal dysfunction, mod MR, mild-mod TR, mild pulm HTN (RVSP 41.81mmHg), dilated RV   • Hx of exercise stress test 06/10/2016    nl GXT (6/16), noting only increased ventricular ectopy with exercise; abdullahi Conrad   • Hx of tobacco use     h/o 2ppd x 40 yrs; quit 1990s   • Squamous cell carcinoma in situ of skin 11/11/2016    SCC in-situ L. chest (11/11/16); derm - Dr. 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 Shun       Past Surgical History:  Past Surgical History:   Procedure Laterality Date   • ATHERECTOMY Right 09/15/2020    s/p peripheral atherectomy/PTA (9/15/20) for PAD; abdullahi - Dr. Easley   • CARDIAC CATHETERIZATION  10/26/2021    s/p PCI to LAD (10/26/21): at [facility]   •  CAROTID ENDARTERECTOMY Right 2005    surg - Dr. Douglas Lazaro   • CATARACT EXTRACTION Bilateral 12/15/2021    Left 12/15/21, Right 22; ophtho - Dr. Fountain   • CORONARY ARTERY BYPASS GRAFT  2011    CABG x2 ()   • HEMORRHOIDECTOMY     • HYDROCELE EXCISION / REPAIR Left     s/p L. hydrocelectomy; uro - Dr. Hobson   • POPLITEAL ARTERY ANGIOPLASTY Left 2017    s/p successful DCB angioplasty of L. SDA and popliteal arteries, patent CFA, DFA (11/3/17); CTS - Dr. Easley   • RENAL ARTERY STENT  2005    for Renal artery stenosis ()   • SKIN BIOPSY Left 2020    s/p blade bx L. shoulder (20): path - superficial BCC with (+) periph margin; derm - Dr. Hoffmann   • TOTAL KNEE ARTHROPLASTY Right 2008        Family History:   Family History   Problem Relation Age of Onset   • Heart disease Mother    • Hypertension Mother    • Heart disease Father          age 46   • Hypertension Father    • Heart disease Brother         x2, 1 brother  age 33, another  age 54        Social History:   Social History     Socioeconomic History   • Marital status:      Sp[facility]   Tobacco Use   • Smoking status: Former Smoker     Packs/day: 3.00     Years: 45.00     Pack years: 135.00     Quit date:      Years since quittin.2   • Smokeless tobacco: Former User     Types: Chew     Quit date: 3/29/2022   • Tobacco comment: quit ; 3ppd x 45 yrs, quit age age 53   Vaping Use   • Vaping Use: Never used   Substance and Sexual Activity   • Alcohol use: Not Currently     Comment: less than weekly   • Drug use: Defer   • Sexual activity: Defer         Review of Systems     Constitutional: No fevers, chills or malaise   Eyes: Denies visual changes    Cardiovascular: Denies chest pain, palpitations   Pulmonary: Denies cough or shortness of breath   Abdominal/ GI: See HPI    Genitourinary: Denies dysuria or hematuria   Musculoskeletal: Denies any but chronic joint aches, pains or  "deformities   Psychiatric: No recent mood changes   Neurologic: No paresthesias or loss of function          Objective     Physical Exam:      Vital Signs  BP 90/53 (BP Location: Right arm, Patient Position: Lying)   Pulse 97   Temp 99.7 °F (37.6 °C) (Oral)   Resp 20   Ht 170.2 cm (67\")   Wt 74.8 kg (165 lb)   SpO2 95%   BMI 25.84 kg/m²     Intake/Output Summary (Last 24 hours) at 4/5/2022 1806  Last data filed at 4/5/2022 1425  Gross per 24 hour   Intake 500 ml   Output --   Net 500 ml         Physical Exam:    Head: Normocephalic, atraumatic.   Eyes: Pupils equal, round, react to light and accommodation.   Mouth: Oral mucosa without lesions  Neck: No masses, lymphadenopathy or carotid bruits bilaterally  CV: Rhythm and rate regular, no murmurs, rubs or gallops  Lungs: Clear to auscultation bilaterally  Abdomen: Bowel sounds positive, soft, mild distention, mild tenderness without rebound or guarding  Groin : No obvious hernias bilaterally  Extremities:  No cyanosis, clubbing or edema bilaterally  Lymphatics: No abnormal lymphadenopathy appreciated  Neurologic: No gross deficits      Results Review: I have personally reviewed all of the recent lab and imaging results available at this time.   WBC 24.6  Hgb 8.5  Plts 249  Cr 2.68  K 3.4  Albumin 2.4  Troponin 0.379    CT 4/5:  IMPRESSION:  1. Unusual appearance of the bowel in the left abdomen, in the left  upper quadrant with separate areas and inflammation of small bowel and  proximal descending colon. More distal area in the left abdomen that  resembles a whirl sign involving both large and small bowel, without  evidence of significant ongoing bowel obstruction. Consider possible  internal hernia or adhesion with resulting vascular compromise of bowel.  2. No other evidence of acute intra-abdominal or intrapelvic disease  elsewhere.  3. Heavy atherosclerotic calcification of the distal abdominal aorta and  iliac arteries, but also present in 2013. " Potentially significant  luminal stenosis of the distal abdominal aorta is a consideration.   4. Mild new interstitial disease the right middle and lower lobe,  potentially bronchitis, or more likely with history of vomiting, mild  aspiration.     Assessment/Plan     Assessment and Plan:    Patient with intermittent episodes of abdominal pain, persisting over the last 10 days, along with more prolonged episodes of nausea and vomiting. There is certainly concern for mesenteric ischemia given his calcified mesenteric vessels along with thickening of the bowel. No history of abdominal surgeries, so internal hernia due to adhesions much less likely. I had a long discussion with the patient and his son about the risks and benefits of operative intervention. As it stands now, he is at a high risk for intraoperative and postoperative morbidity and mortality due to: CAD with history of CHF and current troponin leak, acute on chronic renal insufficiency, Plavix use, hypoalbuminemia, anemia. The three of us agreed to go forward with a trial of rehydration with broad spectrum antibiotics and serial lab draws. However, if his labs (perez WBC and lactate) worsen, or he has a clinical decline with altered mental status, fevers >101, worsening tachycardia, we will readdress the need for emergent operative intervention.    Recommend cardiology evaluation should surgery be needed.    Please keep NPO and hold Plavix for now.    Will continue to follow closely.       I discussed the patient's findings and my recommendations with the patient and/or family, as well as the primary team     Demar Oakes MD  04/05/22  18:06 EDT

## 2022-04-06 ENCOUNTER — APPOINTMENT (OUTPATIENT)
Dept: CARDIOLOGY | Facility: HOSPITAL | Age: 87
End: 2022-04-06

## 2022-04-06 PROBLEM — I50.22 CHRONIC SYSTOLIC HEART FAILURE (HCC): Status: ACTIVE | Noted: 2021-05-05

## 2022-04-06 PROBLEM — I21.A1 TYPE 2 MYOCARDIAL INFARCTION DUE TO ANEMIA: Status: ACTIVE | Noted: 2022-04-06

## 2022-04-06 PROBLEM — A41.9 SEVERE SEPSIS (HCC): Status: RESOLVED | Noted: 2022-04-05 | Resolved: 2022-04-06

## 2022-04-06 PROBLEM — M16.0 PRIMARY OSTEOARTHRITIS OF BOTH HIPS: Chronic | Status: RESOLVED | Noted: 2021-11-19 | Resolved: 2022-04-06

## 2022-04-06 PROBLEM — N17.9 AKI (ACUTE KIDNEY INJURY): Status: ACTIVE | Noted: 2022-04-06

## 2022-04-06 PROBLEM — I50.42 CHRONIC COMBINED SYSTOLIC AND DIASTOLIC HEART FAILURE (HCC): Status: ACTIVE | Noted: 2021-05-05

## 2022-04-06 PROBLEM — R65.20 SEVERE SEPSIS: Status: RESOLVED | Noted: 2022-04-05 | Resolved: 2022-04-06

## 2022-04-06 PROBLEM — I27.20 PULMONARY HYPERTENSION: Status: RESOLVED | Noted: 2021-09-24 | Resolved: 2022-04-06

## 2022-04-06 PROBLEM — K55.9 ISCHEMIC BOWEL DISEASE (HCC): Status: ACTIVE | Noted: 2022-04-05

## 2022-04-06 PROBLEM — I25.5 ISCHEMIC CARDIOMYOPATHY: Status: ACTIVE | Noted: 2022-04-06

## 2022-04-06 PROBLEM — D64.9 TYPE 2 MYOCARDIAL INFARCTION DUE TO ANEMIA (HCC): Status: ACTIVE | Noted: 2022-04-06

## 2022-04-06 LAB
BACTERIA SPEC AEROBE CULT: NO GROWTH
BASOPHILS # BLD AUTO: 0.02 10*3/MM3 (ref 0–0.2)
BASOPHILS NFR BLD AUTO: 0.1 % (ref 0–1.5)
BH CV ECHO MEAS - AO MAX PG (FULL): 5.9 MMHG
BH CV ECHO MEAS - AO MAX PG: 11.2 MMHG
BH CV ECHO MEAS - AO MEAN PG (FULL): 3.4 MMHG
BH CV ECHO MEAS - AO MEAN PG: 6 MMHG
BH CV ECHO MEAS - AO ROOT AREA (BSA CORRECTED): 1.8
BH CV ECHO MEAS - AO ROOT AREA: 8.7 CM^2
BH CV ECHO MEAS - AO ROOT DIAM: 3.3 CM
BH CV ECHO MEAS - AO V2 MAX: 167.5 CM/SEC
BH CV ECHO MEAS - AO V2 MEAN: 115.1 CM/SEC
BH CV ECHO MEAS - AO V2 VTI: 33.2 CM
BH CV ECHO MEAS - ASC AORTA: 3.2 CM
BH CV ECHO MEAS - AVA(I,A): 2.3 CM^2
BH CV ECHO MEAS - AVA(I,D): 2.3 CM^2
BH CV ECHO MEAS - AVA(V,A): 2.2 CM^2
BH CV ECHO MEAS - AVA(V,D): 2.2 CM^2
BH CV ECHO MEAS - BSA(HAYCOCK): 1.9 M^2
BH CV ECHO MEAS - BSA: 1.9 M^2
BH CV ECHO MEAS - BZI_BMI: 26 KILOGRAMS/M^2
BH CV ECHO MEAS - BZI_METRIC_HEIGHT: 170.2 CM
BH CV ECHO MEAS - BZI_METRIC_WEIGHT: 75.3 KG
BH CV ECHO MEAS - EDV(CUBED): 130.9 ML
BH CV ECHO MEAS - EDV(MOD-SP2): 117 ML
BH CV ECHO MEAS - EDV(MOD-SP4): 101 ML
BH CV ECHO MEAS - EDV(TEICH): 122.5 ML
BH CV ECHO MEAS - EF(CUBED): 74.4 %
BH CV ECHO MEAS - EF(MOD-BP): 57 %
BH CV ECHO MEAS - EF(MOD-SP2): 60.7 %
BH CV ECHO MEAS - EF(MOD-SP4): 52.5 %
BH CV ECHO MEAS - EF(TEICH): 66 %
BH CV ECHO MEAS - ESV(CUBED): 33.5 ML
BH CV ECHO MEAS - ESV(MOD-SP2): 46 ML
BH CV ECHO MEAS - ESV(MOD-SP4): 48 ML
BH CV ECHO MEAS - ESV(TEICH): 41.7 ML
BH CV ECHO MEAS - FS: 36.5 %
BH CV ECHO MEAS - IVS/LVPW: 0.89
BH CV ECHO MEAS - IVSD: 0.81 CM
BH CV ECHO MEAS - LA DIMENSION: 3.7 CM
BH CV ECHO MEAS - LA/AO: 1.1
BH CV ECHO MEAS - LAD MAJOR: 5.4 CM
BH CV ECHO MEAS - LAT PEAK E' VEL: 10.2 CM/SEC
BH CV ECHO MEAS - LATERAL E/E' RATIO: 10.2
BH CV ECHO MEAS - LV DIASTOLIC VOL/BSA (35-75): 54.1 ML/M^2
BH CV ECHO MEAS - LV IVRT: 0.07 SEC
BH CV ECHO MEAS - LV MASS(C)D: 153 GRAMS
BH CV ECHO MEAS - LV MASS(C)DI: 81.9 GRAMS/M^2
BH CV ECHO MEAS - LV MAX PG: 5.4 MMHG
BH CV ECHO MEAS - LV MEAN PG: 2.6 MMHG
BH CV ECHO MEAS - LV SYSTOLIC VOL/BSA (12-30): 25.7 ML/M^2
BH CV ECHO MEAS - LV V1 MAX: 115.9 CM/SEC
BH CV ECHO MEAS - LV V1 MEAN: 75.7 CM/SEC
BH CV ECHO MEAS - LV V1 VTI: 24.8 CM
BH CV ECHO MEAS - LVIDD: 5.1 CM
BH CV ECHO MEAS - LVIDS: 3.2 CM
BH CV ECHO MEAS - LVLD AP2: 8.6 CM
BH CV ECHO MEAS - LVLD AP4: 8.4 CM
BH CV ECHO MEAS - LVLS AP2: 7.3 CM
BH CV ECHO MEAS - LVLS AP4: 7.6 CM
BH CV ECHO MEAS - LVOT AREA (M): 3.1 CM^2
BH CV ECHO MEAS - LVOT AREA: 3.1 CM^2
BH CV ECHO MEAS - LVOT DIAM: 2 CM
BH CV ECHO MEAS - LVPWD: 0.91 CM
BH CV ECHO MEAS - MED PEAK E' VEL: 9.1 CM/SEC
BH CV ECHO MEAS - MEDIAL E/E' RATIO: 11.4
BH CV ECHO MEAS - MV A MAX VEL: 123.4 CM/SEC
BH CV ECHO MEAS - MV DEC SLOPE: 443.7 CM/SEC^2
BH CV ECHO MEAS - MV DEC TIME: 0.21 SEC
BH CV ECHO MEAS - MV E MAX VEL: 105.8 CM/SEC
BH CV ECHO MEAS - MV E/A: 0.86
BH CV ECHO MEAS - MV MAX PG: 8.2 MMHG
BH CV ECHO MEAS - MV MEAN PG: 3.2 MMHG
BH CV ECHO MEAS - MV P1/2T MAX VEL: 122.5 CM/SEC
BH CV ECHO MEAS - MV P1/2T: 80.8 MSEC
BH CV ECHO MEAS - MV V2 MAX: 143.6 CM/SEC
BH CV ECHO MEAS - MV V2 MEAN: 83.3 CM/SEC
BH CV ECHO MEAS - MV V2 VTI: 40.2 CM
BH CV ECHO MEAS - MVA P1/2T LCG: 1.8 CM^2
BH CV ECHO MEAS - MVA(P1/2T): 2.7 CM^2
BH CV ECHO MEAS - MVA(VTI): 1.9 CM^2
BH CV ECHO MEAS - PA ACC SLOPE: 735 CM/SEC^2
BH CV ECHO MEAS - PA ACC TIME: 0.13 SEC
BH CV ECHO MEAS - PA MAX PG: 4.6 MMHG
BH CV ECHO MEAS - PA PR(ACCEL): 20.4 MMHG
BH CV ECHO MEAS - PA V2 MAX: 107 CM/SEC
BH CV ECHO MEAS - RAP SYSTOLE: 3 MMHG
BH CV ECHO MEAS - RVSP: 14 MMHG
BH CV ECHO MEAS - SI(AO): 154.5 ML/M^2
BH CV ECHO MEAS - SI(CUBED): 52.1 ML/M^2
BH CV ECHO MEAS - SI(LVOT): 41.7 ML/M^2
BH CV ECHO MEAS - SI(MOD-SP2): 38 ML/M^2
BH CV ECHO MEAS - SI(MOD-SP4): 28.4 ML/M^2
BH CV ECHO MEAS - SI(TEICH): 43.3 ML/M^2
BH CV ECHO MEAS - SV(AO): 288.7 ML
BH CV ECHO MEAS - SV(CUBED): 97.4 ML
BH CV ECHO MEAS - SV(LVOT): 77.8 ML
BH CV ECHO MEAS - SV(MOD-SP2): 71 ML
BH CV ECHO MEAS - SV(MOD-SP4): 53 ML
BH CV ECHO MEAS - SV(TEICH): 80.8 ML
BH CV ECHO MEAS - TAPSE (>1.6): 1.5 CM
BH CV ECHO MEAS - TR MAX PG: 11 MMHG
BH CV ECHO MEAS - TR MAX VEL: 167.2 CM/SEC
BH CV ECHO MEASUREMENTS AVERAGE E/E' RATIO: 10.96
BH CV VAS BP LEFT ARM: NORMAL MMHG
BH CV XLRA - RV BASE: 3.6 CM
BH CV XLRA - RV LENGTH: 4.2 CM
BH CV XLRA - RV MID: 2.5 CM
BH CV XLRA - TDI S': 14.5 CM/SEC
CK SERPL-CCNC: 1195 U/L (ref 20–200)
D-LACTATE SERPL-SCNC: 0.8 MMOL/L (ref 0.5–2)
D-LACTATE SERPL-SCNC: 1.2 MMOL/L (ref 0.5–2)
D-LACTATE SERPL-SCNC: 1.3 MMOL/L (ref 0.5–2)
D-LACTATE SERPL-SCNC: 1.3 MMOL/L (ref 0.5–2)
DEPRECATED RDW RBC AUTO: 43.7 FL (ref 37–54)
EOSINOPHIL # BLD AUTO: 0 10*3/MM3 (ref 0–0.4)
EOSINOPHIL NFR BLD AUTO: 0 % (ref 0.3–6.2)
ERYTHROCYTE [DISTWIDTH] IN BLOOD BY AUTOMATED COUNT: 13.4 % (ref 12.3–15.4)
GLUCOSE BLDC GLUCOMTR-MCNC: 105 MG/DL (ref 70–130)
GLUCOSE BLDC GLUCOMTR-MCNC: 144 MG/DL (ref 70–130)
GLUCOSE BLDC GLUCOMTR-MCNC: 169 MG/DL (ref 70–130)
HCT VFR BLD AUTO: 23.5 % (ref 37.5–51)
HGB BLD-MCNC: 7.2 G/DL (ref 13–17.7)
IMM GRANULOCYTES # BLD AUTO: 0.16 10*3/MM3 (ref 0–0.05)
IMM GRANULOCYTES NFR BLD AUTO: 0.9 % (ref 0–0.5)
LEFT ATRIUM VOLUME INDEX: 35.9 ML/M^2
LEFT ATRIUM VOLUME: 67 ML
LV EF 2D ECHO EST: 60 %
LYMPHOCYTES # BLD AUTO: 0.84 10*3/MM3 (ref 0.7–3.1)
LYMPHOCYTES NFR BLD AUTO: 4.9 % (ref 19.6–45.3)
MAXIMAL PREDICTED HEART RATE: 132 BPM
MCH RBC QN AUTO: 27.5 PG (ref 26.6–33)
MCHC RBC AUTO-ENTMCNC: 30.6 G/DL (ref 31.5–35.7)
MCV RBC AUTO: 89.7 FL (ref 79–97)
MONOCYTES # BLD AUTO: 0.65 10*3/MM3 (ref 0.1–0.9)
MONOCYTES NFR BLD AUTO: 3.8 % (ref 5–12)
NEUTROPHILS NFR BLD AUTO: 15.57 10*3/MM3 (ref 1.7–7)
NEUTROPHILS NFR BLD AUTO: 90.3 % (ref 42.7–76)
NRBC BLD AUTO-RTO: 0 /100 WBC (ref 0–0.2)
PLATELET # BLD AUTO: 199 10*3/MM3 (ref 140–450)
PMV BLD AUTO: 10.1 FL (ref 6–12)
RBC # BLD AUTO: 2.62 10*6/MM3 (ref 4.14–5.8)
STRESS TARGET HR: 112 BPM
TROPONIN T SERPL-MCNC: 0.51 NG/ML (ref 0–0.03)
TSH SERPL DL<=0.05 MIU/L-ACNC: 1.41 UIU/ML (ref 0.27–4.2)
WBC NRBC COR # BLD: 17.24 10*3/MM3 (ref 3.4–10.8)

## 2022-04-06 PROCEDURE — 93306 TTE W/DOPPLER COMPLETE: CPT

## 2022-04-06 PROCEDURE — 83605 ASSAY OF LACTIC ACID: CPT | Performed by: INTERNAL MEDICINE

## 2022-04-06 PROCEDURE — 85025 COMPLETE CBC W/AUTO DIFF WBC: CPT | Performed by: INTERNAL MEDICINE

## 2022-04-06 PROCEDURE — 97162 PT EVAL MOD COMPLEX 30 MIN: CPT

## 2022-04-06 PROCEDURE — 63710000001 INSULIN LISPRO (HUMAN) PER 5 UNITS: Performed by: INTERNAL MEDICINE

## 2022-04-06 PROCEDURE — 93306 TTE W/DOPPLER COMPLETE: CPT | Performed by: INTERNAL MEDICINE

## 2022-04-06 PROCEDURE — 99222 1ST HOSP IP/OBS MODERATE 55: CPT | Performed by: INTERNAL MEDICINE

## 2022-04-06 PROCEDURE — 25010000002 PIPERACILLIN SOD-TAZOBACTAM PER 1 G: Performed by: INTERNAL MEDICINE

## 2022-04-06 PROCEDURE — 82550 ASSAY OF CK (CPK): CPT | Performed by: INTERNAL MEDICINE

## 2022-04-06 PROCEDURE — 82962 GLUCOSE BLOOD TEST: CPT

## 2022-04-06 PROCEDURE — 99232 SBSQ HOSP IP/OBS MODERATE 35: CPT | Performed by: HOSPITALIST

## 2022-04-06 PROCEDURE — 84443 ASSAY THYROID STIM HORMONE: CPT | Performed by: INTERNAL MEDICINE

## 2022-04-06 RX ORDER — ASPIRIN 81 MG/1
81 TABLET ORAL DAILY
Status: DISCONTINUED | OUTPATIENT
Start: 2022-04-06 | End: 2022-04-06

## 2022-04-06 RX ORDER — ASPIRIN 300 MG/1
300 SUPPOSITORY RECTAL DAILY
Status: DISCONTINUED | OUTPATIENT
Start: 2022-04-06 | End: 2022-04-08

## 2022-04-06 RX ADMIN — PREGABALIN 75 MG: 75 CAPSULE ORAL at 09:28

## 2022-04-06 RX ADMIN — SODIUM CHLORIDE 75 ML/HR: 9 INJECTION, SOLUTION INTRAVENOUS at 22:46

## 2022-04-06 RX ADMIN — LEVOTHYROXINE SODIUM 50 MCG: 50 TABLET ORAL at 09:28

## 2022-04-06 RX ADMIN — PREGABALIN 75 MG: 75 CAPSULE ORAL at 20:26

## 2022-04-06 RX ADMIN — INSULIN LISPRO 2 UNITS: 100 INJECTION, SOLUTION INTRAVENOUS; SUBCUTANEOUS at 12:03

## 2022-04-06 RX ADMIN — MIDODRINE HYDROCHLORIDE 5 MG: 5 TABLET ORAL at 09:28

## 2022-04-06 RX ADMIN — ASPIRIN 300 MG: 300 SUPPOSITORY RECTAL at 18:00

## 2022-04-06 RX ADMIN — TAZOBACTAM SODIUM AND PIPERACILLIN SODIUM 3.38 G: 375; 3 INJECTION, SOLUTION INTRAVENOUS at 03:30

## 2022-04-06 RX ADMIN — PANTOPRAZOLE SODIUM 40 MG: 40 INJECTION, POWDER, FOR SOLUTION INTRAVENOUS at 05:20

## 2022-04-06 RX ADMIN — MIDODRINE HYDROCHLORIDE 5 MG: 5 TABLET ORAL at 12:03

## 2022-04-06 RX ADMIN — TAZOBACTAM SODIUM AND PIPERACILLIN SODIUM 3.38 G: 375; 3 INJECTION, SOLUTION INTRAVENOUS at 18:00

## 2022-04-06 RX ADMIN — MIDODRINE HYDROCHLORIDE 5 MG: 5 TABLET ORAL at 18:00

## 2022-04-06 RX ADMIN — Medication 10 ML: at 09:29

## 2022-04-06 RX ADMIN — Medication 10 ML: at 20:26

## 2022-04-06 RX ADMIN — TAZOBACTAM SODIUM AND PIPERACILLIN SODIUM 3.38 G: 375; 3 INJECTION, SOLUTION INTRAVENOUS at 12:02

## 2022-04-06 NOTE — CONSULTS
Cardiology Consult   [facility] Cardiology      Reason for consultation:  · Preoperative cardiac risk assessment  · Elevated troponin    Requesting provider: Anup Galdamez  Consulting provider: Campos Gonzalez MD    IDENTIFICATION: 88-year-old gentleman who resides in [place], [place]      Active Hospital Problems    Diagnosis  POA   • **Ischemic bowel disease (HCC) [K55.9]  Yes     Priority: High   • Type 2 myocardial infarction due to anemia (HCC) [D64.9, I21.A1]  Yes     Priority: Medium   • KHARI (acute kidney injury) (HCC) [N17.9]  Yes     Priority: Medium   • Sepsis with acute renal failure without septic shock, due to unspecified organism, unspecified acute renal failure type (HCC) [A41.9, R65.20, N17.9]  Yes   • HFrEF with normalized LVEF [I50.22]  Yes     · Cardiac MRI (E7/13/2021): LVEF 31% with anterior wall viability  · Echo (4/6/2022): LVEF 57%.     • Type 2 diabetes mellitus with diabetic polyneuropathy, without long-term current use of insulin ([facility]) [E11.42]  Yes   • Coronary artery disease involving native coronary artery of native heart [I25.10]  Yes     · CABG (4/2011): With LIMA to LAD and SVG to RPDA  · Cardiac cath (5/4/2021): Subtotal ostial LAD stenosis.  Patent SVG to RPDA.  Circumflex luminal irregularities.  · Cardiac MRI (7/13/2021): LVEF 31% with anterior wall viability  · Cardiac catheterization at [facility] (10/26/2021): DAVI to left main/LAD using Xience 3.5 x 38 mm  · Echo (4/6/2022): LVEF 57%     • Hyperlipidemia LDL goal <70 [E78.5]  Yes   • Stage 3b chronic kidney disease (HCC) [N18.32]  Yes              88-year-old gentleman with coronary artery disease and recent PCI of the left main/LAD for chronic systolic heart failure.  Patient had been in doing well following his procedure at [facility] in October until recently when he developed severe epigastric discomfort.  He was admitted to [facility] on 4/5/2022 and diagnosed with ischemic bowel.  General surgery has  been consulted and there is possibility of bowel resection if patient does not improve.  Patient denies any recent anginal symptoms.  Echocardiogram performed earlier today shows LVEF of 55%.  EF was 31% on cardiac MRI prior to revascularization.    Patient does report increased shortness of breath.  He has been receiving aggressive IV fluid resuscitation and notably his hemoglobin is 7.2.  Clopidogrel is presently being held in anticipation for possible surgery.    Allergies   Allergen Reactions   • Niacin      flushing       Prior to Admission medications    Medication Sig Start Date End Date Taking? Authorizing Provider   albuterol sulfate  (90 Base) MCG/ACT inhaler Inhale 2 puffs Every 6 (Six) Hours As Needed for Wheezing.  Patient not taking: Reported on 4/5/2022 2/28/21   Becca Painting,    amLODIPine (NORVASC) 2.5 MG tablet Take 2.5 mg by mouth 2 (two) times a day.  Patient not taking: Reported on 4/5/2022 5/14/21   Marisol Easley MD   aspirin 81 MG tablet Take 1 tablet by mouth daily. 1/31/13      atorvastatin (LIPITOR) 40 MG tablet Take 1 tablet by mouth Daily. 2/3/22   Delilah Tatum MD   bumetanide (BUMEX) 1 MG tablet Take 2 mg by mouth Daily. 7/1/21   Stephenie Ramirez MD   cholecalciferol (VITAMIN D3) 10 MCG (400 UNIT) tablet Take 400 Units by mouth Daily.    ProviderSoniya MD   clopidogrel (PLAVIX) 75 MG tablet Take 75 mg by mouth Daily. 5/14/21   Marisol Easley MD   fluocinonide (LIDEX) 0.05 % external solution  6/19/17   Soniya Briones MD   glucose blood (FREESTYLE TEST STRIPS) test strip 1 each by Other route Daily. 4/10/18   Delilah Tatum MD   guaiFENesin (MUCINEX) 600 MG 12 hr tablet Take 2 tablets by mouth 2 (Two) Times a Day. 2/5/21   Kandy Amin APRN   ketoconazole (NIZORAL) 2 % shampoo  6/19/17   Soniya Briones MD   Lancets misc 1 Units Daily. Use as directed 4/10/18   Delilah Tatum MD   levothyroxine (SYNTHROID, LEVOTHROID) 50 MCG  tablet Take 1 tablet by mouth Daily. 1/10/22   Delilah Tatum MD   loratadine (Claritin) 5 MG chewable tablet Chew 1 tablet Daily. 4/3/21   Venecia Maria APRN   metoprolol succinate XL (TOPROL-XL) 50 MG 24 hr tablet Take 50 mg by mouth Daily. Increased per Dr. Ramirez 7/1/21   Stephenie Ramirez MD   Misc Natural Products (GLUCOSAMINE CHONDROITIN ADV) tablet Take  by mouth. 1/31/13   Delilah Tatum MD   omeprazole (priLOSEC) 20 MG capsule TAKE 1 CAPSULE DAILY 11/1/21   Delilah Tatum MD   potassium chloride 10 MEQ CR tablet Take 10 mEq by mouth Daily.    ProviderSoniya MD   pregabalin (Lyrica) 75 MG capsule Take 1 capsule by mouth 2 (Two) Times a Day. 1/10/22   Delilah Tatum MD   sacubitril-valsartan (ENTRESTO)  MG tablet Take 1 tablet by mouth 2 (Two) Times a Day. 7/1/21   Stephenie Ramirez MD   Spiriva Respimat 2.5 MCG/ACT aerosol solution inhaler USE 2 INHALATIONS DAILY 10/19/21   Delilah Tatum MD   spironolactone (ALDACTONE) 12.5 MG tablet half tablet Take  by mouth Daily. 25mg 1/2 QD 7/1/21   Stephenie Ramirez MD   tamsulosin (FLOMAX) 0.4 MG capsule 24 hr capsule Take 1 capsule by mouth Daily. 1/10/22   Delilah Tatum MD   zolpidem (AMBIEN) 10 MG tablet Take 1 tablet by mouth Every Night. 1/10/22   Delilah Tatum MD       Past Medical History:   Diagnosis Date   • Abnormal ankle brachial index 09/12/2017    AUGUST (9/12/17) nl 1.04 on R, bord 0.91 on L; cards - Dr. Easley   • Basal cell carcinoma (BCC) of left shoulder 12/11/2020    s/p blade bx L. shoulder (12/11/20); superficial BCC with (+) periph margin; derm - Dr. Hoffmann   • Basal cell carcinoma of hand 01/03/1995    L. wrist (1/3/95); derm - Dr. Hoffmann   • Hx of cardiovascular stress test 03/19/2013    SPECT stress test (3/19/13) EF 53%, c/w prior MI    • Hx of carotid ultrasound 04/16/2019    carotid u/s (4/16/19): bilat plaque, 0-49% stenosis right, 50-69% stenosis left   • Hx of carotid ultrasound 02/08/2021    carotid u/s (2/8/21): bilat  0-49% stenosis; marked diffuse prominent extracranial carotid artery atherosclerotic changes   • Hx of cath aortogram with L. angiogram 10/03/2017    aortogram and L. angio (10/3/17): no AAA, occluded L. SFA; will pursue L.SFA intervention; abdullahi Easley   • Hx of chest x-ray 10/12/2017    nl CXR (10/12/17)   • Hx of colonoscopy 11/10/2015    hyperplastic polyps (11/15), repeat in 5 yrs; QUINTON Holland   • Hx of echocardiogram 05/03/2021    ECHO (5/3/21, [facility] hospitalized): EF 35%, grade II agarwal dysfunction, mod MR, mild-mod TR, mild pulm HTN (RVSP 41.81mmHg), dilated RV   • Hx of exercise stress test 06/10/2016    nl GXT (6/16), noting only increased ventricular ectopy with exercise; abdullahi Conrad   • Hx of tobacco use     h/o 2ppd x 40 yrs; quit 1990s   • Squamous cell carcinoma in situ of skin 11/11/2016    SCC in-situ L. chest (11/11/16); derm - Dr. 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 Shun       Past Surgical History:   Procedure Laterality Date   • ATHERECTOMY Right 09/15/2020    s/p peripheral atherectomy/PTA (9/15/20) for PAD; abdullahi Easley   • CARDIAC CATHETERIZATION  10/26/2021    s/p PCI to LAD (10/26/21): at [facility]   • CAROTID ENDARTERECTOMY Right 01/2005    surg - Dr. Douglas Lazaro   • CATARACT EXTRACTION Bilateral 12/15/2021    Left 12/15/21, Right 1/12/22; ophtho - Dr. Fountain   • CORONARY ARTERY BYPASS GRAFT  04/2011    CABG x2 (4/11)   • HEMORRHOIDECTOMY     • HYDROCELE EXCISION / REPAIR Left     s/p L. hydrocelectomy; uro - Dr. Hobson   • POPLITEAL ARTERY ANGIOPLASTY Left 11/03/2017    s/p successful DCB angioplasty of L. SDA and popliteal arteries, patent CFA, DFA (11/3/17); NIMCO Easely   • RENAL ARTERY STENT  01/2005    for Renal artery stenosis (1/05)   • SKIN BIOPSY Left 12/11/2020    s/p blade bx L. shoulder (12/11/20): path - superficial BCC with (+) periph margin; derm -  Dr. Hoffmann   • TOTAL KNEE ARTHROPLASTY Right 2008       Family History   Problem Relation Age of Onset   • Heart disease Mother    • Hypertension Mother    • Heart disease Father          age 46   • Hypertension Father    • Heart disease Brother         x2, 1 brother  age 33, another  age 54       Social History     Tobacco Use   Smoking Status Former Smoker   • Packs/day: 3.00   • Years: 45.00   • Pack years: 135.00   • Quit date:    • Years since quittin.2   Smokeless Tobacco Former User   • Types: Chew   • Quit date: 3/29/2022   Tobacco Comment    quit ; 3ppd x 45 yrs, quit age age 53       Social History     Substance and Sexual Activity   Alcohol Use Not Currently    Comment: less than weekly         Review of Systems:   Review of Systems   Constitutional: Negative for malaise/fatigue.   Eyes: Negative for vision loss in left eye and vision loss in right eye.   Cardiovascular: Negative for chest pain, dyspnea on exertion, near-syncope, orthopnea, palpitations, paroxysmal nocturnal dyspnea and syncope.   Respiratory: Positive for shortness of breath.    Musculoskeletal: Negative for myalgias.   Gastrointestinal: Positive for abdominal pain.   Neurological: Negative for brief paralysis, excessive daytime sleepiness, focal weakness, numbness, paresthesias and weakness.   All other systems reviewed and are negative.           Vital Sign Min/Max for last 24 hours  Temp  Min: 97.7 °F (36.5 °C)  Max: 98.3 °F (36.8 °C)   BP  Min: 90/50  Max: 121/52   Pulse  Min: 77  Max: 116   Resp  Min: 16  Max: 20   SpO2  Min: 90 %  Max: 98 %   No data recorded      Intake/Output Summary (Last 24 hours) at 2022 1544  Last data filed at 2022 1300  Gross per 24 hour   Intake 2980 ml   Output 200 ml   Net 2780 ml             Constitutional:       Appearance: Healthy appearance. Well-developed.   Eyes:      General: Lids are normal. No scleral icterus.     Conjunctiva/sclera: Conjunctivae normal.    HENT:      Head: Normocephalic and atraumatic.   Neck:      Thyroid: No thyromegaly.      Vascular: No carotid bruit or JVD.   Pulmonary:      Effort: Pulmonary effort is normal.      Breath sounds: Normal breath sounds. No wheezing. No rhonchi. No rales.   Cardiovascular:      Normal rate. Regular rhythm.      Murmurs: There is no murmur.      No gallop. No rub.   Pulses:     Intact distal pulses.   Edema:     Peripheral edema absent.   Abdominal:      General: There is no distension.      Palpations: Abdomen is soft. There is no abdominal mass.   Musculoskeletal:      Cervical back: Normal range of motion. Skin:     General: Skin is warm and dry.      Findings: No rash.   Neurological:      General: No focal deficit present.      Mental Status: Alert and oriented to person, place, and time.      Gait: Gait is intact.   Psychiatric:         Attention and Perception: Attention normal.         Mood and Affect: Mood normal.         Behavior: Behavior normal.          Tele: Sinus    DATA REVIEW:    EKG (4/5/2022): Sinus rhythm with PVC.  No ischemic ST or T wave abnormality      ECHO (4/6/2022): LVEF 60%.  No significant valve abnormality.      Results from last 7 days   Lab Units 04/05/22  1449   SODIUM mmol/L 135*   POTASSIUM mmol/L 3.4*   CHLORIDE mmol/L 97*   BUN mg/dL 55*   CREATININE mg/dL 2.68*     Results from last 7 days   Lab Units 04/05/22  2344 04/05/22  2110 04/05/22  2034   TROPONIN T ng/mL 0.510* 0.574* 0.527*     Results from last 7 days   Lab Units 04/06/22  0532 04/05/22  1449   WBC 10*3/mm3 17.24* 24.64*   HEMOGLOBIN g/dL 7.2* 8.5*   HEMATOCRIT % 23.5* 26.7*   PLATELETS 10*3/mm3 199 249     Lab Results   Component Value Date    HGBA1C 6.50 (H) 04/05/2022     Lab Results   Component Value Date    CHOL 102 01/05/2022    TRIG 96 01/05/2022    HDL 40 01/05/2022    LDL 44 01/05/2022    AST 85 (H) 04/05/2022    ALT 60 (H) 04/05/2022                Coronary artery disease  · Previous CABG and recent PCI  of left main/LAD with no recent anginal or heart failure symptoms  · Recommend holding clopidogrel at this juncture in anticipation for possible surgery  · Start aspirin 325 mg SC daily  · No beta-blocker due to hypotension    Type II myocardial infarction due to anemia    Heart failure with improved EF  · EF has improved from 31% to 60% since left main/LAD revascularization in October 2021  · Holding all heart failure medicines presently due to hypotension from abdominal sepsis as well as acute kidney injury    Acute kidney injury  · No ARB/Arni, spironolactone due to KHARI    Hyperlipidemia with goal LDL <70  · Hold statin therapy due to n.p.o. status         · Start aspirin 300 mg per rectum  · Okay to proceed with abdominal surgery with increased but acceptable cardiovascular risk.  No further optimization from cardiac standpoint needed  · Resume clopidogrel if no surgery anticipated and reduce aspirin dosing 81 mg daily  · Follow-up with my office in 6 weeks      Electronically signed by Jamey Gonzalez IV, MD, 04/06/22, 3:33 PM EDT.

## 2022-04-06 NOTE — THERAPY EVALUATION
Patient Name: Filipe Hamilton Jr.  : 1934    MRN: [id number]                              Today's Date: 2022       Admit Date: 2022    Visit Dx:     ICD-10-CM ICD-9-CM   1. Sepsis with acute renal failure without septic shock, due to unspecified organism, unspecified acute renal failure type (HCC)  A41.9 038.9    R65.20 995.92    N17.9 584.9   2. Acute dehydration  E86.0 276.51   3. Acute abdominal pain  R10.9 789.00     338.19   4. Bronchitis  J40 490   5. Elevated troponin  R77.8 790.6   6. Acute on chronic anemia  D64.9 285.9     Patient Active Problem List   Diagnosis   • Renal artery stenosis (HCC)   • Actinic keratosis   • Allergic rhinitis   • Coronary artery disease involving native coronary artery of native heart   • Bilateral carotid artery stenosis   • Cervical spondylosis   • Stage 3b chronic kidney disease (HCC)   • Type 2 diabetes mellitus with diabetic polyneuropathy, without long-term current use of insulin (HCC)   • Diverticulosis of large intestine   • BPH   • Gastroesophageal reflux disease   • Hyperlipidemia LDL goal <70   • Hyperplastic colon polyp   • Essential hypertension   • Hypothyroidism   • Insomnia   • Microalbuminuria   • Osteoarthritis of both knees   • Rosacea   • Chronic obstructive pulmonary disease with acute exacerbation (HCC)   • Tubular adenoma of colon   • Ganglion cyst, R. 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 shoulder (12/11/20); superficial BCC with (+) periph margin; darron Hoffmann   • Basal cell carcinoma of hand 01/03/1995    L. wrist (1/3/95); darron Hoffmann   • Hx of cardiovascular stress test 03/19/2013    SPECT stress test (3/19/13) EF 53%, c/w prior MI    • Hx of carotid ultrasound 04/16/2019    carotid u/s (4/16/19): bilat plaque, 0-49% stenosis right, 50-69% stenosis left   • Hx of carotid ultrasound 02/08/2021    carotid u/s (2/8/21): bilat 0-49% stenosis; marked diffuse prominent extracranial carotid artery atherosclerotic changes   • Hx of cath aortogram with L. angiogram 10/03/2017    aortogram and L. angio (10/3/17): no AAA, occluded L. SFA; will pursue L.SFA intervention; abdullahi Easley   • Hx of chest x-ray 10/12/2017    nl CXR (10/12/17)   • Hx of colonoscopy 11/10/2015    hyperplastic polyps (11/15), repeat in 5 yrs; QUINTON Holland   • Hx of echocardiogram 05/03/2021    ECHO (5/3/21, [facility] hospitalized): EF 35%, grade II agarwal dysfunction, mod MR, mild-mod TR, mild pulm HTN (RVSP 41.81mmHg), dilated RV   • Hx of exercise stress test 06/10/2016    nl GXT (6/16), noting only increased ventricular ectopy with exercise; abdullahi Conrad   • Hx of tobacco use     h/o 2ppd x 40 yrs; quit 1990s   • Squamous cell carcinoma in situ of skin 11/11/2016    SCC in-situ L. chest (11/11/16); derm - Dr. 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 Hoffmann     Past Surgical History:   Procedure Laterality Date   • ATHERECTOMY Right 09/15/2020    s/p peripheral atherectomy/PTA (9/15/20) for PAD; cards - Dr. Easley   • CARDIAC CATHETERIZATION  10/26/2021    s/p PCI  to LAD (10/26/21): at [facility]   • CAROTID ENDARTERECTOMY Right 01/2005    surg - Dr. Douglas Lazaro   • CATARACT EXTRACTION Bilateral 12/15/2021    Left 12/15/21, Right 1/12/22; ophtho - Dr. Fountain   • CORONARY ARTERY BYPASS GRAFT  04/2011    CABG x2 (4/11)   • HEMORRHOIDECTOMY     • HYDROCELE EXCISION / REPAIR Left     s/p L. hydrocelectomy; uro - Dr. Hobson   • POPLITEAL ARTERY ANGIOPLASTY Left 11/03/2017    s/p successful DCB angioplasty of L. SDA and popliteal arteries, patent CFA, DFA (11/3/17); CTS - Dr. Easley   • RENAL ARTERY STENT  01/2005    for Renal artery stenosis (1/05)   • SKIN BIOPSY Left 12/11/2020    s/p blade bx L. shoulder (12/11/20): path - superficial BCC with (+) periph margin; derm - Dr. Hoffmann   • TOTAL KNEE ARTHROPLASTY Right 01/2008      General Information     Row Name 04/06/22 1544          Physical Therapy Time and Intention    Document Type evaluation  -EJ     Mode of Treatment physical therapy  -EJ     Row Name 04/06/22 1544          General Information    Patient Profile Reviewed yes  -EJ     Prior Level of Function independent:;all household mobility;community mobility;ADL's;home management  -EJ     Existing Precautions/Restrictions fall  -EJ     Barriers to Rehab none identified  -EJ     Row Name 04/06/22 1544          Living Environment    People in Home spouse;child(edith), adult  -EJ     Row Name 04/06/22 1544          Home Main Entrance    Number of Stairs, Main Entrance two  1+1  -EJ     Row Name 04/06/22 1544          Stairs Within Home, Primary    Stairs, Within Home, Primary full flight  -EJ     Number of Stairs, Within Home, Primary other (see comments)  -EJ     Stair Railings, Within Home, Primary railing on right side (ascending)  -EJ     Row Name 04/06/22 1544          Cognition    Orientation Status (Cognition) oriented x 4  -EJ     Row Name 04/06/22 1544          Safety Issues, Functional Mobility    Safety Issues Affecting Function (Mobility) insight into  deficits/self-awareness;safety precautions follow-through/compliance;awareness of need for assistance;safety precaution awareness  -EJ     Impairments Affecting Function (Mobility) balance;endurance/activity tolerance  -EJ           User Key  (r) = Recorded By, (t) = Taken By, (c) = Cosigned By    Initials Name Provider Type    Chayito Bolanos PT Physical Therapist               Mobility     Row Name 04/06/22 1553          Sit-Stand Transfer    Sit-Stand [place] (Transfers) contact guard  -EJ     [facility] Name 04/06/22 1553          Gait/Stairs (Locomotion)    [place] Level (Gait) contact guard  -EJ     Distance in Feet (Gait) 350  -EJ     Comment, (Gait/Stairs) unsteady at times, no overt LOB.  -EJ           User Key  (r) = Recorded By, (t) = Taken By, (c) = Cosigned By    Initials Name Provider Type    Chayito Bolanos PT Physical Therapist               Obj/Interventions     Row Name 04/06/22 1554          Range of Motion Comprehensive    General Range of Motion no range of motion deficits identified  -[facility] Name 04/06/22 1554          Strength Comprehensive (MMT)    General Manual Muscle Testing (MMT) Assessment no strength deficits identified  -     Comment, General Manual Muscle Testing (MMT) Assessment pain in R knee with testing  -[facility] Name 04/06/22 1554          Balance    Balance Assessment sitting static balance;sit to stand dynamic balance  -EJ     Static Sitting Balance independent  -EJ     Sit to Stand Dynamic Balance contact guard  -EJ     [facility] Name 04/06/22 1554          Sensory Assessment (Somatosensory)    Sensory Assessment (Somatosensory) sensation intact  -           User Key  (r) = Recorded By, (t) = Taken By, (c) = Cosigned By    Initials Name Provider Type    Chayito Bolanos PT Physical Therapist               Goals/Plan     Row Name 04/06/22 1600          Bed Mobility Goal 1 (PT)    Activity/Assistive Device (Bed Mobility Goal 1, PT) bed mobility  activities, all  -EJ     [place] Level/Cues Needed (Bed Mobility Goal 1, PT) independent  -EJ     Time Frame (Bed Mobility Goal 1, PT) long term goal (LTG);5 days  -EJ     Row Name 04/06/22 1600          Transfer Goal 1 (PT)    Activity/Assistive Device (Transfer Goal 1, PT) sit-to-stand/stand-to-sit  -EJ     [place] Level/Cues Needed (Transfer Goal 1, PT) independent  -EJ     Time Frame (Transfer Goal 1, PT) long term goal (LTG);5 days  -EJ     Row Name 04/06/22 1600          Gait Training Goal 1 (PT)    Activity/Assistive Device (Gait Training Goal 1, PT) gait (walking locomotion)  -EJ     [place] Level (Gait Training Goal 1, PT) independent  -EJ     Time Frame (Gait Training Goal 1, PT) long term goal (LTG);5 days  -EJ     Row Name 04/06/22 1600          Stairs Goal 1 (PT)    Activity/Assistive Device (Stairs Goal 1, PT) stairs, all skills;using handrail, right  -EJ     [place] Level/Cues Needed (Stairs Goal 1, PT) independent  -EJ     Time Frame (Stairs Goal 1, PT) long term goal (LTG);5 days  -EJ           User Key  (r) = Recorded By, (t) = Taken By, (c) = Cosigned By    Initials Name Provider Type    EJ Chayito De La Cruz PT Physical Therapist               Clinical Impression     Row Name 04/06/22 3888          Pain    Pretreatment Pain Rating 0/10 - no pain  -EJ     Posttreatment Pain Rating 0/10 - no pain  -EJ     Pain Intervention(s) Repositioned;Ambulation/increased activity  -     Row Name 04/06/22 4888          Plan of Care Review    Plan of Care Reviewed With patient  -EJ     Outcome Evaluation Pt ambulates 350 ft in oneil with CGA. No LOB, but unsteady and reports feeling deconditioned after decreased activity in the hospital and just previously to presenting to hospital. PT to continue during stay. Pt anticipated to progress and be able to d/c home with assist. OPPT recommended to support pt in return to PLOF.  -     Row Name 04/06/22 1259          Therapy Assessment/Plan (PT)     Rehab Potential (PT) good, to achieve stated therapy goals  -EJ     Criteria for Skilled Interventions Met (PT) yes;skilled treatment is necessary  -EJ     Row Name 04/06/22 1556          Vital Signs    Pre Systolic BP Rehab --  VSS  -EJ     Pre Patient Position Sitting  -EJ     Intra Patient Position Standing  -EJ     Post Patient Position Sitting  -EJ     Row Name 04/06/22 1556          Positioning and Restraints    Pre-Treatment Position sitting in chair/recliner  -EJ     Post Treatment Position chair  -EJ     In Chair notified nsg;reclined;call light within reach;encouraged to call for assist;with family/caregiver  -EJ           User Key  (r) = Recorded By, (t) = Taken By, (c) = Cosigned By    Initials Name Provider Type    Chayito Bolanso PT Physical Therapist               Outcome Measures     Row Name 04/06/22 1601 04/06/22 0800       How much help from another person do you currently need...    Turning from your back to your side while in flat bed without using bedrails? 3  -EJ 3  -AM    Moving from lying on back to sitting on the side of a flat bed without bedrails? 3  -EJ 3  -AM    Moving to and from a bed to a chair (including a wheelchair)? 3  -EJ 3  -AM    Standing up from a chair using your arms (e.g., wheelchair, bedside chair)? 3  -EJ 3  -AM    Climbing 3-5 steps with a railing? 3  -EJ 3  -AM    To walk in hospital room? 3  -EJ 3  -AM    AM-PAC 6 Clicks Score (PT) 18  -EJ 18  -AM    Row Name 04/06/22 1601          Functional Assessment    Outcome Measure Options AM-PAC 6 Clicks Basic Mobility (PT)  -EJ           User Key  (r) = Recorded By, (t) = Taken By, (c) = Cosigned By    Initials Name Provider Type    Chayito Bolanos PT Physical Therapist    Allyn Crockett RN Registered Nurse                             Physical Therapy Education                 Title: PT OT SLP Therapies (Done)     Topic: Physical Therapy (Done)     Point: Mobility training (Done)     Learning  Progress Summary           Patient Acceptance, E, NR,VU by  at 4/6/2022 1602                   Point: Home exercise program (Done)     Learning Progress Summary           Patient Acceptance, E, NR,VU by  at 4/6/2022 1602                   Point: Body mechanics (Done)     Learning Progress Summary           Patient Acceptance, E, NR,VU by  at 4/6/2022 1602                   Point: Precautions (Done)     Learning Progress Summary           Patient Acceptance, E, NR,VU by  at 4/6/2022 1602                               User Key     Initials Effective Dates Name Provider Type Discipline     06/16/21 -  Chayito De La Cruz PT Physical Therapist PT              PT Recommendation and Plan  Planned Therapy Interventions (PT): balance training, bed mobility training, gait training, home exercise program, joint mobilization, lumbar stabilization, patient/family education, manual therapy techniques, motor coordination training, postural re-education, ROM (range of motion), stair training, strengthening, stretching, transfer training  Plan of Care Reviewed With: patient  Outcome Evaluation: Pt ambulates 350 ft in oneil with CGA. No LOB, but unsteady and reports feeling deconditioned after decreased activity in the hospital and just previously to presenting to hospital. PT to continue during stay. Pt anticipated to progress and be able to d/c home with assist. OPPT recommended to support pt in return to PLOF.     Time Calculation:    PT Charges     Row Name 04/06/22 1602             Time Calculation    Start Time 1456  -EJ      PT Received On 04/06/22  -EJ      PT Goal Re-Cert Due Date 04/16/22  -EJ              Time Calculation- PT    Total Timed Code Minutes- PT 0 minute(s)  -EJ              Untimed Charges    PT Eval/Re-eval Minutes 55  -EJ              Total Minutes    Untimed Charges Total Minutes 55  -EJ       Total Minutes 55  -EJ            User Key  (r) = Recorded By, (t) = Taken By, (c) = Cosigned By     Initials Name Provider Type     Chayito De La Cruz, PT Physical Therapist              Therapy Charges for Today     Code Description Service Date Service Provider Modifiers Qty    63720370449 HC PT EVAL MOD COMPLEXITY 4 4/6/2022 Chayito De La Cruz, PT GP 1          PT G-Codes  Outcome Measure Options: AM-PAC 6 Clicks Basic Mobility (PT)  AM-PAC 6 Clicks Score (PT): 18    Chayito Lazaro, PT  4/6/2022

## 2022-04-06 NOTE — CASE MANAGEMENT/SOCIAL WORK
Discharge Planning Assessment  [facility]     Patient Name: Filipe Hamilton Jr.  MRN: [id number]  Today's Date: 4/6/2022    Admit Date: 4/5/2022     Discharge Needs Assessment     Row Name 04/06/22 1524       Living Environment    People in Home spouse;child(edith), adult    Current Living Arrangements home    Primary Care Provided by self    Provides Primary Care For no one    Family Caregiver if Needed child(edith), adult;spouse    Able to Return to Prior Arrangements yes       Transition Planning    Patient/Family Anticipates Transition to home    Transportation Anticipated family or friend will provide       Discharge Needs Assessment    Readmission Within the Last 30 Days no previous admission in last 30 days    Equipment Currently Used at Home none    Current Discharge Risk chronically ill               Discharge Plan     Row Name 04/06/22 1525       Plan    Plan home    Patient/Family in Agreement with Plan yes    Plan Comments I met with Mr. Hamilton at the bedside. He lives with his wife and adult son in [facility]. He is independent with mobility and activities of daily living. He has no medical equipment in the home and is not current with any home or outpatient services. He drives himself when leaving the home. Mr. Hamilton denies any difficulties in obtaining or affording his medications. Mr. Hamilton anticipates going home at the time of discharge and that his son will transport him at that time. Case management will continue to follow.    Final Discharge Disposition Code 01 - home or self-care              Continued Care and Services - Admitted Since 4/5/2022    Coordination has not been started for this encounter.       Expected Discharge Date and Time     Expected Discharge Date Expected Discharge Time    Apr 15, 2022          Demographic Summary     Row Name 04/06/22 1523       General Information    General Information Comments I confirmed that Mr. Hamilton's PCP is Delilah Tatum and is insured  by [employer].               Functional Status     Row Name 04/06/22 1524       Functional Status, IADL    Medications independent    Meal Preparation independent    Housekeeping independent    Laundry independent    Shopping independent       Employment/    Employment Status retired               Psychosocial    No documentation.                Abuse/Neglect    No documentation.                Legal    No documentation.                Substance Abuse    No documentation.                Patient Forms    No documentation.                   Kendall Haq RN

## 2022-04-06 NOTE — PLAN OF CARE
Goal Outcome Evaluation:  Plan of Care Reviewed With: patient           Outcome Evaluation: Pt ambulates 350 ft in oneil with CGA. No LOB, but unsteady and reports feeling deconditioned after decreased activity in the hospital and just previously to presenting to hospital. PT to continue during stay. Pt anticipated to progress and be able to d/c home with assist. OPPT recommended to support pt in return to PLOF.

## 2022-04-06 NOTE — PROGRESS NOTES
"Patient Name:  Filipe Hamilton Jr.  YOB: 1934  [id number]    Surgery Progress Note    Date of visit: 4/6/2022    Subjective   Abdominal pain significantly improved this morning. No nausea/vomiting. No fevers/chills.          Objective       /52 (BP Location: Right arm, Patient Position: Lying)   Pulse 82   Temp 98.3 °F (36.8 °C) (Oral)   Resp 20   Ht 170.2 cm (67\")   Wt 75.5 kg (166 lb 8 oz)   SpO2 91%   BMI 26.08 kg/m²     Intake/Output Summary (Last 24 hours) at 4/6/2022 0848  Last data filed at 4/6/2022 0700  Gross per 24 hour   Intake 3000 ml   Output 200 ml   Net 2800 ml       CV:  Rhythm regular and rate regular  L:  Clear to auscultation bilaterally  Abd:  Bowel sounds positive, soft, nontender, mild distension   Ext:  No cyanosis, clubbing, edema    Recent labs and imaging that are back at this time have been reviewed.   WBC 24.6 -> 17.2  Hgb 8.5 -> 7.2  Plts 199  Lactate 2 -> 1.3 -> 0.8  Trop 0.527 -> 0.574 -> 0.510       Assessment/Plan     Pt with possible mesenteric ischemia, now improving  OK for trial of clear liquid diet -> if develops increased pain, return to NPO  Continue to hold Plavix for now pending cardiology assessment        Demar Oakes MD  4/6/2022  08:48 EDT      "

## 2022-04-06 NOTE — PROGRESS NOTES
Patient without abdominal pain through the day. Tolerated clear liquid diet without incident. No fevers, but episode of hypotension mid-day. Will check CBC and lactate again tomorrow AM. Will make him NPO p MN in case abdominal pain returns, patient has a clinical decline and/or if lab values worsen.

## 2022-04-06 NOTE — PROGRESS NOTES
[facility] Medicine Services  PROGRESS NOTE    Patient Name: Filipe Hamilton Jr.  : 1934  MRN: [id number]    Date of Admission: 2022  Primary Care Physician: Delilah Tatum MD    Subjective   Subjective     CC:    Abdominal pain    HPI:   Son says wheezing and worried about heart failure.  Asking about surgeon's opinion today about surgery.  Denies chest pain    ROS:    Gen- denies chills, denies fevers  CV- No chest pain, palpitations  Resp- No cough, dyspnea  GI- No N/V/D, abd pain        Objective   Objective     Vital Signs:   Temp:  [97.7 °F (36.5 °C)-98.6 °F (37 °C)] 98.6 °F (37 °C)  Heart Rate:  [] 93  Resp:  [16-20] 18  BP: ()/(49-83) 115/51     Physical Exam:    Constitutional: No acute distress, awake, alert  HENT: NCAT, mucous membranes moist  Respiratory: Clear to auscultation bilaterally, respiratory effort normal   Cardiovascular: RRR, s1 and s2  Gastrointestinal: Positive bowel sounds, soft, pouchy, gas filled abdomen  Musculoskeletal: No bilateral ankle edema  Psychiatric: Appropriate affect, cooperative  Neurologic: Oriented x 3, strength symmetric in all extremities, Cranial Nerves grossly intact to confrontation, speech clear  Skin: No rashes    Results Reviewed:  LAB RESULTS:      Lab 22  1422 22  0532 22  2344 22  2034 22  1449   WBC  --  17.24*  --   --  24.64*   HEMOGLOBIN  --  7.2*  --   --  8.5*   HEMATOCRIT  --  23.5*  --   --  26.7*   PLATELETS  --  199  --   --  249   NEUTROS ABS  --  15.57*  --   --  22.86*   IMMATURE GRANS (ABS)  --  0.16*  --   --  0.24*   LYMPHS ABS  --  0.84  --   --  0.38*   MONOS ABS  --  0.65  --   --  1.13*   EOS ABS  --  0.00  --   --  0.00   MCV  --  89.7  --   --  87.8   PROCALCITONIN  --   --   --   --  1.66*   LACTATE 1.3 0.8 1.3  --  2.0   PROTIME  --   --   --   --  16.6*   APTT  --   --   --  35.6  --          Lab 22  0532 22  1449   SODIUM  --    --  135*   POTASSIUM  --   --  3.4*   CHLORIDE  --   --  97*   CO2  --   --  22.0   ANION GAP  --   --  16.0*   BUN  --   --  55*   CREATININE  --   --  2.68*   EGFR  --   --  22.2*   GLUCOSE  --   --  148*   CALCIUM  --   --  7.8*   HEMOGLOBIN A1C  --  6.50*  --    TSH 1.410  --   --          Lab 04/05/22  1449   TOTAL PROTEIN 5.4*   ALBUMIN 2.40*   GLOBULIN 3.0   ALT (SGPT) 60*   AST (SGOT) 85*   BILIRUBIN 0.6   ALK PHOS 70   LIPASE 8*         Lab 04/05/22 2344 04/05/22 2110 04/05/22 2034 04/05/22  1449   TROPONIN T 0.510* 0.574* 0.527* 0.379*   PROTIME  --   --   --  16.6*   INR  --   --   --  1.35*             Lab 04/05/22 2110 04/05/22 2034   ABO TYPING B B   RH TYPING Positive Positive   ANTIBODY SCREEN  --  Negative         Brief Urine Lab Results  (Last result in the past 365 days)      Color   Clarity   Blood   Leuk Est   Nitrite   Protein   CREAT   Urine HCG        04/05/22 1647 Yellow   Clear   Negative   Negative   Negative   Trace                 Microbiology Results Abnormal     Procedure Component Value - Date/Time    Urine Culture - Urine, Urine, Clean Catch [[id number]]  (Normal) Collected: 04/05/22 1647    Lab Status: Preliminary result Specimen: Urine, Clean Catch Updated: 04/06/22 1200     Urine Culture No growth    COVID PRE-OP / PRE-PROCEDURE SCREENING ORDER (NO ISOLATION) - Swab, Nasopharynx [[id number]]  (Normal) Collected: 04/05/22 1558    Lab Status: Final result Specimen: Swab from Nasopharynx Updated: 04/05/22 1632    Narrative:      The following orders were created for panel order COVID PRE-OP / PRE-PROCEDURE SCREENING ORDER (NO ISOLATION) - Swab, Nasopharynx.  Procedure                               Abnormality         Status                     ---------                               -----------         ------                     COVID-19 and FLU A/B PCR...[[id number]]  Normal              Final result                 Please view results for these tests on the individual orders.     COVID-19 and FLU A/B PCR - Swab, Nasopharynx [[id number]]  (Normal) Collected: 04/05/22 1558    Lab Status: Final result Specimen: Swab from Nasopharynx Updated: 04/05/22 1632     COVID19 Not Detected     Influenza A PCR Not Detected     Influenza B PCR Not Detected    Narrative:      Fact sheet for providers: https://www.fda.gov/media/070578/download    Fact sheet for patients: https://www.fda.gov/media/496615/download    Test performed by PCR.          Adult Transthoracic Echo Complete W/ Cont if Necessary Per Protocol    Result Date: 4/6/2022  · Left ventricular systolic function is normal. Estimated left ventricular EF = 60%. · Left ventricular diastolic function is consistent with (grade I) impaired relaxation. · Estimated right ventricular systolic pressure from tricuspid regurgitation is normal (<35 mmHg).      CT Abdomen Pelvis Without Contrast    Result Date: 4/5/2022  DATE OF EXAM: 4/5/2022 4:49 PM  PROCEDURE: CT ABDOMEN PELVIS WO CONTRAST-  INDICATIONS: nausea/vomiting/diarrhea  COMPARISON: 1/31/2013 abdomen pelvis CT scan  TECHNIQUE: Routine transaxial slices were obtained through the abdomen and pelvis without the administration of intravenous contrast. Reconstructed coronal and sagittal images were also obtained. Automated exposure control and iterative construction methods were used.  The radiation dose reduction device was turned on for each scan per the ALARA (As Low as Reasonably Achievable) protocol.  FINDINGS: History indicates generalized abdominal pain nausea vomiting and diarrhea.  The included lower lungs show mild new peribronchial thickening in the middle lobe and right lower lobe, possibly bronchitis. No focal pneumonia or effusion is seen.  Granulomas calcifications are seen in the normal-sized spleen. There is mild fatty liver change. No significant abnormalities are seen of the gallbladder, pancreas, adrenal glands, or kidneys for a non-IV contrast enhanced exam. No upper abdominal free  "air ascites or adenopathy is seen.  In the left upper quadrant, there is inflammatory fat stranding around the splenic flexure of the colon, but also focal small bowel mesenteric edema in a separate but adjacent location left upper quadrant, with mild mucosal thickening of a loop of adjacent small bowel. It is uncertain if these two areas inflammation represent the same underlying pathology. No abscess, pneumatosis or extraluminal air is seen. The colonic inflammatory focus is not associated with significant diverticular disease.  Regarding the lower abdomen pelvis, no additional inflamed bowel loops are identified. There is, however, an unusual appearance involving both the distal colon and adjacent loop of small bowel from axial image 86 through image 102, with a partial \"whirl sign \"and marked decreased caliber of small bowel at this level as well as diminished caliber of the colon, possibly a very low-level obstruction, as from underlying adhesion, very small internal hernia etc. The small bowel loop can also be seen in the left lower quadrant coronal image 36 through 26.  Terminal ileum cecum and appendix appear normal. No intrapelvic free fluid is seen. Bladder is normally distended and normal in appearance. Incidental note is made of heavy atherosclerotic calcification of the abdominal aorta, extending into the lumen on axial image 71, suggesting there is likely a high degree of stenosis. Heavy atherosclerotic calcification is seen of the patient's small iliac arteries as well. Similar appearance is present in 2013, with calcified material in the aortic lumen, although the aorta was patent on that study.       Impression:    1. Unusual appearance of the bowel in the left abdomen, in the left upper quadrant with separate areas and inflammation of small bowel and proximal descending colon. More distal area in the left abdomen that resembles a whirl sign involving both large and small bowel, without evidence of " significant ongoing bowel obstruction. Consider possible internal hernia or adhesion with resulting vascular compromise of bowel. 2. No other evidence of acute intra-abdominal or intrapelvic disease elsewhere. 3. Heavy atherosclerotic calcification of the distal abdominal aorta and iliac arteries, but also present in 2013. Potentially significant luminal stenosis of the distal abdominal aorta is a consideration. 4. Mild new interstitial disease the right middle and lower lobe, potentially bronchitis, or more likely with history of vomiting, mild aspiration.  Note: Preliminary findings of this study were discussed with Dr. Longo at approximately 5:25 PM 4/5/2022.  This report was finalized on 4/5/2022 5:26 PM by Dr. Gonzalez Dillard MD.      XR Chest 1 View    Result Date: 4/5/2022  DATE OF EXAM: 4/5/2022 3:08 PM  PROCEDURE: XR CHEST 1 VW-  INDICATIONS: nausea/vomitng/fatigue  COMPARISON: 2/27/2021  TECHNIQUE: Single radiographic AP view of the chest was obtained.  FINDINGS: No focal airspace opacity. No pleural effusion or pneumothorax. Normal heart and mediastinal contours. Sternotomy wires are intact.      Impression: No evidence of acute disease in the chest.  This report was finalized on 4/5/2022 3:30 PM by Yusef Rausch.        Results for orders placed during the hospital encounter of 04/05/22    Adult Transthoracic Echo Complete W/ Cont if Necessary Per Protocol    Interpretation Summary  · Left ventricular systolic function is normal. Estimated left ventricular EF = 60%.  · Left ventricular diastolic function is consistent with (grade I) impaired relaxation.  · Estimated right ventricular systolic pressure from tricuspid regurgitation is normal (<35 mmHg).      I have reviewed the medications:  Scheduled Meds:aspirin, 300 mg, Rectal, Daily  insulin lispro, 0-7 Units, Subcutaneous, TID AC  levothyroxine, 50 mcg, Oral, Daily  midodrine, 5 mg, Oral, TID AC  pantoprazole, 40 mg, Intravenous, Q  AM  piperacillin-tazobactam, 3.375 g, Intravenous, Q8H  pregabalin, 75 mg, Oral, BID  senna-docusate sodium, 2 tablet, Oral, BID  sodium chloride, 10 mL, Intravenous, Q12H      Continuous Infusions:sodium chloride, 75 mL/hr, Last Rate: 75 mL/hr (04/05/22 5088)      PRN Meds:.•  albuterol sulfate HFA  •  senna-docusate sodium **AND** polyethylene glycol **AND** bisacodyl **AND** bisacodyl  •  dextrose  •  dextrose  •  glucagon (human recombinant)  •  Morphine  •  ondansetron  •  Sodium Chloride (PF)  •  sodium chloride  •  zolpidem    Assessment/Plan   Assessment & Plan     Active Hospital Problems    Diagnosis  POA   • **Ischemic bowel disease (HCC) [K55.9]  Yes   • Type 2 myocardial infarction due to anemia ([facility]) [D64.9, I21.A1]  Yes   • KHARI (acute kidney injury) ([facility]) [N17.9]  Yes   • Sepsis with acute renal failure without septic shock, due to unspecified organism, unspecified acute renal failure type ([facility]) [A41.9, R65.20, N17.9]  Yes   • HFrEF with normalized LVEF [I50.22]  Yes   • Type 2 diabetes mellitus with diabetic polyneuropathy, without long-term current use of insulin ([facility]) [E11.42]  Yes   • Coronary artery disease involving native coronary artery of native heart [I25.10]  Yes   • Hyperlipidemia LDL goal <70 [E78.5]  Yes   • Stage 3b chronic kidney disease (HCC) [N18.32]  Yes      Resolved Hospital Problems   No resolved problems to display.        Brief Hospital Course to date:  Filipe Hamilton Jr. is a 88 y.o. male who presented to the ER with abdominal pain.  On call General Surgery called from ER and recommended hydration and admission.    Sepsis  Leukocytosis  Abd pain  -suspect due to abdominal source   -blood cultures pending  -continue zosyn  - discussed case with general surgery, high risk of morbidity and mortality  -plavix on hold  -concern for mesenteric ischemia  -aspirin suppository  -IV fluids  -General Surgery following     HTN  -add midodrine  -hold amlodipine and BB for now  -normal  EF on Echo     Abdominal pain  Elevated Lactic acid  -holding Plavix        NSTEMI  -seen by Cardiology  -aspirin  -normal EF on Echo     KHARI on CKD  -secondary to sepsis, overdiuresis  -recheck in AM  -urinary retention protocol     DM with neuropathy  -SSI while npo    On Clear Liquid diet for now    DVT prophylaxis:  No DVT prophylaxis order currently exists.       AM-PAC 6 Clicks Score (PT): 18 (04/06/22 1601)    Disposition: I expect the patient to be discharged TBD    CODE STATUS:   Code Status and Medical Interventions:   Ordered at: 04/05/22 2122     Level Of Support Discussed With:    Patient     Code Status (Patient has no pulse and is not breathing):    CPR (Attempt to Resuscitate)     Medical Interventions (Patient has pulse or is breathing):    Full Support       Anup Galdamez MD  04/06/22

## 2022-04-07 LAB
ALBUMIN SERPL-MCNC: 2.2 G/DL (ref 3.5–5.2)
ALBUMIN/GLOB SERPL: 1 G/DL
ALP SERPL-CCNC: 60 U/L (ref 39–117)
ALT SERPL W P-5'-P-CCNC: 62 U/L (ref 1–41)
ANION GAP SERPL CALCULATED.3IONS-SCNC: 7 MMOL/L (ref 5–15)
AST SERPL-CCNC: 62 U/L (ref 1–40)
BASOPHILS # BLD AUTO: 0.02 10*3/MM3 (ref 0–0.2)
BASOPHILS NFR BLD AUTO: 0.2 % (ref 0–1.5)
BILIRUB SERPL-MCNC: 0.3 MG/DL (ref 0–1.2)
BUN SERPL-MCNC: 30 MG/DL (ref 8–23)
BUN/CREAT SERPL: 22.9 (ref 7–25)
CALCIUM SPEC-SCNC: 7.5 MG/DL (ref 8.6–10.5)
CHLORIDE SERPL-SCNC: 109 MMOL/L (ref 98–107)
CO2 SERPL-SCNC: 24 MMOL/L (ref 22–29)
CREAT SERPL-MCNC: 1.31 MG/DL (ref 0.76–1.27)
D-LACTATE SERPL-SCNC: 0.7 MMOL/L (ref 0.5–2)
DEPRECATED RDW RBC AUTO: 43.3 FL (ref 37–54)
EGFRCR SERPLBLD CKD-EPI 2021: 52.4 ML/MIN/1.73
EOSINOPHIL # BLD AUTO: 0.06 10*3/MM3 (ref 0–0.4)
EOSINOPHIL NFR BLD AUTO: 0.5 % (ref 0.3–6.2)
ERYTHROCYTE [DISTWIDTH] IN BLOOD BY AUTOMATED COUNT: 13.3 % (ref 12.3–15.4)
GLOBULIN UR ELPH-MCNC: 2.3 GM/DL
GLUCOSE BLDC GLUCOMTR-MCNC: 103 MG/DL (ref 70–130)
GLUCOSE BLDC GLUCOMTR-MCNC: 175 MG/DL (ref 70–130)
GLUCOSE BLDC GLUCOMTR-MCNC: 97 MG/DL (ref 70–130)
GLUCOSE SERPL-MCNC: 103 MG/DL (ref 65–99)
HCT VFR BLD AUTO: 22.9 % (ref 37.5–51)
HEMOCCULT STL QL: POSITIVE
HGB BLD-MCNC: 6.9 G/DL (ref 13–17.7)
IMM GRANULOCYTES # BLD AUTO: 0.48 10*3/MM3 (ref 0–0.05)
IMM GRANULOCYTES NFR BLD AUTO: 4.1 % (ref 0–0.5)
LYMPHOCYTES # BLD AUTO: 0.88 10*3/MM3 (ref 0.7–3.1)
LYMPHOCYTES NFR BLD AUTO: 7.4 % (ref 19.6–45.3)
MAGNESIUM SERPL-MCNC: 1.6 MG/DL (ref 1.6–2.4)
MCH RBC QN AUTO: 26.8 PG (ref 26.6–33)
MCHC RBC AUTO-ENTMCNC: 30.1 G/DL (ref 31.5–35.7)
MCV RBC AUTO: 89.1 FL (ref 79–97)
MONOCYTES # BLD AUTO: 0.45 10*3/MM3 (ref 0.1–0.9)
MONOCYTES NFR BLD AUTO: 3.8 % (ref 5–12)
NEUTROPHILS NFR BLD AUTO: 84 % (ref 42.7–76)
NEUTROPHILS NFR BLD AUTO: 9.95 10*3/MM3 (ref 1.7–7)
NRBC BLD AUTO-RTO: 0 /100 WBC (ref 0–0.2)
PLATELET # BLD AUTO: 207 10*3/MM3 (ref 140–450)
PMV BLD AUTO: 9.9 FL (ref 6–12)
POTASSIUM SERPL-SCNC: 3 MMOL/L (ref 3.5–5.2)
PROT SERPL-MCNC: 4.5 G/DL (ref 6–8.5)
RBC # BLD AUTO: 2.57 10*6/MM3 (ref 4.14–5.8)
SODIUM SERPL-SCNC: 140 MMOL/L (ref 136–145)
WBC NRBC COR # BLD: 11.84 10*3/MM3 (ref 3.4–10.8)

## 2022-04-07 PROCEDURE — 82962 GLUCOSE BLOOD TEST: CPT

## 2022-04-07 PROCEDURE — 99222 1ST HOSP IP/OBS MODERATE 55: CPT | Performed by: PHYSICIAN ASSISTANT

## 2022-04-07 PROCEDURE — 80053 COMPREHEN METABOLIC PANEL: CPT | Performed by: SURGERY

## 2022-04-07 PROCEDURE — 36430 TRANSFUSION BLD/BLD COMPNT: CPT

## 2022-04-07 PROCEDURE — 86900 BLOOD TYPING SEROLOGIC ABO: CPT

## 2022-04-07 PROCEDURE — P9016 RBC LEUKOCYTES REDUCED: HCPCS

## 2022-04-07 PROCEDURE — 83735 ASSAY OF MAGNESIUM: CPT | Performed by: HOSPITALIST

## 2022-04-07 PROCEDURE — 85025 COMPLETE CBC W/AUTO DIFF WBC: CPT | Performed by: SURGERY

## 2022-04-07 PROCEDURE — 25010000002 PIPERACILLIN SOD-TAZOBACTAM PER 1 G: Performed by: INTERNAL MEDICINE

## 2022-04-07 PROCEDURE — 97530 THERAPEUTIC ACTIVITIES: CPT

## 2022-04-07 PROCEDURE — 83605 ASSAY OF LACTIC ACID: CPT | Performed by: SURGERY

## 2022-04-07 PROCEDURE — 82272 OCCULT BLD FECES 1-3 TESTS: CPT | Performed by: SURGERY

## 2022-04-07 PROCEDURE — 63710000001 INSULIN LISPRO (HUMAN) PER 5 UNITS: Performed by: INTERNAL MEDICINE

## 2022-04-07 PROCEDURE — 99233 SBSQ HOSP IP/OBS HIGH 50: CPT | Performed by: NURSE PRACTITIONER

## 2022-04-07 RX ORDER — POTASSIUM CHLORIDE 750 MG/1
40 CAPSULE, EXTENDED RELEASE ORAL AS NEEDED
Status: DISCONTINUED | OUTPATIENT
Start: 2022-04-07 | End: 2022-04-08 | Stop reason: HOSPADM

## 2022-04-07 RX ORDER — MAGNESIUM SULFATE 1 G/100ML
1 INJECTION INTRAVENOUS AS NEEDED
Status: DISCONTINUED | OUTPATIENT
Start: 2022-04-07 | End: 2022-04-08 | Stop reason: HOSPADM

## 2022-04-07 RX ORDER — BUMETANIDE 0.25 MG/ML
0.5 INJECTION INTRAMUSCULAR; INTRAVENOUS ONCE
Status: COMPLETED | OUTPATIENT
Start: 2022-04-07 | End: 2022-04-07

## 2022-04-07 RX ORDER — MAGNESIUM SULFATE HEPTAHYDRATE 40 MG/ML
2 INJECTION, SOLUTION INTRAVENOUS AS NEEDED
Status: DISCONTINUED | OUTPATIENT
Start: 2022-04-07 | End: 2022-04-08 | Stop reason: HOSPADM

## 2022-04-07 RX ORDER — PEG-3350, SODIUM SULFATE, SODIUM CHLORIDE, POTASSIUM CHLORIDE, SODIUM ASCORBATE AND ASCORBIC ACID 7.5-2.691G
1000 KIT ORAL
Status: ACTIVE | OUTPATIENT
Start: 2022-04-08 | End: 2022-04-08

## 2022-04-07 RX ORDER — POTASSIUM CHLORIDE 1.5 G/1.77G
40 POWDER, FOR SOLUTION ORAL AS NEEDED
Status: DISCONTINUED | OUTPATIENT
Start: 2022-04-07 | End: 2022-04-08 | Stop reason: HOSPADM

## 2022-04-07 RX ORDER — PEG-3350, SODIUM SULFATE, SODIUM CHLORIDE, POTASSIUM CHLORIDE, SODIUM ASCORBATE AND ASCORBIC ACID 7.5-2.691G
1000 KIT ORAL
Status: COMPLETED | OUTPATIENT
Start: 2022-04-07 | End: 2022-04-07

## 2022-04-07 RX ADMIN — TAZOBACTAM SODIUM AND PIPERACILLIN SODIUM 3.38 G: 375; 3 INJECTION, SOLUTION INTRAVENOUS at 02:32

## 2022-04-07 RX ADMIN — POLYETHYLENE GLYCOL 3350, SODIUM SULFATE, SODIUM CHLORIDE, POTASSIUM CHLORIDE, SODIUM ASCORBATE, AND ASCORBIC ACID 1000 ML: KIT at 20:05

## 2022-04-07 RX ADMIN — SENNOSIDES AND DOCUSATE SODIUM 2 TABLET: 50; 8.6 TABLET ORAL at 08:29

## 2022-04-07 RX ADMIN — ASPIRIN 300 MG: 300 SUPPOSITORY RECTAL at 08:29

## 2022-04-07 RX ADMIN — POTASSIUM CHLORIDE 40 MEQ: 750 CAPSULE, EXTENDED RELEASE ORAL at 16:23

## 2022-04-07 RX ADMIN — LEVOTHYROXINE SODIUM 50 MCG: 50 TABLET ORAL at 08:29

## 2022-04-07 RX ADMIN — PREGABALIN 75 MG: 75 CAPSULE ORAL at 21:04

## 2022-04-07 RX ADMIN — TAZOBACTAM SODIUM AND PIPERACILLIN SODIUM 3.38 G: 375; 3 INJECTION, SOLUTION INTRAVENOUS at 19:54

## 2022-04-07 RX ADMIN — BUMETANIDE 0.5 MG: 0.25 INJECTION, SOLUTION INTRAMUSCULAR; INTRAVENOUS at 15:25

## 2022-04-07 RX ADMIN — POTASSIUM CHLORIDE 40 MEQ: 750 CAPSULE, EXTENDED RELEASE ORAL at 12:14

## 2022-04-07 RX ADMIN — MIDODRINE HYDROCHLORIDE 5 MG: 5 TABLET ORAL at 16:23

## 2022-04-07 RX ADMIN — INSULIN LISPRO 2 UNITS: 100 INJECTION, SOLUTION INTRAVENOUS; SUBCUTANEOUS at 12:14

## 2022-04-07 RX ADMIN — POTASSIUM CHLORIDE 40 MEQ: 750 CAPSULE, EXTENDED RELEASE ORAL at 08:29

## 2022-04-07 RX ADMIN — TAZOBACTAM SODIUM AND PIPERACILLIN SODIUM 3.38 G: 375; 3 INJECTION, SOLUTION INTRAVENOUS at 10:45

## 2022-04-07 RX ADMIN — MIDODRINE HYDROCHLORIDE 5 MG: 5 TABLET ORAL at 10:45

## 2022-04-07 RX ADMIN — PANTOPRAZOLE SODIUM 40 MG: 40 INJECTION, POWDER, FOR SOLUTION INTRAVENOUS at 05:52

## 2022-04-07 RX ADMIN — PREGABALIN 75 MG: 75 CAPSULE ORAL at 08:29

## 2022-04-07 NOTE — PLAN OF CARE
Goal Outcome Evaluation:  Plan of Care Reviewed With: patient, family        Progress: improving  Outcome Evaluation: Patient ambulated 350' CGA, no LOB but mild unsteadiness turning corners.  Fatigued with activity.  PT to cont. to follow and progress activity as tolerated

## 2022-04-07 NOTE — THERAPY TREATMENT NOTE
Patient Name: Filipe Hamilton Jr.  : 1934    MRN: [id number]                              Today's Date: 2022       Admit Date: 2022    Visit Dx:     ICD-10-CM ICD-9-CM   1. Sepsis with acute renal failure without septic shock, due to unspecified organism, unspecified acute renal failure type (HCC)  A41.9 038.9    R65.20 995.92    N17.9 584.9   2. Acute dehydration  E86.0 276.51   3. Acute abdominal pain  R10.9 789.00     338.19   4. Bronchitis  J40 490   5. Elevated troponin  R77.8 790.6   6. Acute on chronic anemia  D64.9 285.9   7. Mesenteric ischemia (HCC)  K55.9 557.9   8. Anemia due to GI blood loss  D50.0 280.0     Patient Active Problem List   Diagnosis   • Renal artery stenosis (HCC)   • Actinic keratosis   • Allergic rhinitis   • Coronary artery disease involving native coronary artery of native heart   • Bilateral carotid artery stenosis   • Cervical spondylosis   • Stage 3b chronic kidney disease (HCC)   • Type 2 diabetes mellitus with diabetic polyneuropathy, without long-term current use of insulin (HCC)   • Diverticulosis of large intestine   • BPH   • Gastroesophageal reflux disease   • Hyperlipidemia LDL goal <70   • Hyperplastic colon polyp   • Essential hypertension   • Hypothyroidism   • Insomnia   • Microalbuminuria   • Osteoarthritis of both knees   • Rosacea   • Chronic obstructive pulmonary disease with acute exacerbation (HCC)   • Tubular adenoma of colon   • Ganglion cyst, R. 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 shoulder (12/11/20); superficial BCC with (+) periph margin; darron Hoffmann   • Basal cell carcinoma of hand 01/03/1995    L. wrist (1/3/95); darron Hoffmann   • Hx of cardiovascular stress test 03/19/2013    SPECT stress test (3/19/13) EF 53%, c/w prior MI    • Hx of carotid ultrasound 04/16/2019    carotid u/s (4/16/19): bilat plaque, 0-49% stenosis right, 50-69% stenosis left   • Hx of carotid ultrasound 02/08/2021    carotid u/s (2/8/21): bilat 0-49% stenosis; marked diffuse prominent extracranial carotid artery atherosclerotic changes   • Hx of cath aortogram with L. angiogram 10/03/2017    aortogram and L. angio (10/3/17): no AAA, occluded L. SFA; will pursue L.SFA intervention; abdullahi Easley   • Hx of chest x-ray 10/12/2017    nl CXR (10/12/17)   • Hx of colonoscopy 11/10/2015    hyperplastic polyps (11/15), repeat in 5 yrs; QUINTON Holland   • Hx of echocardiogram 05/03/2021    ECHO (5/3/21, [facility] hospitalized): EF 35%, grade II agarwal dysfunction, mod MR, mild-mod TR, mild pulm HTN (RVSP 41.81mmHg), dilated RV   • Hx of exercise stress test 06/10/2016    nl GXT (6/16), noting only increased ventricular ectopy with exercise; abdullahi Conrad   • Hx of tobacco use     h/o 2ppd x 40 yrs; quit 1990s   • Squamous cell carcinoma in situ of skin 11/11/2016    SCC in-situ L. chest (11/11/16); derm - Dr. 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 Hoffmann     Past Surgical History:   Procedure Laterality Date   • ATHERECTOMY Right 09/15/2020    s/p peripheral atherectomy/PTA  (9/15/20) for PAD; cards - Dr. Easley   • CARDIAC CATHETERIZATION  10/26/2021    s/p PCI to LAD (10/26/21): at [facility]   • CAROTID ENDARTERECTOMY Right 01/2005    surg - Dr. Douglas Lazaro   • CATARACT EXTRACTION Bilateral 12/15/2021    Left 12/15/21, Right 1/12/22; ophtho - Dr. Fountain   • CORONARY ARTERY BYPASS GRAFT  04/2011    CABG x2 (4/11)   • HEMORRHOIDECTOMY     • HYDROCELE EXCISION / REPAIR Left     s/p L. hydrocelectomy; uro - Dr. Hobson   • POPLITEAL ARTERY ANGIOPLASTY Left 11/03/2017    s/p successful DCB angioplasty of L. SDA and popliteal arteries, patent CFA, DFA (11/3/17); CTS - Dr. Easley   • RENAL ARTERY STENT  01/2005    for Renal artery stenosis (1/05)   • SKIN BIOPSY Left 12/11/2020    s/p blade bx L. shoulder (12/11/20): path - superficial BCC with (+) periph margin; derm - Dr. Hoffmann   • TOTAL KNEE ARTHROPLASTY Right 01/2008      General Information     Row Name 04/07/22 1540          Document Type therapy note (daily note)  -    Mode of Treatment physical therapy  -    Row Name 04/07/22 1540          Patient Profile Reviewed yes  -    Barriers to Rehab previous functional deficit  -    Row Name 04/07/22 1540          Orientation Status (Cognition) oriented x 3  -    Row Name 04/07/22 1540          Impairments Affecting Function (Mobility) balance;endurance/activity tolerance;strength  -          User Key  (r) = Recorded By, (t) = Taken By, (c) = Cosigned By    Initials Name Provider Type    LF Romy Machado PT Physical Therapist               Mobility     Row Name 04/07/22 1540          Bed Mobility --    All Activities, [place] (Bed Mobility) --    Assistive Device (Bed Mobility) --    Comment, (Bed Mobility) up to bathroom when PT arrived.  Independent at sink for  hand hygiene  -    Row Name 04/07/22 1540          Sit-Stand [place] (Transfers) independent  -    Row Name 04/07/22 1540          [place] Level (Gait) contact guard  -    Distance in  Feet (Gait) 350  -LF    Bilateral Gait Deviations --    Comment, (Gait/Stairs) mild unsteadiness turning corners  -LF          User Key  (r) = Recorded By, (t) = Taken By, (c) = Cosigned By    Initials Name Provider Type    LF Romy Machado PT Physical Therapist               Obj/Interventions     Row Name 04/07/22 4150          Balance    Balance Assessment standing static balance;standing dynamic balance  -LF     Static Sitting Balance independent  -LF     Static Standing Balance independent  -LF     Dynamic Standing Balance standby assist;contact guard  -LF           User Key  (r) = Recorded By, (t) = Taken By, (c) = Cosigned By    Initials Name Provider Type     Romy Machado PT Physical Therapist               Goals/Plan    No documentation.                Clinical Impression     [facility] Name 04/07/22 1540          Pretreatment Pain Rating 0/10 - no pain  -LF    Posttreatment Pain Rating 0/10 - no pain  -LF    Row Name 04/07/22 2870          Plan of Care Reviewed With patient;family  -LF    Progress improving  -LF    Outcome Evaluation Patient ambulated 350' CGA, no LOB but mild unsteadiness turning corners.  Fatigued with activity.  PT to cont. to follow and progress activity as tolerated  -LF    [facility] Name 04/07/22 6570          O2 Delivery Intra Treatment room air  -LF    Pre Patient Position Standing  -LF    Intra Patient Position Standing  -LF    Post Patient Position Sitting  -LF    Row Name 04/07/22 4250          Pre-Treatment Position bathroom  -LF    Post Treatment Position other  couch.  Nsg returning to reconnect IV  -LF    In Chair sitting;with family/caregiver  -LF          User Key  (r) = Recorded By, (t) = Taken By, (c) = Cosigned By    Initials Name Provider Type     Romy Machado PT Physical Therapist               Outcome Measures     Row Name 04/07/22 7210          Turning from your back to your side while in flat bed without using bedrails? 4  -LF    Moving from lying on back to  sitting on the side of a flat bed without bedrails? 4  -LF    Moving to and from a bed to a chair (including a wheelchair)? 4  -LF    Standing up from a chair using your arms (e.g., wheelchair, bedside chair)? 4  -LF    Climbing 3-5 steps with a railing? 3  -LF    To walk in hospital room? 3  -LF    AM-PAC 6 Clicks Score (PT) 22  -LF    Row Name 04/07/22 0800          How much help from another person do you currently need...    Turning from your back to your side while in flat bed without using bedrails? 4  -ALEM     Moving from lying on back to sitting on the side of a flat bed without bedrails? 4  -ALEM     Moving to and from a bed to a chair (including a wheelchair)? 4  -ALEM     Standing up from a chair using your arms (e.g., wheelchair, bedside chair)? 3  -ALEM     Climbing 3-5 steps with a railing? 3  -ALEM     To walk in hospital room? 3  -ALEM     AM-PAC 6 Clicks Score (PT) 21  -ALEM     Row Name 04/07/22 1540          Outcome Measure Options AM-PAC 6 Clicks Basic Mobility (PT)  -LF          User Key  (r) = Recorded By, (t) = Taken By, (c) = Cosigned By    Initials Name Provider Type    Romy Moreno PT Physical Therapist    Myesha French RN Registered Nurse                             Physical Therapy Education                 Title: PT OT SLP Therapies (Done)     Topic: Physical Therapy (Done)     Point: Mobility training (Done)     Learning Progress Summary           Patient Acceptance, E, NR,VU by CIPRIANO at 4/6/2022 1602                   Point: Home exercise program (Done)     Learning Progress Summary           Patient Acceptance, E, NR,VU by CIPRIANO at 4/6/2022 1602                   Point: Body mechanics (Done)     Learning Progress Summary           Patient Acceptance, E, NR,VU by CIRPIANO at 4/6/2022 1602                   Point: Precautions (Done)     Learning Progress Summary           Patient Acceptance, E, NR,VU by CIPRIANO at 4/6/2022 1602                               User Key     Initials Effective Dates  Name Provider Type Discipline     06/16/21 -  Chayito De La Cruz PT Physical Therapist PT              PT Recommendation and Plan     Plan of Care Reviewed With: patient, family  Progress: improving  Outcome Evaluation: Patient ambulated 350' CGA, no LOB but mild unsteadiness turning corners.  Fatigued with activity.  PT to cont. to follow and progress activity as tolerated     Time Calculation:    PT Charges     Row Name 04/07/22 1540       Time Calculation    Start Time 1540  -LF    PT Received On 04/07/22  -LF           Timed Charges    91159 - PT Therapeutic Activity Minutes 15  -LF           Total Minutes    Timed Charges Total Minutes 15  -LF     Total Minutes 15  -LF          User Key  (r) = Recorded By, (t) = Taken By, (c) = Cosigned By    Initials Name Provider Type     Romy Machado PT Physical Therapist              Therapy Charges for Today     Code Description Service Date Service Provider Modifiers Qty    [id number]  PT THERAPEUTIC ACT EA 15 MIN 4/7/2022 Romy Machado PT GP 1          PT G-Codes  Outcome Measure Options: AM-PAC 6 Clicks Basic Mobility (PT)  AM-PAC 6 Clicks Score (PT): 22    Romy Machado PT  4/7/2022

## 2022-04-07 NOTE — PROGRESS NOTES
"Patient Name:  Filipe Hamilton Jr.  YOB: 1934  [id number]    Surgery Progress Note    Date of visit: 4/7/2022    Subjective   Patient without abdominal pain. No fevers/chills. No nausea/vomiting. +BM.         Objective       /62 (BP Location: Right arm, Patient Position: Lying)   Pulse 90   Temp 98.2 °F (36.8 °C) (Oral)   Resp 18   Ht 170.2 cm (67\")   Wt 75.5 kg (166 lb 8 oz)   SpO2 93%   BMI 26.08 kg/m²     Intake/Output Summary (Last 24 hours) at 4/7/2022 0730  Last data filed at 4/7/2022 0600  Gross per 24 hour   Intake 1660 ml   Output 850 ml   Net 810 ml       CV:  Rhythm regular and rate regular  L:  Clear to auscultation bilaterally  Abd:  Bowel sounds positive, soft, mild distention, nontender  Ext:  No cyanosis, clubbing, edema    Recent labs and imaging that are back at this time have been reviewed.   WBC 17.2 -> 11.8  Hgb 7.2 -> 6.9  Cr 2.68 -> 1.31  Lactate 1.2 -> 0.7       Assessment/Plan     Pt with possible mesenteric ischemia, with continuing improvement  OK for clear liquid diet  Check fecal occult blood -> if positive consult GI for assessment for colonoscopy  Continue to hold Plavix for now, ok for ASA        Demar Oakes MD  4/7/2022  07:30 EDT    "

## 2022-04-07 NOTE — CONSULTS
[facility] [place] [facility] Consult    Referring Provider: No ref. provider found    PCP: Delilah Tatum MD    Reason for Consultation: Anemia, occult positive blood, mesenteric ischemia via CT    History of present illness:    Filipe Hamilton Jr. is a 88 y.o. male, PMH includes CAD, CKD, T2DM, HTN, COPD is admitted via ED 4/5 for evaluation of abdominal pain. Son is at bedside and helps to provide the history.    Pt reports ongoing generalized, severe, stabbing abdominal pain. Unalleviated with changes in position or bland diet. He states that pain seems to be worse postprandially, associated with nausea, emesis and diarrhea. He does note darker color to his stool over the last few days. Associated with recent weight loss, undefined quantity, as well.     Hb 8.5 at time of admission, downtrending to 6.9 earlier today. 1u pRBC infusing at bedside currently.     CT A/P wo contrast 4/5: Unusual appearance of bowel in left abdomen, in left upper quadrant with separate areas and inflammation of small bowel and proximal descending colon. Whirl sign involving both large and small bowel, without evidence of significant ongoing bowel obstruction. Consider possible internal hernia or adhesion with resulting vascular compromise of bowel.     Patient denies associated fever, chills, indigestion, constipation, hematemesis, dysphagia, hematochezia, bloating, dysuria, jaundice or bruising.    Patient denies personal or FHx of PUD, H Pylori, gastritis, pancreatitis, colitis, Celiac disease, UC, Crohn's disease, IBS, colon or gastric cancers. Pt denies EtOH, tobacco, illicit substance or NSAID use. He is anticoagulated on plavix.     EGD / CSY in the remote past.     Allergies:  Niacin    Scheduled Meds:  aspirin, 300 mg, Rectal, Daily  bumetanide, 0.5 mg, Intravenous, Once  insulin lispro, 0-7 Units, Subcutaneous, TID AC  levothyroxine, 50 mcg, Oral, Daily  midodrine, 5 mg, Oral, TID AC  pantoprazole, 40 mg, Intravenous, Q  AM  piperacillin-tazobactam, 3.375 g, Intravenous, Q8H  pregabalin, 75 mg, Oral, BID  senna-docusate sodium, 2 tablet, Oral, BID  sodium chloride, 10 mL, Intravenous, Q12H         Infusions:  sodium chloride, 75 mL/hr, Last Rate: 75 mL/hr (04/06/22 3407)        PRN Meds:  •  albuterol sulfate HFA  •  senna-docusate sodium **AND** polyethylene glycol **AND** bisacodyl **AND** bisacodyl  •  dextrose  •  dextrose  •  glucagon (human recombinant)  •  magnesium sulfate **OR** magnesium sulfate in D5W 1g/100mL (PREMIX)  •  Morphine  •  ondansetron  •  potassium chloride  •  potassium chloride  •  Sodium Chloride (PF)  •  sodium chloride  •  zolpidem    Home Meds:  Medications Prior to Admission   Medication Sig Dispense Refill Last Dose   • albuterol sulfate  (90 Base) MCG/ACT inhaler Inhale 2 puffs Every 6 (Six) Hours As Needed for Wheezing. (Patient not taking: Reported on 4/5/2022) 18 g 0 Not Taking at Unknown time   • amLODIPine (NORVASC) 2.5 MG tablet Take 2.5 mg by mouth 2 (two) times a day. (Patient not taking: Reported on 4/5/2022)   Not Taking at Unknown time   • aspirin 81 MG tablet Take 1 tablet by mouth daily.      • atorvastatin (LIPITOR) 40 MG tablet Take 1 tablet by mouth Daily. 90 tablet 3    • bumetanide (BUMEX) 1 MG tablet Take 2 mg by mouth Daily.      • cholecalciferol (VITAMIN D3) 10 MCG (400 UNIT) tablet Take 400 Units by mouth Daily.      • clopidogrel (PLAVIX) 75 MG tablet Take 75 mg by mouth Daily.      • fluocinonide (LIDEX) 0.05 % external solution       • glucose blood (FREESTYLE TEST STRIPS) test strip 1 each by Other route Daily. 100 each 3    • guaiFENesin (MUCINEX) 600 MG 12 hr tablet Take 2 tablets by mouth 2 (Two) Times a Day. 30 tablet 0    • ketoconazole (NIZORAL) 2 % shampoo       • Lancets misc 1 Units Daily. Use as directed 100 each 3    • levothyroxine (SYNTHROID, LEVOTHROID) 50 MCG tablet Take 1 tablet by mouth Daily. 90 tablet 3    • loratadine (Claritin) 5 MG chewable  tablet Chew 1 tablet Daily. 30 tablet 0    • metoprolol succinate XL (TOPROL-XL) 50 MG 24 hr tablet Take 50 mg by mouth Daily. Increased per Dr. Ramirez      • Misc Natural Products (GLUCOSAMINE CHONDROITIN ADV) tablet Take  by mouth.      • omeprazole (priLOSEC) 20 MG capsule TAKE 1 CAPSULE DAILY 90 capsule 3    • potassium chloride 10 MEQ CR tablet Take 10 mEq by mouth Daily.      • pregabalin (Lyrica) 75 MG capsule Take 1 capsule by mouth 2 (Two) Times a Day. 180 capsule 0    • sacubitril-valsartan (ENTRESTO)  MG tablet Take 1 tablet by mouth 2 (Two) Times a Day.      • Spiriva Respimat 2.5 MCG/ACT aerosol solution inhaler USE 2 INHALATIONS DAILY 12 g 3    • spironolactone (ALDACTONE) 12.5 MG tablet half tablet Take  by mouth Daily. 25mg 1/2 QD      • tamsulosin (FLOMAX) 0.4 MG capsule 24 hr capsule Take 1 capsule by mouth Daily. 90 capsule 3    • zolpidem (AMBIEN) 10 MG tablet Take 1 tablet by mouth Every Night. 30 tablet 2        ROS: Review of Systems   Constitutional: Positive for appetite change (decreased) and unexpected weight change. Negative for chills, diaphoresis and fatigue.   HENT: Negative for drooling, facial swelling, mouth sores, nosebleeds, rhinorrhea, sore throat, tinnitus, trouble swallowing and voice change.    Respiratory: Negative for cough, chest tightness and shortness of breath.    Cardiovascular: Negative for chest pain, palpitations and leg swelling.   Gastrointestinal: Positive for abdominal pain (generalized, severe), diarrhea, nausea and vomiting. Negative for abdominal distention, blood in stool and constipation.   Genitourinary: Negative for dysuria, flank pain and hematuria.   Musculoskeletal: Negative for arthralgias, joint swelling and myalgias.   Skin: Negative for color change, pallor and rash.   Neurological: Negative for dizziness, tremors, syncope, weakness and light-headedness.   Psychiatric/Behavioral: Negative for confusion and hallucinations.   All other systems  reviewed and are negative.      PAST MED HX:  Past Medical History:   Diagnosis Date   • Abnormal ankle brachial index 09/12/2017    AUGUST (9/12/17) nl 1.04 on R, bord 0.91 on L; abdullahi Easley   • Basal cell carcinoma (BCC) of left shoulder 12/11/2020    s/p blade bx L. shoulder (12/11/20); superficial BCC with (+) periph margin; darron Hoffmann   • Basal cell carcinoma of hand 01/03/1995    L. wrist (1/3/95); darron Hoffmann   • Hx of cardiovascular stress test 03/19/2013    SPECT stress test (3/19/13) EF 53%, c/w prior MI    • Hx of carotid ultrasound 04/16/2019    carotid u/s (4/16/19): bilat plaque, 0-49% stenosis right, 50-69% stenosis left   • Hx of carotid ultrasound 02/08/2021    carotid u/s (2/8/21): bilat 0-49% stenosis; marked diffuse prominent extracranial carotid artery atherosclerotic changes   • Hx of cath aortogram with L. angiogram 10/03/2017    aortogram and L. angio (10/3/17): no AAA, occluded L. SFA; will pursue L.SFA intervention; abdullahi Easley   • Hx of chest x-ray 10/12/2017    nl CXR (10/12/17)   • Hx of colonoscopy 11/10/2015    hyperplastic polyps (11/15), repeat in 5 yrs; QUINTON Holland   • Hx of echocardiogram 05/03/2021    ECHO (5/3/21, [facility] hospitalized): EF 35%, grade II agarwal dysfunction, mod MR, mild-mod TR, mild pulm HTN (RVSP 41.81mmHg), dilated RV   • Hx of exercise stress test 06/10/2016    nl GXT (6/16), noting only increased ventricular ectopy with exercise; abdullahi Conrad   • Hx of tobacco use     h/o 2ppd x 40 yrs; quit 1990s   • Squamous cell carcinoma in situ of skin 11/11/2016    SCC in-situ L. chest (11/11/16); derm - Dr. 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 Hoffmann       PAST SURG HX:  Past Surgical History:   Procedure Laterality Date   • ATHERECTOMY Right 09/15/2020    s/p peripheral atherectomy/PTA (9/15/20) for PAD; cards - Dr. Easley   • CARDIAC  "CATHETERIZATION  10/26/2021    s/p PCI to LAD (10/26/21): [facility]   • CAROTID ENDARTERECTOMY Right 2005    surg - Dr. Douglas Lazaro   • CATARACT EXTRACTION Bilateral 12/15/2021    Left 12/15/21, Right 22; ophtho - Dr. Fountain   • CORONARY ARTERY BYPASS GRAFT  2011    CABG x2 ()   • HEMORRHOIDECTOMY     • HYDROCELE EXCISION / REPAIR Left     s/p L. hydrocelectomy; uro - Dr. Hobson   • POPLITEAL ARTERY ANGIOPLASTY Left 2017    s/p successful DCB angioplasty of L. SDA and popliteal arteries, patent CFA, DFA (11/3/17); CTS - Dr. Easley   • RENAL ARTERY STENT  2005    for Renal artery stenosis ()   • SKIN BIOPSY Left 2020    s/p blade bx L. shoulder (20): path - superficial BCC with (+) periph margin; derm - Dr. Hoffmann   • TOTAL KNEE ARTHROPLASTY Right 2008       FAM HX:  Family History   Problem Relation Age of Onset   • Heart disease Mother    • Hypertension Mother    • Heart disease Father          age 46   • Hypertension Father    • Heart disease Brother         x2, 1 brother  age 33, another  age 54       SOC HX:  Social History     Socioeconomic History   • Marital status:      Spouse name: Adelina   Tobacco Use   • Smoking status: Former Smoker     Packs/day: 3.00     Years: 45.00     Pack years: 135.00     Quit date:      Years since quittin.2   • Smokeless tobacco: Former User     Types: Chew     Quit date: 3/29/2022   • Tobacco comment: quit ; 3ppd x 45 yrs, quit age age 53   Vaping Use   • Vaping Use: Never used   Substance and Sexual Activity   • Alcohol use: Not Currently     Comment: less than weekly   • Drug use: Defer   • Sexual activity: Defer       PHYSICAL EXAM  /68   Pulse 86   Temp 97.8 °F (36.6 °C) (Oral)   Resp 18   Ht 170.2 cm (67\")   Wt 75.5 kg (166 lb 8 oz)   SpO2 96%   BMI 26.08 kg/m²   Wt Readings from Last 3 Encounters:   22 75.5 kg (166 lb 8 oz)   01/10/22 75.3 kg (166 lb)   21 75.8 kg " (167 lb)   ,body mass index is 26.08 kg/m².  Physical Exam  Vitals and nursing note reviewed.   Constitutional:       General: He is not in acute distress.     Appearance: Normal appearance. He is not ill-appearing or diaphoretic.   HENT:      Head: Normocephalic and atraumatic.      Right Ear: External ear normal.      Left Ear: External ear normal.      Nose: Nose normal. No rhinorrhea.      Mouth/Throat:      Mouth: Mucous membranes are moist.      Pharynx: Oropharynx is clear. No posterior oropharyngeal erythema.   Eyes:      General:         Right eye: No discharge.         Left eye: No discharge.      Conjunctiva/sclera: Conjunctivae normal.      Pupils: Pupils are equal, round, and reactive to light.   Cardiovascular:      Rate and Rhythm: Normal rate and regular rhythm.      Pulses: Normal pulses.      Heart sounds: No murmur heard.    No friction rub.   Pulmonary:      Effort: Pulmonary effort is normal. No respiratory distress.      Breath sounds: Normal breath sounds. No wheezing.   Abdominal:      General: Abdomen is flat. There is no distension.      Tenderness: There is abdominal tenderness (diffuse, generalized to deep palpation). There is no guarding or rebound.   Musculoskeletal:         General: Normal range of motion.      Cervical back: Normal range of motion and neck supple. No rigidity.   Skin:     General: Skin is warm and dry.      Capillary Refill: Capillary refill takes less than 2 seconds.      Coloration: Skin is not jaundiced or pale.   Neurological:      General: No focal deficit present.      Mental Status: He is alert and oriented to person, place, and time. Mental status is at baseline.   Psychiatric:         Mood and Affect: Mood normal.         Thought Content: Thought content normal.         Judgment: Judgment normal.         Results Review:   I reviewed the patient's new clinical results.  I reviewed the patient's new imaging results and agree with the interpretation.  I reviewed  the patient's other test results and agree with the interpretation    Lab Results   Component Value Date    WBC 11.84 (H) 04/07/2022    HGB 6.9 (C) 04/07/2022    HGB 7.2 (L) 04/06/2022    HGB 8.5 (L) 04/05/2022    HCT 22.9 (L) 04/07/2022    MCV 89.1 04/07/2022     04/07/2022       Lab Results   Component Value Date    INR 1.35 (H) 04/05/2022       Lab Results   Component Value Date    GLUCOSE 103 (H) 04/07/2022    BUN 30 (H) 04/07/2022    CREATININE 1.31 (H) 04/07/2022    EGFRIFNONA 44 (L) 01/05/2022    EGFRIFAFRI >60 10/28/2021    BCR 22.9 04/07/2022     04/07/2022    K 3.0 (L) 04/07/2022    CO2 24.0 04/07/2022    CALCIUM 7.5 (L) 04/07/2022    PROTENTOTREF 6.2 12/03/2014    ALBUMIN 2.20 (L) 04/07/2022    ALKPHOS 60 04/07/2022    BILITOT 0.3 04/07/2022    ALT 62 (H) 04/07/2022    AST 62 (H) 04/07/2022       ASSESSMENTS/PLANS    Mesenteric ischemia  Acute anemia  Occult positive stool  Nausea, vomiting, diarrhea   - continue trending H/H and transfuse per hospitalist protocol   - continue protonix 40mg IV BID   - continue plavix   - clear liquid diet now   - NPO at midnight, plan for EGD / CSY with Dr. Brunner tomorrow    I discussed the patient's findings and my recommendations with patient and family. Thank you very kindly for this consultation. Will continue to follow during this hospitalization.      Rayna Ramos PA-C  04/07/22  14:13 EDT

## 2022-04-07 NOTE — PAYOR COMM NOTE
"Filipe Hamilton Jr. (88 y.o. Male)             Date of Birth   1934    Social Security Number       Address   [address] [place] [address] [facility] [address]    Home Phone   [phone]    MRN   [id number]       Yarsanism   Advent    Marital Status                               Admission Date   4/5/22    Admission Type   Emergency    Admitting Provider   Anup Galdamez MD    Attending Provider   Anup Galdamez MD    Department, Room/Bed   [address], S461/1       Discharge Date       Discharge Disposition       Discharge Destination                               Attending Provider: Anup Galdamez MD    Allergies: Niacin    Isolation: None   Infection: None   Code Status: CPR   Advance Care Planning Activity    Ht: 170.2 cm (67\")   Wt: 75.5 kg (166 lb 8 oz)    Admission Cmt: None   Principal Problem: Ischemic bowel disease (HCC) [K55.9]                 Active Insurance as of 4/5/2022     Primary Coverage     Payor Plan Insurance Group Employer/Plan Group    [place] [employer] MEDICARE REPLACEMENT [facility] MEDICARE REPLACEMENT [id number]     Payor Plan Address Payor Plan Phone Number Payor Plan Fax Number Effective Dates    [address] [address]   5/20/2016 - None Entered    [facility] [id number]       Subscriber Name Subscriber Birth Date Member ID       FILIPE HAMILTON JR. 1934 [id number]                 Emergency Contacts      (Rel.) Home Phone Work Phone Mobile Phone    AlfonsoTaylor (Spouse) [phone] -- --    AlfonsoJosue (Son) -- -- [phone]            [place]: [facility] [id number] Tax ID [id number]  Insurance Information                [employer] [employer] MED[employer]ARE REPLACEMENT/[facility] MEDICARE REPLACEMENT Phone: --    Subscriber: Filipe Hamilton Jr. Subscriber#: [id number]    Group#: [id number] Precert#:  [id number].             History & Physical      Helen Ely DO at 04/05/22 1853              [facility] " Medicine Services  HISTORY AND PHYSICAL    Patient Name: Filipe Hamilton Jr.  : 1934  MRN: [id number]  Primary Care Physician: Delilah Tatum MD  Date of admission: 2022      Subjective   Subjective     Chief Complaint:  Abdominal pain    HPI:  Filipe Hamilton Jr. is a 88 y.o. male with history of diabetes, peripheral neuropathy, cardiomyopathy on Bumex and spironolactone, follows with  cardiology admitted for worsening abdominal pain over the last 3 to 4 days.  Son helps provide some history at bedside.  He has not been able to tolerate any oral intake and has been having nausea and vomiting along with diarrhea that worsened today.  He was evaluated by surgery in the ER who recommended conservative management due to increased risk of liborio-operative, post-op complications for now.    Discussed case with general surgery who had already evaluated patient prior to my exam.       Review of Systems   Constitutional: Positive for chills. Negative for fatigue and fever.   Respiratory: Negative for shortness of breath.    Cardiovascular: Negative for chest pain and leg swelling.   Gastrointestinal: Positive for abdominal distention, abdominal pain, diarrhea, nausea and vomiting.   Genitourinary: Positive for difficulty urinating. Negative for dysuria.   Skin: Negative for rash and wound.   Neurological: Negative for weakness and light-headedness.        All other systems reviewed and are negative.     Personal History     Past Medical History:   Diagnosis Date   • Abnormal ankle brachial index 2017    AUGUST (17) nl 1.04 on R, bord 0.91 on L; abdullahi Easley   • Basal cell carcinoma (BCC) of left shoulder 2020    s/p blade bx L. shoulder (20); superficial BCC with (+) periph margin; darron Hoffmann   • Basal cell carcinoma of hand 1995    L. wrist (1/3/95); darron Hoffmann   • Hx of cardiovascular stress test 2013    SPECT stress test (3/19/13) EF 53%, c/w prior  MI    • Hx of carotid ultrasound 04/16/2019    carotid u/s (4/16/19): bilat plaque, 0-49% stenosis right, 50-69% stenosis left   • Hx of carotid ultrasound 02/08/2021    carotid u/s (2/8/21): bilat 0-49% stenosis; marked diffuse prominent extracranial carotid artery atherosclerotic changes   • Hx of cath aortogram with L. angiogram 10/03/2017    aortogram and L. angio (10/3/17): no AAA, occluded L. SFA; will pursue L.SFA intervention; abdullahi Easley   • Hx of chest x-ray 10/12/2017    nl CXR (10/12/17)   • Hx of colonoscopy 11/10/2015    hyperplastic polyps (11/15), repeat in 5 yrs; QUINTON Holland   • Hx of echocardiogram 05/03/2021    ECHO (5/3/21, [facility] hospitalized): EF 35%, grade II agarwal dysfunction, mod MR, mild-mod TR, mild pulm HTN (RVSP 41.81mmHg), dilated RV   • Hx of exercise stress test 06/10/2016    nl GXT (6/16), noting only increased ventricular ectopy with exercise; abdullahi Conrad   • Hx of tobacco use     h/o 2ppd x 40 yrs; quit 1990s   • Squamous cell carcinoma in situ of skin 11/11/2016    SCC in-situ L. chest (11/11/16); derm - Dr. 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 Shun             Past Surgical History:   Procedure Laterality Date   • ATHERECTOMY Right 09/15/2020    s/p peripheral atherectomy/PTA (9/15/20) for PAD; abdullahi Easley   • CARDIAC CATHETERIZATION  10/26/2021    s/p PCI to LAD (10/26/21): at [facility]   • CAROTID ENDARTERECTOMY Right 01/2005    surg - Dr. Douglas Lazaro   • CATARACT EXTRACTION Bilateral 12/15/2021    Left 12/15/21, Right 1/12/22; ophtho - Dr. Fountain   • CORONARY ARTERY BYPASS GRAFT  04/2011    CABG x2 (4/11)   • HEMORRHOIDECTOMY     • HYDROCELE EXCISION / REPAIR Left     s/p L. hydrocelectomy; uro - Dr. Hobson   • POPLITEAL ARTERY ANGIOPLASTY Left 11/03/2017    s/p successful DCB angioplasty of L. SDA and popliteal arteries, patent CFA, DFA (11/3/17); CTS -   Kaushal   • RENAL ARTERY STENT  01/2005    for Renal artery stenosis (1/05)   • SKIN BIOPSY Left 12/11/2020    s/p blade bx L. shoulder (12/11/20): path - superficial BCC with (+) periph margin; derm - Dr. Hoffmann   • TOTAL KNEE ARTHROPLASTY Right 01/2008       Family History:  family history includes Heart disease in his brother, father, and mother; Hypertension in his father and mother. Otherwise pertinent FHx was reviewed and unremarkable.     Social History:  reports that he quit smoking about 35 years ago. He has a 135.00 pack-year smoking history. He quit smokeless tobacco use 7 days ago.  His smokeless tobacco use included chew. He reports previous alcohol use. Drug use questions deferred to the physician.  Social History     Social History Narrative   • Not on file       Medications:  Available home medication information reviewed.  Medications Prior to Admission   Medication Sig Dispense Refill Last Dose   • albuterol sulfate  (90 Base) MCG/ACT inhaler Inhale 2 puffs Every 6 (Six) Hours As Needed for Wheezing. (Patient not taking: Reported on 4/5/2022) 18 g 0 Not Taking at Unknown time   • amLODIPine (NORVASC) 2.5 MG tablet Take 2.5 mg by mouth 2 (two) times a day. (Patient not taking: Reported on 4/5/2022)   Not Taking at Unknown time   • aspirin 81 MG tablet Take 1 tablet by mouth daily.      • atorvastatin (LIPITOR) 40 MG tablet Take 1 tablet by mouth Daily. 90 tablet 3    • bumetanide (BUMEX) 1 MG tablet Take 2 mg by mouth Daily.      • cholecalciferol (VITAMIN D3) 10 MCG (400 UNIT) tablet Take 400 Units by mouth Daily.      • clopidogrel (PLAVIX) 75 MG tablet Take 75 mg by mouth Daily.      • fluocinonide (LIDEX) 0.05 % external solution       • glucose blood (FREESTYLE TEST STRIPS) test strip 1 each by Other route Daily. 100 each 3    • guaiFENesin (MUCINEX) 600 MG 12 hr tablet Take 2 tablets by mouth 2 (Two) Times a Day. 30 tablet 0    • ketoconazole (NIZORAL) 2 % shampoo       • Lancets misc  1 Units Daily. Use as directed 100 each 3    • levothyroxine (SYNTHROID, LEVOTHROID) 50 MCG tablet Take 1 tablet by mouth Daily. 90 tablet 3    • loratadine (Claritin) 5 MG chewable tablet Chew 1 tablet Daily. 30 tablet 0    • metoprolol succinate XL (TOPROL-XL) 50 MG 24 hr tablet Take 50 mg by mouth Daily. Increased per Dr. Ramirez      • Misc Natural Products (GLUCOSAMINE CHONDROITIN ADV) tablet Take  by mouth.      • omeprazole (priLOSEC) 20 MG capsule TAKE 1 CAPSULE DAILY 90 capsule 3    • potassium chloride 10 MEQ CR tablet Take 10 mEq by mouth Daily.      • pregabalin (Lyrica) 75 MG capsule Take 1 capsule by mouth 2 (Two) Times a Day. 180 capsule 0    • sacubitril-valsartan (ENTRESTO)  MG tablet Take 1 tablet by mouth 2 (Two) Times a Day.      • Spiriva Respimat 2.5 MCG/ACT aerosol solution inhaler USE 2 INHALATIONS DAILY 12 g 3    • spironolactone (ALDACTONE) 12.5 MG tablet half tablet Take  by mouth Daily. 25mg 1/2 QD      • tamsulosin (FLOMAX) 0.4 MG capsule 24 hr capsule Take 1 capsule by mouth Daily. 90 capsule 3    • zolpidem (AMBIEN) 10 MG tablet Take 1 tablet by mouth Every Night. 30 tablet 2        Allergies   Allergen Reactions   • Niacin      flushing       Objective   Objective     Vital Signs:   Temp:  [99.7 °F (37.6 °C)] 99.7 °F (37.6 °C)  Heart Rate:  [] 90  Resp:  [16-22] 16  BP: ()/(49-73) 103/54       Physical Exam  Constitutional:       Appearance: Normal appearance.   HENT:      Head: Normocephalic and atraumatic.      Nose: Nose normal.   Cardiovascular:      Rate and Rhythm: Regular rhythm. Tachycardia present.      Heart sounds: No murmur heard.  Pulmonary:      Effort: Pulmonary effort is normal. No respiratory distress.      Breath sounds: Normal breath sounds.   Abdominal:      General: There is distension.      Tenderness: There is abdominal tenderness. There is no guarding.   Musculoskeletal:         General: No swelling.   Skin:     General: Skin is warm and dry.       Capillary Refill: Capillary refill takes 2 to 3 seconds.   Neurological:      General: No focal deficit present.      Mental Status: He is alert and oriented to person, place, and time.   Psychiatric:         Mood and Affect: Mood normal.            Result Review:  I have personally reviewed the results from the time of this admission to 4/5/2022 21:24 EDT and agree with these findings:  [x]  Laboratory  []  Microbiology  [x]  Radiology  [x]  EKG/Telemetry   []  Cardiology/Vascular   []  Pathology  [x]  Old records  []  Other:  Most notable findings include:       LAB RESULTS:      Lab 04/05/22 2034 04/05/22  1449   WBC  --  24.64*   HEMOGLOBIN  --  8.5*   HEMATOCRIT  --  26.7*   PLATELETS  --  249   NEUTROS ABS  --  22.86*   IMMATURE GRANS (ABS)  --  0.24*   LYMPHS ABS  --  0.38*   MONOS ABS  --  1.13*   EOS ABS  --  0.00   MCV  --  87.8   PROCALCITONIN  --  1.66*   LACTATE  --  2.0   PROTIME  --  16.6*   INR  --  1.35*   APTT 35.6  --          Lab 04/05/22  1449   SODIUM 135*   POTASSIUM 3.4*   CHLORIDE 97*   CO2 22.0   ANION GAP 16.0*   BUN 55*   CREATININE 2.68*   EGFR 22.2*   GLUCOSE 148*   CALCIUM 7.8*         Lab 04/05/22  1449   TOTAL PROTEIN 5.4*   ALBUMIN 2.40*   GLOBULIN 3.0   ALT (SGPT) 60*   AST (SGOT) 85*   BILIRUBIN 0.6   ALK PHOS 70   LIPASE 8*         Lab 04/05/22  1449   TROPONIN T 0.379*                 UA    Urinalysis 1/10/22 1/10/22 4/5/22    1449 1449    Squamous Epithelial Cells, UA  0-2    Specific Gravity, UA 1.014  1.019   Ketones, UA Negative  Trace (A)   Blood, UA Negative  Negative   Leukocytes, UA Negative  Negative   Nitrite, UA Negative  Negative   RBC, UA  0-2    WBC, UA  0-2    Bacteria, UA  None Seen    (A) Abnormal value              Microbiology Results (last 10 days)     Procedure Component Value - Date/Time    COVID PRE-OP / PRE-PROCEDURE SCREENING ORDER (NO ISOLATION) - Swab, Nasopharynx [[id number]]  (Normal) Collected: 04/05/22 1558    Lab Status: Final result Specimen:  Swab from Nasopharynx Updated: 04/05/22 1632    Narrative:      The following orders were created for panel order COVID PRE-OP / PRE-PROCEDURE SCREENING ORDER (NO ISOLATION) - Swab, Nasopharynx.  Procedure                               Abnormality         Status                     ---------                               -----------         ------                     COVID-19 and FLU A/B PCR...[[id number]]  Normal              Final result                 Please view results for these tests on the individual orders.    COVID-19 and FLU A/B PCR - Swab, Nasopharynx [[id number]]  (Normal) Collected: 04/05/22 1558    Lab Status: Final result Specimen: Swab from Nasopharynx Updated: 04/05/22 1632     COVID19 Not Detected     Influenza A PCR Not Detected     Influenza B PCR Not Detected    Narrative:      Fact sheet for providers: https://www.fda.gov/media/254603/download    Fact sheet for patients: https://www.fda.gov/media/545717/download    Test performed by PCR.          CT Abdomen Pelvis Without Contrast    Result Date: 4/5/2022  DATE OF EXAM: 4/5/2022 4:49 PM  PROCEDURE: CT ABDOMEN PELVIS WO CONTRAST-  INDICATIONS: nausea/vomiting/diarrhea  COMPARISON: 1/31/2013 abdomen pelvis CT scan  TECHNIQUE: Routine transaxial slices were obtained through the abdomen and pelvis without the administration of intravenous contrast. Reconstructed coronal and sagittal images were also obtained. Automated exposure control and iterative construction methods were used.  The radiation dose reduction device was turned on for each scan per the ALARA (As Low as Reasonably Achievable) protocol.  FINDINGS: History indicates generalized abdominal pain nausea vomiting and diarrhea.  The included lower lungs show mild new peribronchial thickening in the middle lobe and right lower lobe, possibly bronchitis. No focal pneumonia or effusion is seen.  Granulomas calcifications are seen in the normal-sized spleen. There is mild fatty liver change. No  "significant abnormalities are seen of the gallbladder, pancreas, adrenal glands, or kidneys for a non-IV contrast enhanced exam. No upper abdominal free air ascites or adenopathy is seen.  In the left upper quadrant, there is inflammatory fat stranding around the splenic flexure of the colon, but also focal small bowel mesenteric edema in a separate but adjacent location left upper quadrant, with mild mucosal thickening of a loop of adjacent small bowel. It is uncertain if these two areas inflammation represent the same underlying pathology. No abscess, pneumatosis or extraluminal air is seen. The colonic inflammatory focus is not associated with significant diverticular disease.  Regarding the lower abdomen pelvis, no additional inflamed bowel loops are identified. There is, however, an unusual appearance involving both the distal colon and adjacent loop of small bowel from axial image 86 through image 102, with a partial \"whirl sign \"and marked decreased caliber of small bowel at this level as well as diminished caliber of the colon, possibly a very low-level obstruction, as from underlying adhesion, very small internal hernia etc. The small bowel loop can also be seen in the left lower quadrant coronal image 36 through 26.  Terminal ileum cecum and appendix appear normal. No intrapelvic free fluid is seen. Bladder is normally distended and normal in appearance. Incidental note is made of heavy atherosclerotic calcification of the abdominal aorta, extending into the lumen on axial image 71, suggesting there is likely a high degree of stenosis. Heavy atherosclerotic calcification is seen of the patient's small iliac arteries as well. Similar appearance is present in 2013, with calcified material in the aortic lumen, although the aorta was patent on that study.       Impression:    1. Unusual appearance of the bowel in the left abdomen, in the left upper quadrant with separate areas and inflammation of small bowel " and proximal descending colon. More distal area in the left abdomen that resembles a whirl sign involving both large and small bowel, without evidence of significant ongoing bowel obstruction. Consider possible internal hernia or adhesion with resulting vascular compromise of bowel. 2. No other evidence of acute intra-abdominal or intrapelvic disease elsewhere. 3. Heavy atherosclerotic calcification of the distal abdominal aorta and iliac arteries, but also present in 2013. Potentially significant luminal stenosis of the distal abdominal aorta is a consideration. 4. Mild new interstitial disease the right middle and lower lobe, potentially bronchitis, or more likely with history of vomiting, mild aspiration.  Note: Preliminary findings of this study were discussed with Dr. Longo at approximately 5:25 PM 4/5/2022.  This report was finalized on 4/5/2022 5:26 PM by Dr. Gonzalez Dillard MD.      XR Chest 1 View    Result Date: 4/5/2022  DATE OF EXAM: 4/5/2022 3:08 PM  PROCEDURE: XR CHEST 1 VW-  INDICATIONS: nausea/vomitng/fatigue  COMPARISON: 2/27/2021  TECHNIQUE: Single radiographic AP view of the chest was obtained.  FINDINGS: No focal airspace opacity. No pleural effusion or pneumothorax. Normal heart and mediastinal contours. Sternotomy wires are intact.      Impression: No evidence of acute disease in the chest.  This report was finalized on 4/5/2022 3:30 PM by Yusef Rausch.            Assessment/Plan   Assessment & Plan     Active Hospital Problems    Diagnosis  POA   • Abdominal pain [R10.9]  Unknown   • Severe sepsis (HCC) [A41.9, R65.20]  Unknown   • Sepsis with acute renal failure without septic shock, due to unspecified organism, unspecified acute renal failure type (HCC) [A41.9, R65.20, N17.9]  Yes   • Hypertension [I10]  Yes     10/11/21 /84; on metoprolol XL 50mg QD, amlo 2.5mg BID; also takes bumex 2mg QD, entresto 97/103 BID, and spironolactone 12.5mg QD; 5/7/21 /68 on lisin 40mg QD, amlo 5mg  QD, and metoprolol XL 25mg 1/2 QD; 4/8/21 /70; 10/13/2020 /68 on amlodipine 5 mg QD, lisinopril 40 mg QD; impression: 1/3/19 BP stable 120/64, on amlo 5mg QD, lisin 40mg QD; 04/19/2016 - BP has been running high per patient; patient will check to see if actually taking amlodipine (no RXs in EHR since 2014; incr lisin to 20mg BID #180, 2RF; rec low Na diet, cont monitoring BPs; f/u in 1 month  Impression: 12/18/2015 - BP stable on current meds; stable electrolytes  Impression: 09/11/2015 - BP excellent  Impression: 06/08/2015 - BP remains excellent on current med  Impression: 03/09/2015 - BP stable  Impression: 01/12/2015 - BP stable  Impression: 09/09/2014 - BP remains stable on lisinopril;      • Type 2 diabetes mellitus with diabetic polyneuropathy, without long-term current use of insulin (HCC) [E11.42]  Yes     1/5/22 A1C 6.33; 11/19/21 UDS appropriate (+) zolpidem/lyrica; 11/19/21 A1C 6.4; 10/11/21 wants to wean off of lyrica - rec 75mg QHS x 2 wks, then QOD x 1-2 wks, then stop; 7/22/21 A1C 6.1; 5/17/21 abnl NFBG 198; 4/8/21 A1C better 5.7; 1/4/21 A1C 6.21; 10/15/20 UDS appropriate (+) zolpidem/lyrica; 9/10/20 ophtho/Dr. Dionisio Valenzuela - cataract OU, RTC 1 yr; 7/14/2020 A1c 5.9; no meds; 1/6/20 A1C bord 6.2 (was 5.8 in 7/19), also remains on lyrica 64mg BID #180, 0RF (exception due to insurance); 09/09/2014 - offered option to increase gabapentin to 300mg QAM and 600mg QHS with is complaints of increased nighttime sxs; patient prefers trial of Lyrica 75mg BID #60,2RF and will discontinue gabapentin;cautions also given with concomitant use of zolpidem with lyrica, specifically to monitor for excessive sedating effects; LETHA done and controlled med contract signed today; f/u in 3 mos for next prescription - could consider adjusting dose depending on results;   Impression: 12/18/2015 - BG control remains stable; healthy diet and phys activtiy; no meds; f/u A1C In 6 mos  Impression: 06/08/2015 - BG  control remains stable; healthy diet and phys activity as tolerated  Impression: 12/09/2014 - BG control remains stable; cont healthy diet and phys acitivty; repeat A1C in 6 mos  Impression: 03/07/2014 - stable A1C; cont healthy diet and exercise;        Sepsis  Leukocytosis  Abd pain  -suspect due to abdominal source   -blood cultures pending  -continue zosyn  - discussed case with general surgery, high risk of morbidity and mortality  -plavix on hold  -NPO      HTN  -add midodrine  -hold amlodipine and BB for now  -check ECHO    Abdominal pain  Elevated Lactic acid  -trend, gentle IVF      NSTEMI  -cardio consult  -trend troponins  -avoid further boluses, suspect EF is low at baseline      KHARI on CKD  -secondary to sepsis, overdiuresis  -recheck in AM  -urinary retention protocol      DM with neuropathy  -SSI while npo    DVT prophylaxis:  On hold, may need possible surgery      CODE STATUS:  Full  Code Status and Medical Interventions:   Ordered at: 04/05/22 2123     Level Of Support Discussed With:    Patient     Code Status (Patient has no pulse and is not breathing):    CPR (Attempt to Resuscitate)     Medical Interventions (Patient has pulse or is breathing):    Full Support         Helen Ely DO  04/05/22    Electronically signed by Helen Ely DO at 04/05/22 2135       Vital Signs (last day)     Date/Time Temp Temp src Pulse Resp BP Patient Position SpO2    04/07/22 1225 97.8 (36.6) Oral 82 18 -- -- 95    04/07/22 1211 -- -- 86 18 147/72 -- 98    04/07/22 1201 97.8 (36.6) Oral -- 18 140/70 Lying --    04/07/22 1140 97.8 (36.6) Oral 84 18 139/69 Lying --    04/07/22 1000 -- -- 93 -- -- -- --    04/07/22 0800 -- -- 81 -- -- -- --    04/07/22 0719 98.2 (36.8) Oral -- 18 154/62 Lying --    04/07/22 0610 -- -- 90 -- -- -- 93    04/07/22 0600 -- -- 84 -- -- -- --    04/07/22 0500 -- -- 73 -- -- -- 91 04/07/22 0400 -- -- 77 -- -- -- 93 04/07/22 0300 -- -- 83 -- -- -- 93 04/07/22  0250 98.5 (36.9) Oral 98 18 152/71 Lying 94    04/07/22 0200 -- -- 81 -- -- -- 93 04/07/22 0100 -- -- 81 -- -- -- 93 04/07/22 0005 -- -- 84 -- -- -- 93 04/06/22 2300 -- -- 84 -- -- -- 92 04/06/22 2200 -- -- 83 -- -- -- 95 04/06/22 2100 -- -- 79 -- -- -- 92 04/06/22 2000 -- -- 79 -- -- -- 92    04/06/22 1932 97.9 (36.6) Oral 79 18 121/57 Lying 95    04/06/22 1800 -- -- 99 -- -- -- --    04/06/22 1700 -- -- 93 -- -- -- 93 04/06/22 1600 -- -- 87 -- -- -- 93 04/06/22 1548 98.6 (37) Oral 88 18 115/51 Sitting 93    04/06/22 1500 -- -- 86 -- -- -- 95    04/06/22 1400 -- -- 85 -- -- -- 90 04/06/22 1300 -- -- 86 -- -- -- 94 04/06/22 1200 -- -- 83 -- -- -- 92    04/06/22 1140 97.9 (36.6) Oral 77 20 97/54 Sitting 91    04/06/22 1100 -- -- 87 -- -- -- 94 04/06/22 1000 -- -- 88 -- -- -- 94 04/06/22 0900 -- -- 84 -- -- -- 94 04/06/22 0800 -- -- 83 -- -- -- 91 04/06/22 0700 -- -- 82 -- -- -- 91 04/06/22 0656 98.3 (36.8) Oral 80 20 121/52 Lying --    04/06/22 0600 -- -- 83 -- -- -- 91    04/06/22 0500 -- -- 80 -- -- -- 90    04/06/22 0400 -- -- 82 -- -- -- 91    04/06/22 0324 -- -- 83 18 111/83 Lying 94    04/06/22 0300 -- -- 81 -- -- -- 93    04/06/22 0100 -- -- 86 -- -- -- 95    04/06/22 0000 -- -- 92 -- -- -- 94            Current Facility-Administered Medications   Medication Dose Route Frequency Provider Last Rate Last Admin   • albuterol sulfate HFA (PROVENTIL HFA;VENTOLIN HFA;PROAIR HFA) inhaler 2 puff  2 puff Inhalation Q6H PRN Helen Ely DO       • aspirin suppository 300 mg  300 mg Rectal Daily Jamey Gonzalez IV, MD   300 mg at 04/07/22 0829   • sennosides-docusate (PERICOLACE) 8.6-50 MG per tablet 2 tablet  2 tablet Oral BID Helen Ely DO   2 tablet at 04/07/22 0829    And   • polyethylene glycol (MIRALAX) packet 17 g  17 g Oral Daily PRN Helen Ely DO        And   • bisacodyl (DULCOLAX) EC tablet 5 mg  5 mg Oral Daily PRN  Helen Ely, DO        And   • bisacodyl (DULCOLAX) suppository 10 mg  10 mg Rectal Daily PRN Helen Ely DO       • bumetanide (BUMEX) injection 0.5 mg  0.5 mg Intravenous Once Naty Galan, APRN       • dextrose (D50W) (25 g/50 mL) IV injection 25 g  25 g Intravenous Q15 Min PRN Helen Ely DO       • dextrose (GLUTOSE) oral gel 15 g  15 g Oral Q15 Min PRN Helen Ely DO       • glucagon (human recombinant) (GLUCAGEN DIAGNOSTIC) injection 1 mg  1 mg Intramuscular Q15 Min PRN Helen Ely DO       • insulin lispro (humaLOG) injection 0-7 Units  0-7 Units Subcutaneous TID AC Helen Ely DO   2 Units at 04/07/22 1214   • levothyroxine (SYNTHROID, LEVOTHROID) tablet 50 mcg  50 mcg Oral Daily Helen Ely DO   50 mcg at 04/07/22 0829   • Magnesium Sulfate 2 gram infusion - Mg less than or equal to 1.5 mg/dL  2 g Intravenous PRN Naty Galan, APRN        Or   • Magnesium Sulfate 1 gram infusion - Mg 1.6-1.9 mg/dL  1 g Intravenous PRN Rustam Galanin, APRN       • midodrine (PROAMATINE) tablet 5 mg  5 mg Oral TID AC Helen Ely DO   5 mg at 04/07/22 1045   • morphine injection 2 mg  2 mg Intravenous Q4H PRN Helen Ely DO       • ondansetron (ZOFRAN) injection 4 mg  4 mg Intravenous Q6H PRN Helen Ely DO       • pantoprazole (PROTONIX) injection 40 mg  40 mg Intravenous Q AM Helen Ely, DO   40 mg at 04/07/22 0552   • piperacillin-tazobactam (ZOSYN) 3.375 g in iso-osmotic dextrose 50 ml (premix)  3.375 g Intravenous Q8H Helen Ely, DO   3.375 g at 04/07/22 1045   • potassium chloride (KLOR-CON) packet 40 mEq  40 mEq Oral PRN Anup Galdamez MD       • potassium chloride (MICRO-K) CR capsule 40 mEq  40 mEq Oral PRN Anup Galdamez MD   40 mEq at 04/07/22 1214   • pregabalin (LYRICA) capsule 75 mg  75 mg Oral BID Helen Ely DO   75 mg at 04/07/22 0829   •  Sodium Chloride (PF) 0.9 % 10 mL  10 mL Intravenous PRN Jeanne Longo MD       • sodium chloride 0.9 % flush 10 mL  10 mL Intravenous Q12H Helen Ely,    10 mL at 04/06/22 2026   • sodium chloride 0.9 % flush 10 mL  10 mL Intravenous PRN Helen Ely DO       • sodium chloride 0.9 % infusion  75 mL/hr Intravenous Continuous Helen Ely DO 75 mL/hr at 04/06/22 2246 75 mL/hr at 04/06/22 2246   • zolpidem (AMBIEN) tablet 5 mg  5 mg Oral Nightly PRN Helen Ely DO           Lab Results (last 24 hours)     Procedure Component Value Units Date/Time    POC Glucose Once [[id number]]  (Abnormal) Collected: 04/07/22 1201    Specimen: Blood Updated: 04/07/22 1203     Glucose 175 mg/dL      Comment: Meter: [id number] : [id number] Angelita Johnson       Occult Blood X 1, Stool - Stool, Per Rectum [[id number]]  (Abnormal) Collected: 04/07/22 0900    Specimen: Stool from Per Rectum Updated: 04/07/22 1107     Fecal Occult Blood Positive    Magnesium [[id number]]  (Normal) Collected: 04/07/22 0436    Specimen: Blood Updated: 04/07/22 0846     Magnesium 1.6 mg/dL     POC Glucose Once [[id number]]  (Normal) Collected: 04/07/22 0744    Specimen: Blood Updated: 04/07/22 0745     Glucose 103 mg/dL      Comment: Meter: [id number] : [id number] Angelita Johnson       Comprehensive Metabolic Panel [[id number]]  (Abnormal) Collected: 04/07/22 0436    Specimen: Blood Updated: 04/07/22 0623     Glucose 103 mg/dL      BUN 30 mg/dL      Creatinine 1.31 mg/dL      Sodium 140 mmol/L      Potassium 3.0 mmol/L      Chloride 109 mmol/L      CO2 24.0 mmol/L      Calcium 7.5 mg/dL      Total Protein 4.5 g/dL      Albumin 2.20 g/dL      ALT (SGPT) 62 U/L      AST (SGOT) 62 U/L      Alkaline Phosphatase 60 U/L      Total Bilirubin 0.3 mg/dL      Globulin 2.3 gm/dL      Comment: Calculated Result        A/G Ratio 1.0 g/dL      BUN/Creatinine Ratio 22.9     Anion Gap 7.0 mmol/L      eGFR 52.4 mL/min/1.73       Comment: National Kidney Foundation and American Society of Nephrology (ASN) Task Force recommended calculation based on the Chronic Kidney Disease Epidemiology Collaboration (CKD-EPI) equation refit without adjustment for race.       Narrative:      GFR Normal >60  Chronic Kidney Disease <60  Kidney Failure <15      Lactic Acid, Plasma [[id number]]  (Normal) Collected: 04/07/22 0436    Specimen: Blood Updated: 04/07/22 0613     Lactate 0.7 mmol/L      Comment: Falsely depressed results may occur on samples drawn from patients receiving N-Acetylcysteine (NAC) or Metamizole.       CBC & Differential [[id number]]  (Abnormal) Collected: 04/07/22 0436    Specimen: Blood Updated: 04/07/22 0533    Narrative:      The following orders were created for panel order CBC & Differential.  Procedure                               Abnormality         Status                     ---------                               -----------         ------                     CBC Auto Differential[[id number]]        Abnormal            Final result                 Please view results for these tests on the individual orders.    CBC Auto Differential [[id number]]  (Abnormal) Collected: 04/07/22 0436    Specimen: Blood Updated: 04/07/22 0533     WBC 11.84 10*3/mm3      RBC 2.57 10*6/mm3      Hemoglobin 6.9 g/dL      Hematocrit 22.9 %      MCV 89.1 fL      MCH 26.8 pg      MCHC 30.1 g/dL      RDW 13.3 %      RDW-SD 43.3 fl      MPV 9.9 fL      Platelets 207 10*3/mm3      Neutrophil % 84.0 %      Lymphocyte % 7.4 %      Monocyte % 3.8 %      Eosinophil % 0.5 %      Basophil % 0.2 %      Immature Grans % 4.1 %      Neutrophils, Absolute 9.95 10*3/mm3      Lymphocytes, Absolute 0.88 10*3/mm3      Monocytes, Absolute 0.45 10*3/mm3      Eosinophils, Absolute 0.06 10*3/mm3      Basophils, Absolute 0.02 10*3/mm3      Immature Grans, Absolute 0.48 10*3/mm3      nRBC 0.0 /100 WBC     Urine Culture - Urine, Urine, Clean Catch [[id number]]  (Normal)  "Collected: 04/05/22 1647    Specimen: Urine, Clean Catch Updated: 04/06/22 2338     Urine Culture No growth    Blood Culture - Blood, Arm, Right [[id number]]  (Normal) Collected: 04/05/22 1718    Specimen: Blood from Arm, Right Updated: 04/06/22 2003     Blood Culture No growth at 24 hours    Blood Culture - Blood, Hand, Right [[id number]]  (Normal) Collected: 04/05/22 1721    Specimen: Blood from Hand, Right Updated: 04/06/22 2003     Blood Culture No growth at 24 hours    Lactic Acid, Plasma [[id number]]  (Normal) Collected: 04/06/22 1837    Specimen: Blood Updated: 04/06/22 1906     Lactate 1.2 mmol/L      Comment: Falsely depressed results may occur on samples drawn from patients receiving N-Acetylcysteine (NAC) or Metamizole.       POC Glucose Once [[id number]]  (Abnormal) Collected: 04/06/22 1623    Specimen: Blood Updated: 04/06/22 1625     Glucose 144 mg/dL      Comment: Meter: [id number] : [id number] Travis Blevins       Lactic Acid, Plasma [[id number]]  (Normal) Collected: 04/06/22 1422    Specimen: Blood Updated: 04/06/22 1525     Lactate 1.3 mmol/L      Comment: Falsely depressed results may occur on samples drawn from patients receiving N-Acetylcysteine (NAC) or Metamizole.           Imaging Results (Last 24 Hours)     ** No results found for the last 24 hours. **        Orders (last 24 hrs)      Start     Ordered    04/07/22 1245  bumetanide (BUMEX) injection 0.5 mg  Once         04/07/22 1146    04/07/22 1204  POC Glucose Once  PROCEDURE ONCE         04/07/22 1201    04/07/22 1145  Inpatient Gastroenterology Consult  Once        Specialty:  Gastroenterology  Provider:  Tio Francois MD    04/07/22 1145    04/07/22 1004  Magnesium Sulfate 2 gram infusion - Mg less than or equal to 1.5 mg/dL  As Needed        \"Or\" Linked Group Details    04/07/22 1004    04/07/22 1004  Magnesium Sulfate 1 gram infusion - Mg 1.6-1.9 mg/dL  As Needed        \"Or\" Linked Group Details    04/07/22 1004    04/07/22 0806 "  Magnesium  STAT         04/07/22 0805    04/07/22 0805  Patient Currently On Electrolyte Replacement Protocol - Please Refer to MAR for Protocol Details  Misc Nursing Order (Specify)  Daily      Comments: Patient Currently On Electrolyte Replacement Protocol - Please Refer to MAR for Protocol Details    04/07/22 0804    04/07/22 0805  Verify Informed Consent for Blood Product Administration  Once         04/07/22 0804    04/07/22 0805  Prepare RBC, 2 Units  Blood - Once         04/07/22 0804    04/07/22 0804  Initiate Electrolyte Replacement Protocol  Until Discontinued         04/07/22 0804    04/07/22 0803  potassium chloride (MICRO-K) CR capsule 40 mEq  As Needed         04/07/22 0804    04/07/22 0803  potassium chloride (KLOR-CON) packet 40 mEq  As Needed         04/07/22 0804    04/07/22 0746  POC Glucose Once  PROCEDURE ONCE         04/07/22 0744    04/07/22 0735  Diet Clear Liquid  Diet Effective Now         04/07/22 0735    04/07/22 0600  CBC & Differential  Morning Draw         04/06/22 1741    04/07/22 0600  Comprehensive Metabolic Panel  Morning Draw         04/06/22 1741    04/07/22 0600  Lactic Acid, Plasma  Morning Draw         04/06/22 1741    04/07/22 0600  CBC Auto Differential  PROCEDURE ONCE         04/06/22 2205    04/07/22 0543  Verify Informed Consent for Blood Product Administration  Once         04/07/22 0545    04/07/22 0543  Prepare RBC, 1 Units  Blood - Once        Comments: Hold maintenance fluids while infusing blood      04/07/22 0545    04/07/22 0542  Transfuse RBC Infuse Each Unit Over: 3.5H  Transfusion         04/07/22 0545    04/07/22 0001  NPO Diet  Diet Effective Midnight,   Status:  Canceled         04/06/22 1741    04/06/22 1630  aspirin suppository 300 mg  Daily         04/06/22 1542    04/06/22 1626  POC Glucose Once  PROCEDURE ONCE         04/06/22 1623    04/06/22 1615  aspirin EC tablet 81 mg  Daily,   Status:  Discontinued         04/06/22 1529    04/06/22 0730  insulin  "lispro (humaLOG) injection 0-7 Units  3 Times Daily Before Meals         04/05/22 1900    04/06/22 0730  midodrine (PROAMATINE) tablet 5 mg  3 Times Daily Before Meals         04/05/22 2005 04/06/22 0700  POC Glucose TID AC  3 Times Daily Before Meals       04/05/22 1900    04/06/22 0600  pantoprazole (PROTONIX) injection 40 mg  Every Early Morning         04/05/22 2010 04/06/22 0300  piperacillin-tazobactam (ZOSYN) 3.375 g in iso-osmotic dextrose 50 ml (premix)  Every 8 Hours         04/05/22 2006 04/06/22 0000  Lactic Acid, Plasma  Every 6 Hours       04/05/22 1802    04/06/22 0000  Vital Signs  Every 4 Hours       04/05/22 2014 04/05/22 2100  pregabalin (LYRICA) capsule 75 mg  2 Times Daily         04/05/22 2010 04/05/22 2100  sodium chloride 0.9 % flush 10 mL  Every 12 Hours Scheduled         04/05/22 2014 04/05/22 2100  sennosides-docusate (PERICOLACE) 8.6-50 MG per tablet 2 tablet  2 Times Daily        \"And\" Linked Group Details    04/05/22 2014 04/05/22 2014  ondansetron (ZOFRAN) injection 4 mg  Every 6 Hours PRN         04/05/22 2014 04/05/22 2014  polyethylene glycol (MIRALAX) packet 17 g  Daily PRN        \"And\" Linked Group Details    04/05/22 2014 04/05/22 2014  bisacodyl (DULCOLAX) EC tablet 5 mg  Daily PRN        \"And\" Linked Group Details    04/05/22 2014 04/05/22 2014  bisacodyl (DULCOLAX) suppository 10 mg  Daily PRN        \"And\" Linked Group Details    04/05/22 2014 04/05/22 2013  Intake & Output  Every Shift       04/05/22 2014 04/05/22 2012  sodium chloride 0.9 % flush 10 mL  As Needed         04/05/22 2014 04/05/22 2012  levothyroxine (SYNTHROID, LEVOTHROID) tablet 50 mcg  Daily         04/05/22 2010 04/05/22 2011  morphine injection 2 mg  Every 4 Hours PRN         04/05/22 2012 04/05/22 2010  zolpidem (AMBIEN) tablet 5 mg  Nightly PRN         04/05/22 2010 04/05/22 2006  albuterol sulfate HFA (PROVENTIL HFA;VENTOLIN HFA;PROAIR HFA) inhaler 2 puff  " "Every 6 Hours PRN         04/05/22 2010 04/05/22 1900  dextrose (GLUTOSE) oral gel 15 g  Every 15 Minutes PRN         04/05/22 1900    04/05/22 1900  dextrose (D50W) (25 g/50 mL) IV injection 25 g  Every 15 Minutes PRN         04/05/22 1900    04/05/22 1900  glucagon (human recombinant) (GLUCAGEN DIAGNOSTIC) injection 1 mg  Every 15 Minutes PRN         04/05/22 1900 04/05/22 1756  sodium chloride 0.9 % infusion  Continuous         04/05/22 1754 04/05/22 1427  Sodium Chloride (PF) 0.9 % 10 mL  As Needed         04/05/22 1427    Unscheduled  Up With Assistance  As Needed       04/05/22 2014    Unscheduled  Follow Acute Urinary Retention Protocol  As Needed       04/05/22 2014    Unscheduled  Occult Blood X 1, Stool - Stool, Per Rectum  As Needed       04/07/22 0735    Unscheduled  Transfuse RBC, 2 Units Infuse Each Unit Over: 3.5H  Transfusion       04/07/22 0804    Unscheduled  Occult Blood X 1, Stool - Stool, Per Rectum  As Needed       04/07/22 0806                Operative/Procedure Notes (last 24 hours)  Notes from 04/06/22 1318 through 04/07/22 1318   No notes of this type exist for this encounter.            Physician Progress Notes (last 24 hours)      Demar Oakes MD at 04/07/22 0730          Patient Name:  Filipe Hamilton Jr.  YOB: 1934  [id number]    Surgery Progress Note    Date of visit: 4/7/2022    Subjective   Patient without abdominal pain. No fevers/chills. No nausea/vomiting. +BM.        Objective       /62 (BP Location: Right arm, Patient Position: Lying)   Pulse 90   Temp 98.2 °F (36.8 °C) (Oral)   Resp 18   Ht 170.2 cm (67\")   Wt 75.5 kg (166 lb 8 oz)   SpO2 93%   BMI 26.08 kg/m²     Intake/Output Summary (Last 24 hours) at 4/7/2022 0730  Last data filed at 4/7/2022 0600  Gross per 24 hour   Intake 1660 ml   Output 850 ml   Net 810 ml       CV:  Rhythm regular and rate regular  L:  Clear to auscultation bilaterally  Abd:  Bowel sounds positive, soft, " mild distention, nontender  Ext:  No cyanosis, clubbing, edema    Recent labs and imaging that are back at this time have been reviewed.   WBC 17.2 -> 11.8  Hgb 7.2 -> 6.9  Cr 2.68 -> 1.31  Lactate 1.2 -> 0.7      Assessment/Plan     Pt with possible mesenteric ischemia, with continuing improvement  OK for clear liquid diet  Check fecal occult blood -> if positive consult GI for assessment for colonoscopy  Continue to hold Plavix for now, ok for ASA        Demar Oakes MD  2022  07:30 EDT      Electronically signed by Demar Oakes MD at 22 0734     Demar Oakes MD at 22 1741        Patient without abdominal pain through the day. Tolerated clear liquid diet without incident. No fevers, but episode of hypotension mid-day. Will check CBC and lactate again tomorrow AM. Will make him NPO p MN in case abdominal pain returns, patient has a clinical decline and/or if lab values worsen.    Electronically signed by Demar Oakes MD at 22 1743     Anup Galdamez MD at 22 1729              [facility] Medicine Services  PROGRESS NOTE    Patient Name: Filipe Hamilton Jr.  : 1934  MRN: [id number]    Date of Admission: 2022  Primary Care Physician: Delilah Tatum MD    Subjective   Subjective     CC:    Abdominal pain    HPI:   Son says wheezing and worried about heart failure.  Asking about surgeon's opinion today about surgery.  Denies chest pain    ROS:    Gen- denies chills, denies fevers  CV- No chest pain, palpitations  Resp- No cough, dyspnea  GI- No N/V/D, abd pain        Objective   Objective     Vital Signs:   Temp:  [97.7 °F (36.5 °C)-98.6 °F (37 °C)] 98.6 °F (37 °C)  Heart Rate:  [] 93  Resp:  [16-20] 18  BP: ()/(49-83) 115/51     Physical Exam:    Constitutional: No acute distress, awake, alert  HENT: NCAT, mucous membranes moist  Respiratory: Clear to auscultation bilaterally, respiratory effort normal    Cardiovascular: RRR, s1 and s2  Gastrointestinal: Positive bowel sounds, soft, pouchy, gas filled abdomen  Musculoskeletal: No bilateral ankle edema  Psychiatric: Appropriate affect, cooperative  Neurologic: Oriented x 3, strength symmetric in all extremities, Cranial Nerves grossly intact to confrontation, speech clear  Skin: No rashes    Results Reviewed:  LAB RESULTS:      Lab 04/06/22  1422 04/06/22  0532 04/05/22 2344 04/05/22 2034 04/05/22  1449   WBC  --  17.24*  --   --  24.64*   HEMOGLOBIN  --  7.2*  --   --  8.5*   HEMATOCRIT  --  23.5*  --   --  26.7*   PLATELETS  --  199  --   --  249   NEUTROS ABS  --  15.57*  --   --  22.86*   IMMATURE GRANS (ABS)  --  0.16*  --   --  0.24*   LYMPHS ABS  --  0.84  --   --  0.38*   MONOS ABS  --  0.65  --   --  1.13*   EOS ABS  --  0.00  --   --  0.00   MCV  --  89.7  --   --  87.8   PROCALCITONIN  --   --   --   --  1.66*   LACTATE 1.3 0.8 1.3  --  2.0   PROTIME  --   --   --   --  16.6*   APTT  --   --   --  35.6  --          Lab 04/06/22 0532 04/05/22 2034 04/05/22  1449   SODIUM  --   --  135*   POTASSIUM  --   --  3.4*   CHLORIDE  --   --  97*   CO2  --   --  22.0   ANION GAP  --   --  16.0*   BUN  --   --  55*   CREATININE  --   --  2.68*   EGFR  --   --  22.2*   GLUCOSE  --   --  148*   CALCIUM  --   --  7.8*   HEMOGLOBIN A1C  --  6.50*  --    TSH 1.410  --   --          Lab 04/05/22  1449   TOTAL PROTEIN 5.4*   ALBUMIN 2.40*   GLOBULIN 3.0   ALT (SGPT) 60*   AST (SGOT) 85*   BILIRUBIN 0.6   ALK PHOS 70   LIPASE 8*         Lab 04/05/22 2344 04/05/22 2110 04/05/22 2034 04/05/22  1449   TROPONIN T 0.510* 0.574* 0.527* 0.379*   PROTIME  --   --   --  16.6*   INR  --   --   --  1.35*             Lab 04/05/22 2110 04/05/22 2034   ABO TYPING B B   RH TYPING Positive Positive   ANTIBODY SCREEN  --  Negative         Brief Urine Lab Results  (Last result in the past 365 days)      Color   Clarity   Blood   Leuk Est   Nitrite   Protein   CREAT   Urine HCG         04/05/22 1647 Yellow   Clear   Negative   Negative   Negative   Trace                 Microbiology Results Abnormal     Procedure Component Value - Date/Time    Urine Culture - Urine, Urine, Clean Catch [[id number]]  (Normal) Collected: 04/05/22 1647    Lab Status: Preliminary result Specimen: Urine, Clean Catch Updated: 04/06/22 1200     Urine Culture No growth    COVID PRE-OP / PRE-PROCEDURE SCREENING ORDER (NO ISOLATION) - Swab, Nasopharynx [[id number]]  (Normal) Collected: 04/05/22 1558    Lab Status: Final result Specimen: Swab from Nasopharynx Updated: 04/05/22 1632    Narrative:      The following orders were created for panel order COVID PRE-OP / PRE-PROCEDURE SCREENING ORDER (NO ISOLATION) - Swab, Nasopharynx.  Procedure                               Abnormality         Status                     ---------                               -----------         ------                     COVID-19 and FLU A/B PCR...[[id number]]  Normal              Final result                 Please view results for these tests on the individual orders.    COVID-19 and FLU A/B PCR - Swab, Nasopharynx [[id number]]  (Normal) Collected: 04/05/22 1558    Lab Status: Final result Specimen: Swab from Nasopharynx Updated: 04/05/22 1632     COVID19 Not Detected     Influenza A PCR Not Detected     Influenza B PCR Not Detected    Narrative:      Fact sheet for providers: https://www.fda.gov/media/174477/download    Fact sheet for patients: https://www.fda.gov/media/436621/download    Test performed by PCR.          Adult Transthoracic Echo Complete W/ Cont if Necessary Per Protocol    Result Date: 4/6/2022  · Left ventricular systolic function is normal. Estimated left ventricular EF = 60%. · Left ventricular diastolic function is consistent with (grade I) impaired relaxation. · Estimated right ventricular systolic pressure from tricuspid regurgitation is normal (<35 mmHg).      CT Abdomen Pelvis Without Contrast    Result Date:  "4/5/2022  DATE OF EXAM: 4/5/2022 4:49 PM  PROCEDURE: CT ABDOMEN PELVIS WO CONTRAST-  INDICATIONS: nausea/vomiting/diarrhea  COMPARISON: 1/31/2013 abdomen pelvis CT scan  TECHNIQUE: Routine transaxial slices were obtained through the abdomen and pelvis without the administration of intravenous contrast. Reconstructed coronal and sagittal images were also obtained. Automated exposure control and iterative construction methods were used.  The radiation dose reduction device was turned on for each scan per the ALARA (As Low as Reasonably Achievable) protocol.  FINDINGS: History indicates generalized abdominal pain nausea vomiting and diarrhea.  The included lower lungs show mild new peribronchial thickening in the middle lobe and right lower lobe, possibly bronchitis. No focal pneumonia or effusion is seen.  Granulomas calcifications are seen in the normal-sized spleen. There is mild fatty liver change. No significant abnormalities are seen of the gallbladder, pancreas, adrenal glands, or kidneys for a non-IV contrast enhanced exam. No upper abdominal free air ascites or adenopathy is seen.  In the left upper quadrant, there is inflammatory fat stranding around the splenic flexure of the colon, but also focal small bowel mesenteric edema in a separate but adjacent location left upper quadrant, with mild mucosal thickening of a loop of adjacent small bowel. It is uncertain if these two areas inflammation represent the same underlying pathology. No abscess, pneumatosis or extraluminal air is seen. The colonic inflammatory focus is not associated with significant diverticular disease.  Regarding the lower abdomen pelvis, no additional inflamed bowel loops are identified. There is, however, an unusual appearance involving both the distal colon and adjacent loop of small bowel from axial image 86 through image 102, with a partial \"whirl sign \"and marked decreased caliber of small bowel at this level as well as diminished " caliber of the colon, possibly a very low-level obstruction, as from underlying adhesion, very small internal hernia etc. The small bowel loop can also be seen in the left lower quadrant coronal image 36 through 26.  Terminal ileum cecum and appendix appear normal. No intrapelvic free fluid is seen. Bladder is normally distended and normal in appearance. Incidental note is made of heavy atherosclerotic calcification of the abdominal aorta, extending into the lumen on axial image 71, suggesting there is likely a high degree of stenosis. Heavy atherosclerotic calcification is seen of the patient's small iliac arteries as well. Similar appearance is present in 2013, with calcified material in the aortic lumen, although the aorta was patent on that study.       Impression:    1. Unusual appearance of the bowel in the left abdomen, in the left upper quadrant with separate areas and inflammation of small bowel and proximal descending colon. More distal area in the left abdomen that resembles a whirl sign involving both large and small bowel, without evidence of significant ongoing bowel obstruction. Consider possible internal hernia or adhesion with resulting vascular compromise of bowel. 2. No other evidence of acute intra-abdominal or intrapelvic disease elsewhere. 3. Heavy atherosclerotic calcification of the distal abdominal aorta and iliac arteries, but also present in 2013. Potentially significant luminal stenosis of the distal abdominal aorta is a consideration. 4. Mild new interstitial disease the right middle and lower lobe, potentially bronchitis, or more likely with history of vomiting, mild aspiration.  Note: Preliminary findings of this study were discussed with Dr. Longo at approximately 5:25 PM 4/5/2022.  This report was finalized on 4/5/2022 5:26 PM by Dr. Gonzalez Dillard MD.      XR Chest 1 View    Result Date: 4/5/2022  DATE OF EXAM: 4/5/2022 3:08 PM  PROCEDURE: XR CHEST 1 VW-  INDICATIONS:  nausea/vomitng/fatigue  COMPARISON: 2/27/2021  TECHNIQUE: Single radiographic AP view of the chest was obtained.  FINDINGS: No focal airspace opacity. No pleural effusion or pneumothorax. Normal heart and mediastinal contours. Sternotomy wires are intact.      Impression: No evidence of acute disease in the chest.  This report was finalized on 4/5/2022 3:30 PM by Yusef Rausch.        Results for orders placed during the hospital encounter of 04/05/22    Adult Transthoracic Echo Complete W/ Cont if Necessary Per Protocol    Interpretation Summary  · Left ventricular systolic function is normal. Estimated left ventricular EF = 60%.  · Left ventricular diastolic function is consistent with (grade I) impaired relaxation.  · Estimated right ventricular systolic pressure from tricuspid regurgitation is normal (<35 mmHg).      I have reviewed the medications:  Scheduled Meds:aspirin, 300 mg, Rectal, Daily  insulin lispro, 0-7 Units, Subcutaneous, TID AC  levothyroxine, 50 mcg, Oral, Daily  midodrine, 5 mg, Oral, TID AC  pantoprazole, 40 mg, Intravenous, Q AM  piperacillin-tazobactam, 3.375 g, Intravenous, Q8H  pregabalin, 75 mg, Oral, BID  senna-docusate sodium, 2 tablet, Oral, BID  sodium chloride, 10 mL, Intravenous, Q12H      Continuous Infusions:sodium chloride, 75 mL/hr, Last Rate: 75 mL/hr (04/05/22 2146)      PRN Meds:.•  albuterol sulfate HFA  •  senna-docusate sodium **AND** polyethylene glycol **AND** bisacodyl **AND** bisacodyl  •  dextrose  •  dextrose  •  glucagon (human recombinant)  •  Morphine  •  ondansetron  •  Sodium Chloride (PF)  •  sodium chloride  •  zolpidem    Assessment/Plan   Assessment & Plan     Active Hospital Problems    Diagnosis  POA   • **Ischemic bowel disease (HCC) [K55.9]  Yes   • 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32]  Yes      Resolved Hospital Problems   No resolved problems to display.        Brief Hospital Course to date:  Filipe Hamilton Jr. is a 88 y.o. male who presented to the ER with abdominal pain.  On call General Surgery called from ER and recommended hydration and admission.    Sepsis  Leukocytosis  Abd pain  -suspect due to abdominal source   -blood cultures pending  -continue zosyn  - discussed case with general surgery, high risk of morbidity and mortality  -plavix on hold  -concern for mesenteric ischemia  -aspirin suppository  -IV fluids  -General Surgery following     HTN  -add midodrine  -hold amlodipine and BB for now  -normal EF on Echo     Abdominal pain  Elevated Lactic acid  -holding Plavix        NSTEMI  -seen by Cardiology  -aspirin  -normal EF on Echo     KHARI on CKD  -secondary to sepsis, overdiuresis  -recheck in AM  -urinary retention protocol     DM with neuropathy  -SSI while npo    On Clear Liquid diet for now    DVT prophylaxis:  No DVT prophylaxis order currently exists.       AM-PAC 6 Clicks Score (PT): 18 (04/06/22 1601)    Disposition: I expect the patient to be discharged TBD    CODE STATUS:   Code Status and Medical Interventions:   Ordered at: 04/05/22 9529     Level Of Support Discussed With:    Patient     Code Status (Patient has no pulse and is not breathing):    CPR (Attempt to Resuscitate)     Medical Interventions (Patient has pulse or is breathing):    Full Support       Anup Galdamez MD  04/06/22              Electronically signed by Anup Galdamez MD at  04/06/22 1736          Consult Notes (last 24 hours)      Jamey Gonzalez IV, MD at 04/06/22 1533      Consult Orders    1. Inpatient Cardiology Consult [[id number]] ordered by Helen Ely DO at 04/05/22 1802               Cardiology Consult   [facility]      Reason for consultation:  · Preoperative cardiac risk assessment  · Elevated troponin    Requesting provider: Anup Galdamez  Consulting provider: Campos Gonzalez MD    IDENTIFICATION: 88-year-old gentleman who resides in [place], [place]      Active Hospital Problems    Diagnosis  POA   • **Ischemic bowel disease (HCC) [K55.9]  Yes     Priority: High   • Type 2 myocardial infarction due to anemia (HCC) [D64.9, I21.A1]  Yes     Priority: Medium   • KHARI (acute kidney injury) (HCC) [N17.9]  Yes     Priority: Medium   • Sepsis with acute renal failure without septic shock, due to unspecified organism, unspecified acute renal failure type (HCC) [A41.9, R65.20, N17.9]  Yes   • HFrEF with normalized LVEF [I50.22]  Yes     · Cardiac MRI (E7/13/2021): LVEF 31% with anterior wall viability  · Echo (4/6/2022): LVEF 57%.     • Type 2 diabetes mellitus with diabetic polyneuropathy, without long-term current use of insulin (HCC) [E11.42]  Yes   • Coronary artery disease involving native coronary artery of native heart [I25.10]  Yes     · CABG (4/2011): With LIMA to LAD and SVG to RPDA  · Cardiac cath (5/4/2021): Subtotal ostial LAD stenosis.  Patent SVG to RPDA.  Circumflex luminal irregularities.  · Cardiac MRI (7/13/2021): LVEF 31% with anterior wall viability  · Cardiac catheterization at [facility] (10/26/2021): DAVI to left main/LAD using Xience 3.5 x 38 mm  · Echo (4/6/2022): LVEF 57%     • Hyperlipidemia LDL goal <70 [E78.5]  Yes   • Stage 3b chronic kidney disease (HCC) [N18.32]  Yes         Subjective     88-year-old gentleman with coronary artery disease and recent PCI of the left main/LAD for chronic systolic heart  failure.  Patient had been in doing well following his procedure at [facility] in October until recently when he developed severe epigastric discomfort.  He was admitted to [facility] on 4/5/2022 and diagnosed with ischemic bowel.  General surgery has been consulted and there is possibility of bowel resection if patient does not improve.  Patient denies any recent anginal symptoms.  Echocardiogram performed earlier today shows LVEF of 55%.  EF was 31% on cardiac MRI prior to revascularization.    Patient does report increased shortness of breath.  He has been receiving aggressive IV fluid resuscitation and notably his hemoglobin is 7.2.  Clopidogrel is presently being held in anticipation for possible surgery.    Allergies   Allergen Reactions   • Niacin      flushing       Prior to Admission medications    Medication Sig Start Date End Date Taking? Authorizing Provider   albuterol sulfate  (90 Base) MCG/ACT inhaler Inhale 2 puffs Every 6 (Six) Hours As Needed for Wheezing.  Patient not taking: Reported on 4/5/2022 2/28/21   Becca Painting DO   amLODIPine (NORVASC) 2.5 MG tablet Take 2.5 mg by mouth 2 (two) times a day.  Patient not taking: Reported on 4/5/2022 5/14/21   Marisol Easley MD   aspirin 81 MG tablet Take 1 tablet by mouth daily. 1/31/13      atorvastatin (LIPITOR) 40 MG tablet Take 1 tablet by mouth Daily. 2/3/22   Delilah Tatum MD   bumetanide (BUMEX) 1 MG tablet Take 2 mg by mouth Daily. 7/1/21   Stephenie Ramirez MD   cholecalciferol (VITAMIN D3) 10 MCG (400 UNIT) tablet Take 400 Units by mouth Daily.    Soniya Briones MD   clopidogrel (PLAVIX) 75 MG tablet Take 75 mg by mouth Daily. 5/14/21   Marisol Easley MD   fluocinonide (LIDEX) 0.05 % external solution  6/19/17   Soniya Briones MD   glucose blood (FREESTYLE TEST STRIPS) test strip 1 each by Other route Daily. 4/10/18   Delilah Tatum MD   guaiFENesin (MUCINEX) 600 MG 12 hr tablet Take 2 tablets by  mouth 2 (Two) Times a Day. 2/5/21   Kandy Amin APRN   ketoconazole (NIZORAL) 2 % shampoo  6/19/17   Provider, Historical, MD   Lancets misc 1 Units Daily. Use as directed 4/10/18   Delilah Tatum MD   levothyroxine (SYNTHROID, LEVOTHROID) 50 MCG tablet Take 1 tablet by mouth Daily. 1/10/22   Delilah Tatum MD   loratadine (Claritin) 5 MG chewable tablet Chew 1 tablet Daily. 4/3/21   Venecia Maria APRN   metoprolol succinate XL (TOPROL-XL) 50 MG 24 hr tablet Take 50 mg by mouth Daily. Increased per Dr. Ramirez 7/1/21   Stephenie Ramirez MD   Misc Natural Products (GLUCOSAMINE CHONDROITIN ADV) tablet Take  by mouth. 1/31/13   Delilah Tatum MD   omeprazole (priLOSEC) 20 MG capsule TAKE 1 CAPSULE DAILY 11/1/21   Delilah Tatum MD   potassium chloride 10 MEQ CR tablet Take 10 mEq by mouth Daily.    Provider, MD Soniya   pregabalin (Lyrica) 75 MG capsule Take 1 capsule by mouth 2 (Two) Times a Day. 1/10/22   Delilah Tatum MD   sacubitril-valsartan (ENTRESTO)  MG tablet Take 1 tablet by mouth 2 (Two) Times a Day. 7/1/21   Stephenie Ramirez MD   Spiriva Respimat 2.5 MCG/ACT aerosol solution inhaler USE 2 INHALATIONS DAILY 10/19/21   Delilah Tatum MD   spironolactone (ALDACTONE) 12.5 MG tablet half tablet Take  by mouth Daily. 25mg 1/2 QD 7/1/21   Stephenie Ramirez MD   tamsulosin (FLOMAX) 0.4 MG capsule 24 hr capsule Take 1 capsule by mouth Daily. 1/10/22   Delilah Tatum MD   zolpidem (AMBIEN) 10 MG tablet Take 1 tablet by mouth Every Night. 1/10/22   Delilah Tatum MD       Past Medical History:   Diagnosis Date   • Abnormal ankle brachial index 09/12/2017    AUGUST (9/12/17) nl 1.04 on R, bord 0.91 on L; cards - Dr. Easley   • Basal cell carcinoma (BCC) of left shoulder 12/11/2020    s/p blade bx L. shoulder (12/11/20); superficial BCC with (+) periph margin; derm Artur Hoffmann   • Basal cell carcinoma of hand 01/03/1995    L. wrist (1/3/95); derm Artur Hoffmann   • Hx of cardiovascular stress test  03/19/2013    SPECT stress test (3/19/13) EF 53%, c/w prior MI    • Hx of carotid ultrasound 04/16/2019    carotid u/s (4/16/19): bilat plaque, 0-49% stenosis right, 50-69% stenosis left   • Hx of carotid ultrasound 02/08/2021    carotid u/s (2/8/21): bilat 0-49% stenosis; marked diffuse prominent extracranial carotid artery atherosclerotic changes   • Hx of cath aortogram with L. angiogram 10/03/2017    aortogram and L. angio (10/3/17): no AAA, occluded L. SFA; will pursue L.SFA intervention; abdullahi Easley   • Hx of chest x-ray 10/12/2017    nl CXR (10/12/17)   • Hx of colonoscopy 11/10/2015    hyperplastic polyps (11/15), repeat in 5 yrs; QUINTON Holland   • Hx of echocardiogram 05/03/2021    ECHO (5/3/21, [facility] hospitalized): EF 35%, grade II agarwal dysfunction, mod MR, mild-mod TR, mild pulm HTN (RVSP 41.81mmHg), dilated RV   • Hx of exercise stress test 06/10/2016    nl GXT (6/16), noting only increased ventricular ectopy with exercise; abdullahi Conrad   • Hx of tobacco use     h/o 2ppd x 40 yrs; quit 1990s   • Squamous cell carcinoma in situ of skin 11/11/2016    SCC in-situ L. chest (11/11/16); derm - Dr. 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 Shun       Past Surgical History:   Procedure Laterality Date   • ATHERECTOMY Right 09/15/2020    s/p peripheral atherectomy/PTA (9/15/20) for PAD; abdullahi Easley   • CARDIAC CATHETERIZATION  10/26/2021    s/p PCI to LAD (10/26/21): at [facility]   • CAROTID ENDARTERECTOMY Right 01/2005    surg - Dr. Douglas Lazaro   • CATARACT EXTRACTION Bilateral 12/15/2021    Left 12/15/21, Right 1/12/22; ophtho - Dr. Fountain   • CORONARY ARTERY BYPASS GRAFT  04/2011    CABG x2 (4/11)   • HEMORRHOIDECTOMY     • HYDROCELE EXCISION / REPAIR Left     s/p L. hydrocelectomy; uro - Dr. Hobson   • POPLITEAL ARTERY ANGIOPLASTY Left 11/03/2017    s/p successful DCB angioplasty of L. SDA and  popliteal arteries, patent CFA, DFA (11/3/17); CTS - Dr. Easley   • RENAL ARTERY STENT  2005    for Renal artery stenosis ()   • SKIN BIOPSY Left 2020    s/p blade bx L. shoulder (20): path - superficial BCC with (+) periph margin; derm - Dr. Hoffmann   • TOTAL KNEE ARTHROPLASTY Right 2008       Family History   Problem Relation Age of Onset   • Heart disease Mother    • Hypertension Mother    • Heart disease Father          age 46   • Hypertension Father    • Heart disease Brother         x2, 1 brother  age 33, another  age 54       Social History     Tobacco Use   Smoking Status Former Smoker   • Packs/day: 3.00   • Years: 45.00   • Pack years: 135.00   • Quit date:    • Years since quittin.2   Smokeless Tobacco Former User   • Types: Chew   • Quit date: 3/29/2022   Tobacco Comment    quit ; 3ppd x 45 yrs, quit age age 53       Social History     Substance and Sexual Activity   Alcohol Use Not Currently    Comment: less than weekly         Review of Systems:   Review of Systems   Constitutional: Negative for malaise/fatigue.   Eyes: Negative for vision loss in left eye and vision loss in right eye.   Cardiovascular: Negative for chest pain, dyspnea on exertion, near-syncope, orthopnea, palpitations, paroxysmal nocturnal dyspnea and syncope.   Respiratory: Positive for shortness of breath.    Musculoskeletal: Negative for myalgias.   Gastrointestinal: Positive for abdominal pain.   Neurological: Negative for brief paralysis, excessive daytime sleepiness, focal weakness, numbness, paresthesias and weakness.   All other systems reviewed and are negative.      Objective     Vital Sign Min/Max for last 24 hours  Temp  Min: 97.7 °F (36.5 °C)  Max: 98.3 °F (36.8 °C)   BP  Min: 90/50  Max: 121/52   Pulse  Min: 77  Max: 116   Resp  Min: 16  Max: 20   SpO2  Min: 90 %  Max: 98 %   No data recorded      Intake/Output Summary (Last 24 hours) at 2022 3318  Last data filed at  4/6/2022 1300  Gross per 24 hour   Intake 2980 ml   Output 200 ml   Net 2780 ml             Constitutional:       Appearance: Healthy appearance. Well-developed.   Eyes:      General: Lids are normal. No scleral icterus.     Conjunctiva/sclera: Conjunctivae normal.   HENT:      Head: Normocephalic and atraumatic.   Neck:      Thyroid: No thyromegaly.      Vascular: No carotid bruit or JVD.   Pulmonary:      Effort: Pulmonary effort is normal.      Breath sounds: Normal breath sounds. No wheezing. No rhonchi. No rales.   Cardiovascular:      Normal rate. Regular rhythm.      Murmurs: There is no murmur.      No gallop. No rub.   Pulses:     Intact distal pulses.   Edema:     Peripheral edema absent.   Abdominal:      General: There is no distension.      Palpations: Abdomen is soft. There is no abdominal mass.   Musculoskeletal:      Cervical back: Normal range of motion. Skin:     General: Skin is warm and dry.      Findings: No rash.   Neurological:      General: No focal deficit present.      Mental Status: Alert and oriented to person, place, and time.      Gait: Gait is intact.   Psychiatric:         Attention and Perception: Attention normal.         Mood and Affect: Mood normal.         Behavior: Behavior normal.          Tele: Sinus    DATA REVIEW:    EKG (4/5/2022): Sinus rhythm with PVC.  No ischemic ST or T wave abnormality      ECHO (4/6/2022): LVEF 60%.  No significant valve abnormality.      Results from last 7 days   Lab Units 04/05/22  1449   SODIUM mmol/L 135*   POTASSIUM mmol/L 3.4*   CHLORIDE mmol/L 97*   BUN mg/dL 55*   CREATININE mg/dL 2.68*     Results from last 7 days   Lab Units 04/05/22  2344 04/05/22  2110 04/05/22  2034   TROPONIN T ng/mL 0.510* 0.574* 0.527*     Results from last 7 days   Lab Units 04/06/22  0532 04/05/22  1449   WBC 10*3/mm3 17.24* 24.64*   HEMOGLOBIN g/dL 7.2* 8.5*   HEMATOCRIT % 23.5* 26.7*   PLATELETS 10*3/mm3 199 249     Lab Results   Component Value Date    HGBA1C  6.50 (H) 04/05/2022     Lab Results   Component Value Date    CHOL 102 01/05/2022    TRIG 96 01/05/2022    HDL 40 01/05/2022    LDL 44 01/05/2022    AST 85 (H) 04/05/2022    ALT 60 (H) 04/05/2022           Assessment     Coronary artery disease  · Previous CABG and recent PCI of left main/LAD with no recent anginal or heart failure symptoms  · Recommend holding clopidogrel at this juncture in anticipation for possible surgery  · Start aspirin 325 mg NE daily  · No beta-blocker due to hypotension    Type II myocardial infarction due to anemia    Heart failure with improved EF  · EF has improved from 31% to 60% since left main/LAD revascularization in October 2021  · Holding all heart failure medicines presently due to hypotension from abdominal sepsis as well as acute kidney injury    Acute kidney injury  · No ARB/Arni, spironolactone due to KHARI    Hyperlipidemia with goal LDL <70  · Hold statin therapy due to n.p.o. status    Plan     · Start aspirin 300 mg per rectum  · Okay to proceed with abdominal surgery with increased but acceptable cardiovascular risk.  No further optimization from cardiac standpoint needed  · Resume clopidogrel if no surgery anticipated and reduce aspirin dosing 81 mg daily  · Follow-up with my office in 6 weeks      Electronically signed by Jamey Gonzalez IV, MD, 04/06/22, 3:33 PM EDT.        Electronically signed by Jamey Gonzalez IV, MD at 04/07/22 0804

## 2022-04-07 NOTE — PROGRESS NOTES
[facility] Medicine Services  PROGRESS NOTE    Patient Name: Filipe Hamilton Jr.  : 1934  MRN: [id number]    Date of Admission: 2022  Primary Care Physician: Delilah Tatum MD    Subjective   Subjective     CC:    Follow up possible mesenteric ischemia    HPI:   Reporting some cramping with his clear liquid diet today.  Weak and tired. Son at bedside reports a new cough that sounds like the cough he has when he is getting volume overloaded. No significant shortness of air.       ROS:    Gen- no fevers, no chills, + gen weakness  CV- No chest pain, palpitations  Resp- + cough, no dyspnea  GI- No N/V/D, + abd cramping        Objective   Objective     Vital Signs:   Temp:  [97.9 °F (36.6 °C)-98.6 °F (37 °C)] 98.2 °F (36.8 °C)  Heart Rate:  [73-99] 90  Resp:  [18-20] 18  BP: ()/(51-71) 154/62     Physical Exam:    Constitutional: No acute distress, awake, alert, resting in bed, son at bedside.   HENT: NCAT, mucous membranes moist  Respiratory: Coarse breath sounds anteriorly, respiratory effort normal on room air  Cardiovascular: RRR, s1 and s2  Gastrointestinal: Positive bowel sounds, distended, slightly firm abdomen  Musculoskeletal: No bilateral ankle edema  Psychiatric: Appropriate affect, cooperative  Neurologic: Oriented x 3, strength symmetric in all extremities, Cranial Nerves grossly intact to confrontation, speech clear  Skin: No rashes noted to exposed skin     Results Reviewed:  LAB RESULTS:      Lab 22  0436 22  1837 22  1422 22  0532 22  2344 22  2034 22  1449   WBC 11.84*  --   --  17.24*  --   --  24.64*   HEMOGLOBIN 6.9*  --   --  7.2*  --   --  8.5*   HEMATOCRIT 22.9*  --   --  23.5*  --   --  26.7*   PLATELETS 207  --   --  199  --   --  249   NEUTROS ABS 9.95*  --   --  15.57*  --   --  22.86*   IMMATURE GRANS (ABS) 0.48*  --   --  0.16*  --   --  0.24*   LYMPHS ABS 0.88  --   --  0.84  --   --  0.38*   MONOS ABS  0.45  --   --  0.65  --   --  1.13*   EOS ABS 0.06  --   --  0.00  --   --  0.00   MCV 89.1  --   --  89.7  --   --  87.8   PROCALCITONIN  --   --   --   --   --   --  1.66*   LACTATE 0.7 1.2 1.3 0.8 1.3  --  2.0   PROTIME  --   --   --   --   --   --  16.6*   APTT  --   --   --   --   --  35.6  --          Lab 04/07/22 0436 04/06/22  0532 04/05/22 2034 04/05/22  1449   SODIUM 140  --   --  135*   POTASSIUM 3.0*  --   --  3.4*   CHLORIDE 109*  --   --  97*   CO2 24.0  --   --  22.0   ANION GAP 7.0  --   --  16.0*   BUN 30*  --   --  55*   CREATININE 1.31*  --   --  2.68*   EGFR 52.4*  --   --  22.2*   GLUCOSE 103*  --   --  148*   CALCIUM 7.5*  --   --  7.8*   MAGNESIUM 1.6  --   --   --    HEMOGLOBIN A1C  --   --  6.50*  --    TSH  --  1.410  --   --          Lab 04/07/22 0436 04/05/22  1449   TOTAL PROTEIN 4.5* 5.4*   ALBUMIN 2.20* 2.40*   GLOBULIN 2.3 3.0   ALT (SGPT) 62* 60*   AST (SGOT) 62* 85*   BILIRUBIN 0.3 0.6   ALK PHOS 60 70   LIPASE  --  8*         Lab 04/05/22 2344 04/05/22 2110 04/05/22 2034 04/05/22  1449   TROPONIN T 0.510* 0.574* 0.527* 0.379*   PROTIME  --   --   --  16.6*   INR  --   --   --  1.35*             [facility] 04/05/22 2110 04/05/22 2034   ABO TYPING B B   RH TYPING Positive Positive   ANTIBODY SCREEN  --  Negative         Brief Urine Lab Results  (Last result in the past 365 days)      Color   Clarity   Blood   Leuk Est   Nitrite   Protein   CREAT   Urine HCG        04/05/22 1647 Yellow   Clear   Negative   Negative   Negative   Trace                 Microbiology Results Abnormal     Procedure Component Value - Date/Time    Urine Culture - Urine, Urine, Clean Catch [[id number]]  (Normal) Collected: 04/05/22 1647    Lab Status: Final result Specimen: Urine, Clean Catch Updated: 04/06/22 2338     Urine Culture No growth    Blood Culture - Blood, Arm, Right [[id number]]  (Normal) Collected: 04/05/22 1718    Lab Status: Preliminary result Specimen: Blood from Arm, Right Updated: 04/06/22  2003     Blood Culture No growth at 24 hours    Blood Culture - Blood, Hand, Right [[id number]]  (Normal) Collected: 04/05/22 1721    Lab Status: Preliminary result Specimen: Blood from Hand, Right Updated: 04/06/22 2003     Blood Culture No growth at 24 hours    COVID PRE-OP / PRE-PROCEDURE SCREENING ORDER (NO ISOLATION) - Swab, Nasopharynx [[id number]]  (Normal) Collected: 04/05/22 1558    Lab Status: Final result Specimen: Swab from Nasopharynx Updated: 04/05/22 1632    Narrative:      The following orders were created for panel order COVID PRE-OP / PRE-PROCEDURE SCREENING ORDER (NO ISOLATION) - Swab, Nasopharynx.  Procedure                               Abnormality         Status                     ---------                               -----------         ------                     COVID-19 and FLU A/B PCR...[[id number]]  Normal              Final result                 Please view results for these tests on the individual orders.    COVID-19 and FLU A/B PCR - Swab, Nasopharynx [[id number]]  (Normal) Collected: 04/05/22 1558    Lab Status: Final result Specimen: Swab from Nasopharynx Updated: 04/05/22 1632     COVID19 Not Detected     Influenza A PCR Not Detected     Influenza B PCR Not Detected    Narrative:      Fact sheet for providers: https://www.fda.gov/media/056515/download    Fact sheet for patients: https://www.fda.gov/media/458735/download    Test performed by PCR.          Adult Transthoracic Echo Complete W/ Cont if Necessary Per Protocol    Result Date: 4/6/2022  · Left ventricular systolic function is normal. Estimated left ventricular EF = 60%. · Left ventricular diastolic function is consistent with (grade I) impaired relaxation. · Estimated right ventricular systolic pressure from tricuspid regurgitation is normal (<35 mmHg).      CT Abdomen Pelvis Without Contrast    Result Date: 4/5/2022  DATE OF EXAM: 4/5/2022 4:49 PM  PROCEDURE: CT ABDOMEN PELVIS WO CONTRAST-  INDICATIONS:  "nausea/vomiting/diarrhea  COMPARISON: 1/31/2013 abdomen pelvis CT scan  TECHNIQUE: Routine transaxial slices were obtained through the abdomen and pelvis without the administration of intravenous contrast. Reconstructed coronal and sagittal images were also obtained. Automated exposure control and iterative construction methods were used.  The radiation dose reduction device was turned on for each scan per the ALARA (As Low as Reasonably Achievable) protocol.  FINDINGS: History indicates generalized abdominal pain nausea vomiting and diarrhea.  The included lower lungs show mild new peribronchial thickening in the middle lobe and right lower lobe, possibly bronchitis. No focal pneumonia or effusion is seen.  Granulomas calcifications are seen in the normal-sized spleen. There is mild fatty liver change. No significant abnormalities are seen of the gallbladder, pancreas, adrenal glands, or kidneys for a non-IV contrast enhanced exam. No upper abdominal free air ascites or adenopathy is seen.  In the left upper quadrant, there is inflammatory fat stranding around the splenic flexure of the colon, but also focal small bowel mesenteric edema in a separate but adjacent location left upper quadrant, with mild mucosal thickening of a loop of adjacent small bowel. It is uncertain if these two areas inflammation represent the same underlying pathology. No abscess, pneumatosis or extraluminal air is seen. The colonic inflammatory focus is not associated with significant diverticular disease.  Regarding the lower abdomen pelvis, no additional inflamed bowel loops are identified. There is, however, an unusual appearance involving both the distal colon and adjacent loop of small bowel from axial image 86 through image 102, with a partial \"whirl sign \"and marked decreased caliber of small bowel at this level as well as diminished caliber of the colon, possibly a very low-level obstruction, as from underlying adhesion, very small " internal hernia etc. The small bowel loop can also be seen in the left lower quadrant coronal image 36 through 26.  Terminal ileum cecum and appendix appear normal. No intrapelvic free fluid is seen. Bladder is normally distended and normal in appearance. Incidental note is made of heavy atherosclerotic calcification of the abdominal aorta, extending into the lumen on axial image 71, suggesting there is likely a high degree of stenosis. Heavy atherosclerotic calcification is seen of the patient's small iliac arteries as well. Similar appearance is present in 2013, with calcified material in the aortic lumen, although the aorta was patent on that study.       Impression:    1. Unusual appearance of the bowel in the left abdomen, in the left upper quadrant with separate areas and inflammation of small bowel and proximal descending colon. More distal area in the left abdomen that resembles a whirl sign involving both large and small bowel, without evidence of significant ongoing bowel obstruction. Consider possible internal hernia or adhesion with resulting vascular compromise of bowel. 2. No other evidence of acute intra-abdominal or intrapelvic disease elsewhere. 3. Heavy atherosclerotic calcification of the distal abdominal aorta and iliac arteries, but also present in 2013. Potentially significant luminal stenosis of the distal abdominal aorta is a consideration. 4. Mild new interstitial disease the right middle and lower lobe, potentially bronchitis, or more likely with history of vomiting, mild aspiration.  Note: Preliminary findings of this study were discussed with Dr. Longo at approximately 5:25 PM 4/5/2022.  This report was finalized on 4/5/2022 5:26 PM by Dr. Gonzalez Dillard MD.      XR Chest 1 View    Result Date: 4/5/2022  DATE OF EXAM: 4/5/2022 3:08 PM  PROCEDURE: XR CHEST 1 VW-  INDICATIONS: nausea/vomitng/fatigue  COMPARISON: 2/27/2021  TECHNIQUE: Single radiographic AP view of the chest was obtained.   FINDINGS: No focal airspace opacity. No pleural effusion or pneumothorax. Normal heart and mediastinal contours. Sternotomy wires are intact.      Impression: No evidence of acute disease in the chest.  This report was finalized on 4/5/2022 3:30 PM by Yusef Rausch.        Results for orders placed during the hospital encounter of 04/05/22    Adult Transthoracic Echo Complete W/ Cont if Necessary Per Protocol    Interpretation Summary  · Left ventricular systolic function is normal. Estimated left ventricular EF = 60%.  · Left ventricular diastolic function is consistent with (grade I) impaired relaxation.  · Estimated right ventricular systolic pressure from tricuspid regurgitation is normal (<35 mmHg).      I have reviewed the medications:  Scheduled Meds:aspirin, 300 mg, Rectal, Daily  insulin lispro, 0-7 Units, Subcutaneous, TID AC  levothyroxine, 50 mcg, Oral, Daily  midodrine, 5 mg, Oral, TID AC  pantoprazole, 40 mg, Intravenous, Q AM  piperacillin-tazobactam, 3.375 g, Intravenous, Q8H  pregabalin, 75 mg, Oral, BID  senna-docusate sodium, 2 tablet, Oral, BID  sodium chloride, 10 mL, Intravenous, Q12H      Continuous Infusions:sodium chloride, 75 mL/hr, Last Rate: 75 mL/hr (04/06/22 4836)      PRN Meds:.•  albuterol sulfate HFA  •  senna-docusate sodium **AND** polyethylene glycol **AND** bisacodyl **AND** bisacodyl  •  dextrose  •  dextrose  •  glucagon (human recombinant)  •  Morphine  •  ondansetron  •  potassium chloride  •  potassium chloride  •  Sodium Chloride (PF)  •  sodium chloride  •  zolpidem    Assessment/Plan   Assessment & Plan     Active Hospital Problems    Diagnosis  POA   • **Ischemic bowel disease (HCC) [K55.9]  Yes   • Type 2 myocardial infarction due to anemia ([facility]) [D64.9, I21.A1]  Yes   • KHARI (acute kidney injury) ([facility]) [N17.9]  Yes   • Sepsis with acute renal failure without septic shock, due to unspecified organism, unspecified acute renal failure type ([facility]) [A41.9, R65.20, N17.9]   Yes   • HFrEF with normalized LVEF [I50.22]  Yes   • Type 2 diabetes mellitus with diabetic polyneuropathy, without long-term current use of insulin (HCC) [E11.42]  Yes   • Coronary artery disease involving native coronary artery of native heart [I25.10]  Yes   • Hyperlipidemia LDL goal <70 [E78.5]  Yes   • Stage 3b chronic kidney disease (HCC) [N18.32]  Yes      Resolved Hospital Problems   No resolved problems to display.        Brief Hospital Course to date:  Filipe Hamilton Jr. is a 88 y.o. male who presented to the ER with abdominal pain.  On call General Surgery called from ER and recommended hydration and admission.    Sepsis  Leukocytosis  Abd pain  -suspect due to abdominal source- likely mesenteric ischemia   -blood cultures pending  -continue zosyn  - discussed case with general surgery, high risk of morbidity and mortality  -plavix on hold  -aspirin suppository  -IV fluids, clear liquid diet.   -General Surgery following.     Acute anemia  -hemoglobin dropped to 6.9 today from 8.5 on admission.   -occult stool sent-came back positive. Have added a GI consult for today.    -will order 2 units PRBCs today and monitor response.   -will give bumex 0.5 mg IV x 1 between units (monitor kidney function closely).    Electrolyte disturbances  -needs potassium replacement today-ordered.  -add low dose magnesium replacement as well.      History of HTN, hypotensive this admission   -added midodrine with improvement.  -continue to hold home amlodipine and BB for now  -normal EF on Echo  -bp 154/62 this am. Monitor     NSTEMI  -seen by Cardiology  -normal EF on Echo  -if no surgery anticipated, cards recommends resuming plavix and reducing aspirin to 81 mg daily. Will await general surgery recommendations concerning resumption of plavix.      KHARI on CKD  -secondary to sepsis, overdiuresis  -recheck in AM. 1.31 today.   -urinary retention protocol     DM with neuropathy  -continue SSI  -glucoses are well controlled.          DVT prophylaxis:  No DVT prophylaxis order currently exists.       AM-PAC 6 Clicks Score (PT): 18 (04/06/22 1601)    Disposition: I expect the patient to be discharged TBD    CODE STATUS:   Code Status and Medical Interventions:   Ordered at: 04/05/22 2127     Level Of Support Discussed With:    Patient     Code Status (Patient has no pulse and is not breathing):    CPR (Attempt to Resuscitate)     Medical Interventions (Patient has pulse or is breathing):    Full Support       TONY Preciado  04/07/22

## 2022-04-08 ENCOUNTER — ANESTHESIA EVENT (OUTPATIENT)
Dept: GASTROENTEROLOGY | Facility: HOSPITAL | Age: 87
End: 2022-04-08

## 2022-04-08 ENCOUNTER — ANESTHESIA (OUTPATIENT)
Dept: GASTROENTEROLOGY | Facility: HOSPITAL | Age: 87
End: 2022-04-08

## 2022-04-08 VITALS
WEIGHT: 166.5 LBS | HEIGHT: 67 IN | SYSTOLIC BLOOD PRESSURE: 156 MMHG | HEART RATE: 87 BPM | BODY MASS INDEX: 26.13 KG/M2 | TEMPERATURE: 97.5 F | RESPIRATION RATE: 18 BRPM | OXYGEN SATURATION: 100 % | DIASTOLIC BLOOD PRESSURE: 89 MMHG

## 2022-04-08 PROBLEM — N17.9 AKI (ACUTE KIDNEY INJURY): Status: RESOLVED | Noted: 2022-04-06 | Resolved: 2022-04-08

## 2022-04-08 LAB
ANION GAP SERPL CALCULATED.3IONS-SCNC: 13 MMOL/L (ref 5–15)
BH BB BLOOD EXPIRATION DATE: NORMAL
BH BB BLOOD EXPIRATION DATE: NORMAL
BH BB BLOOD TYPE BARCODE: 7300
BH BB BLOOD TYPE BARCODE: 7300
BH BB DISPENSE STATUS: NORMAL
BH BB DISPENSE STATUS: NORMAL
BH BB PRODUCT CODE: NORMAL
BH BB PRODUCT CODE: NORMAL
BH BB UNIT NUMBER: NORMAL
BH BB UNIT NUMBER: NORMAL
BUN SERPL-MCNC: 18 MG/DL (ref 8–23)
BUN/CREAT SERPL: 16.1 (ref 7–25)
CALCIUM SPEC-SCNC: 8.5 MG/DL (ref 8.6–10.5)
CHLORIDE SERPL-SCNC: 111 MMOL/L (ref 98–107)
CO2 SERPL-SCNC: 16 MMOL/L (ref 22–29)
CREAT SERPL-MCNC: 1.12 MG/DL (ref 0.76–1.27)
CROSSMATCH INTERPRETATION: NORMAL
CROSSMATCH INTERPRETATION: NORMAL
DEPRECATED RDW RBC AUTO: 46.5 FL (ref 37–54)
EGFRCR SERPLBLD CKD-EPI 2021: 63.2 ML/MIN/1.73
ERYTHROCYTE [DISTWIDTH] IN BLOOD BY AUTOMATED COUNT: 14.2 % (ref 12.3–15.4)
GLUCOSE BLDC GLUCOMTR-MCNC: 121 MG/DL (ref 70–130)
GLUCOSE BLDC GLUCOMTR-MCNC: 125 MG/DL (ref 70–130)
GLUCOSE BLDC GLUCOMTR-MCNC: 99 MG/DL (ref 70–130)
GLUCOSE SERPL-MCNC: 113 MG/DL (ref 65–99)
HCT VFR BLD AUTO: 33.4 % (ref 37.5–51)
HGB BLD-MCNC: 10.5 G/DL (ref 13–17.7)
MCH RBC QN AUTO: 28.4 PG (ref 26.6–33)
MCHC RBC AUTO-ENTMCNC: 31.4 G/DL (ref 31.5–35.7)
MCV RBC AUTO: 90.3 FL (ref 79–97)
PLATELET # BLD AUTO: 248 10*3/MM3 (ref 140–450)
PMV BLD AUTO: 9.4 FL (ref 6–12)
POTASSIUM SERPL-SCNC: 3.8 MMOL/L (ref 3.5–5.2)
POTASSIUM SERPL-SCNC: 4.3 MMOL/L (ref 3.5–5.2)
RBC # BLD AUTO: 3.7 10*6/MM3 (ref 4.14–5.8)
SODIUM SERPL-SCNC: 140 MMOL/L (ref 136–145)
UNIT  ABO: NORMAL
UNIT  ABO: NORMAL
UNIT  RH: NORMAL
UNIT  RH: NORMAL
WBC NRBC COR # BLD: 8.05 10*3/MM3 (ref 3.4–10.8)

## 2022-04-08 PROCEDURE — 99232 SBSQ HOSP IP/OBS MODERATE 35: CPT | Performed by: NURSE PRACTITIONER

## 2022-04-08 PROCEDURE — 43235 EGD DIAGNOSTIC BRUSH WASH: CPT | Performed by: INTERNAL MEDICINE

## 2022-04-08 PROCEDURE — 0DBM8ZX EXCISION OF DESCENDING COLON, VIA NATURAL OR ARTIFICIAL OPENING ENDOSCOPIC, DIAGNOSTIC: ICD-10-PCS | Performed by: INTERNAL MEDICINE

## 2022-04-08 PROCEDURE — 82962 GLUCOSE BLOOD TEST: CPT

## 2022-04-08 PROCEDURE — 25010000002 PROPOFOL 10 MG/ML EMULSION: Performed by: NURSE ANESTHETIST, CERTIFIED REGISTERED

## 2022-04-08 PROCEDURE — 0DJ08ZZ INSPECTION OF UPPER INTESTINAL TRACT, VIA NATURAL OR ARTIFICIAL OPENING ENDOSCOPIC: ICD-10-PCS | Performed by: INTERNAL MEDICINE

## 2022-04-08 PROCEDURE — 80048 BASIC METABOLIC PNL TOTAL CA: CPT | Performed by: NURSE PRACTITIONER

## 2022-04-08 PROCEDURE — 25010000002 PIPERACILLIN SOD-TAZOBACTAM PER 1 G: Performed by: INTERNAL MEDICINE

## 2022-04-08 PROCEDURE — 88305 TISSUE EXAM BY PATHOLOGIST: CPT | Performed by: INTERNAL MEDICINE

## 2022-04-08 PROCEDURE — 85027 COMPLETE CBC AUTOMATED: CPT | Performed by: NURSE PRACTITIONER

## 2022-04-08 PROCEDURE — 45380 COLONOSCOPY AND BIOPSY: CPT | Performed by: INTERNAL MEDICINE

## 2022-04-08 PROCEDURE — 84132 ASSAY OF SERUM POTASSIUM: CPT | Performed by: HOSPITALIST

## 2022-04-08 RX ORDER — ASPIRIN 81 MG/1
81 TABLET ORAL DAILY
Status: DISCONTINUED | OUTPATIENT
Start: 2022-04-08 | End: 2022-04-08 | Stop reason: HOSPADM

## 2022-04-08 RX ORDER — FENTANYL CITRATE 50 UG/ML
50 INJECTION, SOLUTION INTRAMUSCULAR; INTRAVENOUS
Status: DISCONTINUED | OUTPATIENT
Start: 2022-04-08 | End: 2022-04-08 | Stop reason: HOSPADM

## 2022-04-08 RX ORDER — PROMETHAZINE HYDROCHLORIDE 25 MG/1
25 TABLET ORAL ONCE AS NEEDED
Status: DISCONTINUED | OUTPATIENT
Start: 2022-04-08 | End: 2022-04-08 | Stop reason: HOSPADM

## 2022-04-08 RX ORDER — HYDROMORPHONE HYDROCHLORIDE 1 MG/ML
0.5 INJECTION, SOLUTION INTRAMUSCULAR; INTRAVENOUS; SUBCUTANEOUS
Status: DISCONTINUED | OUTPATIENT
Start: 2022-04-08 | End: 2022-04-08 | Stop reason: HOSPADM

## 2022-04-08 RX ORDER — MIDAZOLAM HYDROCHLORIDE 1 MG/ML
0.5 INJECTION INTRAMUSCULAR; INTRAVENOUS
Status: CANCELLED | OUTPATIENT
Start: 2022-04-08

## 2022-04-08 RX ORDER — SODIUM CHLORIDE, SODIUM LACTATE, POTASSIUM CHLORIDE, CALCIUM CHLORIDE 600; 310; 30; 20 MG/100ML; MG/100ML; MG/100ML; MG/100ML
9 INJECTION, SOLUTION INTRAVENOUS CONTINUOUS
Status: CANCELLED | OUTPATIENT
Start: 2022-04-08

## 2022-04-08 RX ORDER — FAMOTIDINE 20 MG/1
20 TABLET, FILM COATED ORAL ONCE
Status: CANCELLED | OUTPATIENT
Start: 2022-04-08 | End: 2022-04-08

## 2022-04-08 RX ORDER — LIDOCAINE HYDROCHLORIDE 10 MG/ML
0.5 INJECTION, SOLUTION EPIDURAL; INFILTRATION; INTRACAUDAL; PERINEURAL ONCE AS NEEDED
Status: CANCELLED | OUTPATIENT
Start: 2022-04-08

## 2022-04-08 RX ORDER — MEPERIDINE HYDROCHLORIDE 50 MG/ML
12.5 INJECTION INTRAMUSCULAR; INTRAVENOUS; SUBCUTANEOUS
Status: DISCONTINUED | OUTPATIENT
Start: 2022-04-08 | End: 2022-04-08 | Stop reason: HOSPADM

## 2022-04-08 RX ORDER — ONDANSETRON 2 MG/ML
4 INJECTION INTRAMUSCULAR; INTRAVENOUS ONCE AS NEEDED
Status: DISCONTINUED | OUTPATIENT
Start: 2022-04-08 | End: 2022-04-08 | Stop reason: HOSPADM

## 2022-04-08 RX ORDER — IPRATROPIUM BROMIDE AND ALBUTEROL SULFATE 2.5; .5 MG/3ML; MG/3ML
3 SOLUTION RESPIRATORY (INHALATION) ONCE AS NEEDED
Status: DISCONTINUED | OUTPATIENT
Start: 2022-04-08 | End: 2022-04-08 | Stop reason: HOSPADM

## 2022-04-08 RX ORDER — PROPOFOL 10 MG/ML
VIAL (ML) INTRAVENOUS AS NEEDED
Status: DISCONTINUED | OUTPATIENT
Start: 2022-04-08 | End: 2022-04-08 | Stop reason: SURG

## 2022-04-08 RX ORDER — NALOXONE HCL 0.4 MG/ML
0.4 VIAL (ML) INJECTION AS NEEDED
Status: DISCONTINUED | OUTPATIENT
Start: 2022-04-08 | End: 2022-04-08 | Stop reason: HOSPADM

## 2022-04-08 RX ORDER — DROPERIDOL 2.5 MG/ML
0.62 INJECTION, SOLUTION INTRAMUSCULAR; INTRAVENOUS AS NEEDED
Status: DISCONTINUED | OUTPATIENT
Start: 2022-04-08 | End: 2022-04-08 | Stop reason: HOSPADM

## 2022-04-08 RX ORDER — PROMETHAZINE HYDROCHLORIDE 25 MG/1
25 SUPPOSITORY RECTAL ONCE AS NEEDED
Status: DISCONTINUED | OUTPATIENT
Start: 2022-04-08 | End: 2022-04-08 | Stop reason: HOSPADM

## 2022-04-08 RX ORDER — SODIUM CHLORIDE 0.9 % (FLUSH) 0.9 %
3 SYRINGE (ML) INJECTION EVERY 12 HOURS SCHEDULED
Status: DISCONTINUED | OUTPATIENT
Start: 2022-04-08 | End: 2022-04-08 | Stop reason: HOSPADM

## 2022-04-08 RX ORDER — DROPERIDOL 2.5 MG/ML
0.62 INJECTION, SOLUTION INTRAMUSCULAR; INTRAVENOUS ONCE AS NEEDED
Status: DISCONTINUED | OUTPATIENT
Start: 2022-04-08 | End: 2022-04-08 | Stop reason: HOSPADM

## 2022-04-08 RX ORDER — SODIUM CHLORIDE 0.9 % (FLUSH) 0.9 %
10 SYRINGE (ML) INJECTION EVERY 12 HOURS SCHEDULED
Status: CANCELLED | OUTPATIENT
Start: 2022-04-08

## 2022-04-08 RX ORDER — SODIUM CHLORIDE 0.9 % (FLUSH) 0.9 %
10 SYRINGE (ML) INJECTION AS NEEDED
Status: CANCELLED | OUTPATIENT
Start: 2022-04-08

## 2022-04-08 RX ORDER — LABETALOL HYDROCHLORIDE 5 MG/ML
5 INJECTION, SOLUTION INTRAVENOUS
Status: DISCONTINUED | OUTPATIENT
Start: 2022-04-08 | End: 2022-04-08 | Stop reason: HOSPADM

## 2022-04-08 RX ORDER — SODIUM CHLORIDE 0.9 % (FLUSH) 0.9 %
3-10 SYRINGE (ML) INJECTION AS NEEDED
Status: DISCONTINUED | OUTPATIENT
Start: 2022-04-08 | End: 2022-04-08 | Stop reason: HOSPADM

## 2022-04-08 RX ORDER — HYDRALAZINE HYDROCHLORIDE 20 MG/ML
5 INJECTION INTRAMUSCULAR; INTRAVENOUS
Status: DISCONTINUED | OUTPATIENT
Start: 2022-04-08 | End: 2022-04-08 | Stop reason: HOSPADM

## 2022-04-08 RX ORDER — MAGNESIUM CARB/ALUMINUM HYDROX 105-160MG
296 TABLET,CHEWABLE ORAL ONCE
Status: DISCONTINUED | OUTPATIENT
Start: 2022-04-08 | End: 2022-04-08 | Stop reason: HOSPADM

## 2022-04-08 RX ORDER — HYDROCODONE BITARTRATE AND ACETAMINOPHEN 5; 325 MG/1; MG/1
1 TABLET ORAL ONCE AS NEEDED
Status: DISCONTINUED | OUTPATIENT
Start: 2022-04-08 | End: 2022-04-08 | Stop reason: HOSPADM

## 2022-04-08 RX ORDER — FAMOTIDINE 10 MG/ML
20 INJECTION, SOLUTION INTRAVENOUS ONCE
Status: CANCELLED | OUTPATIENT
Start: 2022-04-08 | End: 2022-04-08

## 2022-04-08 RX ADMIN — PROPOFOL 50 MG: 10 INJECTION, EMULSION INTRAVENOUS at 16:18

## 2022-04-08 RX ADMIN — TAZOBACTAM SODIUM AND PIPERACILLIN SODIUM 3.38 G: 375; 3 INJECTION, SOLUTION INTRAVENOUS at 03:43

## 2022-04-08 RX ADMIN — PANTOPRAZOLE SODIUM 40 MG: 40 INJECTION, POWDER, FOR SOLUTION INTRAVENOUS at 06:51

## 2022-04-08 RX ADMIN — TAZOBACTAM SODIUM AND PIPERACILLIN SODIUM 3.38 G: 375; 3 INJECTION, SOLUTION INTRAVENOUS at 18:35

## 2022-04-08 RX ADMIN — PROPOFOL 125 MCG/KG/MIN: 10 INJECTION, EMULSION INTRAVENOUS at 16:18

## 2022-04-08 RX ADMIN — MIDODRINE HYDROCHLORIDE 5 MG: 5 TABLET ORAL at 13:11

## 2022-04-08 RX ADMIN — TAZOBACTAM SODIUM AND PIPERACILLIN SODIUM 3.38 G: 375; 3 INJECTION, SOLUTION INTRAVENOUS at 13:11

## 2022-04-08 NOTE — CASE MANAGEMENT/SOCIAL WORK
Continued Stay Note  [facility]     Patient Name: Filipe Hamilton Jr.  MRN: [id number]  Today's Date: 4/8/2022    Admit Date: 4/5/2022     Discharge Plan     Row Name 04/08/22 6260       Plan    Plan home    Patient/Family in Agreement with Plan yes    Plan Comments Therapy is recommending that Mr. Hamilton go to outpatient physical therapy at discharge. He is agreeable to this and would like a referral sent to [facility] at [place]. I have made this referral and P[facility] will follow up with Mr. Hamilton at discharge. Case management will continue to follow.    Final Discharge Disposition Code 01 - home or self-care               Discharge Codes    No documentation.               Expected Discharge Date and Time     Expected Discharge Date Expected Discharge Time    Apr 15, 2022             Kendall Haq RN

## 2022-04-08 NOTE — PROGRESS NOTES
"Patient Name:  Filipe Hamilton Jr.  YOB: 1934  [id number]    Surgery Progress Note    Date of visit: 4/8/2022    Subjective   No abdominal pain. No fevers/chills. Multiple BMs with bowel prep. No nausea/vomiting.         Objective       /87 (BP Location: Right arm, Patient Position: Sitting)   Pulse 72   Temp 97.3 °F (36.3 °C) (Oral)   Resp 18   Ht 170.2 cm (67\")   Wt 75.5 kg (166 lb 8 oz)   SpO2 98%   BMI 26.08 kg/m²     Intake/Output Summary (Last 24 hours) at 4/8/2022 0730  Last data filed at 4/7/2022 1923  Gross per 24 hour   Intake 720 ml   Output 300 ml   Net 420 ml       CV:  Rhythm regular and rate regular  L:  Clear to auscultation bilaterally  Abd:  Bowel sounds positive, soft, mild distention, nontender  Ext:  No cyanosis, clubbing, edema    Recent labs and imaging that are back at this time have been reviewed.        Assessment/Plan     Pt with possible mesenteric ischemia, with continuing improvement  For EGD/Colonoscopy with GI today  Will review results for indications for operative intervention  Continue to hold Plavix for now, ok for ASA        Demar Oakes MD  4/8/2022  07:30 EDT      "

## 2022-04-08 NOTE — DISCHARGE PLACEMENT REQUEST
"AlfonsoFilipe Jr. (88 y.o. Male)             Date of Birth   1934    Social Security Number       Address   [address] [place] [address]  [facility] [address]    Home Phone   [phone]    MRN   [id number]       Denominational   Mormon    Marital Status                               Admission Date   22    Admission Type   Emergency    Admitting Provider   Anup Galdamez MD    Attending Provider   Anup Galdamez MD    Department, Room/Bed   [facility] 4G, S461/1       Discharge Date       Discharge Disposition       Discharge Destination                               Attending Provider: Anup Galdamez MD    Allergies: Niacin    Isolation: None   Infection: None   Code Status: CPR   Advance Care Planning Activity    Ht: 170.2 cm (67\")   Wt: 75.5 kg (166 lb 8 oz)    Admission Cmt: None   Principal Problem: Ischemic bowel disease (HCC) [K55.9]                 Active Insurance as of 2022     Primary Coverage     Payor Plan Insurance Group Employer/Plan Group    [facility] MEDICARE REPLACEMENT [facility] MEDICARE REPLACEMENT [id number]     Payor Plan Address Payor Plan Phone Number Payor Plan Fax Number Effective Dates    [address] [address]   2016 - None Entered    [facility] [id number]       Subscriber Name Subscriber Birth Date Member ID       FILIPE HAMILTON NAM RAMIREZ 1934 [id number]                 Emergency Contacts      (Rel.) Home Phone Work Phone Mobile Phone    Taylor Hamilton (Spouse) [phone] -- --    Hamilton,Danny (Son) -- -- [phone]          [address] [address]  Phone:  [phone]  Fax:  [phone] Date: 2022      Ambulatory Referral to Physical Therapy Evaluate and treat; Full weight bearing     Patient:  Filipe Hamilton Jr. MRN:  [id number]   [address] [place] [address] [place] [address] :  1934  SSN:    Phone: [phone] Sex:  M      INSURANCE " PAYOR PLAN GROUP # SUBSCRIBER ID   Primary:    [employer] MEDICARE REPLACEMENT [id number]      Referring Provider Information:  TILA GALDAMEZ Phone: [phone] Fax: [phone]      Referral Information:   # Visits:  1 Referral Type: Physical Therapy [AE1]   Urgency:  Routine Referral Reason: Specialty Services Required   Start Date: 2022 End Date:  To be determined by Insurer   Diagnosis: Sepsis with acute renal failure without septic shock, due to unspecified organism, unspecified acute renal failure type (HCC) (A41.9,R65.20,N17.9 [ICD-10-CM] 038.9,995.92,584.9 [ICD-9-CM])      Refer to Dept:   Refer to Provider:   Refer to Provider Phone:   Refer to Facility:       Specialty needed: Evaluate and treat  Weight Bearing Status: Full weight bearing     This document serves as a request of services and does not constitute Insurance authorization or approval of services.  To determine eligibility, please contact the members Insurance carrier to verify and review coverage.     If you have medical questions regarding this request for services. Please contact [address] at [phone] during normal business hours.        Authorizing Provider:Tila Galdamez MD  Authorizing Provider's NPI: [id number]  Order Entered By: Kendall Haq RN 2022 12:29 PM     Electronically signed by: Tila Galdamez MD 2022 12:29 PM            History & Physical      Helen Ely, DO at 22 1853              [facility] Medicine Services  HISTORY AND PHYSICAL    Patient Name: Filipe Hamilton JrHenry  : 1934  MRN: [id number]  Primary Care Physician: Delilah Tatum MD  Date of admission: 2022      Subjective   Subjective     Chief Complaint:  Abdominal pain    HPI:  Filipe BROWNING Alfonso Marquez is a 88 y.o. male with history of diabetes, peripheral neuropathy, cardiomyopathy on Bumex and spironolactone, follows with  cardiology admitted for  worsening abdominal pain over the last 3 to 4 days.  Son helps provide some history at bedside.  He has not been able to tolerate any oral intake and has been having nausea and vomiting along with diarrhea that worsened today.  He was evaluated by surgery in the ER who recommended conservative management due to increased risk of liborio-operative, post-op complications for now.    Discussed case with general surgery who had already evaluated patient prior to my exam.       Review of Systems   Constitutional: Positive for chills. Negative for fatigue and fever.   Respiratory: Negative for shortness of breath.    Cardiovascular: Negative for chest pain and leg swelling.   Gastrointestinal: Positive for abdominal distention, abdominal pain, diarrhea, nausea and vomiting.   Genitourinary: Positive for difficulty urinating. Negative for dysuria.   Skin: Negative for rash and wound.   Neurological: Negative for weakness and light-headedness.        All other systems reviewed and are negative.     Personal History     Past Medical History:   Diagnosis Date   • Abnormal ankle brachial index 09/12/2017    AUGUST (9/12/17) nl 1.04 on R, bord 0.91 on L; cards - Dr. Easley   • Basal cell carcinoma (BCC) of left shoulder 12/11/2020    s/p blade bx L. shoulder (12/11/20); superficial BCC with (+) periph margin; darron Hoffmann   • Basal cell carcinoma of hand 01/03/1995    L. wrist (1/3/95); derm Artur Hoffmann   • Hx of cardiovascular stress test 03/19/2013    SPECT stress test (3/19/13) EF 53%, c/w prior MI    • Hx of carotid ultrasound 04/16/2019    carotid u/s (4/16/19): bilat plaque, 0-49% stenosis right, 50-69% stenosis left   • Hx of carotid ultrasound 02/08/2021    carotid u/s (2/8/21): bilat 0-49% stenosis; marked diffuse prominent extracranial carotid artery atherosclerotic changes   • Hx of cath aortogram with L. angiogram 10/03/2017    aortogram and L. angio (10/3/17): no AAA, occluded L. SFA; will pursue L.SFA  intervention; abdullahi Easley   • Hx of chest x-ray 10/12/2017    nl CXR (10/12/17)   • Hx of colonoscopy 11/10/2015    hyperplastic polyps (11/15), repeat in 5 yrs; QUINTON Holland   • Hx of echocardiogram 05/03/2021    ECHO (5/3/21, [facility] hospitalized): EF 35%, grade II agarwal dysfunction, mod MR, mild-mod TR, mild pulm HTN (RVSP 41.81mmHg), dilated RV   • Hx of exercise stress test 06/10/2016    nl GXT (6/16), noting only increased ventricular ectopy with exercise; abdullahi Conrad   • Hx of tobacco use     h/o 2ppd x 40 yrs; quit 1990s   • Squamous cell carcinoma in situ of skin 11/11/2016    SCC in-situ L. chest (11/11/16); derm - Dr. 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 Shun             Past Surgical History:   Procedure Laterality Date   • ATHERECTOMY Right 09/15/2020    s/p peripheral atherectomy/PTA (9/15/20) for PAD; abdullahi Easley   • CARDIAC CATHETERIZATION  10/26/2021    s/p PCI to LAD (10/26/21): at [facility]   • CAROTID ENDARTERECTOMY Right 01/2005    surg - Dr. Douglas Lazaro   • CATARACT EXTRACTION Bilateral 12/15/2021    Left 12/15/21, Right 1/12/22; ophtho - Dr. Fountain   • CORONARY ARTERY BYPASS GRAFT  04/2011    CABG x2 (4/11)   • HEMORRHOIDECTOMY     • HYDROCELE EXCISION / REPAIR Left     s/p L. hydrocelectomy; uro - Dr. Hobson   • POPLITEAL ARTERY ANGIOPLASTY Left 11/03/2017    s/p successful DCB angioplasty of L. SDA and popliteal arteries, patent CFA, DFA (11/3/17); NIMCO Easley   • RENAL ARTERY STENT  01/2005    for Renal artery stenosis (1/05)   • SKIN BIOPSY Left 12/11/2020    s/p blade bx L. shoulder (12/11/20): path - superficial BCC with (+) periph margin; derm - Dr. Shun   • TOTAL KNEE ARTHROPLASTY Right 01/2008       Family History:  family history includes Heart disease in his brother, father, and mother; Hypertension in his father and mother. Otherwise pertinent FHx was  reviewed and unremarkable.     Social History:  reports that he quit smoking about 35 years ago. He has a 135.00 pack-year smoking history. He quit smokeless tobacco use 7 days ago.  His smokeless tobacco use included chew. He reports previous alcohol use. Drug use questions deferred to the physician.  Social History     Social History Narrative   • Not on file       Medications:  Available home medication information reviewed.  Medications Prior to Admission   Medication Sig Dispense Refill Last Dose   • albuterol sulfate  (90 Base) MCG/ACT inhaler Inhale 2 puffs Every 6 (Six) Hours As Needed for Wheezing. (Patient not taking: Reported on 4/5/2022) 18 g 0 Not Taking at Unknown time   • amLODIPine (NORVASC) 2.5 MG tablet Take 2.5 mg by mouth 2 (two) times a day. (Patient not taking: Reported on 4/5/2022)   Not Taking at Unknown time   • aspirin 81 MG tablet Take 1 tablet by mouth daily.      • atorvastatin (LIPITOR) 40 MG tablet Take 1 tablet by mouth Daily. 90 tablet 3    • bumetanide (BUMEX) 1 MG tablet Take 2 mg by mouth Daily.      • cholecalciferol (VITAMIN D3) 10 MCG (400 UNIT) tablet Take 400 Units by mouth Daily.      • clopidogrel (PLAVIX) 75 MG tablet Take 75 mg by mouth Daily.      • fluocinonide (LIDEX) 0.05 % external solution       • glucose blood (FREESTYLE TEST STRIPS) test strip 1 each by Other route Daily. 100 each 3    • guaiFENesin (MUCINEX) 600 MG 12 hr tablet Take 2 tablets by mouth 2 (Two) Times a Day. 30 tablet 0    • ketoconazole (NIZORAL) 2 % shampoo       • Lancets misc 1 Units Daily. Use as directed 100 each 3    • levothyroxine (SYNTHROID, LEVOTHROID) 50 MCG tablet Take 1 tablet by mouth Daily. 90 tablet 3    • loratadine (Claritin) 5 MG chewable tablet Chew 1 tablet Daily. 30 tablet 0    • metoprolol succinate XL (TOPROL-XL) 50 MG 24 hr tablet Take 50 mg by mouth Daily. Increased per Dr. Ramirez      • Misc Natural Products (GLUCOSAMINE CHONDROITIN ADV) tablet Take  by mouth.       • omeprazole (priLOSEC) 20 MG capsule TAKE 1 CAPSULE DAILY 90 capsule 3    • potassium chloride 10 MEQ CR tablet Take 10 mEq by mouth Daily.      • pregabalin (Lyrica) 75 MG capsule Take 1 capsule by mouth 2 (Two) Times a Day. 180 capsule 0    • sacubitril-valsartan (ENTRESTO)  MG tablet Take 1 tablet by mouth 2 (Two) Times a Day.      • Spiriva Respimat 2.5 MCG/ACT aerosol solution inhaler USE 2 INHALATIONS DAILY 12 g 3    • spironolactone (ALDACTONE) 12.5 MG tablet half tablet Take  by mouth Daily. 25mg 1/2 QD      • tamsulosin (FLOMAX) 0.4 MG capsule 24 hr capsule Take 1 capsule by mouth Daily. 90 capsule 3    • zolpidem (AMBIEN) 10 MG tablet Take 1 tablet by mouth Every Night. 30 tablet 2        Allergies   Allergen Reactions   • Niacin      flushing       Objective   Objective     Vital Signs:   Temp:  [99.7 °F (37.6 °C)] 99.7 °F (37.6 °C)  Heart Rate:  [] 90  Resp:  [16-22] 16  BP: ()/(49-73) 103/54       Physical Exam  Constitutional:       Appearance: Normal appearance.   HENT:      Head: Normocephalic and atraumatic.      Nose: Nose normal.   Cardiovascular:      Rate and Rhythm: Regular rhythm. Tachycardia present.      Heart sounds: No murmur heard.  Pulmonary:      Effort: Pulmonary effort is normal. No respiratory distress.      Breath sounds: Normal breath sounds.   Abdominal:      General: There is distension.      Tenderness: There is abdominal tenderness. There is no guarding.   Musculoskeletal:         General: No swelling.   Skin:     General: Skin is warm and dry.      Capillary Refill: Capillary refill takes 2 to 3 seconds.   Neurological:      General: No focal deficit present.      Mental Status: He is alert and oriented to person, place, and time.   Psychiatric:         Mood and Affect: Mood normal.            Result Review:  I have personally reviewed the results from the time of this admission to 4/5/2022 21:24 EDT and agree with these findings:  [x]  Laboratory  []   Microbiology  [x]  Radiology  [x]  EKG/Telemetry   []  Cardiology/Vascular   []  Pathology  [x]  Old records  []  Other:  Most notable findings include:       LAB RESULTS:      Lab 04/05/22 2034 04/05/22  1449   WBC  --  24.64*   HEMOGLOBIN  --  8.5*   HEMATOCRIT  --  26.7*   PLATELETS  --  249   NEUTROS ABS  --  22.86*   IMMATURE GRANS (ABS)  --  0.24*   LYMPHS ABS  --  0.38*   MONOS ABS  --  1.13*   EOS ABS  --  0.00   MCV  --  87.8   PROCALCITONIN  --  1.66*   LACTATE  --  2.0   PROTIME  --  16.6*   INR  --  1.35*   APTT 35.6  --          Lab 04/05/22  1449   SODIUM 135*   POTASSIUM 3.4*   CHLORIDE 97*   CO2 22.0   ANION GAP 16.0*   BUN 55*   CREATININE 2.68*   EGFR 22.2*   GLUCOSE 148*   CALCIUM 7.8*         Lab 04/05/22  1449   TOTAL PROTEIN 5.4*   ALBUMIN 2.40*   GLOBULIN 3.0   ALT (SGPT) 60*   AST (SGOT) 85*   BILIRUBIN 0.6   ALK PHOS 70   LIPASE 8*         Lab 04/05/22  1449   TROPONIN T 0.379*                 UA    Urinalysis 1/10/22 1/10/22 4/5/22    1449 1449    Squamous Epithelial Cells, UA  0-2    Specific Gravity, UA 1.014  1.019   Ketones, UA Negative  Trace (A)   Blood, UA Negative  Negative   Leukocytes, UA Negative  Negative   Nitrite, UA Negative  Negative   RBC, UA  0-2    WBC, UA  0-2    Bacteria, UA  None Seen    (A) Abnormal value              Microbiology Results (last 10 days)     Procedure Component Value - Date/Time    COVID PRE-OP / PRE-PROCEDURE SCREENING ORDER (NO ISOLATION) - Swab, Nasopharynx [[id number]]  (Normal) Collected: 04/05/22 1558    Lab Status: Final result Specimen: Swab from Nasopharynx Updated: 04/05/22 1632    Narrative:      The following orders were created for panel order COVID PRE-OP / PRE-PROCEDURE SCREENING ORDER (NO ISOLATION) - Swab, Nasopharynx.  Procedure                               Abnormality         Status                     ---------                               -----------         ------                     COVID-19 and FLU A/B PCR...[[id number]]   Normal              Final result                 Please view results for these tests on the individual orders.    COVID-19 and FLU A/B PCR - Swab, Nasopharynx [[id number]]  (Normal) Collected: 04/05/22 1558    Lab Status: Final result Specimen: Swab from Nasopharynx Updated: 04/05/22 1632     COVID19 Not Detected     Influenza A PCR Not Detected     Influenza B PCR Not Detected    Narrative:      Fact sheet for providers: https://www.fda.gov/media/588395/download    Fact sheet for patients: https://www.fda.gov/media/434063/download    Test performed by PCR.          CT Abdomen Pelvis Without Contrast    Result Date: 4/5/2022  DATE OF EXAM: 4/5/2022 4:49 PM  PROCEDURE: CT ABDOMEN PELVIS WO CONTRAST-  INDICATIONS: nausea/vomiting/diarrhea  COMPARISON: 1/31/2013 abdomen pelvis CT scan  TECHNIQUE: Routine transaxial slices were obtained through the abdomen and pelvis without the administration of intravenous contrast. Reconstructed coronal and sagittal images were also obtained. Automated exposure control and iterative construction methods were used.  The radiation dose reduction device was turned on for each scan per the ALARA (As Low as Reasonably Achievable) protocol.  FINDINGS: History indicates generalized abdominal pain nausea vomiting and diarrhea.  The included lower lungs show mild new peribronchial thickening in the middle lobe and right lower lobe, possibly bronchitis. No focal pneumonia or effusion is seen.  Granulomas calcifications are seen in the normal-sized spleen. There is mild fatty liver change. No significant abnormalities are seen of the gallbladder, pancreas, adrenal glands, or kidneys for a non-IV contrast enhanced exam. No upper abdominal free air ascites or adenopathy is seen.  In the left upper quadrant, there is inflammatory fat stranding around the splenic flexure of the colon, but also focal small bowel mesenteric edema in a separate but adjacent location left upper quadrant, with mild  "mucosal thickening of a loop of adjacent small bowel. It is uncertain if these two areas inflammation represent the same underlying pathology. No abscess, pneumatosis or extraluminal air is seen. The colonic inflammatory focus is not associated with significant diverticular disease.  Regarding the lower abdomen pelvis, no additional inflamed bowel loops are identified. There is, however, an unusual appearance involving both the distal colon and adjacent loop of small bowel from axial image 86 through image 102, with a partial \"whirl sign \"and marked decreased caliber of small bowel at this level as well as diminished caliber of the colon, possibly a very low-level obstruction, as from underlying adhesion, very small internal hernia etc. The small bowel loop can also be seen in the left lower quadrant coronal image 36 through 26.  Terminal ileum cecum and appendix appear normal. No intrapelvic free fluid is seen. Bladder is normally distended and normal in appearance. Incidental note is made of heavy atherosclerotic calcification of the abdominal aorta, extending into the lumen on axial image 71, suggesting there is likely a high degree of stenosis. Heavy atherosclerotic calcification is seen of the patient's small iliac arteries as well. Similar appearance is present in 2013, with calcified material in the aortic lumen, although the aorta was patent on that study.       Impression:    1. Unusual appearance of the bowel in the left abdomen, in the left upper quadrant with separate areas and inflammation of small bowel and proximal descending colon. More distal area in the left abdomen that resembles a whirl sign involving both large and small bowel, without evidence of significant ongoing bowel obstruction. Consider possible internal hernia or adhesion with resulting vascular compromise of bowel. 2. No other evidence of acute intra-abdominal or intrapelvic disease elsewhere. 3. Heavy atherosclerotic calcification of " the distal abdominal aorta and iliac arteries, but also present in 2013. Potentially significant luminal stenosis of the distal abdominal aorta is a consideration. 4. Mild new interstitial disease the right middle and lower lobe, potentially bronchitis, or more likely with history of vomiting, mild aspiration.  Note: Preliminary findings of this study were discussed with Dr. Longo at approximately 5:25 PM 4/5/2022.  This report was finalized on 4/5/2022 5:26 PM by Dr. Gonzalez Dillard MD.      XR Chest 1 View    Result Date: 4/5/2022  DATE OF EXAM: 4/5/2022 3:08 PM  PROCEDURE: XR CHEST 1 VW-  INDICATIONS: nausea/vomitng/fatigue  COMPARISON: 2/27/2021  TECHNIQUE: Single radiographic AP view of the chest was obtained.  FINDINGS: No focal airspace opacity. No pleural effusion or pneumothorax. Normal heart and mediastinal contours. Sternotomy wires are intact.      Impression: No evidence of acute disease in the chest.  This report was finalized on 4/5/2022 3:30 PM by Yusef Rausch.            Assessment/Plan   Assessment & Plan     Active Hospital Problems    Diagnosis  POA   • Abdominal pain [R10.9]  Unknown   • Severe sepsis (HCC) [A41.9, R65.20]  Unknown   • Sepsis with acute renal failure without septic shock, due to unspecified organism, unspecified acute renal failure type (HCC) [A41.9, R65.20, N17.9]  Yes   • Hypertension [I10]  Yes     10/11/21 /84; on metoprolol XL 50mg QD, amlo 2.5mg BID; also takes bumex 2mg QD, entresto 97/103 BID, and spironolactone 12.5mg QD; 5/7/21 /68 on lisin 40mg QD, amlo 5mg QD, and metoprolol XL 25mg 1/2 QD; 4/8/21 /70; 10/13/2020 /68 on amlodipine 5 mg QD, lisinopril 40 mg QD; impression: 1/3/19 BP stable 120/64, on amlo 5mg QD, lisin 40mg QD; 04/19/2016 - BP has been running high per patient; patient will check to see if actually taking amlodipine (no RXs in EHR since 2014; incr lisin to 20mg BID #180, 2RF; rec low Na diet, cont monitoring BPs; f/u in 1 month   Impression: 12/18/2015 - BP stable on current meds; stable electrolytes  Impression: 09/11/2015 - BP excellent  Impression: 06/08/2015 - BP remains excellent on current med  Impression: 03/09/2015 - BP stable  Impression: 01/12/2015 - BP stable  Impression: 09/09/2014 - BP remains stable on lisinopril;      • Type 2 diabetes mellitus with diabetic polyneuropathy, without long-term current use of insulin (HCC) [E11.42]  Yes     1/5/22 A1C 6.33; 11/19/21 UDS appropriate (+) zolpidem/lyrica; 11/19/21 A1C 6.4; 10/11/21 wants to wean off of lyrica - rec 75mg QHS x 2 wks, then QOD x 1-2 wks, then stop; 7/22/21 A1C 6.1; 5/17/21 abnl NFBG 198; 4/8/21 A1C better 5.7; 1/4/21 A1C 6.21; 10/15/20 UDS appropriate (+) zolpidem/lyrica; 9/10/20 ophtho/Dr. Dionisio Valenzuela - cataract OU, RTC 1 yr; 7/14/2020 A1c 5.9; no meds; 1/6/20 A1C bord 6.2 (was 5.8 in 7/19), also remains on lyrica 64mg BID #180, 0RF (exception due to insurance); 09/09/2014 - offered option to increase gabapentin to 300mg QAM and 600mg QHS with is complaints of increased nighttime sxs; patient prefers trial of Lyrica 75mg BID #60,2RF and will discontinue gabapentin;cautions also given with concomitant use of zolpidem with lyrica, specifically to monitor for excessive sedating effects; LETHA done and controlled med contract signed today; f/u in 3 mos for next prescription - could consider adjusting dose depending on results;   Impression: 12/18/2015 - BG control remains stable; healthy diet and phys activtiy; no meds; f/u A1C In 6 mos  Impression: 06/08/2015 - BG control remains stable; healthy diet and phys activity as tolerated  Impression: 12/09/2014 - BG control remains stable; cont healthy diet and phys acitivty; repeat A1C in 6 mos  Impression: 03/07/2014 - stable A1C; cont healthy diet and exercise;        Sepsis  Leukocytosis  Abd pain  -suspect due to abdominal source   -blood cultures pending  -continue zosyn  - discussed case with general surgery, high risk  of morbidity and mortality  -plavix on hold  -NPO      HTN  -add midodrine  -hold amlodipine and BB for now  -check ECHO    Abdominal pain  Elevated Lactic acid  -trend, gentle IVF      NSTEMI  -cardio consult  -trend troponins  -avoid further boluses, suspect EF is low at baseline      KHARI on CKD  -secondary to sepsis, overdiuresis  -recheck in AM  -urinary retention protocol      DM with neuropathy  -SSI while npo    DVT prophylaxis:  On hold, may need possible surgery      CODE STATUS:  Full  Code Status and Medical Interventions:   Ordered at: 22     Level Of Support Discussed With:    Patient     Code Status (Patient has no pulse and is not breathing):    CPR (Attempt to Resuscitate)     Medical Interventions (Patient has pulse or is breathing):    Full Support         Helen Ely DO  22    Electronically signed by Helen Ely DO at 22          Physical Therapy Notes (most recent note)      Romy Machado, PT at 22 1627  Version 1 of 1         Patient Name: Filipe Hamilton   : 1934    MRN: [id number]                              Today's Date: 2022       Admit Date: 2022    Visit Dx:     ICD-10-CM ICD-9-CM   1. Sepsis with acute renal failure without septic shock, due to unspecified organism, unspecified acute renal failure type (HCC)  A41.9 038.9    R65.20 995.92    N17.9 584.9   2. Acute dehydration  E86.0 276.51   3. Acute abdominal pain  R10.9 789.00     338.19   4. Bronchitis  J40 490   5. Elevated troponin  R77.8 790.6   6. Acute on chronic anemia  D64.9 285.9   7. Mesenteric ischemia (HCC)  K55.9 557.9   8. Anemia due to GI blood loss  D50.0 280.0     Patient Active Problem List   Diagnosis   • Renal artery stenosis (HCC)   • Actinic keratosis   • Allergic rhinitis   • Coronary artery disease involving native coronary artery of native heart   • Bilateral carotid artery stenosis   • Cervical spondylosis   • Stage 3b chronic  kidney disease (HCC)   • Type 2 diabetes mellitus with diabetic polyneuropathy, without long-term current use of insulin (HCC)   • Diverticulosis of large intestine   • BPH   • Gastroesophageal reflux disease   • Hyperlipidemia LDL goal <70   • Hyperplastic colon polyp   • Essential hypertension   • Hypothyroidism   • Insomnia   • Microalbuminuria   • Osteoarthritis of both knees   • Rosacea   • Chronic obstructive pulmonary disease with acute exacerbation (HCC)   • Tubular adenoma of colon   • Ganglion cyst, R. 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 shoulder (12/11/20); superficial BCC with (+) periph margin; derm Artur Hoffmann   • Basal cell carcinoma of hand 01/03/1995    L. wrist (1/3/95); derm Artur Hoffmann   • Hx of cardiovascular stress test 03/19/2013    SPECT stress test (3/19/13) EF 53%, c/w prior MI    • Hx of carotid ultrasound 04/16/2019    carotid u/s (4/16/19): bilat plaque, 0-49% stenosis right, 50-69% stenosis left   • Hx of carotid ultrasound 02/08/2021    carotid u/s (2/8/21): bilat 0-49% stenosis; marked diffuse prominent extracranial carotid artery atherosclerotic changes   • Hx of cath  aortogram with L. angiogram 10/03/2017    aortogram and L. angio (10/3/17): no AAA, occluded L. SFA; will pursue L.SFA intervention; abdullahi Easley   • Hx of chest x-ray 10/12/2017    nl CXR (10/12/17)   • Hx of colonoscopy 11/10/2015    hyperplastic polyps (11/15), repeat in 5 yrs; QUINTNO Holland   • Hx of echocardiogram 05/03/2021    ECHO (5/3/21, [facility] hospitalized): EF 35%, grade II agarwal dysfunction, mod MR, mild-mod TR, mild pulm HTN (RVSP 41.81mmHg), dilated RV   • Hx of exercise stress test 06/10/2016    nl GXT (6/16), noting only increased ventricular ectopy with exercise; abdullahi Conrad   • Hx of tobacco use     h/o 2ppd x 40 yrs; quit 1990s   • Squamous cell carcinoma in situ of skin 11/11/2016    SCC in-situ L. chest (11/11/16); derm Artur Hoffmann   • Squamous cell carcinoma in situ of skin of face 03/11/2005    SCC in situ lower lip (3/11/15); derm - Dr. Shun     Past Surgical History:   Procedure Laterality Date   • ATHERECTOMY Right 09/15/2020    s/p peripheral atherectomy/PTA (9/15/20) for PAD; abdullaih Easley   • CARDIAC CATHETERIZATION  10/26/2021    s/p PCI to LAD (10/26/21): at [facility]   • CAROTID ENDARTERECTOMY Right 01/2005    surg - Dr. Douglas Lazaro   • CATARACT EXTRACTION Bilateral 12/15/2021    Left 12/15/21, Right 1/12/22; ophtho - Dr. Fountain   • CORONARY ARTERY BYPASS GRAFT  04/2011    CABG x2 (4/11)   • HEMORRHOIDECTOMY     • HYDROCELE EXCISION / REPAIR Left     s/p L. hydrocelectomy; uro - Dr. Hobson   • POPLITEAL ARTERY ANGIOPLASTY Left 11/03/2017    s/p successful DCB angioplasty of L. SDA and popliteal arteries, patent CFA, DFA (11/3/17); NIMCO Easley   • RENAL ARTERY STENT  01/2005    for Renal artery stenosis (1/05)   • SKIN BIOPSY Left 12/11/2020    s/p blade bx L. shoulder (12/11/20): path - superficial BCC with (+) periph margin; derm - Dr. Hoffmann   • TOTAL KNEE ARTHROPLASTY Right 01/2008      General Information     Row Name 04/07/22 1547           Document Type therapy note (daily note)  -    Mode of Treatment physical therapy  -    Row Name 04/07/22 1540          Patient Profile Reviewed yes  -    Barriers to Rehab previous functional deficit  -    Row Name 04/07/22 1540          Orientation Status (Cognition) oriented x 3  -[facility] Name 04/07/22 1540          Impairments Affecting Function (Mobility) balance;endurance/activity tolerance;strength  -          User Key  (r) = Recorded By, (t) = Taken By, (c) = Cosigned By    Initials Name Provider Type     Romy Machado, KSENIA Physical Therapist               Mobility     Row Name 04/07/22 1540          Bed Mobility --    All Activities, [place] (Bed Mobility) --    Assistive Device (Bed Mobility) --    Comment, (Bed Mobility) up to bathroom when PT arrived.  Independent at sink for  hand hygiene  -[facility] Name 04/07/22 1540          Sit-Stand [place] (Transfers) independent  -LF    Row Name 04/07/22 1540          [place] Level (Gait) contact guard  -    Distance in Feet (Gait) 350  -    Bilateral Gait Deviations --    Comment, (Gait/Stairs) mild unsteadiness turning corners  -          User Key  (r) = Recorded By, (t) = Taken By, (c) = Cosigned By    Initials Name Provider Type     Romy Machado PT Physical Therapist               Obj/Interventions     [facility] Name 04/07/22 1540          Balance    Balance Assessment standing static balance;standing dynamic balance  -LF     Static Sitting Balance independent  -LF     Static Standing Balance independent  -LF     Dynamic Standing Balance standby assist;contact guard  -           User Key  (r) = Recorded By, (t) = Taken By, (c) = Cosigned By    Initials Name Provider Type     Romy Machado PT Physical Therapist               Goals/Plan    No documentation.                Clinical Impression     [facility] Name 04/07/22 1540          Pretreatment Pain Rating 0/10 - no pain  -LF    Posttreatment Pain Rating 0/10 -  no pain  -LF    Row Name 04/07/22 1540          Plan of Care Reviewed With patient;family  -LF    Progress improving  -LF    Outcome Evaluation Patient ambulated 350' CGA, no LOB but mild unsteadiness turning corners.  Fatigued with activity.  PT to cont. to follow and progress activity as tolerated  -LF    Row Name 04/07/22 1540          O2 Delivery Intra Treatment room air  -LF    Pre Patient Position Standing  -LF    Intra Patient Position Standing  -LF    Post Patient Position Sitting  -LF    Row Name 04/07/22 1540          Pre-Treatment Position bathroom  -LF    Post Treatment Position other  couch.  Nsg returning to reconnect IV  -LF    In Chair sitting;with family/caregiver  -LF          User Key  (r) = Recorded By, (t) = Taken By, (c) = Cosigned By    Initials Name Provider Type    LF Romy Machado, PT Physical Therapist               Outcome Measures     Row Name 04/07/22 1540          Turning from your back to your side while in flat bed without using bedrails? 4  -LF    Moving from lying on back to sitting on the side of a flat bed without bedrails? 4  -LF    Moving to and from a bed to a chair (including a wheelchair)? 4  -LF    Standing up from a chair using your arms (e.g., wheelchair, bedside chair)? 4  -LF    Climbing 3-5 steps with a railing? 3  -LF    To walk in hospital room? 3  -LF    AM-PAC 6 Clicks Score (PT) 22  -LF    Row Name 04/07/22 0800          How much help from another person do you currently need...    Turning from your back to your side while in flat bed without using bedrails? 4  -ALEM     Moving from lying on back to sitting on the side of a flat bed without bedrails? 4  -ALEM     Moving to and from a bed to a chair (including a wheelchair)? 4  -ALEM     Standing up from a chair using your arms (e.g., wheelchair, bedside chair)? 3  -ALEM     Climbing 3-5 steps with a railing? 3  -ALEM     To walk in hospital room? 3  -ALEM     AM-PAC 6 Clicks Score (PT) 21  -ALEM     Row Name 04/07/22 1540           Outcome Measure Options AM-PAC 6 Clicks Basic Mobility (PT)  -LF          User Key  (r) = Recorded By, (t) = Taken By, (c) = Cosigned By    Initials Name Provider Type     Romy Machado PT Physical Therapist    Myesha French RN Registered Nurse                             Physical Therapy Education                 Title: PT OT SLP Therapies (Done)     Topic: Physical Therapy (Done)     Point: Mobility training (Done)     Learning Progress Summary           Patient Acceptance, E, NR,VU by  at 4/6/2022 1602                   Point: Home exercise program (Done)     Learning Progress Summary           Patient Acceptance, E, NR,VU by  at 4/6/2022 1602                   Point: Body mechanics (Done)     Learning Progress Summary           Patient Acceptance, E, NR,VU by  at 4/6/2022 1602                   Point: Precautions (Done)     Learning Progress Summary           Patient Acceptance, E, NR,VU by  at 4/6/2022 1602                               User Key     Initials Effective Dates Name Provider Type [facility] 06/16/21 -  Chayito De La Cruz PT Physical Therapist PT              PT Recommendation and Plan     Plan of Care Reviewed With: patient, family  Progress: improving  Outcome Evaluation: Patient ambulated 350' CGA, no LOB but mild unsteadiness turning corners.  Fatigued with activity.  PT to cont. to follow and progress activity as tolerated     Time Calculation:    PT Charges     Row Name 04/07/22 1540       Time Calculation    Start Time 1540  -LF    PT Received On 04/07/22  -LF           Timed Charges    19817 - PT Therapeutic Activity Minutes 15  -LF           Total Minutes    Timed Charges Total Minutes 15  -LF     Total Minutes 15  -LF          User Key  (r) = Recorded By, (t) = Taken By, (c) = Cosigned By    Initials Name Provider Type     Romy Machado PT Physical Therapist              Therapy Charges for Today     Code Description Service Date Service  Provider Modifiers Qty    [id number] HC PT THERAPEUTIC ACT EA 15 MIN 4/7/2022 Romy Machado, PT GP 1          PT G-Codes  Outcome Measure Options: AM-PAC 6 Clicks Basic Mobility (PT)  AM-PAC 6 Clicks Score (PT): 22    Romy Machado, PT  4/7/2022      Electronically signed by Romy Machado, PT at 04/07/22 2399

## 2022-04-08 NOTE — ANESTHESIA POSTPROCEDURE EVALUATION
Patient: Filipe Hamilton Jr.    Procedure Summary       Date: 04/08/22 Room / Location:  [facility] /  [facility]    Anesthesia Start: 1613 Anesthesia Stop: 1650    Procedures:       COLONOSCOPY (N/A )      ESOPHAGOGASTRODUODENOSCOPY (N/A ) Diagnosis:       Mesenteric ischemia (HCC)      (Mesenteric ischemia (HCC) [K55.9])    Surgeons: Brunner, Mark I, MD Provider: Todd Packer MD    Anesthesia Type: MAC ASA Status: 3            Anesthesia Type: MAC    Vitals  Vitals Value Taken Time   /81 04/08/22 1646   Temp 97.2 °F (36.2 °C) 04/08/22 1646   Pulse 79 04/08/22 1646   Resp 18 04/08/22 1646   SpO2 98 % 04/08/22 1646           Post Anesthesia Care and Evaluation    Patient location during evaluation: PACU  Patient participation: complete - patient participated  Level of consciousness: awake and alert  Pain management: adequate  Airway patency: patent  Anesthetic complications: No anesthetic complications  PONV Status: none  Cardiovascular status: hemodynamically stable and acceptable  Respiratory status: nonlabored ventilation, acceptable and nasal cannula  Hydration status: acceptable

## 2022-04-08 NOTE — PROGRESS NOTES
[facility] Medicine Services  PROGRESS NOTE    Patient Name: Filipe Hamilton Jr.  : 1934  MRN: [id number]    Date of Admission: 2022  Primary Care Physician: Delilah Tatum MD    Subjective   Subjective     CC:    Follow up possible mesenteric ischemia, blood in stool    HPI:    Up in chair this morning with family at bedside.  Weak and tired this morning because of being up all night long doing bowel prep.      ROS:    Gen- no fevers, no chills, + gen weakness  CV- No chest pain, palpitations  Resp- + cough, no dyspnea  GI- No N/V/D, + abd cramping-better today.        Objective   Objective     Vital Signs:   Temp:  [97.3 °F (36.3 °C)-97.9 °F (36.6 °C)] 97.3 °F (36.3 °C)  Heart Rate:  [] 72  Resp:  [18] 18  BP: (119-162)/() 140/87     Physical Exam:    Constitutional: No acute distress, awake, alert, up in chair, son at bedside.   HENT: NCAT, mucous membranes moist  Respiratory: Coarse breath sounds anteriorly, respiratory effort normal on room air  Cardiovascular: RRR, s1 and s2  Gastrointestinal: Positive bowel sounds, distended, soft abdomen  Musculoskeletal: No bilateral ankle edema  Psychiatric: Appropriate affect, cooperative  Neurologic: Oriented x 3, strength symmetric in all extremities, Cranial Nerves grossly intact to confrontation, speech clear  Skin: No rashes noted to exposed skin     Results Reviewed:  LAB RESULTS:      Lab 22  0436 22  1837 22  1422 22  0532 22  2344 22  2034 22  1449   WBC 11.84*  --   --  17.24*  --   --  24.64*   HEMOGLOBIN 6.9*  --   --  7.2*  --   --  8.5*   HEMATOCRIT 22.9*  --   --  23.5*  --   --  26.7*   PLATELETS 207  --   --  199  --   --  249   NEUTROS ABS 9.95*  --   --  15.57*  --   --  22.86*   IMMATURE GRANS (ABS) 0.48*  --   --  0.16*  --   --  0.24*   LYMPHS ABS 0.88  --   --  0.84  --   --  0.38*   MONOS ABS 0.45  --   --  0.65  --   --  1.13*   EOS ABS 0.06  --   --   0.00  --   --  0.00   MCV 89.1  --   --  89.7  --   --  87.8   PROCALCITONIN  --   --   --   --   --   --  1.66*   LACTATE 0.7 1.2 1.3 0.8 1.3  --  2.0   PROTIME  --   --   --   --   --   --  16.6*   APTT  --   --   --   --   --  35.6  --          Lab 04/08/22  0009 04/07/22 0436 04/06/22  0532 04/05/22 2034 04/05/22  1449   SODIUM  --  140  --   --  135*   POTASSIUM 3.8 3.0*  --   --  3.4*   CHLORIDE  --  109*  --   --  97*   CO2  --  24.0  --   --  22.0   ANION GAP  --  7.0  --   --  16.0*   BUN  --  30*  --   --  55*   CREATININE  --  1.31*  --   --  2.68*   EGFR  --  52.4*  --   --  22.2*   GLUCOSE  --  103*  --   --  148*   CALCIUM  --  7.5*  --   --  7.8*   MAGNESIUM  --  1.6  --   --   --    HEMOGLOBIN A1C  --   --   --  6.50*  --    TSH  --   --  1.410  --   --          Lab 04/07/22 0436 04/05/22  1449   TOTAL PROTEIN 4.5* 5.4*   ALBUMIN 2.20* 2.40*   GLOBULIN 2.3 3.0   ALT (SGPT) 62* 60*   AST (SGOT) 62* 85*   BILIRUBIN 0.3 0.6   ALK PHOS 60 70   LIPASE  --  8*         Lab 04/05/22 2344 04/05/22 2110 04/05/22 2034 04/05/22  1449   TROPONIN T 0.510* 0.574* 0.527* 0.379*   PROTIME  --   --   --  16.6*   INR  --   --   --  1.35*             Lab 04/05/22 2110 04/05/22 2034   ABO TYPING B B   RH TYPING Positive Positive   ANTIBODY SCREEN  --  Negative         Brief Urine Lab Results  (Last result in the past 365 days)      Color   Clarity   Blood   Leuk Est   Nitrite   Protein   CREAT   Urine HCG        04/05/22 1647 Yellow   Clear   Negative   Negative   Negative   Trace                 Microbiology Results Abnormal     Procedure Component Value - Date/Time    Blood Culture - Blood, Hand, Right [[id number]]  (Normal) Collected: 04/05/22 1721    Lab Status: Preliminary result Specimen: Blood from Hand, Right Updated: 04/07/22 2003     Blood Culture No growth at 2 days    Blood Culture - Blood, Arm, Right [[id number]]  (Normal) Collected: 04/05/22 1718    Lab Status: Preliminary result Specimen: Blood from  Arm, Right Updated: 04/07/22 2002     Blood Culture No growth at 2 days    Urine Culture - Urine, Urine, Clean Catch [[id number]]  (Normal) Collected: 04/05/22 1647    Lab Status: Final result Specimen: Urine, Clean Catch Updated: 04/06/22 2338     Urine Culture No growth    COVID PRE-OP / PRE-PROCEDURE SCREENING ORDER (NO ISOLATION) - Swab, Nasopharynx [[id number]]  (Normal) Collected: 04/05/22 1558    Lab Status: Final result Specimen: Swab from Nasopharynx Updated: 04/05/22 1632    Narrative:      The following orders were created for panel order COVID PRE-OP / PRE-PROCEDURE SCREENING ORDER (NO ISOLATION) - Swab, Nasopharynx.  Procedure                               Abnormality         Status                     ---------                               -----------         ------                     COVID-19 and FLU A/B PCR...[[id number]]  Normal              Final result                 Please view results for these tests on the individual orders.    COVID-19 and FLU A/B PCR - Swab, Nasopharynx [[id number]]  (Normal) Collected: 04/05/22 1558    Lab Status: Final result Specimen: Swab from Nasopharynx Updated: 04/05/22 1632     COVID19 Not Detected     Influenza A PCR Not Detected     Influenza B PCR Not Detected    Narrative:      Fact sheet for providers: https://www.fda.gov/media/928526/download    Fact sheet for patients: https://www.fda.gov/media/248962/download    Test performed by PCR.          Adult Transthoracic Echo Complete W/ Cont if Necessary Per Protocol    Result Date: 4/6/2022  · Left ventricular systolic function is normal. Estimated left ventricular EF = 60%. · Left ventricular diastolic function is consistent with (grade I) impaired relaxation. · Estimated right ventricular systolic pressure from tricuspid regurgitation is normal (<35 mmHg).        Results for orders placed during the hospital encounter of 04/05/22    Adult Transthoracic Echo Complete W/ Cont if Necessary Per  Protocol    Interpretation Summary  · Left ventricular systolic function is normal. Estimated left ventricular EF = 60%.  · Left ventricular diastolic function is consistent with (grade I) impaired relaxation.  · Estimated right ventricular systolic pressure from tricuspid regurgitation is normal (<35 mmHg).      I have reviewed the medications:  Scheduled Meds:aspirin, 300 mg, Rectal, Daily  insulin lispro, 0-7 Units, Subcutaneous, TID AC  levothyroxine, 50 mcg, Oral, Daily  midodrine, 5 mg, Oral, TID AC  pantoprazole, 40 mg, Intravenous, Q AM  PEG-KCl-NaCl-NaSulf-Na Asc-C, 1,000 mL, Oral, Once When Specified  piperacillin-tazobactam, 3.375 g, Intravenous, Q8H  pregabalin, 75 mg, Oral, BID  senna-docusate sodium, 2 tablet, Oral, BID  sodium chloride, 10 mL, Intravenous, Q12H      Continuous Infusions:sodium chloride, 75 mL/hr, Last Rate: 75 mL/hr (04/06/22 2246)      PRN Meds:.•  albuterol sulfate HFA  •  senna-docusate sodium **AND** polyethylene glycol **AND** bisacodyl **AND** bisacodyl  •  dextrose  •  dextrose  •  glucagon (human recombinant)  •  magnesium sulfate **OR** magnesium sulfate in D5W 1g/100mL (PREMIX)  •  Morphine  •  ondansetron  •  potassium chloride  •  potassium chloride  •  Sodium Chloride (PF)  •  sodium chloride  •  zolpidem    Assessment/Plan   Assessment & Plan     Active Hospital Problems    Diagnosis  POA   • **Ischemic bowel disease ([facility]) [K55.9]  Yes   • Type 2 myocardial infarction due to anemia ([facility]) [D64.9, I21.A1]  Yes   • KHARI (acute kidney injury) ([facility]) [N17.9]  Yes   • Sepsis with acute renal failure without septic shock, due to unspecified organism, unspecified acute renal failure type ([facility]) [A41.9, R65.20, N17.9]  Yes   • HFrEF with normalized LVEF [I50.22]  Yes   • Type 2 diabetes mellitus with diabetic polyneuropathy, without long-term current use of insulin ([facility]) [E11.42]  Yes   • Coronary artery disease involving native coronary artery of native heart [I25.10]  Yes   •  Hyperlipidemia LDL goal <70 [E78.5]  Yes   • Stage 3b chronic kidney disease (HCC) [N18.32]  Yes      Resolved Hospital Problems   No resolved problems to display.        Brief Hospital Course to date:  Filipe Hamilton Jr. is a 88 y.o. male who presented to the ER with abdominal pain.  On call General Surgery called from ER and recommended hydration and admission.    Sepsis  Leukocytosis  Abd pain  -suspect due to abdominal source- likely mesenteric ischemia   -blood cultures pending  -continue zosyn  - my partner discussed case with general surgery, high risk of morbidity and mortality  -plavix on hold, ASA okay for now.   -aspirin suppository  -IV fluids-NPO for EGD/colonoscopy today   -General Surgery following.     Acute anemia  -hemoglobin dropped to 6.9 4/7 from 8.5 on admission.   -occult stool sent-came back positive.   -GI consulted. Plan for EGD/colonoscopy this afternoon. Prep completed last night.   -s/p 2 units PRBCs 4/7. Hgb improved to 10.5 today.   -s/p bumex 0.5 mg IV x 1 between units (monitor kidney function closely)    Electrolyte disturbances  -improved with replacement per protocol.  -potassium 4.3 today.     History of HTN, hypotensive this admission   -added midodrine with improvement.  -continue to hold home amlodipine and BB for now  -normal EF on Echo  -monitor.      NSTEMI  -seen by Cardiology  -normal EF on Echo  -if no surgery anticipated, cards recommends resuming plavix and reducing aspirin to 81 mg daily. Will await general surgery recommendations concerning resumption of plavix.      KHARI on CKD  -secondary to sepsis, overdiuresis  -creatinine 1.12 today.    -urinary retention protocol     DM with neuropathy  -continue SSI  -glucoses are well controlled.         DVT prophylaxis:  No DVT prophylaxis order currently exists.       AM-PAC 6 Clicks Score (PT): 22 (04/07/22 0958)    Disposition: I expect the patient to be discharged TBD    CODE STATUS:   Code Status and Medical  Interventions:   Ordered at: 04/05/22 2125     Level Of Support Discussed With:    Patient     Code Status (Patient has no pulse and is not breathing):    CPR (Attempt to Resuscitate)     Medical Interventions (Patient has pulse or is breathing):    Full Support       TONY Preciado  04/08/22

## 2022-04-08 NOTE — ANESTHESIA PREPROCEDURE EVALUATION
Anesthesia Evaluation     Patient summary reviewed and Nursing notes reviewed   NPO Solid Status: > 8 hours  NPO Liquid Status: > 8 hours           Airway   Mallampati: II  TM distance: >3 FB  Neck ROM: full  No difficulty expected  Dental - normal exam     Pulmonary     breath sounds clear to auscultation  Cardiovascular   Exercise tolerance: good (4-7 METS)    Rhythm: regular  Rate: normal    (+) CABG >6 Months,       Neuro/Psych  GI/Hepatic/Renal/Endo      Musculoskeletal     Abdominal    Substance History      OB/GYN          Other                        Anesthesia Plan    ASA 3     MAC     intravenous induction     Anesthetic plan, all risks, benefits, and alternatives have been provided, discussed and informed consent has been obtained with: patient.        CODE STATUS:    Level Of Support Discussed With: Patient  Code Status (Patient has no pulse and is not breathing): CPR (Attempt to Resuscitate)  Medical Interventions (Patient has pulse or is breathing): Full Support

## 2022-04-08 NOTE — BRIEF OP NOTE
COLONOSCOPY  Progress Note    Filipe Hamilton .  4/8/2022    Colonoscopy reveals patchy injury to the descending and sigmoid colon characterized by erythema, congestion, exudative ulceration, and submucosal hemorrhage.  Biopsies taken in the descending colon.  A few ulcers have a small amount of attached clot.  Findings consistent with acute colonic ischemia.    EGD is normal.    >> Modify cardiac risk factors.  >> Await pathology.  >> Hold prescription antiplatelet and anticoagulation agents for 1 week.  >> May give low-dose aspirin.    Okay for discharge when tolerating diet.    Mark I. Brunner, MD     Date: 4/8/2022  Time: 16:39 EDT

## 2022-04-09 ENCOUNTER — TELEPHONE (OUTPATIENT)
Dept: INTERNAL MEDICINE | Facility: CLINIC | Age: 87
End: 2022-04-09

## 2022-04-09 NOTE — TELEPHONE ENCOUNTER
Spoke w/ son, states after colonoscopy/EGD on 4/5/22, pt agitated, due to meds from procedures, which is normal for pt. States after procedure GI  came in and stated ok with pt leaving, however the hospitalist didn't agree. Pt got more agitated and since GI  said was ok, he left. States will be at appt this week. Changed appt to 1:15 to give 30 min

## 2022-04-10 LAB
BACTERIA SPEC AEROBE CULT: NORMAL
BACTERIA SPEC AEROBE CULT: NORMAL

## 2022-04-10 NOTE — DISCHARGE SUMMARY
[facility] Medicine Services  ELOPEMENT AGAINST MEDICAL ADVICE    Patient Name: Filipe Hamilton Jr.  : 1934  MRN: [id number]    Date of Admission: 2022  Date of Elopement:  2022 (Friday Night)  Primary Care Physician: Delilah Tatum MD    Consults     Date and Time Order Name Status Description    2022 11:45 AM Inpatient Gastroenterology Consult Completed     2022  6:02 PM Inpatient Cardiology Consult Completed         Demar Oakes MD - General Surgery   Mark I. Brunner, MD - Gastroenterology  Jamey Dony Gonzalez IV, MD - Cardiology    Hospital Course     Presenting Problem:   Sepsis with acute renal failure without septic shock, due to unspecified organism, unspecified acute renal failure type (HCC) [A41.9, R65.20, N17.9]    Active Hospital Problems    Diagnosis  POA   • **Ischemic bowel disease (HCC) [K55.9]  Yes   • Type 2 myocardial infarction due to anemia (HCC) [D64.9, I21.A1]  Yes   • Sepsis with acute renal failure without septic shock, due to unspecified organism, unspecified acute renal failure type (HCC) [A41.9, R65.20, N17.9]  Yes   • HFrEF with normalized LVEF [I50.22]  Yes   • Type 2 diabetes mellitus with diabetic polyneuropathy, without long-term current use of insulin (HCC) [E11.42]  Yes   • Coronary artery disease involving native coronary artery of native heart [I25.10]  Yes   • Hyperlipidemia LDL goal <70 [E78.5]  Yes   • Stage 3b chronic kidney disease (HCC) [N18.32]  Yes      Resolved Hospital Problems    Diagnosis Date Resolved POA   • KHARI (acute kidney injury) (HCC) [N17.9] 2022 Yes          Hospital Course:  Filipe Hamilton Jr. is a 88 y.o. male who presented to the ER on  (Tuesday) with abdominal pain.   He was admitted to the Hospital Medicine Service for concerns about Sepsis POA.   On call General Surgery called from ER and recommended hydration and admission.   Gastroenterology was consulted by Dr. Oakes  of General Surgery due to the patient's clinical picture.  He was seen by Dr. Tio Francois and he recommended an EGD and Colonoscopy with his partner Dr. Mark Brunner the next day after a clear liquid diet and bowel prep.     Sepsis  Leukocytosis  Abd pain  -suspect due to abdominal source- likely mesenteric ischemia   -blood cultures pending  -continue zosyn  - my partner discussed case with general surgery, high risk of morbidity and mortality  -plavix on hold, ASA okay for now.   -aspirin suppository  -IV fluids-NPO for EGD/colonoscopy today   -General Surgery evaluated on admission and referred case to Gastroenterology     Acute anemia  -hemoglobin dropped to 6.9 4/7 from 8.5 on admission.   -occult stool sent-came back positive.   -GI consulted. Plan for EGD/colonoscopy this afternoon. Prep completed  -s/p 2 units PRBCs 4/7. Hgb improved to 10.5 today.   -s/p bumex 0.5 mg IV x 1 between units (monitor kidney function closely)     Electrolyte disturbances  -improved with replacement per protocol.  -potassium 4.3 today.     History of HTN, hypotensive this admission   -added midodrine with improvement.  -continue to hold home amlodipine and BB for now  -normal EF on Echo  -monitor.      NSTEMI  -seen by Cardiology  -normal EF on Echo  -if no surgery anticipated, cards recommends resuming plavix and reducing aspirin to 81 mg daily. Will await general surgery recommendations concerning resumption of plavix.      KHARI on CKD  -secondary to sepsis, overdiuresis  -creatinine 1.12 today.    -urinary retention protocol     DM with neuropathy  -continue SSI  -glucoses are well controlled      Patient had an Upper Endoscopy with Dr. Mark I. Brunner in the afternoon around 4 pm - EGD was reported to be normal     Patient had a Lower Endoscopy with Dr. Mark I. Brunner in the afternoon around 4 pm - Colonoscopy was reported to have findings consistent with Acute Colonic Ischemia    The patient was not seen by me on the day  that he left the hospital.   He was seen independently by an Advanced Practice Provider.  Please see Progress Note from TONY Preciado dated 4/8/2022 (Friday).    Patient appears to have left after 7:45 pm on Friday night (the time is unclear because there is no documentation of patient elopement seen).    **Patient left AMA prior to completion of evaluation and management**      Day of Discharge     HPI:   **Patient left AMA prior to completion of evaluation and management**    Vital Signs:     See Progress Note - TONY Preciado - day of AMA discharge    Physical Exam (if applicable):    Seen by TONY Preciado - Advanced Practice Provider on this Friday - day of leaving hospital    Pending Labs     Order Current Status    Tissue Pathology Exam Collected (04/08/22 1632)    Blood Culture - Blood, Arm, Right Preliminary result    Blood Culture - Blood, Hand, Right Preliminary result        Discharge Details     Discharge Disposition:  **Patient left AMA prior to completion of evaluation and management, therefore discharge planning remains incomplete including absence of any needed discharging medications, testing arrangements or follow up unless otherwise specified**    Future Appointments   Date Time Provider Department Center   4/15/2022  1:15 PM Delilah Tatum MD [facility]   4/19/2022 10:15 AM PAT 1 [facility]   4/24/2022 10:00 AM C19 [facility] PS [facility] C[facility]   1/12/2023 10:00 AM Delilah Tatum MD [facility]       Additional Instructions for the Follow-ups that You Need to Schedule     Ambulatory Referral to Physical Therapy Evaluate and treat; Full weight bearing   As directed      Specialty needed: Evaluate and treat    Weight Bearing Status: Full weight bearing                 Anup Galdamez MD  04/09/22

## 2022-04-12 ENCOUNTER — TRANSITIONAL CARE MANAGEMENT TELEPHONE ENCOUNTER (OUTPATIENT)
Dept: CALL CENTER | Facility: HOSPITAL | Age: 87
End: 2022-04-12

## 2022-04-12 ENCOUNTER — READMISSION MANAGEMENT (OUTPATIENT)
Dept: CALL CENTER | Facility: HOSPITAL | Age: 87
End: 2022-04-12

## 2022-04-12 LAB
CYTO UR: NORMAL
LAB AP CASE REPORT: NORMAL
LAB AP CLINICAL INFORMATION: NORMAL
LAB AP DIAGNOSIS COMMENT: NORMAL
PATH REPORT.FINAL DX SPEC: NORMAL
PATH REPORT.GROSS SPEC: NORMAL

## 2022-04-12 NOTE — OUTREACH NOTE
Prep Survey    Flowsheet Row Responses   [facility] patient discharged from? [place]   Is LACE score < 7 ? No   Emergency Room discharge w/ pulse ox? No   Eligibility [facility]   Date of Admission 04/05/22   Date of Discharge 04/08/22   Discharge Disposition Home or Self Care   Discharge diagnosis Sepsis with acute renal failure    Does the patient have one of the following disease processes/diagnoses(primary or secondary)? Sepsis   Does the patient have Home health ordered? No   Is there a DME ordered? No   Comments regarding appointments outpatient physical therapy per [facility] in [place].   Prep survey completed? Yes          AMRIT CAMPBELL - Registered Nurse

## 2022-04-12 NOTE — OUTREACH NOTE
Call Center TCM Note    Flowsheet Row Responses   [facility] patient discharged from? [place]   Does the patient have one of the following disease processes/diagnoses(primary or secondary)? Sepsis   TCM attempt successful? Yes   Call start time [id number]   Call end time 1736   Discharge diagnosis Sepsis with acute renal failure    Person spoke with today (if not patient) and relationship caregiver   What is the patient's perception of their health status since discharge? Improving   Additional teach back comments No information given due to caregiver not listed on verbal release.  Caregiver offered information that pt is doing fine and no issues with his colon and he is keeping him well  hydrated.  Has follow up Friday with PCP.    TCM call completed? Yes          Betty Alcala LPN    4/12/2022, 17:39 EDT

## 2022-04-14 PROBLEM — I25.5 ISCHEMIC CARDIOMYOPATHY: Chronic | Status: ACTIVE | Noted: 2022-04-06

## 2022-04-14 PROBLEM — K55.9 ISCHEMIC BOWEL DISEASE (HCC): Chronic | Status: ACTIVE | Noted: 2022-04-05

## 2022-04-14 PROBLEM — E11.42 DIABETIC PERIPHERAL NEUROPATHY (HCC): Chronic | Status: ACTIVE | Noted: 2022-01-08

## 2022-04-15 ENCOUNTER — OFFICE VISIT (OUTPATIENT)
Dept: INTERNAL MEDICINE | Facility: CLINIC | Age: 87
End: 2022-04-15

## 2022-04-15 ENCOUNTER — LAB (OUTPATIENT)
Dept: LAB | Facility: HOSPITAL | Age: 87
End: 2022-04-15

## 2022-04-15 VITALS
RESPIRATION RATE: 18 BRPM | BODY MASS INDEX: 27.31 KG/M2 | HEIGHT: 67 IN | SYSTOLIC BLOOD PRESSURE: 150 MMHG | TEMPERATURE: 96.6 F | WEIGHT: 174 LBS | DIASTOLIC BLOOD PRESSURE: 60 MMHG | HEART RATE: 65 BPM | OXYGEN SATURATION: 98 %

## 2022-04-15 DIAGNOSIS — K55.9 ISCHEMIC BOWEL DISEASE: Primary | ICD-10-CM

## 2022-04-15 DIAGNOSIS — D64.9 NORMOCYTIC ANEMIA: Chronic | ICD-10-CM

## 2022-04-15 DIAGNOSIS — R65.20 SEPSIS WITH ACUTE RENAL FAILURE WITHOUT SEPTIC SHOCK, DUE TO UNSPECIFIED ORGANISM, UNSPECIFIED ACUTE RENAL FAILURE TYPE: ICD-10-CM

## 2022-04-15 DIAGNOSIS — J40 BRONCHITIS: ICD-10-CM

## 2022-04-15 DIAGNOSIS — N17.9 SEPSIS WITH ACUTE RENAL FAILURE WITHOUT SEPTIC SHOCK, DUE TO UNSPECIFIED ORGANISM, UNSPECIFIED ACUTE RENAL FAILURE TYPE: ICD-10-CM

## 2022-04-15 DIAGNOSIS — A41.9 SEPSIS WITH ACUTE RENAL FAILURE WITHOUT SEPTIC SHOCK, DUE TO UNSPECIFIED ORGANISM, UNSPECIFIED ACUTE RENAL FAILURE TYPE: ICD-10-CM

## 2022-04-15 DIAGNOSIS — M10.042 ACUTE IDIOPATHIC GOUT OF LEFT HAND: Chronic | ICD-10-CM

## 2022-04-15 DIAGNOSIS — F51.01 PRIMARY INSOMNIA: ICD-10-CM

## 2022-04-15 DIAGNOSIS — N18.32 STAGE 3B CHRONIC KIDNEY DISEASE: Chronic | ICD-10-CM

## 2022-04-15 DIAGNOSIS — R79.89 ELEVATED LFTS: ICD-10-CM

## 2022-04-15 DIAGNOSIS — N18.32 STAGE 3B CHRONIC KIDNEY DISEASE: ICD-10-CM

## 2022-04-15 DIAGNOSIS — I73.9 PERIPHERAL ARTERIAL DISEASE: Chronic | ICD-10-CM

## 2022-04-15 DIAGNOSIS — E87.6 HYPOKALEMIA: ICD-10-CM

## 2022-04-15 DIAGNOSIS — E83.42 HYPOMAGNESEMIA: ICD-10-CM

## 2022-04-15 DIAGNOSIS — E11.42 TYPE 2 DIABETES MELLITUS WITH DIABETIC POLYNEUROPATHY, WITHOUT LONG-TERM CURRENT USE OF INSULIN: Chronic | ICD-10-CM

## 2022-04-15 DIAGNOSIS — I10 ESSENTIAL HYPERTENSION: Chronic | ICD-10-CM

## 2022-04-15 DIAGNOSIS — K55.9 ISCHEMIC BOWEL DISEASE: ICD-10-CM

## 2022-04-15 DIAGNOSIS — I21.A1: ICD-10-CM

## 2022-04-15 DIAGNOSIS — D64.9: ICD-10-CM

## 2022-04-15 DIAGNOSIS — E03.9 ACQUIRED HYPOTHYROIDISM: Chronic | ICD-10-CM

## 2022-04-15 LAB
BASOPHILS # BLD AUTO: 0.02 10*3/MM3 (ref 0–0.2)
BASOPHILS NFR BLD AUTO: 0.5 % (ref 0–1.5)
D-LACTATE SERPL-SCNC: 0.8 MMOL/L (ref 0.5–2)
DEPRECATED RDW RBC AUTO: 40.7 FL (ref 37–54)
EOSINOPHIL # BLD AUTO: 0.02 10*3/MM3 (ref 0–0.4)
EOSINOPHIL NFR BLD AUTO: 0.5 % (ref 0.3–6.2)
ERYTHROCYTE [DISTWIDTH] IN BLOOD BY AUTOMATED COUNT: 13.1 % (ref 12.3–15.4)
HCT VFR BLD AUTO: 34.3 % (ref 37.5–51)
HGB BLD-MCNC: 10.6 G/DL (ref 13–17.7)
IMM GRANULOCYTES # BLD AUTO: 0.04 10*3/MM3 (ref 0–0.05)
IMM GRANULOCYTES NFR BLD AUTO: 0.9 % (ref 0–0.5)
LYMPHOCYTES # BLD AUTO: 0.88 10*3/MM3 (ref 0.7–3.1)
LYMPHOCYTES NFR BLD AUTO: 20.6 % (ref 19.6–45.3)
MCH RBC QN AUTO: 26.9 PG (ref 26.6–33)
MCHC RBC AUTO-ENTMCNC: 30.9 G/DL (ref 31.5–35.7)
MCV RBC AUTO: 87.1 FL (ref 79–97)
MONOCYTES # BLD AUTO: 0.34 10*3/MM3 (ref 0.1–0.9)
MONOCYTES NFR BLD AUTO: 8 % (ref 5–12)
NEUTROPHILS NFR BLD AUTO: 2.97 10*3/MM3 (ref 1.7–7)
NEUTROPHILS NFR BLD AUTO: 69.5 % (ref 42.7–76)
NRBC BLD AUTO-RTO: 0.2 /100 WBC (ref 0–0.2)
PLATELET # BLD AUTO: 187 10*3/MM3 (ref 140–450)
PMV BLD AUTO: 9.9 FL (ref 6–12)
RBC # BLD AUTO: 3.94 10*6/MM3 (ref 4.14–5.8)
WBC NRBC COR # BLD: 4.27 10*3/MM3 (ref 3.4–10.8)

## 2022-04-15 PROCEDURE — 36415 COLL VENOUS BLD VENIPUNCTURE: CPT

## 2022-04-15 PROCEDURE — 99496 TRANSJ CARE MGMT HIGH F2F 7D: CPT | Performed by: INTERNAL MEDICINE

## 2022-04-15 PROCEDURE — 83605 ASSAY OF LACTIC ACID: CPT

## 2022-04-15 PROCEDURE — 84550 ASSAY OF BLOOD/URIC ACID: CPT

## 2022-04-15 PROCEDURE — 85025 COMPLETE CBC W/AUTO DIFF WBC: CPT

## 2022-04-15 PROCEDURE — 1111F DSCHRG MED/CURRENT MED MERGE: CPT | Performed by: INTERNAL MEDICINE

## 2022-04-15 PROCEDURE — 84484 ASSAY OF TROPONIN QUANT: CPT

## 2022-04-15 PROCEDURE — 83036 HEMOGLOBIN GLYCOSYLATED A1C: CPT

## 2022-04-15 PROCEDURE — 83735 ASSAY OF MAGNESIUM: CPT

## 2022-04-15 PROCEDURE — 80053 COMPREHEN METABOLIC PANEL: CPT

## 2022-04-15 RX ORDER — ZOLPIDEM TARTRATE 10 MG/1
10 TABLET ORAL NIGHTLY
Qty: 90 TABLET | Refills: 0 | Status: SHIPPED | OUTPATIENT
Start: 2022-04-15 | End: 2022-07-27 | Stop reason: SDUPTHER

## 2022-04-15 RX ORDER — LEVOTHYROXINE SODIUM 0.05 MG/1
50 TABLET ORAL DAILY
Qty: 90 TABLET | Refills: 2 | Status: SHIPPED | OUTPATIENT
Start: 2022-04-15 | End: 2023-01-11 | Stop reason: SDUPTHER

## 2022-04-15 RX ORDER — PREGABALIN 75 MG/1
75 CAPSULE ORAL 2 TIMES DAILY
Qty: 180 CAPSULE | Refills: 0 | Status: SHIPPED | OUTPATIENT
Start: 2022-04-15 | End: 2022-07-27 | Stop reason: SDUPTHER

## 2022-04-15 NOTE — ASSESSMENT & PLAN NOTE
Recent worsened anemia x 2 wks, attribute to colonic ulcers due to ischemic colitis; s/p 2u prBCs during hospitalization; needs f/u CBC for serial examination

## 2022-04-15 NOTE — ASSESSMENT & PLAN NOTE
Recent A1C 6.5; no current DM meds    For peripheral neuropathy, cont'd judicious and appropriate use of lyrica 75mg BID# 180, 0RF (exception made due to insurance). LETHA was reviewed and appropriate.  The patient has read and signed the [facility] Controlled Substance Contract 7/22/21. UDS 10/11/21. Patient has been counseled and is aware of side effects, risks, potential for addiction/tolerance, interactions, and how to take medication correctly.  RTC 3 mos for next RX with contract    Patient verbalizes understanding of additional risks of this medication in individuals > 65 yrs of age and wants to continue lyrica 75mg BID and zolpidem 10mg QHS; Also discussed potential contribution of med towards hypotension perez in light of recent sepsis/colonic ischemic/anemia, but patient insists on continuation of medications.

## 2022-04-15 NOTE — ASSESSMENT & PLAN NOTE
Wife insists that he does not have any refills for levothyroxine despite Rx in 1/22 for #90,3RF; will escribe levothyroxine 50 mcg QD #90, 2RF to [facility]

## 2022-04-15 NOTE — ASSESSMENT & PLAN NOTE
Due to ischemic colitis with hypotension, NSTEMI, acute on chronic renal failure, treated with zosyn during hospitalization; WBC trended to normal range prior to elopement AMA; has been afebrile; f/u CBC

## 2022-04-15 NOTE — ASSESSMENT & PLAN NOTE
Judicious and appropriate use of zolpidem 10mg QHS #30, 2RF). LETHA was reviewed and appropriate.  The patient has read and signed the [facility] Controlled Substance Contract 7/22/21. UDS 10/11/21.  Patient has been counseled and is aware of side effects, risks, potential for addiction/tolerance, interactions, and how to take medication correctly. RTC 3 mos for next RX with LETHA    Patient verbalizes understanding of additional risks of this medication in individuals > 65 yrs of age and wants to continue zolpidem 10mg QHS and lyrica 75mg BID with additional risks of falls, decreased mental clarity, confusion. Also discussed potential contribution of these meds towards hypotension perez in light of recent sepsis/colonic ischemic/anemia, but patient insists on continuation of medication.

## 2022-04-15 NOTE — ASSESSMENT & PLAN NOTE
BP meds held during hospitalization due to sepsis/hypotension; midodrine was given; BPs normalized since going home and he is back on his home regimen of medication

## 2022-04-15 NOTE — PROGRESS NOTES
TRANSITIONAL CARE HOSPITAL FOLLOW-UP VISIT    Hospitalized at:    [x]   [facility]  []   [place]  []     []   Other -    Outside records reviewed. Pertinent radiology and labs reviewed  []   Records requested    Dates of hospitalization:  Admission - 4/5/22        Discharge - left AMA 4/8/22   If transferred to rehab facility, date of discharge : n/a    Hospital Course: presented to the hospital with complaints of abd pain with n/v/d. Evaluation showed poss internal hernia and bowel inflammation as well as RML/RLL bronchitis on CT. Zosyn RX'd for sepsis with WBC 24.64. Surgery was consulted and recommended conservative care and hydration for poss mesenteric ischemia due to significant co-morbidities. Diet was advanced to clear liquids with decreased abd pain. Plavix was held just in case emergent surgery was indicated.    ECHO was performed for worsened SOB, and showed EF 60%. BP meds were held due to hypotension, attributed to sepsis and KHARI. Cardiology was consulted 4/6/22 pre-operatively and RX'd ASA 300mg RI. Midodrine was added for hypotension.    Had mild hypokalemia (K3.4) and worsening anemia (H&H 8.5/26.7) at presentation. Anemia worsened and he was given 2u pRBCs for 4/7/22 Hypokalemia was addressed with K and Mg supplementation.    GI was consulted 4/7/22 and performed EGD/colonoscopy. Colonoscopy showed ulceration, congestion, and erythema c/w acute colonic ischemia; EGD was normal.  Recommended holding antiplt agent for 1wk (ok for ASA 81mg QD) and ok for discharge when tolerating diet.    Patient was subsequently left the hospital without notifying staff.      Lab/Radiology Results:   4/5/22 CT abd/pelvis - mild new peribronchial thickening in RML/RLL sugg of bronchitis; no pneumonia; splenic granulomas, mild fatty liver; inflammatory fat stranding around splenic flexure of colon with focal small bowel mesenteric edema; poss low level obstruction or small internal hernia at distal colon and adjacent  loop of small bowel; heavy atherosclerotic calcification in abd aorta, sugg of high degree of stenosis    4/5/22 pCXR - nl    4/5/22 CBC with WBC 24.64, 92% neutrophils, decr'd H&H 8.5/26.7; CMP with Cr 2.68, GFR, K 3.4, bord LFTs (AST 85, ALT 60), nl lipase, lactate, neg COVID19, neg flu A/B, elevated procalcitonin 1.66, elevated troponin 0.379 and 0.527 and 0.574, worsened A1C 6.5    4/5/22 ucx - no growth  bcxs x2 - no growth    4/6/22 ECHO - EF 60%, grade I diastolic dysfunction, tr MR/TR    4/6/22 CBC with WBC 17.24, worsened H&H 7.2/23.5, stable lactate, TSH, elevated CPK 1,195    4/7/22 WBC 11.84, worsened H&H 6.9/22.9; CMP with improved Cr 1.31, GFR 52.4, abnl K 3.0, AST 62, ALT 62; Mg 1.6    4/7/22 (+) occult blood    4/8/22 CBC with WBC 8.05, H&H 10.5/33.4 (post-transfusion); BMP with Cr 1.12, GFR 63.2    4/8/22 colon bx path - ulceration with background proprial fibrosis and regenerative change, c/w ischemic colitis    New Medications or Medication Changes: LEFT AMA and didn't tell anyone     Current outpatient and discharge medications have been reconciled for the patient.  Reviewed by: Delilah Tatum MD      Status:  better    Current symptoms:   · No further abd pain, nausea, or vomiting  · Diarrhea, described as clear jelly; no brown/yellow stool, no black stool  · Decreased appetite but eating regularly, small portions  · Has mild cough, but chronic; no SOB or CP    Post discharge concerns/issues: Right elbow pain - is scheduled for surgery upcoming    Treatment plan   Med Changes:  •  reviewed and updated with patient      •  was not required at this time     Referrals: none    Risk for Readmission (LACE) Score: 12 (4/8/2022  6:01 AM)      Follow-up appointments: none    Discussed with: patient and spouse, Pat  ___________________________________________________________  Chief Complaint   Patient presents with   • Hospital Follow Up Visit     Ischemic colitis       History of Present Illness  88 y.o.  " gentleman, accompanied by his wife, presents for hospital follow-up after \"elopement AMA\" after dx of ischemic colitis causing severe pain. Hospitalization also complicated by significant anemia, hypotension, NSTEMI attributed to anemia, acute renal failure, and hypokalemia.     Patient received 2u pRBCs. EGD/colonosc performed showed ulceration c/w ischemic colitis.     Since leaving the hospital, patient has had no further abd pain.  Reports decreased appetite but has not been having much nausea or vomiting.  Is able to eat regular food, just in small portions.  Reports diarrhea of \"cleared gel\".  Denies any brown stool, yellow stool, or black stool.  Denies any fevers.  Denies any bright red blood from the rectum.    Does not feel that he has a respiratory infection.  Reports only a slight cough without wheezing or shortness of breath.  Again has not had any fevers or any other respiratory symptoms.  Patient restarted himself on all his heart failure medications.  States that he is having some fluid retention likely due to not having the medication for several days.  Feels some abdominal distention, which she attributes to fluid retention.    Continues to have significant right elbow pain and is anticipating upcoming surgery at the end of this month with Dr. Granados.    Review of Systems  ROS significant for no further abdominal pain as well as no nausea or vomiting.  Does not have any numbness tingling or weakness in the legs.  Complains of right elbow pain with impending surgery.  Denies any chest pain, palpitation, shortness of breath.  Has minimal cough.  Denies any fevers or chills.  Denies any urinary symptoms.    Has chronic insomnia and chronic neuropathy, requesting zolpidem and Lyrica prescriptions respectively.  All other ROS reviewed and negative.    Current Outpatient Medications:   •  albuterol sulfate  (90 Base) MCG/ACT inhaler prn  •  aspirin 81 MG QD  •  atorvastatin (LIPITOR) 40 " "MG QD  •  bumetanide (BUMEX) 1 MG QD  •  cholecalciferol (VITAMIN D3) 10 MCG (400 UNIT) QD  •  clopidogrel (PLAVIX) 75 MG QD  •  fluocinonide (LIDEX) 0.05 % external solution AD  •  guaiFENesin (MUCINEX) 600 MG 12 hr prn  •  ketoconazole (NIZORAL) 2 % shampoo AD  •  levothyroxine 50 MCG QD 3  •  loratadine (Claritin) 5 MG chewable QD  •  metoprolol succinate XL (TOPROL-XL) 50 MG QD   •  GLUCOSAMINE CHONDROITIN ADV QD  •  omeprazole (priLOSEC) 20 MG QD  •  potassium chloride 10 MEQ QD  •  pregabalin (Lyrica) 75 MG BID  •  sacubitril-valsartan (ENTRESTO)  MG BID  •  Spiriva Respimat 2.5 MCG/ACT aerosol 2 inh QD  •  spironolactone (ALDACTONE) 12.5 MG QD  •  tamsulosin (FLOMAX) 0.4 MG QHS  •  zolpidem (AMBIEN) 10 MG QHS    VITALS:  /60 (BP Location: Left arm, Patient Position: Sitting, Cuff Size: Adult)   Pulse 65   Temp 96.6 °F (35.9 °C) (Tympanic)   Resp 18   Ht 170.2 cm (67.01\")   Wt 78.9 kg (174 lb)   SpO2 98%   BMI 27.25 kg/m²     Physical Exam  Vitals and nursing note reviewed.   Constitutional:       General: He is not in acute distress.     Appearance: Normal appearance. He is not ill-appearing.   Eyes:      Extraocular Movements: Extraocular movements intact.      Conjunctiva/sclera: Conjunctivae normal.      Comments: Wearing glasses   Cardiovascular:      Rate and Rhythm: Normal rate and regular rhythm.   Pulmonary:      Effort: Pulmonary effort is normal. No respiratory distress.      Breath sounds: No wheezing, rhonchi or rales.   Abdominal:      General: There is distension (Mild).      Tenderness: There is no abdominal tenderness. There is no guarding.   Neurological:      Mental Status: He is alert and oriented to person, place, and time. Mental status is at baseline.      Gait: Gait normal.   Psychiatric:         Mood and Affect: Mood normal.         Behavior: Behavior normal.         LABS  3/25/22 WBC 20.15, H&H 10.7/34.2    1/5/22 Cr 1.51, GFR, K 4.6, 57; nl LFTs; A1C " 6.33    ASSESSMENT/PLAN  Diagnoses and all orders for this visit:    1. Ischemic bowel disease (HCC) (Primary)  Assessment & Plan:  Etiology of acute severe abd pain, s/p EGD/colonosc while hospitalized; clopidogrel was held during hospitalization - ok for ASA 81mg QD per GI; needs f/u CBC perez since he received 2u pRBCs during admission; patient has since resumed clopidogrel 75 mg QD with no further abdominal pain, no melena or hematochezia; follow-up CBC and lactic acid    Orders:  -     Lactic Acid, Plasma; Future    2. Type 2 myocardial infarction due to anemia ([facility])  Assessment & Plan:  S/p cards consultation during hospitalization; f/u troponin      3. Sepsis with acute renal failure without septic shock, due to unspecified organism, unspecified acute renal failure type (HCC)  Assessment & Plan:  Due to ischemic colitis with hypotension, NSTEMI, acute on chronic renal failure, treated with zosyn during hospitalization; WBC trended to normal range prior to elopement AMA; has been afebrile; f/u CBC    Orders:  -     Troponin; Future    4. Stage 3b chronic kidney disease ([facility])  Assessment & Plan:  Acute on chronic renal failure attributed to sepsis, dehydration, ischemic colon, resolved by the time he left the hospital; needs f/u renal function    Orders:  -     CBC & Differential; Future    5. Normocytic anemia  Assessment & Plan:  Recent worsened anemia x 2 wks, attribute to colonic ulcers due to ischemic colitis; s/p 2u prBCs during hospitalization; needs f/u CBC for serial examination      6. Type 2 diabetes mellitus with diabetic polyneuropathy, without long-term current use of insulin ([facility])  Assessment & Plan:  Recent A1C 6.5; no current DM meds    For peripheral neuropathy, cont'd judicious and appropriate use of lyrica 75mg BID# 180, 0RF (exception made due to insurance). LETHA was reviewed and appropriate.  The patient has read and signed the [facility] Controlled Substance Contract 7/22/21. S  10/11/21. Patient has been counseled and is aware of side effects, risks, potential for addiction/tolerance, interactions, and how to take medication correctly.  RTC 3 mos for next RX with contract    Patient verbalizes understanding of additional risks of this medication in individuals > 65 yrs of age and wants to continue lyrica 75mg BID and zolpidem 10mg QHS; Also discussed potential contribution of med towards hypotension perez in light of recent sepsis/colonic ischemic/anemia, but patient insists on continuation of medications.      Orders:  -     pregabalin (Lyrica) 75 MG capsule; Take 1 capsule by mouth 2 (Two) Times a Day.  Dispense: 180 capsule; Refill: 0    7. Essential hypertension  Assessment & Plan:  BP meds held during hospitalization due to sepsis/hypotension; midodrine was given; BPs normalized since going home and he is back on his home regimen of medication      8. Hypokalemia  Comments:  f/u BMP and Mg  Orders:  -     CBC & Differential; Future    9. Hypomagnesemia  Comments:  f/u BMP and Mg  Orders:  -     Magnesium; Future    10. Elevated LFTs  Comments:  needs f/u LFTs  Orders:  -     Comprehensive Metabolic Panel; Future    11. Insomnia  Assessment & Plan:  Judicious and appropriate use of zolpidem 10mg QHS #30, 2RF). LETHA was reviewed and appropriate.  The patient has read and signed the [facility] Controlled Substance Contract 7/22/21. UDS 10/11/21.  Patient has been counseled and is aware of side effects, risks, potential for addiction/tolerance, interactions, and how to take medication correctly. RTC 3 mos for next RX with LETHA    Patient verbalizes understanding of additional risks of this medication in individuals > 65 yrs of age and wants to continue zolpidem 10mg QHS and lyrica 75mg BID with additional risks of falls, decreased mental clarity, confusion. Also discussed potential contribution of these meds towards hypotension perez in light of recent sepsis/colonic ischemic/anemia,  but patient insists on continuation of medication.      Orders:  -     zolpidem (AMBIEN) 10 MG tablet; Take 1 tablet by mouth Every Night.  Dispense: 90 tablet; Refill: 0    12. Bronchitis  Comments:  RML/RLL bronchitis by CT scan; COVID19 neg; s/p 3-4d zosyn, Rx'd for sepsis; resp status stable and symptomatic only with minimal cough    13. Peripheral arterial disease (HCC)  Assessment & Plan:  Severe aortic and iliac calcifications on CT scan; clopidogrel held due to GI bleed assoc'd with ischemic colitis; states he does not have any symptoms in his legs currently      14. Hypothyroidism  Assessment & Plan:  Wife insists that he does not have any refills for levothyroxine despite Rx in 1/22 for #90,3RF; will escribe levothyroxine 50 mcg QD #90, 2RF to [facility]    Orders:  -     levothyroxine (SYNTHROID, LEVOTHROID) 50 MCG tablet; Take 1 tablet by mouth Daily.  Dispense: 90 tablet; Refill: 2      FOLLOW-UP  1. Health maintenance - COVID19 vacc done, incl 3rd dose Pfizer; rec 4th dose COVID19 booster per recent CDC updated guidelines  2.  RTC 3 mos for next zolpidem/lyrica RX with A1C    Electronically signed by:    Delilah Tatum MD, FACP  04/15/2022

## 2022-04-15 NOTE — ASSESSMENT & PLAN NOTE
Etiology of acute severe abd pain, s/p EGD/colonosc while hospitalized; clopidogrel was held during hospitalization - ok for ASA 81mg QD per GI; needs f/u CBC perez since he received 2u pRBCs during admission; patient has since resumed clopidogrel 75 mg QD with no further abdominal pain, no melena or hematochezia; follow-up CBC and lactic acid

## 2022-04-15 NOTE — ASSESSMENT & PLAN NOTE
Acute on chronic renal failure attributed to sepsis, dehydration, ischemic colon, resolved by the time he left the hospital; needs f/u renal function

## 2022-04-15 NOTE — ASSESSMENT & PLAN NOTE
Severe aortic and iliac calcifications on CT scan; clopidogrel held due to GI bleed assoc'd with ischemic colitis; states he does not have any symptoms in his legs currently

## 2022-04-16 LAB
ALBUMIN SERPL-MCNC: 3.4 G/DL (ref 3.5–5.2)
ALBUMIN/GLOB SERPL: 1.3 G/DL
ALP SERPL-CCNC: 65 U/L (ref 39–117)
ALT SERPL W P-5'-P-CCNC: 14 U/L (ref 1–41)
ANION GAP SERPL CALCULATED.3IONS-SCNC: 14 MMOL/L (ref 5–15)
AST SERPL-CCNC: 19 U/L (ref 1–40)
BILIRUB SERPL-MCNC: 0.3 MG/DL (ref 0–1.2)
BUN SERPL-MCNC: 11 MG/DL (ref 8–23)
BUN/CREAT SERPL: 11.1 (ref 7–25)
CALCIUM SPEC-SCNC: 8.5 MG/DL (ref 8.6–10.5)
CHLORIDE SERPL-SCNC: 103 MMOL/L (ref 98–107)
CO2 SERPL-SCNC: 26 MMOL/L (ref 22–29)
CREAT SERPL-MCNC: 0.99 MG/DL (ref 0.76–1.27)
EGFRCR SERPLBLD CKD-EPI 2021: 73.3 ML/MIN/1.73
GLOBULIN UR ELPH-MCNC: 2.7 GM/DL
GLUCOSE SERPL-MCNC: 116 MG/DL (ref 65–99)
HBA1C MFR BLD: 6.4 % (ref 4.8–5.6)
MAGNESIUM SERPL-MCNC: 1.5 MG/DL (ref 1.6–2.4)
POTASSIUM SERPL-SCNC: 3.5 MMOL/L (ref 3.5–5.2)
PROT SERPL-MCNC: 6.1 G/DL (ref 6–8.5)
SODIUM SERPL-SCNC: 143 MMOL/L (ref 136–145)
TROPONIN T SERPL-MCNC: 0.04 NG/ML (ref 0–0.03)
URATE SERPL-MCNC: 7.3 MG/DL (ref 3.4–7)

## 2022-04-17 DIAGNOSIS — D64.9 NORMOCYTIC ANEMIA: ICD-10-CM

## 2022-04-17 DIAGNOSIS — E83.42 HYPOMAGNESEMIA: Primary | ICD-10-CM

## 2022-04-17 DIAGNOSIS — N18.32 STAGE 3B CHRONIC KIDNEY DISEASE: ICD-10-CM

## 2022-04-17 RX ORDER — MAGNESIUM OXIDE 400 MG/1
400 TABLET ORAL 2 TIMES DAILY
Qty: 60 TABLET | Refills: 5
Start: 2022-04-17 | End: 2023-01-12

## 2022-04-19 ENCOUNTER — HOSPITAL ENCOUNTER (OUTPATIENT)
Dept: GENERAL RADIOLOGY | Facility: HOSPITAL | Age: 87
Discharge: HOME | End: 2022-04-19

## 2022-04-19 ENCOUNTER — PRE-ADMISSION TESTING (OUTPATIENT)
Dept: PREADMISSION TESTING | Facility: HOSPITAL | Age: 87
End: 2022-04-19

## 2022-04-19 ENCOUNTER — TELEPHONE (OUTPATIENT)
Dept: INTERNAL MEDICINE | Facility: CLINIC | Age: 87
End: 2022-04-19

## 2022-04-19 VITALS — HEIGHT: 67 IN | WEIGHT: 173.94 LBS | BODY MASS INDEX: 27.3 KG/M2

## 2022-04-19 LAB
ANION GAP SERPL CALCULATED.3IONS-SCNC: 9 MMOL/L (ref 5–15)
BUN SERPL-MCNC: 11 MG/DL (ref 8–23)
BUN/CREAT SERPL: 12 (ref 7–25)
CALCIUM SPEC-SCNC: 7.8 MG/DL (ref 8.6–10.5)
CHLORIDE SERPL-SCNC: 105 MMOL/L (ref 98–107)
CO2 SERPL-SCNC: 28 MMOL/L (ref 22–29)
CREAT SERPL-MCNC: 0.92 MG/DL (ref 0.76–1.27)
DEPRECATED RDW RBC AUTO: 46.4 FL (ref 37–54)
EGFRCR SERPLBLD CKD-EPI 2021: 80 ML/MIN/1.73
ERYTHROCYTE [DISTWIDTH] IN BLOOD BY AUTOMATED COUNT: 14.1 % (ref 12.3–15.4)
GLUCOSE SERPL-MCNC: 159 MG/DL (ref 65–99)
HCT VFR BLD AUTO: 33 % (ref 37.5–51)
HGB BLD-MCNC: 10.1 G/DL (ref 13–17.7)
MCH RBC QN AUTO: 27.9 PG (ref 26.6–33)
MCHC RBC AUTO-ENTMCNC: 30.6 G/DL (ref 31.5–35.7)
MCV RBC AUTO: 91.2 FL (ref 79–97)
PLATELET # BLD AUTO: 152 10*3/MM3 (ref 140–450)
PMV BLD AUTO: 9.6 FL (ref 6–12)
POTASSIUM SERPL-SCNC: 3.7 MMOL/L (ref 3.5–5.2)
RBC # BLD AUTO: 3.62 10*6/MM3 (ref 4.14–5.8)
SODIUM SERPL-SCNC: 142 MMOL/L (ref 136–145)
WBC NRBC COR # BLD: 3.49 10*3/MM3 (ref 3.4–10.8)

## 2022-04-19 PROCEDURE — 71046 X-RAY EXAM CHEST 2 VIEWS: CPT

## 2022-04-19 PROCEDURE — 36415 COLL VENOUS BLD VENIPUNCTURE: CPT

## 2022-04-19 PROCEDURE — 80048 BASIC METABOLIC PNL TOTAL CA: CPT

## 2022-04-19 PROCEDURE — 85027 COMPLETE CBC AUTOMATED: CPT

## 2022-04-19 NOTE — PAT
An arrival time for procedure was not given during PAT visit. If patient had any questions or concerns about their arrival time, they were instructed to contact their surgeon/physician.  Additionally, if the patient referred to an arrival time that was acquired from their my chart account, patient was encouraged to verify that time with their surgeon/physician.  NO arrival times given in Pre Admission Testing Department.    Patient instructed to drink 20 ounces (or until full) of Gatorade and it needs to be completed 1 hour (for Main OR patients) or 2 hours (scheduled  section patients) before given arrival time for procedure (NO RED Gatorade)    Patient verbalized understanding.    Patient to apply Chlorhexadine wipes  to surgical area (as instructed) the night before procedure and the AM of procedure. Wipes provided.    Patient directed to Radiology Department for CXR after Pre Admission Testing Appointment.     EKG ON CHART FROM 22. ECHO IN Epic FROM 22. CARDIAC RISK ASSESSMENT ON CHART FROM DR. CANSECO FROM 22. PT TO STOP PLAVIX 5 DAYS PRIOR TO SURGERY PER INSTRUCTIONS FROM DR. PATRICK. PT AND SON AWARE AND VERBALIZED UNDERSTANDING.

## 2022-04-19 NOTE — TELEPHONE ENCOUNTER
Pt and patient's son, Josue, notified of lab results and recommendations and when to come back for repeat labs. Labs are ordered already.

## 2022-04-19 NOTE — TELEPHONE ENCOUNTER
----- Message from Delilah Tatum MD sent at 4/17/2022  6:36 PM EDT -----  Blood counts show improvement in anemia; kidney function back to normal.  Gout test is improved.   Magnesium is low and potassium is at lower end of normal; rec OTC mag oxide 400mg BID.  F/u BMP, Mg, and CBC in 1 month (nonfasting)

## 2022-04-24 ENCOUNTER — CLINICAL SUPPORT NO REQUIREMENTS (OUTPATIENT)
Dept: PREADMISSION TESTING | Facility: HOSPITAL | Age: 87
End: 2022-04-24

## 2022-04-24 LAB — SARS-COV-2 RNA PNL SPEC NAA+PROBE: NOT DETECTED

## 2022-04-24 PROCEDURE — U0004 COV-19 TEST NON-CDC HGH THRU: HCPCS

## 2022-04-24 PROCEDURE — C9803 HOPD COVID-19 SPEC COLLECT: HCPCS

## 2022-04-25 ENCOUNTER — ANESTHESIA EVENT (OUTPATIENT)
Dept: PERIOP | Facility: HOSPITAL | Age: 87
End: 2022-04-25

## 2022-04-25 RX ORDER — FAMOTIDINE 10 MG/ML
20 INJECTION, SOLUTION INTRAVENOUS ONCE
Status: CANCELLED | OUTPATIENT
Start: 2022-04-25 | End: 2022-04-25

## 2022-04-26 ENCOUNTER — ANESTHESIA EVENT CONVERTED (OUTPATIENT)
Dept: ANESTHESIOLOGY | Facility: HOSPITAL | Age: 87
End: 2022-04-26

## 2022-04-26 ENCOUNTER — ANESTHESIA (OUTPATIENT)
Dept: PERIOP | Facility: HOSPITAL | Age: 87
End: 2022-04-26

## 2022-04-26 ENCOUNTER — HOSPITAL ENCOUNTER (OUTPATIENT)
Facility: HOSPITAL | Age: 87
Setting detail: HOSPITAL OUTPATIENT SURGERY
Discharge: HOME | End: 2022-04-26
Attending: ORTHOPAEDIC SURGERY | Admitting: ORTHOPAEDIC SURGERY

## 2022-04-26 VITALS
BODY MASS INDEX: 27.15 KG/M2 | HEIGHT: 67 IN | HEART RATE: 57 BPM | TEMPERATURE: 98.2 F | OXYGEN SATURATION: 93 % | DIASTOLIC BLOOD PRESSURE: 80 MMHG | RESPIRATION RATE: 16 BRPM | WEIGHT: 173 LBS | SYSTOLIC BLOOD PRESSURE: 163 MMHG

## 2022-04-26 DIAGNOSIS — M70.21 OLECRANON BURSITIS OF RIGHT ELBOW: Primary | ICD-10-CM

## 2022-04-26 DIAGNOSIS — M70.20 OLECRANON BURSITIS: ICD-10-CM

## 2022-04-26 LAB — GLUCOSE BLDC GLUCOMTR-MCNC: 121 MG/DL (ref 70–130)

## 2022-04-26 PROCEDURE — 87176 TISSUE HOMOGENIZATION CULTR: CPT | Performed by: ORTHOPAEDIC SURGERY

## 2022-04-26 PROCEDURE — 82962 GLUCOSE BLOOD TEST: CPT

## 2022-04-26 PROCEDURE — 25010000002 ROPIVACAINE PER 1 MG: Performed by: ANESTHESIOLOGY

## 2022-04-26 PROCEDURE — 25010000002 DEXAMETHASONE PER 1 MG: Performed by: NURSE ANESTHETIST, CERTIFIED REGISTERED

## 2022-04-26 PROCEDURE — 0 LIDOCAINE 1 % SOLUTION: Performed by: NURSE ANESTHETIST, CERTIFIED REGISTERED

## 2022-04-26 PROCEDURE — 87205 SMEAR GRAM STAIN: CPT | Performed by: ORTHOPAEDIC SURGERY

## 2022-04-26 PROCEDURE — 87070 CULTURE OTHR SPECIMN AEROBIC: CPT | Performed by: ORTHOPAEDIC SURGERY

## 2022-04-26 PROCEDURE — 87206 SMEAR FLUORESCENT/ACID STAI: CPT | Performed by: ORTHOPAEDIC SURGERY

## 2022-04-26 PROCEDURE — 87102 FUNGUS ISOLATION CULTURE: CPT | Performed by: ORTHOPAEDIC SURGERY

## 2022-04-26 PROCEDURE — 25010000002 FENTANYL CITRATE (PF) 50 MCG/ML SOLUTION: Performed by: ANESTHESIOLOGY

## 2022-04-26 PROCEDURE — 25010000002 PROPOFOL 10 MG/ML EMULSION: Performed by: NURSE ANESTHETIST, CERTIFIED REGISTERED

## 2022-04-26 PROCEDURE — 87075 CULTR BACTERIA EXCEPT BLOOD: CPT | Performed by: ORTHOPAEDIC SURGERY

## 2022-04-26 PROCEDURE — 87116 MYCOBACTERIA CULTURE: CPT | Performed by: ORTHOPAEDIC SURGERY

## 2022-04-26 PROCEDURE — 25010000002 CEFAZOLIN IN DEXTROSE 2-4 GM/100ML-% SOLUTION: Performed by: ORTHOPAEDIC SURGERY

## 2022-04-26 PROCEDURE — 88304 TISSUE EXAM BY PATHOLOGIST: CPT | Performed by: ORTHOPAEDIC SURGERY

## 2022-04-26 RX ORDER — MAGNESIUM HYDROXIDE 1200 MG/15ML
LIQUID ORAL AS NEEDED
Status: DISCONTINUED | OUTPATIENT
Start: 2022-04-26 | End: 2022-04-26 | Stop reason: HOSPADM

## 2022-04-26 RX ORDER — SODIUM CHLORIDE 0.9 % (FLUSH) 0.9 %
10 SYRINGE (ML) INJECTION AS NEEDED
Status: DISCONTINUED | OUTPATIENT
Start: 2022-04-26 | End: 2022-04-26 | Stop reason: HOSPADM

## 2022-04-26 RX ORDER — BUPIVACAINE HCL/0.9 % NACL/PF 0.125 %
PLASTIC BAG, INJECTION (ML) EPIDURAL AS NEEDED
Status: DISCONTINUED | OUTPATIENT
Start: 2022-04-26 | End: 2022-04-26 | Stop reason: SURG

## 2022-04-26 RX ORDER — FAMOTIDINE 20 MG/1
20 TABLET, FILM COATED ORAL ONCE
Status: DISCONTINUED | OUTPATIENT
Start: 2022-04-26 | End: 2022-04-26 | Stop reason: SDUPTHER

## 2022-04-26 RX ORDER — PROPOFOL 10 MG/ML
VIAL (ML) INTRAVENOUS AS NEEDED
Status: DISCONTINUED | OUTPATIENT
Start: 2022-04-26 | End: 2022-04-26 | Stop reason: SURG

## 2022-04-26 RX ORDER — FAMOTIDINE 20 MG/1
20 TABLET, FILM COATED ORAL ONCE
Status: COMPLETED | OUTPATIENT
Start: 2022-04-26 | End: 2022-04-26

## 2022-04-26 RX ORDER — FENTANYL CITRATE 50 UG/ML
50 INJECTION, SOLUTION INTRAMUSCULAR; INTRAVENOUS
Status: DISCONTINUED | OUTPATIENT
Start: 2022-04-26 | End: 2022-04-27 | Stop reason: HOSPADM

## 2022-04-26 RX ORDER — DEXAMETHASONE SODIUM PHOSPHATE 4 MG/ML
INJECTION, SOLUTION INTRA-ARTICULAR; INTRALESIONAL; INTRAMUSCULAR; INTRAVENOUS; SOFT TISSUE AS NEEDED
Status: DISCONTINUED | OUTPATIENT
Start: 2022-04-26 | End: 2022-04-26 | Stop reason: SURG

## 2022-04-26 RX ORDER — CEFAZOLIN SODIUM 2 G/100ML
2 INJECTION, SOLUTION INTRAVENOUS ONCE
Status: COMPLETED | OUTPATIENT
Start: 2022-04-26 | End: 2022-04-26

## 2022-04-26 RX ORDER — FENTANYL CITRATE 50 UG/ML
INJECTION, SOLUTION INTRAMUSCULAR; INTRAVENOUS
Status: COMPLETED | OUTPATIENT
Start: 2022-04-26 | End: 2022-04-26

## 2022-04-26 RX ORDER — PREGABALIN 75 MG/1
75 CAPSULE ORAL ONCE
Status: COMPLETED | OUTPATIENT
Start: 2022-04-26 | End: 2022-04-26

## 2022-04-26 RX ORDER — ROPIVACAINE HYDROCHLORIDE 5 MG/ML
INJECTION, SOLUTION EPIDURAL; INFILTRATION; PERINEURAL
Status: COMPLETED | OUTPATIENT
Start: 2022-04-26 | End: 2022-04-26

## 2022-04-26 RX ORDER — ONDANSETRON 2 MG/ML
4 INJECTION INTRAMUSCULAR; INTRAVENOUS ONCE AS NEEDED
Status: DISCONTINUED | OUTPATIENT
Start: 2022-04-26 | End: 2022-04-27 | Stop reason: HOSPADM

## 2022-04-26 RX ORDER — SODIUM CHLORIDE, SODIUM LACTATE, POTASSIUM CHLORIDE, CALCIUM CHLORIDE 600; 310; 30; 20 MG/100ML; MG/100ML; MG/100ML; MG/100ML
9 INJECTION, SOLUTION INTRAVENOUS CONTINUOUS
Status: DISCONTINUED | OUTPATIENT
Start: 2022-04-26 | End: 2022-04-27 | Stop reason: HOSPADM

## 2022-04-26 RX ORDER — MIDAZOLAM HYDROCHLORIDE 1 MG/ML
0.5 INJECTION INTRAMUSCULAR; INTRAVENOUS
Status: DISCONTINUED | OUTPATIENT
Start: 2022-04-26 | End: 2022-04-26 | Stop reason: HOSPADM

## 2022-04-26 RX ORDER — LIDOCAINE HYDROCHLORIDE 10 MG/ML
INJECTION, SOLUTION INFILTRATION; PERINEURAL AS NEEDED
Status: DISCONTINUED | OUTPATIENT
Start: 2022-04-26 | End: 2022-04-26 | Stop reason: SURG

## 2022-04-26 RX ORDER — EPHEDRINE SULFATE 50 MG/ML
5 INJECTION, SOLUTION INTRAVENOUS ONCE AS NEEDED
Status: DISCONTINUED | OUTPATIENT
Start: 2022-04-26 | End: 2022-04-27 | Stop reason: HOSPADM

## 2022-04-26 RX ORDER — SODIUM CHLORIDE 0.9 % (FLUSH) 0.9 %
10 SYRINGE (ML) INJECTION EVERY 12 HOURS SCHEDULED
Status: DISCONTINUED | OUTPATIENT
Start: 2022-04-26 | End: 2022-04-26 | Stop reason: HOSPADM

## 2022-04-26 RX ORDER — ACETAMINOPHEN 500 MG
1000 TABLET ORAL ONCE
Status: COMPLETED | OUTPATIENT
Start: 2022-04-26 | End: 2022-04-26

## 2022-04-26 RX ORDER — LIDOCAINE HYDROCHLORIDE 10 MG/ML
0.5 INJECTION, SOLUTION EPIDURAL; INFILTRATION; INTRACAUDAL; PERINEURAL ONCE AS NEEDED
Status: COMPLETED | OUTPATIENT
Start: 2022-04-26 | End: 2022-04-26

## 2022-04-26 RX ORDER — EPHEDRINE SULFATE 50 MG/ML
INJECTION, SOLUTION INTRAVENOUS AS NEEDED
Status: DISCONTINUED | OUTPATIENT
Start: 2022-04-26 | End: 2022-04-26 | Stop reason: SURG

## 2022-04-26 RX ORDER — HYDROCODONE BITARTRATE AND ACETAMINOPHEN 5; 325 MG/1; MG/1
1-2 TABLET ORAL EVERY 4 HOURS PRN
Qty: 10 TABLET | Refills: 0 | Status: SHIPPED | OUTPATIENT
Start: 2022-04-26 | End: 2022-05-26

## 2022-04-26 RX ADMIN — DEXAMETHASONE SODIUM PHOSPHATE 4 MG: 4 INJECTION, SOLUTION INTRA-ARTICULAR; INTRALESIONAL; INTRAMUSCULAR; INTRAVENOUS; SOFT TISSUE at 09:55

## 2022-04-26 RX ADMIN — SODIUM CHLORIDE, POTASSIUM CHLORIDE, SODIUM LACTATE AND CALCIUM CHLORIDE 9 ML/HR: 600; 310; 30; 20 INJECTION, SOLUTION INTRAVENOUS at 09:15

## 2022-04-26 RX ADMIN — EPHEDRINE SULFATE 15 MG: 50 INJECTION INTRAVENOUS at 10:02

## 2022-04-26 RX ADMIN — Medication 200 MCG: at 10:00

## 2022-04-26 RX ADMIN — FAMOTIDINE 20 MG: 20 TABLET ORAL at 09:15

## 2022-04-26 RX ADMIN — ACETAMINOPHEN 1000 MG: 500 TABLET ORAL at 09:18

## 2022-04-26 RX ADMIN — FENTANYL CITRATE 100 MCG: 50 INJECTION, SOLUTION INTRAMUSCULAR; INTRAVENOUS at 09:38

## 2022-04-26 RX ADMIN — EPHEDRINE SULFATE 15 MG: 50 INJECTION INTRAVENOUS at 10:10

## 2022-04-26 RX ADMIN — PREGABALIN 75 MG: 75 CAPSULE ORAL at 09:18

## 2022-04-26 RX ADMIN — CEFAZOLIN SODIUM 2 G: 2 INJECTION, SOLUTION INTRAVENOUS at 09:49

## 2022-04-26 RX ADMIN — LIDOCAINE HYDROCHLORIDE 50 MG: 10 INJECTION, SOLUTION INFILTRATION; PERINEURAL at 09:55

## 2022-04-26 RX ADMIN — EPHEDRINE SULFATE 15 MG: 50 INJECTION INTRAVENOUS at 10:21

## 2022-04-26 RX ADMIN — ROPIVACAINE HYDROCHLORIDE 30 ML: 5 INJECTION, SOLUTION EPIDURAL; INFILTRATION; PERINEURAL at 09:38

## 2022-04-26 RX ADMIN — LIDOCAINE HYDROCHLORIDE 0.5 ML: 10 INJECTION, SOLUTION EPIDURAL; INFILTRATION; INTRACAUDAL; PERINEURAL at 09:15

## 2022-04-26 RX ADMIN — PROPOFOL 120 MG: 10 INJECTION, EMULSION INTRAVENOUS at 09:55

## 2022-04-26 NOTE — ANESTHESIA POSTPROCEDURE EVALUATION
Patient: Filipe Hamilton Jr.    Procedure Summary     Date: 04/26/22 Room / [facility]:  [facility]    Anesthesia Start: 0949 Anesthesia Stop: 1053    Procedure: RIGHT OLECRANON BURSECTOMY (Right Elbow) Diagnosis:     Surgeons: Gregory Granados Jr., MD Provider: Phoenix Scott MD    Anesthesia Type: general ASA Status: 3          Anesthesia Type: general    Vitals  Vitals Value Taken Time   /68 04/26/22 1049   Temp     Pulse 56 04/26/22 1052   Resp     SpO2 97 % 04/26/22 1052   Vitals shown include unvalidated device data.        Post Anesthesia Care and Evaluation    Patient location during evaluation: PACU  Patient participation: complete - patient participated  Level of consciousness: awake and alert  Pain management: adequate  Airway patency: patent  Anesthetic complications: No anesthetic complications  PONV Status: none  Cardiovascular status: hemodynamically stable and acceptable  Respiratory status: nonlabored ventilation, acceptable and nasal cannula  Hydration status: acceptable

## 2022-04-26 NOTE — ANESTHESIA PROCEDURE NOTES
Peripheral Block      Patient reassessed immediately prior to procedure    Patient location during procedure: pre-op  Reason for block: at surgeon's request and post-op pain management  Performed by  Anesthesiologist: Phoenix Scott MD  Assisted by: Venecia Mcgee CRNA  Preanesthetic Checklist  Completed: patient identified, IV checked, site marked, risks and benefits discussed, surgical consent, monitors and equipment checked, pre-op evaluation and timeout performed  Prep:  Sterile barriers:cap, gloves, mask, sterile barriers and washed/disinfected hands  Prep: ChloraPrep  Patient monitoring: blood pressure monitoring, continuous pulse oximetry and EKG  Procedure    Sedation: yes  Performed under: local infiltration  Guidance:ultrasound guided    ULTRASOUND INTERPRETATION. Using ultrasound guidance a 20 G gauge needle was placed in close proximity to the nerve, at which point, under ultrasound guidance anesthetic was injected in the area of the nerve and spread of the anesthesia was seen on ultrasound in close proximity thereto.  There were no abnormalities seen on ultrasound; a digital image was taken; and the patient tolerated the procedure with no complications. Images:still images obtained    Laterality:right  Block Type:supraclavicular  Injection Technique:single-shot  Needle Type:echogenic and short-bevel  Needle Gauge:20 G  Resistance on Injection: none    Medications Used: fentaNYL citrate (PF) (SUBLIMAZE) injection, 100 mcg  ropivacaine (NAROPIN) 0.5 % injection, 30 mL  Med administered at 4/26/2022 9:38 AM      Post Assessment  Injection Assessment: negative aspiration for heme, no paresthesia on injection and incremental injection  Patient Tolerance:comfortable throughout block  Complications:no  Additional Notes  Procedure:                 The pt was placed in semifowlers position with a slight tilt of the thorax contralateral to the insertion site.  The Insertion Site was prepped and draped  in sterile fashion.  The pt was anesthetized with  IV Sedation( see meds) and  skin and cutaneous tissue where infiltrated and anesthetized with 1% Lidocaine 3 mls via a 25g needle.  Utilizing ultrasound guidance, a BBraun  Contiplex needle was advanced in-plane in a lateral to medial direction.  Hydro dissection of tissue was achieved with Normal saline. Major vessels(supraclavicular artery) and the pleura where visualized as the brachial plexus was approached at the level immediately adjacent and superior to the clavicle. LA spread was visualized and injection was made incrementally every 5 mls with aspiration. Injection pressure was normal or little, there was no intraneural injection, no vascular injection. Thank You.

## 2022-04-26 NOTE — ANESTHESIA PREPROCEDURE EVALUATION
Anesthesia Evaluation     Patient summary reviewed and Nursing notes reviewed                Airway   Mallampati: II  TM distance: >3 FB  Neck ROM: full  No difficulty expected  Dental - normal exam     Pulmonary - normal exam   (+) a smoker Former, COPD,   Cardiovascular - normal exam    (+) hypertension, CAD, CABG, cardiac stents (DAIV to L main/LAD 10/21) PVD, hyperlipidemia,     ROS comment:   Echo 4/6/22: normal EF, no significant valve disease    increased but acceptable cardiovascular risk    Neuro/Psych- negative ROS  GI/Hepatic/Renal/Endo    (+)  GERD,  renal disease CRI, diabetes mellitus, thyroid problem hypothyroidism    Musculoskeletal (-) negative ROS    Abdominal  - normal exam    Bowel sounds: normal.   Substance History - negative use     OB/GYN negative ob/gyn ROS         Other                      Anesthesia Plan    ASA 3     general   (A line  Peripheral nerve block for post op pain relief)  intravenous induction     Anesthetic plan, all risks, benefits, and alternatives have been provided, discussed and informed consent has been obtained with: patient.    Plan discussed with CRNA.        CODE STATUS:

## 2022-04-26 NOTE — ANESTHESIA PROCEDURE NOTES
Arterial Line      Patient reassessed immediately prior to procedure    Patient location during procedure: pre-op   Line placed for hemodynamic monitoring.  Performed By   Anesthesiologist: Phoenix Scott MD  Preanesthetic Checklist  Completed: patient identified, IV checked, site marked, risks and benefits discussed, surgical consent, monitors and equipment checked, pre-op evaluation and timeout performed  Arterial Line Prep   Sterile Tech: cap, gloves and sterile barriers  Prep: ChloraPrep  Patient monitoring: blood pressure monitoring, continuous pulse oximetry and EKG  Arterial Line Procedure   Laterality:left  Location:  radial artery  Catheter size: 20 G   Guidance: palpation technique  Number of attempts: 2  Successful placement: yes  Post Assessment   Dressing Type: line sutured, occlusive dressing applied, secured with tape and wrist guard applied.   Complications no  Circ/Move/Sens Assessment: normal and unchanged.   Patient Tolerance: patient tolerated the procedure well with no apparent complications

## 2022-04-26 NOTE — H&P
Pre-Op H&P  Filipe Hamilton Jr.  [id number]  1934      Chief complaint: Right elbow pain and swelling      Subjective:  Patient is a 88 y.o.male presents for scheduled surgery by Dr. Granados. He anticipates a RIGHT OLECRANON BURSECTOMY today. He has a 3 month history of right elbow pain and swelling. He has serous drainage that was cultured by his PCP and was negative for growth. No recent fevers, chills or night sweats.       Review of Systems:  Constitutional-- No fever, chills or sweats. No fatigue.  CV-- No chest pain, palpitation or syncope. +HTN, HLD, CHF, CAD  Resp-- No SOB, cough, hemoptysis  Skin--No rashes or lesions      Allergies:   Allergies   Allergen Reactions   • Niacin Other (See Comments)     flushing         Home Meds:  Medications Prior to Admission   Medication Sig Dispense Refill Last Dose   • aspirin 81 MG tablet Take 1 tablet by mouth daily.   4/25/2022 at 2100   • atorvastatin (LIPITOR) 40 MG tablet Take 1 tablet by mouth Daily. 90 tablet 3 4/25/2022 at 2100   • metoprolol succinate XL (TOPROL-XL) 50 MG 24 hr tablet Take 50 mg by mouth Daily. Increased per Dr. Ramirez   4/26/2022 at 0800   • bumetanide (BUMEX) 1 MG tablet Take 0.5 mg by mouth Daily.      • cholecalciferol (VITAMIN D3) 10 MCG (400 UNIT) tablet Take 400 Units by mouth Daily.      • clopidogrel (PLAVIX) 75 MG tablet Take 75 mg by mouth Daily.   4/21/2022   • guaiFENesin (MUCINEX) 600 MG 12 hr tablet Take 2 tablets by mouth 2 (Two) Times a Day. 30 tablet 0    • ketoconazole (NIZORAL) 2 % shampoo Apply 1 application topically to the appropriate area as directed 2 (Two) Times a Week.      • levothyroxine (SYNTHROID, LEVOTHROID) 50 MCG tablet Take 1 tablet by mouth Daily. 90 tablet 2    • loratadine (Claritin) 5 MG chewable tablet Chew 1 tablet Daily. (Patient taking differently: Chew 5 mg Daily As Needed.) 30 tablet 0    • magnesium oxide (MAG-OX) 400 MG tablet Take 1 tablet by mouth 2 (Two) Times a Day. 60 tablet 5    •  omeprazole (priLOSEC) 20 MG capsule TAKE 1 CAPSULE DAILY (Patient taking differently: Take 20 mg by mouth Daily.) 90 capsule 3    • pregabalin (Lyrica) 75 MG capsule Take 1 capsule by mouth 2 (Two) Times a Day. 180 capsule 0    • sacubitril-valsartan (ENTRESTO)  MG tablet Take 1 tablet by mouth 2 (Two) Times a Day.      • Spiriva Respimat 2.5 MCG/ACT aerosol solution inhaler USE 2 INHALATIONS DAILY (Patient taking differently: Inhale 2 puffs Daily.) 12 g 3    • spironolactone (ALDACTONE) 12.5 MG tablet half tablet Take 12.5 mg by mouth Daily. 25mg 1/2 QD      • tamsulosin (FLOMAX) 0.4 MG capsule 24 hr capsule Take 1 capsule by mouth Daily. 90 capsule 3    • zolpidem (AMBIEN) 10 MG tablet Take 1 tablet by mouth Every Night. 90 tablet 0          PMH:   Past Medical History:   Diagnosis Date   • Abnormal ankle brachial index 09/12/2017    AUGUST (9/12/17) nl 1.04 on R, bord 0.91 on L; cards - Dr. Easley   • Anemia    • Basal cell carcinoma (BCC) of left shoulder 12/11/2020    s/p blade bx L. shoulder (12/11/20); superficial BCC with (+) periph margin; derm - Dr. Hoffmann   • Basal cell carcinoma of hand 01/03/1995    L. wrist (1/3/95); derm - Dr. Hoffmann   • Blood type B+ 04/05/2022   • CHF (congestive heart failure) (HCC)    • Chronic bronchitis (HCC)    • Coronary artery disease    • Disease of thyroid gland    • Enlarged prostate    • GERD (gastroesophageal reflux disease)    • History of transfusion of packed RBC 04/07/2022    s/p 2u pRBCs (4/7/22)   • Hx of cardiovascular stress test 03/19/2013    SPECT stress test (3/19/13) EF 53%, c/w prior MI    • Hx of carotid ultrasound 04/16/2019    carotid u/s (4/16/19): bilat plaque, 0-49% stenosis right, 50-69% stenosis left   • Hx of carotid ultrasound 02/08/2021    carotid u/s (2/8/21): bilat 0-49% stenosis; marked diffuse prominent extracranial carotid artery atherosclerotic changes   • Hx of cath aortogram with L. angiogram 10/03/2017    aortogram and L. angio  (10/3/17): no AAA, occluded L. SFA; will pursue L.SFA intervention; abdullahi Easley   • Hx of chest x-ray 10/12/2017    nl CXR (10/12/17)   • Hx of colonoscopy 11/10/2015    hyperplastic polyps (11/15), repeat in 5 yrs; QUINTON Holland   • Hx of colonoscopy 04/08/2022    colonosc (4/8/22, hosp); erythema, congestion, exudative ulceration, and submucosal hemorrhage c/w acute colonc ischemia; ulcers; GI - Dr. Brunner   • Hx of echocardiogram 05/03/2021    ECHO (5/3/21, [facility] hospitalized): EF 35%, grade II agarwal dysfunction, mod MR, mild-mod TR, mild pulm HTN (RVSP 41.81mmHg), dilated RV   • Hx of echocardiogram 04/06/2022    ECHO (4/6/22, hosp): EF 60%, mild diastolic dysfxn, calcified AV without AS/AR, tr MR/TR   • Hx of esophagogastroduodenoscopy 04/08/2022    nl EGD (4/8/22, hosp); GI - Dr. Brunner   • Hx of exercise stress test 06/10/2016    nl GXT (6/16), noting only increased ventricular ectopy with exercise; abdullahi Conrad   • Hx of tobacco use     h/o 2ppd x 40 yrs; quit 1990s   • Hypertension    • Ischemic bowel disease (HCC)    • PVC (premature ventricular contraction)    • Sepsis (HCC)    • Squamous cell carcinoma in situ of skin 11/11/2016    SCC in-situ L. chest (11/11/16); derm - Dr. Shun   • Squamous cell carcinoma in situ of skin of face 03/11/2005    SCC in situ lower lip (3/11/15); darron Hoffmann     [facility]:    Past Surgical History:   Procedure Laterality Date   • ATHERECTOMY Right 09/15/2020    s/p peripheral atherectomy/PTA (9/15/20) for PAD; abdullahi Easley   • CARDIAC CATHETERIZATION  10/26/2021    s/p PCI to LAD (10/26/21): at [facility]   • CAROTID ENDARTERECTOMY Right 01/2005    surg - Dr. Douglas Lazaro   • CATARACT EXTRACTION Bilateral 12/15/2021    Left 12/15/21, Right 1/12/22; ming Fountain   • COLONOSCOPY N/A 4/8/2022    Procedure: COLONOSCOPY;  Surgeon: Brunner, Mark I, MD;  Location: [facility] ENDOSCOPY;  Service: Gastroenterology;  Laterality: N/A;   •  "CORONARY ARTERY BYPASS GRAFT  2011    CABG x2 ()   • ENDOSCOPY N/A 2022    Procedure: ESOPHAGOGASTRODUODENOSCOPY;  Surgeon: Brunner, Mark I, MD;  Location: [facility] ENDOSCOPY;  Service: Gastroenterology;  Laterality: N/A;   • HEMORRHOIDECTOMY     • HYDROCELE EXCISION / REPAIR Left     s/p L. hydrocelectomy; uro - Dr. Hobson   • POPLITEAL ARTERY ANGIOPLASTY Left 2017    s/p successful DCB angioplasty of L. SDA and popliteal arteries, patent CFA, DFA (11/3/17); CTS - Dr. Easley   • RENAL ARTERY STENT  2005    for Renal artery stenosis ()   • SKIN BIOPSY Left 2020    s/p blade bx L. shoulder (20): path - superficial BCC with (+) periph margin; derm - Dr. Hoffmann   • TOTAL KNEE ARTHROPLASTY Right 2008       Social History:   Tobacco:   Social History     Tobacco Use   Smoking Status Former Smoker   • Packs/day: 3.00   • Years: 45.00   • Pack years: 135.00   • Quit date:    • Years since quittin.3   Smokeless Tobacco Former User   • Types: Chew   • Quit date: 3/29/2022   Tobacco Comment    quit ; 3ppd x 45 yrs, quit age age 53      Alcohol:     Social History     Substance and Sexual Activity   Alcohol Use Not Currently    Comment: less than weekly         Physical Exam:/75 (BP Location: Right arm, Patient Position: Lying)   Pulse 50   Temp 97.8 °F (36.6 °C) (Temporal)   Resp 14   Ht 170.2 cm (67\")   Wt 78.5 kg (173 lb)   SpO2 99%   BMI 27.10 kg/m²       General Appearance:    Alert, cooperative, no distress, appears stated age   Head:    Normocephalic, without obvious abnormality, atraumatic   Lungs:     Clear to auscultation bilaterally, respirations unlabored    Heart:   Regular rate and rhythm, S1 and S2 normal    Abdomen:    Soft without tenderness   Extremities:   Extremities normal, atraumatic, no cyanosis or edema   Skin:   Skin color, texture, turgor normal, no rashes or lesions   Neurologic:   Grossly intact     Results Review:     LABS:  Lab " Results   Component Value Date    WBC 3.49 04/19/2022    HGB 10.1 (L) 04/19/2022    HCT 33.0 (L) 04/19/2022    MCV 91.2 04/19/2022     04/19/2022    NEUTROABS 2.97 04/15/2022    GLUCOSE 159 (H) 04/19/2022    BUN 11 04/19/2022    CREATININE 0.92 04/19/2022    EGFRIFNONA 44 (L) 01/05/2022    EGFRIFAFRI >60 10/28/2021     04/19/2022    K 3.7 04/19/2022     04/19/2022    CO2 28.0 04/19/2022    MG 1.5 (L) 04/15/2022    CALCIUM 7.8 (L) 04/19/2022    ALBUMIN 3.40 (L) 04/15/2022    AST 19 04/15/2022    ALT 14 04/15/2022    BILITOT 0.3 04/15/2022       RADIOLOGY:  Imaging Results (Last 72 Hours)     ** No results found for the last 72 hours. **          I reviewed the patient's new clinical results.    Cancer Staging (if applicable)  Cancer Patient: __ yes __no __unknown; If yes, clinical stage T:__ N:__M:__, stage group or __N/A      Impression: Right olecranon bursitis       Plan: RIGHT OLECRANON BURSECTOMY      TONY Moore   4/26/2022   09:13 EDT

## 2022-04-28 PROBLEM — M70.21 OLECRANON BURSITIS, RIGHT ELBOW: Status: ACTIVE | Noted: 2022-04-28

## 2022-04-28 LAB
CYTO UR: NORMAL
LAB AP CASE REPORT: NORMAL
LAB AP CLINICAL INFORMATION: NORMAL
PATH REPORT.FINAL DX SPEC: NORMAL
PATH REPORT.GROSS SPEC: NORMAL

## 2022-04-29 LAB
BACTERIA SPEC AEROBE CULT: NORMAL
GRAM STN SPEC: NORMAL
GRAM STN SPEC: NORMAL

## 2022-05-01 LAB — BACTERIA SPEC ANAEROBE CULT: NORMAL

## 2022-06-07 LAB
FUNGUS WND CULT: NORMAL
MYCOBACTERIUM SPEC CULT: NORMAL
NIGHT BLUE STAIN TISS: NORMAL

## 2022-07-27 NOTE — ASSESSMENT & PLAN NOTE
Judicious and appropriate use of zolpidem 10mg QHS #30, 2RF. LETHA was reviewed and appropriate  (hydrocodone per Dr. Granados in 4/22).  The patient has read and signed the [facility] Controlled Substance Contract today 7/28/22. UDS 10/11/21. Patient has been counseled and is aware of side effects, risks, potential for addiction/tolerance, interactions, and how to take medication correctly.  RTC 3 mos for next RX with contract/UDS    Patient verbalizes understanding of additional risks of this medication in individuals > 65 yrs of age and wants to continue zolpidem 10mg QHS and lyrica 75mg BID with additional risks of falls, decreased mental clarity, confusion.

## 2022-07-27 NOTE — ASSESSMENT & PLAN NOTE
Cont'd judicious and appropriate use of lyrica 75mg BID# 180, 0RF (exception made due to insurance). LETHA was reviewed and appropriate (hydrocodone per Dr. Granados in 4/22).  The patient has read and signed the [facility] Controlled Substance Contract today 7/28/22. UDS 10/11/21. Patient has been counseled and is aware of side effects, risks, potential for addiction/tolerance, interactions, and how to take medication correctly.  RTC 3 mos for next RX with contract/UDS     Patient verbalizes understanding of additional risks of this medication in individuals > 65 yrs of age and wants to continue lyrica 75mg BID and zolpidem 10mg QHS

## 2022-07-27 NOTE — ASSESSMENT & PLAN NOTE
BG control stable/improved with A1C 6.0; currently no meds; encouraged reg phys activity to decr insulin resistance, moderation in unhealthy starches/sweets; f/u A1C in 6 mos

## 2022-07-27 NOTE — PROGRESS NOTES
"Chief Complaint   Patient presents with   • Insomnia   • Diabetes   • Peripheral Neuropathy       History of Present Illness  88 y.o.  gentleman presents for zolpidem and lyrica RFs for chronic insomnia and neuropathy; denies s/e but notes significant sxs after running out of medication.    Review of Systems  ROS (+) for chronic insomnia.  All other ROS reviewed and negative.      Current Outpatient Medications:   •  aspirin 81 MG QD  •  atorvastatin (LIPITOR) 40 MG QD  •  bumetanide (BUMEX) 1 MG 1/2 QD  •  cholecalciferol (VITAMIN D3) 10 MCG (400 UNIT) QD   •  clopidogrel (PLAVIX) 75 MG QD  •  guaiFENesin (MUCINEX) 600 MG q12 prn  •  ketoconazole (NIZORAL) 2 % shampoo AD  •  levothyroxine 50 MCG QD  •  loratadine (Claritin) 5 MG QD prn  •  magnesium oxide (MAG-OX) 400 MG BID  •  metoprolol succinate XL (TOPROL-XL) 50 QD  •  omeprazole (priLOSEC) 20 MG QD  •  pregabalin (Lyrica) 75 MG BID  •  sacubitril-valsartan (ENTRESTO)  MG QD  •  Spiriva Respimat 2.5 MCG/ACT aerosol solution 2 puffs QD  •  spironolactone (ALDACTONE) 12.5 MG QD  •  tamsulosin (FLOMAX) 0.4 MG QHS  •  zolpidem (AMBIEN) 10 MG QHS      VITALS:  /62   Pulse 50   Ht 165.1 cm (65\")   Wt 73.5 kg (162 lb)   SpO2 99%   BMI 26.96 kg/m²     Physical Exam  Vitals and nursing note reviewed.   Constitutional:       General: He is not in acute distress.     Appearance: Normal appearance. He is not ill-appearing.   Eyes:      Extraocular Movements: Extraocular movements intact.      Conjunctiva/sclera: Conjunctivae normal.   Pulmonary:      Effort: Pulmonary effort is normal. No respiratory distress.   Neurological:      Mental Status: He is alert and oriented to person, place, and time. Mental status is at baseline.   Psychiatric:         Mood and Affect: Mood normal.         Behavior: Behavior normal.         LABS  Results for orders placed or performed in visit on 07/29/22   POC Glycosylated Hemoglobin (Hb A1C)    Specimen: Blood "   Result Value Ref Range    Hemoglobin A1C 6.0 %    Lot Number [id number]     Expiration Date 03/23/24 4/19/22 A1C 6.4    ASSESSMENT/PLAN    Diagnoses and all orders for this visit:    1. Insomnia (Primary)  Assessment & Plan:  Judicious and appropriate use of zolpidem 10mg QHS #30, 2RF. LETHA was reviewed and appropriate  (hydrocodone per Dr. Granados in 4/22).  The patient has read and signed the [facility] Substance Contract today 7/28/22. UDS 10/11/21. Patient has been counseled and is aware of side effects, risks, potential for addiction/tolerance, interactions, and how to take medication correctly.  RTC 3 mos for next RX with contract/UDS    Patient verbalizes understanding of additional risks of this medication in individuals > 65 yrs of age and wants to continue zolpidem 10mg QHS and lyrica 75mg BID with additional risks of falls, decreased mental clarity, confusion.      Orders:  -     zolpidem (AMBIEN) 10 MG tablet; Take 1 tablet by mouth Every Night.  Dispense: 90 tablet; Refill: 0    2. Diabetic peripheral neuropathy (HCC)  Assessment & Plan:  Cont'd judicious and appropriate use of lyrica 75mg BID# 180, 0RF (exception made due to insurance). LETHA was reviewed and appropriate (hydrocodone per Dr. Granados in 4/22).  The patient has read and signed the [facility] Substance Contract today 7/28/22. UDS 10/11/21. Patient has been counseled and is aware of side effects, risks, potential for addiction/tolerance, interactions, and how to take medication correctly.  RTC 3 mos for next RX with contract/UDS     Patient verbalizes understanding of additional risks of this medication in individuals > 65 yrs of age and wants to continue lyrica 75mg BID and zolpidem 10mg QHS      3. Type 2 diabetes mellitus with diabetic polyneuropathy, without long-term current use of insulin (HCC)  Assessment & Plan:  BG control stable/improved with A1C 6.0; currently no meds; encouraged reg phys  activity to decr insulin resistance, moderation in unhealthy starches/sweets; f/u A1C in 6 mos      Orders:  -     pregabalin (Lyrica) 75 MG capsule; Take 1 capsule by mouth 2 (Two) Times a Day.  Dispense: 180 capsule; Refill: 0  -     POC Glycosylated Hemoglobin (Hb A1C)      FOLLOW-UP  1. Health maintenance - COVID19 vacc done, incl 4th dose booster; overdue for Td (last Tdap 10/11) - he will get at pharmacy; also rec updated DM eye exam (he had cataract surgery 12/21-1/22)  2. RTC 10/17/22 as scheduled for next zolpidem/lyrica RFs with LETHA/contract/UDS    Electronically signed by:    Delilah Tatum MD, FACP  07/29/2022

## 2022-07-29 ENCOUNTER — OFFICE VISIT (OUTPATIENT)
Dept: INTERNAL MEDICINE | Facility: CLINIC | Age: 87
End: 2022-07-29

## 2022-07-29 VITALS
HEIGHT: 65 IN | BODY MASS INDEX: 26.99 KG/M2 | OXYGEN SATURATION: 99 % | HEART RATE: 50 BPM | WEIGHT: 162 LBS | SYSTOLIC BLOOD PRESSURE: 112 MMHG | DIASTOLIC BLOOD PRESSURE: 62 MMHG

## 2022-07-29 DIAGNOSIS — E11.42 DIABETIC PERIPHERAL NEUROPATHY: Chronic | ICD-10-CM

## 2022-07-29 DIAGNOSIS — F51.01 PRIMARY INSOMNIA: Primary | ICD-10-CM

## 2022-07-29 DIAGNOSIS — E11.42 TYPE 2 DIABETES MELLITUS WITH DIABETIC POLYNEUROPATHY, WITHOUT LONG-TERM CURRENT USE OF INSULIN: Chronic | ICD-10-CM

## 2022-07-29 LAB
EXPIRATION DATE: NORMAL
HBA1C MFR BLD: 6 %
Lab: NORMAL

## 2022-07-29 PROCEDURE — 3044F HG A1C LEVEL LT 7.0%: CPT | Performed by: INTERNAL MEDICINE

## 2022-07-29 PROCEDURE — 99214 OFFICE O/P EST MOD 30 MIN: CPT | Performed by: INTERNAL MEDICINE

## 2022-07-29 PROCEDURE — 83036 HEMOGLOBIN GLYCOSYLATED A1C: CPT | Performed by: INTERNAL MEDICINE

## 2022-07-29 RX ORDER — PREGABALIN 75 MG/1
75 CAPSULE ORAL 2 TIMES DAILY
Qty: 180 CAPSULE | Refills: 0 | Status: SHIPPED | OUTPATIENT
Start: 2022-07-29 | End: 2022-10-17 | Stop reason: SDUPTHER

## 2022-07-29 RX ORDER — ZOLPIDEM TARTRATE 10 MG/1
10 TABLET ORAL NIGHTLY
Qty: 90 TABLET | Refills: 0 | Status: SHIPPED | OUTPATIENT
Start: 2022-07-29 | End: 2022-10-17 | Stop reason: SDUPTHER

## 2022-09-01 ENCOUNTER — LAB (OUTPATIENT)
Dept: LAB | Facility: HOSPITAL | Age: 87
End: 2022-09-01

## 2022-09-01 ENCOUNTER — TELEPHONE (OUTPATIENT)
Dept: INTERNAL MEDICINE | Facility: CLINIC | Age: 87
End: 2022-09-01

## 2022-09-01 DIAGNOSIS — D64.9 NORMOCYTIC ANEMIA: ICD-10-CM

## 2022-09-01 DIAGNOSIS — N18.32 STAGE 3B CHRONIC KIDNEY DISEASE: ICD-10-CM

## 2022-09-01 DIAGNOSIS — E83.42 HYPOMAGNESEMIA: ICD-10-CM

## 2022-09-01 DIAGNOSIS — D64.9 NORMOCYTIC ANEMIA: Primary | ICD-10-CM

## 2022-09-01 LAB
BASOPHILS # BLD AUTO: 0 10*3/MM3 (ref 0–0.2)
BASOPHILS NFR BLD AUTO: 0 % (ref 0–1.5)
DEPRECATED RDW RBC AUTO: 39.2 FL (ref 37–54)
EOSINOPHIL # BLD AUTO: 0.01 10*3/MM3 (ref 0–0.4)
EOSINOPHIL NFR BLD AUTO: 0.2 % (ref 0.3–6.2)
ERYTHROCYTE [DISTWIDTH] IN BLOOD BY AUTOMATED COUNT: 12.9 % (ref 12.3–15.4)
HCT VFR BLD AUTO: 33.6 % (ref 37.5–51)
HGB BLD-MCNC: 10.2 G/DL (ref 13–17.7)
LYMPHOCYTES # BLD AUTO: 1.38 10*3/MM3 (ref 0.7–3.1)
LYMPHOCYTES NFR BLD AUTO: 27.8 % (ref 19.6–45.3)
MCH RBC QN AUTO: 26.1 PG (ref 26.6–33)
MCHC RBC AUTO-ENTMCNC: 30.4 G/DL (ref 31.5–35.7)
MCV RBC AUTO: 85.9 FL (ref 79–97)
MONOCYTES # BLD AUTO: 0.41 10*3/MM3 (ref 0.1–0.9)
MONOCYTES NFR BLD AUTO: 8.3 % (ref 5–12)
NEUTROPHILS NFR BLD AUTO: 3.15 10*3/MM3 (ref 1.7–7)
NEUTROPHILS NFR BLD AUTO: 63.5 % (ref 42.7–76)
PLATELET # BLD AUTO: 117 10*3/MM3 (ref 140–450)
PMV BLD AUTO: 10.3 FL (ref 6–12)
RBC # BLD AUTO: 3.91 10*6/MM3 (ref 4.14–5.8)
WBC NRBC COR # BLD: 4.96 10*3/MM3 (ref 3.4–10.8)

## 2022-09-01 PROCEDURE — 85025 COMPLETE CBC W/AUTO DIFF WBC: CPT

## 2022-09-01 PROCEDURE — 83735 ASSAY OF MAGNESIUM: CPT

## 2022-09-01 PROCEDURE — 80048 BASIC METABOLIC PNL TOTAL CA: CPT

## 2022-09-01 NOTE — TELEPHONE ENCOUNTER
Pt needs a CBC checked after a colonoscopy yesterday where he had some bleeding post procedure and Dr. Holland recommended checking hgb. Dr. Tatum will order and the patient will come here to have it drawn, but was also notified that he could get the order from the other Dr in the future and have them drawn here anyway.

## 2022-09-01 NOTE — TELEPHONE ENCOUNTER
Caller: Josue Hamilton    Relationship: Emergency Contact    Best call back number: [phone]    What orders are you requesting (i.e. lab or imaging): LABS CBC PANEL, WHITE AND RED BLOOD CELLS    In what timeframe would the patient need to come in: ASAP    Where will you receive your lab/imaging services: [facility]    ADDITIONAL NOTES: SON STATES PATIENT HAD A COLONOSCOPY AND HAD SOME BLEEDING. SON IS REQUESTING LABS. PLEASE CALL WHEN AVAILABLE

## 2022-09-01 NOTE — TELEPHONE ENCOUNTER
I have not received any correspondence or report regarding a recent colonoscopy.  The last one in my chart was from April 2022.    If there has been a recent colonoscopy and there is a bleeding complication, he will need to follow-up with GI.  They can order whatever labs are indicated.  If GI refuses to order labs, then let us know, and I will figure out a diagnosis to attach with that order.    If he is actively having a GI bleed, he should go to the ER.

## 2022-09-02 ENCOUNTER — TELEPHONE (OUTPATIENT)
Dept: INTERNAL MEDICINE | Facility: CLINIC | Age: 87
End: 2022-09-02

## 2022-09-02 LAB
ANION GAP SERPL CALCULATED.3IONS-SCNC: 11.9 MMOL/L (ref 5–15)
BUN SERPL-MCNC: 17 MG/DL (ref 8–23)
BUN/CREAT SERPL: 16 (ref 7–25)
CALCIUM SPEC-SCNC: 9.1 MG/DL (ref 8.6–10.5)
CHLORIDE SERPL-SCNC: 108 MMOL/L (ref 98–107)
CO2 SERPL-SCNC: 23.1 MMOL/L (ref 22–29)
CREAT SERPL-MCNC: 1.06 MG/DL (ref 0.76–1.27)
EGFRCR SERPLBLD CKD-EPI 2021: 67.5 ML/MIN/1.73
GLUCOSE SERPL-MCNC: 106 MG/DL (ref 65–99)
MAGNESIUM SERPL-MCNC: 2.2 MG/DL (ref 1.6–2.4)
POTASSIUM SERPL-SCNC: 4.1 MMOL/L (ref 3.5–5.2)
SODIUM SERPL-SCNC: 143 MMOL/L (ref 136–145)

## 2022-09-02 NOTE — TELEPHONE ENCOUNTER
----- Message from Delilah Tatum MD sent at 9/2/2022 12:42 PM EDT -----  Pls call son - Already sent message re: blood counts. Kidney function, electrolytes, and magnesium are normal

## 2022-10-17 ENCOUNTER — OFFICE VISIT (OUTPATIENT)
Dept: INTERNAL MEDICINE | Facility: CLINIC | Age: 87
End: 2022-10-17

## 2022-10-17 VITALS
HEART RATE: 48 BPM | WEIGHT: 154 LBS | SYSTOLIC BLOOD PRESSURE: 110 MMHG | OXYGEN SATURATION: 97 % | DIASTOLIC BLOOD PRESSURE: 52 MMHG | HEIGHT: 65 IN | BODY MASS INDEX: 25.66 KG/M2

## 2022-10-17 DIAGNOSIS — F51.01 PRIMARY INSOMNIA: Primary | ICD-10-CM

## 2022-10-17 DIAGNOSIS — E11.42 TYPE 2 DIABETES MELLITUS WITH DIABETIC POLYNEUROPATHY, WITHOUT LONG-TERM CURRENT USE OF INSULIN: Chronic | ICD-10-CM

## 2022-10-17 DIAGNOSIS — Z79.899 LONG-TERM USE OF HIGH-RISK MEDICATION: ICD-10-CM

## 2022-10-17 DIAGNOSIS — E11.42 DIABETIC PERIPHERAL NEUROPATHY: Chronic | ICD-10-CM

## 2022-10-17 PROCEDURE — 99214 OFFICE O/P EST MOD 30 MIN: CPT | Performed by: INTERNAL MEDICINE

## 2022-10-17 PROCEDURE — 80307 DRUG TEST PRSMV CHEM ANLYZR: CPT | Performed by: INTERNAL MEDICINE

## 2022-10-17 PROCEDURE — G0483 DRUG TEST DEF 22+ CLASSES: HCPCS | Performed by: INTERNAL MEDICINE

## 2022-10-17 RX ORDER — PREGABALIN 75 MG/1
75 CAPSULE ORAL 2 TIMES DAILY
Qty: 180 CAPSULE | Refills: 0 | Status: SHIPPED | OUTPATIENT
Start: 2022-10-17 | End: 2023-01-11 | Stop reason: SDUPTHER

## 2022-10-17 RX ORDER — ZOLPIDEM TARTRATE 10 MG/1
10 TABLET ORAL NIGHTLY
Qty: 90 TABLET | Refills: 0 | Status: SHIPPED | OUTPATIENT
Start: 2022-10-17 | End: 2023-01-11 | Stop reason: SDUPTHER

## 2022-10-17 NOTE — PROGRESS NOTES
"Chief Complaint   Patient presents with   • Insomnia   • Peripheral Neuropathy       History of Present Illness  88 y.o.  gentleman, accompanied by his wife, presents for zolpidem and lyrica RFs, taken for chronic insomnia and neuropathy, respectively. Denies s/e with medication.    Notes easy bruising with combination plavix and ASA. States vascular or cardiology want to stop some of his blood thinners. Notes episode where he awakened and left side of face was bruised with unknown injury.    Denies s/e with zolpidem or lyrica; taken daily.    Review of Systems  ROS (+) for chronic insomnia and neuropathy. ROS (+) for easy bruising, perez arms and legs and occ the face without known trauma. All other ROS reviewed and negative.      Current Outpatient Medications:   •  aspirin 81 MG QD  •  atorvastatin (LIPITOR) 40 MG QD  •  bumetanide (BUMEX) 1 MG 1/2 QD  •  cholecalciferol (VITAMIN D3) 10 MCG (400 UNIT) QD  •  clopidogrel (PLAVIX) 75 MG QD  •  guaiFENesin (MUCINEX) 600 MG 2 bid prn  •  ketoconazole (NIZORAL) 2 % shampoo AD  •  levothyroxine 50 MCG QD  •  loratadine (Claritin) 5 MG QD  •  magnesium oxide (MAG-OX) 400 MG BID  •  metoprolol succinate XL  50 MG QD  •  omeprazole (priLOSEC) 20 MG QD  •  pregabalin (Lyrica) 75 MG BID  •  sacubitril-valsartan (ENTRESTO)  MG QD  •  Spiriva Respimat 2.5 MCG/ACT aerosol 2 puffs QD  •  spironolactone (ALDACTONE) 12.5 MG 1/2 QD  •  tamsulosin (FLOMAX) 0.4 MG QHS  •  zolpidem (AMBIEN) 10 MG QHS      VITALS:  /52   Pulse (!) 48   Ht 165.1 cm (65\")   Wt 69.9 kg (154 lb)   SpO2 97%   BMI 25.63 kg/m²     Physical Exam  Vitals and nursing note reviewed.   Constitutional:       General: He is not in acute distress.     Appearance: Normal appearance. He is not ill-appearing.   Eyes:      Extraocular Movements: Extraocular movements intact.      Conjunctiva/sclera: Conjunctivae normal.   Pulmonary:      Effort: Pulmonary effort is normal. No respiratory " distress.   Skin:     Findings: Bruising (ecchymosis left foreehead, scattered bilat forearms) present.   Neurological:      Mental Status: He is alert and oriented to person, place, and time. Mental status is at baseline.   Psychiatric:         Mood and Affect: Mood normal.         Behavior: Behavior normal.         LABS  Results for orders placed or performed in visit on 09/01/22   Basic Metabolic Panel    Specimen: Blood   Result Value Ref Range    Glucose 106 (H) 65 - 99 mg/dL    BUN 17 8 - 23 mg/dL    Creatinine 1.06 0.76 - 1.27 mg/dL    Sodium 143 136 - 145 mmol/L    Potassium 4.1 3.5 - 5.2 mmol/L    Chloride 108 (H) 98 - 107 mmol/L    CO2 23.1 22.0 - 29.0 mmol/L    Calcium 9.1 8.6 - 10.5 mg/dL    BUN/Creatinine Ratio 16.0 7.0 - 25.0    Anion Gap 11.9 5.0 - 15.0 mmol/L    eGFR 67.5 >60.0 mL/min/1.73   Magnesium    Specimen: Blood   Result Value Ref Range    Magnesium 2.2 1.6 - 2.4 mg/dL   CBC Auto Differential    Specimen: Blood   Result Value Ref Range    WBC 4.96 3.40 - 10.80 10*3/mm3    RBC 3.91 (L) 4.14 - 5.80 10*6/mm3    Hemoglobin 10.2 (L) 13.0 - 17.7 g/dL    Hematocrit 33.6 (L) 37.5 - 51.0 %    MCV 85.9 79.0 - 97.0 fL    MCH 26.1 (L) 26.6 - 33.0 pg    MCHC 30.4 (L) 31.5 - 35.7 g/dL    RDW 12.9 12.3 - 15.4 %    RDW-SD 39.2 37.0 - 54.0 fl    MPV 10.3 6.0 - 12.0 fL    Platelets 117 (L) 140 - 450 10*3/mm3    Neutrophil % 63.5 42.7 - 76.0 %    Lymphocyte % 27.8 19.6 - 45.3 %    Monocyte % 8.3 5.0 - 12.0 %    Eosinophil % 0.2 (L) 0.3 - 6.2 %    Basophil % 0.0 0.0 - 1.5 %    Neutrophils, Absolute 3.15 1.70 - 7.00 10*3/mm3    Lymphocytes, Absolute 1.38 0.70 - 3.10 10*3/mm3    Monocytes, Absolute 0.41 0.10 - 0.90 10*3/mm3    Eosinophils, Absolute 0.01 0.00 - 0.40 10*3/mm3    Basophils, Absolute 0.00 0.00 - 0.20 10*3/mm3     7/29/22 A1C 6.0    ASSESSMENT/PLAN    Diagnoses and all orders for this visit:    1. Insomnia (Primary)  Assessment & Plan:  Judicious and appropriate use of zolpidem 10mg QHS #90, 0RF.  LETHA was reviewed and appropriate  (hydrocodone per Dr. Granados in 4/22).  The patient has read and signed the [facility] Substance Contract 7/28/22. UDS ordered  (refuses to provide in the office today - wants to take it home). Patient has been counseled and is aware of side effects, risks, potential for addiction/tolerance, interactions, and how to take medication correctly.  RTC 3 mos for next RX with LETHA (contract 7/22)    Patient verbalizes understanding of additional risks of this medication in individuals > 65 yrs of age and wants to continue zolpidem 10mg QHS and lyrica 75mg BID with additional risks of falls, decreased mental clarity, confusion.      Orders:  -     zolpidem (AMBIEN) 10 MG tablet; Take 1 tablet by mouth Every Night.  Dispense: 90 tablet; Refill: 0    2. Diabetic peripheral neuropathy (HCC)  Assessment & Plan:  Cont'd judicious and appropriate use of lyrica 75mg BID# 180, 0RF (exception made due to insurance). LETHA was reviewed and appropriate (hydrocodone per Dr. Granados in 4/22).  The patient has read and signed the [facility] Substance Contract 7/28/22. UDS ordered (refuses to provide in the office today - wants to take it home). Patient has been counseled and is aware of side effects, risks, potential for addiction/tolerance, interactions, and how to take medication correctly.  RTC 3 mos for next RX with LETHA (contract 7/22)     Patient verbalizes understanding of additional risks of this medication in individuals > 65 yrs of age and wants to continue lyrica 75mg BID and zolpidem 10mg QHS    Orders:  -     pregabalin (Lyrica) 75 MG capsule; Take 1 capsule by mouth 2 (Two) Times a Day.  Dispense: 180 capsule; Refill: 0    3. Type 2 diabetes mellitus with diabetic polyneuropathy, without long-term current use of insulin (HCC)  Assessment & Plan:  F/u A1C in 3 mos      4. Long-term use of high-risk medication  -     Compliance Drug Analysis, Ur - Urine,  Clean Catch; Future  -     Compliance Drug Analysis, Ur - Urine, Clean Catch      FOLLOW-UP  1. Health maintenance - COVID19 vacc done, incl 4th dose booster; rec proceeding with new COVID19 vacc (wife states they will get at pharmacy; flu vacc done 10/22; also overdue for Td (last Tdap 10/11) - rec getting at pharmacy  2. RTC for wellness 1/12/23; fasting labs prior to appt (CBC, CMP, TSH, lipids, UA/micro, UDS, A1C, microalb, CPK, FT4, uric), LETHA (contract/UDS 10/22), and zolpidem/lyrica RXs    Electronically signed by:    Delilah Tatum MD, FACP  10/17/2022

## 2022-10-17 NOTE — ASSESSMENT & PLAN NOTE
Judicious and appropriate use of zolpidem 10mg QHS #90, 0RF. LETHA was reviewed and appropriate  (hydrocodone per Dr. Granados in 4/22).  The patient has read and signed the [facility] Controlled Substance Contract 7/28/22. UDS ordered  (refuses to provide in the office today - wants to take it home). Patient has been counseled and is aware of side effects, risks, potential for addiction/tolerance, interactions, and how to take medication correctly.  RTC 3 mos for next RX with LETHA (contract 7/22)    Patient verbalizes understanding of additional risks of this medication in individuals > 65 yrs of age and wants to continue zolpidem 10mg QHS and lyrica 75mg BID with additional risks of falls, decreased mental clarity, confusion.

## 2022-10-17 NOTE — ASSESSMENT & PLAN NOTE
Cont'd judicious and appropriate use of lyrica 75mg BID# 180, 0RF (exception made due to insurance). LETHA was reviewed and appropriate (hydrocodone per Dr. Granados in 4/22).  The patient has read and signed the [facility] Controlled Substance Contract 7/28/22. UDS ordered (refuses to provide in the office today - wants to take it home). Patient has been counseled and is aware of side effects, risks, potential for addiction/tolerance, interactions, and how to take medication correctly.  RTC 3 mos for next RX with LETHA (contract 7/22)     Patient verbalizes understanding of additional risks of this medication in individuals > 65 yrs of age and wants to continue lyrica 75mg BID and zolpidem 10mg QHS

## 2022-10-26 LAB — DRUGS UR: NORMAL

## 2022-10-27 DIAGNOSIS — K21.9 GASTROESOPHAGEAL REFLUX DISEASE WITHOUT ESOPHAGITIS: ICD-10-CM

## 2022-10-27 RX ORDER — OMEPRAZOLE 20 MG/1
CAPSULE, DELAYED RELEASE ORAL
Qty: 90 CAPSULE | Refills: 0 | Status: SHIPPED | OUTPATIENT
Start: 2022-10-27 | End: 2023-01-11 | Stop reason: SDUPTHER

## 2022-10-31 ENCOUNTER — TELEPHONE (OUTPATIENT)
Dept: INTERNAL MEDICINE | Facility: CLINIC | Age: 87
End: 2022-10-31

## 2022-11-01 ENCOUNTER — LAB (OUTPATIENT)
Dept: LAB | Facility: HOSPITAL | Age: 87
End: 2022-11-01

## 2022-11-01 LAB
BASOPHILS # BLD AUTO: 0.01 10*3/MM3 (ref 0–0.2)
BASOPHILS NFR BLD AUTO: 0.2 % (ref 0–1.5)
DEPRECATED RDW RBC AUTO: 39.8 FL (ref 37–54)
EOSINOPHIL # BLD AUTO: 0.02 10*3/MM3 (ref 0–0.4)
EOSINOPHIL NFR BLD AUTO: 0.4 % (ref 0.3–6.2)
ERYTHROCYTE [DISTWIDTH] IN BLOOD BY AUTOMATED COUNT: 12.7 % (ref 12.3–15.4)
HCT VFR BLD AUTO: 36.2 % (ref 37.5–51)
HGB BLD-MCNC: 11.3 G/DL (ref 13–17.7)
LYMPHOCYTES # BLD AUTO: 1.6 10*3/MM3 (ref 0.7–3.1)
LYMPHOCYTES NFR BLD AUTO: 28.8 % (ref 19.6–45.3)
MCH RBC QN AUTO: 26.7 PG (ref 26.6–33)
MCHC RBC AUTO-ENTMCNC: 31.2 G/DL (ref 31.5–35.7)
MCV RBC AUTO: 85.6 FL (ref 79–97)
MONOCYTES # BLD AUTO: 0.48 10*3/MM3 (ref 0.1–0.9)
MONOCYTES NFR BLD AUTO: 8.6 % (ref 5–12)
NEUTROPHILS NFR BLD AUTO: 3.44 10*3/MM3 (ref 1.7–7)
NEUTROPHILS NFR BLD AUTO: 61.8 % (ref 42.7–76)
PLATELET # BLD AUTO: 133 10*3/MM3 (ref 140–450)
PMV BLD AUTO: 10.1 FL (ref 6–12)
RBC # BLD AUTO: 4.23 10*6/MM3 (ref 4.14–5.8)
WBC NRBC COR # BLD: 5.56 10*3/MM3 (ref 3.4–10.8)

## 2022-11-01 PROCEDURE — 85025 COMPLETE CBC W/AUTO DIFF WBC: CPT | Performed by: INTERNAL MEDICINE

## 2022-11-07 ENCOUNTER — TELEPHONE (OUTPATIENT)
Dept: INTERNAL MEDICINE | Facility: CLINIC | Age: 87
End: 2022-11-07

## 2022-11-07 NOTE — TELEPHONE ENCOUNTER
----- Message from Delilah Tatum MD sent at 11/7/2022  2:21 AM EST -----  Chronically anemic but blood counts improved - the best they have been in more than 6 months

## 2022-11-14 DIAGNOSIS — K52.832 LYMPHOCYTIC COLITIS: Primary | ICD-10-CM

## 2022-12-19 DIAGNOSIS — E78.5 HYPERLIPIDEMIA LDL GOAL <70: Chronic | ICD-10-CM

## 2022-12-19 DIAGNOSIS — Z00.00 MEDICARE ANNUAL WELLNESS VISIT, SUBSEQUENT: Primary | Chronic | ICD-10-CM

## 2022-12-19 DIAGNOSIS — E11.42 TYPE 2 DIABETES MELLITUS WITH DIABETIC POLYNEUROPATHY, WITHOUT LONG-TERM CURRENT USE OF INSULIN: Chronic | ICD-10-CM

## 2022-12-19 DIAGNOSIS — M10.042 ACUTE IDIOPATHIC GOUT OF LEFT HAND: Chronic | ICD-10-CM

## 2022-12-19 DIAGNOSIS — E03.9 ACQUIRED HYPOTHYROIDISM: Chronic | ICD-10-CM

## 2023-01-04 ENCOUNTER — TRANSCRIBE ORDERS (OUTPATIENT)
Dept: LAB | Facility: HOSPITAL | Age: 88
End: 2023-01-04
Payer: MEDICARE

## 2023-01-04 ENCOUNTER — LAB (OUTPATIENT)
Dept: LAB | Facility: HOSPITAL | Age: 88
End: 2023-01-04
Payer: MEDICARE

## 2023-01-04 DIAGNOSIS — E11.42 TYPE 2 DIABETES MELLITUS WITH DIABETIC POLYNEUROPATHY, WITHOUT LONG-TERM CURRENT USE OF INSULIN: Chronic | ICD-10-CM

## 2023-01-04 DIAGNOSIS — Z00.00 MEDICARE ANNUAL WELLNESS VISIT, SUBSEQUENT: ICD-10-CM

## 2023-01-04 DIAGNOSIS — E78.5 HYPERLIPIDEMIA LDL GOAL <70: Chronic | ICD-10-CM

## 2023-01-04 DIAGNOSIS — R19.7 DIARRHEA OF PRESUMED INFECTIOUS ORIGIN: Primary | ICD-10-CM

## 2023-01-04 DIAGNOSIS — M10.042 ACUTE IDIOPATHIC GOUT OF LEFT HAND: Chronic | ICD-10-CM

## 2023-01-04 DIAGNOSIS — R19.7 DIARRHEA OF PRESUMED INFECTIOUS ORIGIN: ICD-10-CM

## 2023-01-04 DIAGNOSIS — E03.9 ACQUIRED HYPOTHYROIDISM: Chronic | ICD-10-CM

## 2023-01-04 PROBLEM — D69.6 THROMBOCYTOPENIA (HCC): Status: ACTIVE | Noted: 2023-01-04

## 2023-01-04 LAB
ALBUMIN SERPL-MCNC: 4.1 G/DL (ref 3.5–5.2)
ALBUMIN UR-MCNC: <1.2 MG/DL
ALBUMIN/GLOB SERPL: 2.3 G/DL
ALP SERPL-CCNC: 59 U/L (ref 39–117)
ALT SERPL W P-5'-P-CCNC: 11 U/L (ref 1–41)
ANION GAP SERPL CALCULATED.3IONS-SCNC: 11.5 MMOL/L (ref 5–15)
AST SERPL-CCNC: 13 U/L (ref 1–40)
BACTERIA UR QL AUTO: NORMAL /HPF
BASOPHILS # BLD AUTO: 0.01 10*3/MM3 (ref 0–0.2)
BASOPHILS NFR BLD AUTO: 0.3 % (ref 0–1.5)
BILIRUB SERPL-MCNC: 0.4 MG/DL (ref 0–1.2)
BILIRUB UR QL STRIP: NEGATIVE
BUN SERPL-MCNC: 24 MG/DL (ref 8–23)
BUN/CREAT SERPL: 19.5 (ref 7–25)
CALCIUM SPEC-SCNC: 8.6 MG/DL (ref 8.6–10.5)
CHLORIDE SERPL-SCNC: 105 MMOL/L (ref 98–107)
CHOLEST SERPL-MCNC: 106 MG/DL (ref 0–200)
CK SERPL-CCNC: 43 U/L (ref 20–200)
CLARITY UR: CLEAR
CO2 SERPL-SCNC: 29.5 MMOL/L (ref 22–29)
COLOR UR: YELLOW
CREAT SERPL-MCNC: 1.23 MG/DL (ref 0.76–1.27)
CREAT UR-MCNC: 144.9 MG/DL
CRP SERPL-MCNC: <0.3 MG/DL (ref 0–0.5)
DEPRECATED RDW RBC AUTO: 39.3 FL (ref 37–54)
EGFRCR SERPLBLD CKD-EPI 2021: 56.5 ML/MIN/1.73
EOSINOPHIL # BLD AUTO: 0.02 10*3/MM3 (ref 0–0.4)
EOSINOPHIL NFR BLD AUTO: 0.5 % (ref 0.3–6.2)
ERYTHROCYTE [DISTWIDTH] IN BLOOD BY AUTOMATED COUNT: 12.6 % (ref 12.3–15.4)
ERYTHROCYTE [SEDIMENTATION RATE] IN BLOOD: 2 MM/HR (ref 0–20)
GLOBULIN UR ELPH-MCNC: 1.8 GM/DL
GLUCOSE SERPL-MCNC: 99 MG/DL (ref 65–99)
GLUCOSE UR STRIP-MCNC: NEGATIVE MG/DL
HBA1C MFR BLD: 6 % (ref 4.8–5.6)
HCT VFR BLD AUTO: 35.2 % (ref 37.5–51)
HDLC SERPL-MCNC: 46 MG/DL (ref 40–60)
HGB BLD-MCNC: 11 G/DL (ref 13–17.7)
HGB UR QL STRIP.AUTO: NEGATIVE
HYALINE CASTS UR QL AUTO: NORMAL /LPF
KETONES UR QL STRIP: ABNORMAL
LDLC SERPL CALC-MCNC: 44 MG/DL (ref 0–100)
LDLC/HDLC SERPL: 0.97 {RATIO}
LEUKOCYTE ESTERASE UR QL STRIP.AUTO: NEGATIVE
LYMPHOCYTES # BLD AUTO: 1.54 10*3/MM3 (ref 0.7–3.1)
LYMPHOCYTES NFR BLD AUTO: 39 % (ref 19.6–45.3)
MCH RBC QN AUTO: 26.6 PG (ref 26.6–33)
MCHC RBC AUTO-ENTMCNC: 31.3 G/DL (ref 31.5–35.7)
MCV RBC AUTO: 85.2 FL (ref 79–97)
MICROALBUMIN/CREAT UR: NORMAL MG/G{CREAT}
MONOCYTES # BLD AUTO: 0.33 10*3/MM3 (ref 0.1–0.9)
MONOCYTES NFR BLD AUTO: 8.4 % (ref 5–12)
NEUTROPHILS NFR BLD AUTO: 2.03 10*3/MM3 (ref 1.7–7)
NEUTROPHILS NFR BLD AUTO: 51.3 % (ref 42.7–76)
NITRITE UR QL STRIP: NEGATIVE
PH UR STRIP.AUTO: 6.5 [PH] (ref 5–8)
PLATELET # BLD AUTO: 124 10*3/MM3 (ref 140–450)
PMV BLD AUTO: 10.4 FL (ref 6–12)
POTASSIUM SERPL-SCNC: 4.5 MMOL/L (ref 3.5–5.2)
PROT SERPL-MCNC: 5.9 G/DL (ref 6–8.5)
PROT UR QL STRIP: ABNORMAL
RBC # BLD AUTO: 4.13 10*6/MM3 (ref 4.14–5.8)
RBC # UR STRIP: NORMAL /HPF
REF LAB TEST METHOD: NORMAL
SODIUM SERPL-SCNC: 146 MMOL/L (ref 136–145)
SP GR UR STRIP: 1.02 (ref 1–1.03)
SQUAMOUS #/AREA URNS HPF: NORMAL /HPF
T4 FREE SERPL-MCNC: 1.24 NG/DL (ref 0.93–1.7)
TRIGL SERPL-MCNC: 77 MG/DL (ref 0–150)
TSH SERPL DL<=0.05 MIU/L-ACNC: 3.03 UIU/ML (ref 0.27–4.2)
URATE SERPL-MCNC: 8.2 MG/DL (ref 3.4–7)
UROBILINOGEN UR QL STRIP: ABNORMAL
VLDLC SERPL-MCNC: 16 MG/DL (ref 5–40)
WBC # UR STRIP: NORMAL /HPF
WBC NRBC COR # BLD: 3.95 10*3/MM3 (ref 3.4–10.8)

## 2023-01-04 PROCEDURE — 85025 COMPLETE CBC W/AUTO DIFF WBC: CPT

## 2023-01-04 PROCEDURE — 86140 C-REACTIVE PROTEIN: CPT

## 2023-01-04 PROCEDURE — 83036 HEMOGLOBIN GLYCOSYLATED A1C: CPT

## 2023-01-04 PROCEDURE — 80053 COMPREHEN METABOLIC PANEL: CPT

## 2023-01-04 PROCEDURE — 84443 ASSAY THYROID STIM HORMONE: CPT

## 2023-01-04 PROCEDURE — 82043 UR ALBUMIN QUANTITATIVE: CPT

## 2023-01-04 PROCEDURE — 82550 ASSAY OF CK (CPK): CPT

## 2023-01-04 PROCEDURE — 36415 COLL VENOUS BLD VENIPUNCTURE: CPT

## 2023-01-04 PROCEDURE — 84550 ASSAY OF BLOOD/URIC ACID: CPT

## 2023-01-04 PROCEDURE — 84439 ASSAY OF FREE THYROXINE: CPT

## 2023-01-04 PROCEDURE — 80061 LIPID PANEL: CPT

## 2023-01-04 PROCEDURE — 85652 RBC SED RATE AUTOMATED: CPT

## 2023-01-04 PROCEDURE — 81001 URINALYSIS AUTO W/SCOPE: CPT

## 2023-01-04 PROCEDURE — 82570 ASSAY OF URINE CREATININE: CPT

## 2023-01-09 ENCOUNTER — TELEPHONE (OUTPATIENT)
Dept: INTERNAL MEDICINE | Facility: CLINIC | Age: 88
End: 2023-01-09

## 2023-01-09 NOTE — TELEPHONE ENCOUNTER
----- Message from Delilah Tatum MD sent at 10/27/2022  5:52 PM EDT -----  Needs repeat UDS  
Pt notified and verbalized understanding.   
Quality 111:Pneumonia Vaccination Status For Older Adults: Pneumococcal vaccine (PPSV23) was not administered on or after patient’s 60th birthday and before the end of the measurement period, reason not otherwise specified
Detail Level: Detailed
Additional Notes: Patient was told by primary care not to get flu or pneumonia vaccine
Quality 110: Preventive Care And Screening: Influenza Immunization: Influenza Immunization not Administered for Documented Reasons.

## 2023-01-09 NOTE — TELEPHONE ENCOUNTER
Caller: Taylor Hamilton    Relationship: Emergency Contact    Best call back number: [phone]    Requested Prescriptions:   Requested Prescriptions      No prescriptions requested or ordered in this encounter        Pharmacy where request should be sent: [facility]     Additional details provided by patient: PATIENT NEEDS REFILL ON LISINOPRIL, HE HAS 3 PILLS LEFT HOWEVER NOT ON  MEDICATION LIST AND TAYLOR DOES NOT KNOW THE MG. PLEASE ADVISE    Does the patient have less than a 3 day supply:  [] Yes  [x] No    Would you like a call back once the refill request has been completed: [x] Yes [] No    If the office needs to give you a call back, can they leave a voicemail: [x] Yes [] No    Juan Luis Eaton Rep   01/09/23 12:28 EST

## 2023-01-09 NOTE — TELEPHONE ENCOUNTER
Cardiologist Dr. Ramirez stopped the lisinopril and put him in entresto - see 11/4/21 cardiology note. She specifically said he was supposed to take entresto only and the family was supposed to make sure he was not taking both entresto and lisinopril    The lisinopril family of medication is in the Entresto; entresto is a combination medication used for heart failure.    Unless cardiology changed his medication and took him off of entresto.    Pls, pls dbl check he is taking entresto and NOT lisinopril. If unsure, please call son

## 2023-01-10 NOTE — TELEPHONE ENCOUNTER
PATIENT'S WIFE CALLED BACK AND I READ THE NOTE FROM DR BOTELLO INDICATING THAT HE HAS NOT BEEN ON LISINOPRIL SINCE 2021. THAT THE ENTRESTO TOOK THE PLACE OF THE LISINOPRIL PER HIS CARDIOLOGIST  I DID VERIFY WITH HER THAT HE DOES TAKE HIS ENTRESTO PER DOSING.

## 2023-01-11 NOTE — ASSESSMENT & PLAN NOTE
BP stable 118/68; cont metoprolol XL 50mg QD; also on bumex 0.5mg QD, spironolactone 6.25mg QD, entresto 97/103 BID

## 2023-01-11 NOTE — ASSESSMENT & PLAN NOTE
Judicious and appropriate use of zolpidem 10mg QHS #90, 0RF. LETHA was reviewed and appropriate  (hydrocodone per Dr. Granados in 4/22).  The patient has read and signed the [facility] Controlled Substance Contract 7/28/22. UDS 10/22 (+) lyrica but no zolpidem - will be unable to provide further prescriptions if not appropriate.  Patient has been counseled and is aware of side effects, risks, potential for addiction/tolerance, interactions, and how to take medication correctly.  RTC 3 mos for next RX with LETHA (contract 7/22)    Patient verbalizes understanding of additional risks of this medication in individuals > 65 yrs of age and wants to continue zolpidem 10mg QHS and lyrica 75mg BID with additional risks of falls, decreased mental clarity, confusion.

## 2023-01-11 NOTE — PROGRESS NOTES
ANNUAL WELLNESS VISIT    DRUG AND ALCOHOL USE      no tobacco use, alcohol intake:1 beers per month and caffeine intake: 1 cups of caffeinated coffee per day    DIET AND PHYSICAL ACTIVITY     Diet: general    Exercise: infrequently   Exercise Details: generally sedentary    MOOD DISORDER AND COGNITIVE SCREENING     PHQ-2 Depression Screening - components reviewed with patient; screening is negative for active depression.    Little interest or pleasure in doing things? 0-->not at all   Feeling down, depressed, or hopeless? 0-->not at all   PHQ-2 Total Score 0      Anxiety Screening Tool Used YES     AUDIT screening 1     Mini-Cog Performed   Yes    1. Tell Patient 3 Words apple car house    2. Administer Clock Test abnormal    3. Recall 3 words  apple car house    4. Number Correct Items 3    FUNCTIONAL ABILITY AND LEVEL OF SAFETY   Hearing no hearing loss     Wears Hearing Aids No       Current Activities Independent      none  - see Funct/Cog Status Intake     Fall Risk Assessment       Has difficulty with walking or balance  No         Timed Up and Go (TUG) Test  9 sec.       If >12 sec, normal    ADVANCED DIRECTIVE  Advance Care Planning   ACP discussion was held with the patient during this visit. Patient has an advance directive (not in EMR), copy requested.  Advance Care Planning Discussion:  16 min or more spent on counseling; patient has advanced directive and living will.  Reviewed desires for end of life care, which is to have comfort care.  Patient does not want extraordinary life-sustaining measures, including no prolonged artificial life support. Encouraged patient to ensure family is aware of desires/preferences. Confirmed wife is his healthcare surrogate and son Josue is the alternate. Request that he bring copy for [facility] records.    PAIN SCREENING Do you have pain right now? no      If so, 1-10 scale: 0     Intermittent     Do you have pain every day? No      Probable chronic pain: No     Recent  Hospitalizations:  No recent hospitalization(s)..     MEDICATION REVIEW   - updated and reviewed (see Medication List).   - reviewed for potentially harmful drug-disease interactions in the elderly.   - reviewed for high risk medications in the elderly.   - aspirin use: Yes, on ASA 81mg QD; also on clopidogrel 75mg QD    BMI  Body mass index is 27.04 kg/m².  BMI is >= 25 and <30. (Overweight) The following options were offered after discussion;: exercise counseling/recommendations and nutrition counseling/recommendations       _________________________________________________________    Chief Complaint   Patient presents with   • Medicare Wellness-subsequent   • Insomnia   • Diabetes       History of Present Illness  88 y.o.  gentleman, accompanied by his wife, presents for updated phys examination and f/u on BP, sugars, cholesterol, chronic insomnia and neuropathy.     See abnormal clock - wife is not worried about his memory.     Wife states he has been taking both entresto and lisinopril for awhile now, but has stopped since I told them to stop lisinopril (per instructions from Dr. Ramirez).    Review of Systems  Denies CP, palpitations, SOB. ROS (+) for chronic insomnia, stable on zolpidem.  ROS (+) for easy bruising, stable. All other ROS reviewed and negative.    Current Outpatient Medications:   •  aspirin 81 MG QD  •  atorvastatin (LIPITOR) 40 MG QD  •  bumetanide (BUMEX) 1 MG 1/2 QD  •  cholecalciferol (VITAMIN D3) 10 MCG (400 UNIT) QD  •  clopidogrel (PLAVIX) 75 MGQD  •  guaiFENesin (MUCINEX) 600 MG bid prn  •  ketoconazole (NIZORAL) 2 % shampoo AD  •  levothyroxine  50 MCG QD  •  loratadine (Claritin) 5 MG QD  •  magnesium oxide (MAG-OX) 400 MG BID  •  metoprolol succinate XL  50 MG QD per Dr. Ramirez  •  omeprazole (priLOSEC) 20 MG QD  •  Pancrelipase, Lip-Prot-Amyl, (ZENPEP) 53857-341434 units capsule 3 QD  •  pregabalin (Lyrica) 75 MG BID  •  sacubitril-valsartan (ENTRESTO)  MG BID  •   "Spiriva Respimat 2.5 MCG/ACT aerosol solution inhaler 2 puffs QD  •  spironolactone 12.5 MG 1/2 QD  •  tamsulosin (FLOMAX) 0.4 MG QHS  •  zolpidem (AMBIEN) 10 MG QHS      VITALS:  /68   Pulse 51   Ht 163.8 cm (64.5\")   Wt 72.6 kg (160 lb)   SpO2 98%   BMI 27.04 kg/m²     Physical Exam  Vitals and nursing note reviewed.   Constitutional:       General: He is not in acute distress.     Appearance: Normal appearance. He is well-developed.   HENT:      Head: Normocephalic.      Right Ear: Ear canal and external ear normal.      Left Ear: Ear canal and external ear normal.      Ears:      Comments: bilat auditory canals mostly occluded by cerumen     Nose: Nose normal.   Eyes:      Extraocular Movements: Extraocular movements intact.      Conjunctiva/sclera: Conjunctivae normal.      Pupils: Pupils are equal, round, and reactive to light.      Comments: Wearing glasses   Neck:      Vascular: No carotid bruit (bilaterally).   Cardiovascular:      Rate and Rhythm: Regular rhythm. Bradycardia present.      Heart sounds: Normal heart sounds.   Pulmonary:      Effort: Pulmonary effort is normal. No respiratory distress.      Breath sounds: Normal breath sounds. No wheezing or rales.   Abdominal:      General: Bowel sounds are normal. There is no distension.      Palpations: Abdomen is soft. There is no mass.      Tenderness: There is no abdominal tenderness.   Musculoskeletal:      Cervical back: Normal range of motion and neck supple.      Right lower leg: Edema (trace BLE edema) present.      Left lower leg: Edema present.   Lymphadenopathy:      Cervical: No cervical adenopathy.   Skin:     General: Skin is warm and dry.      Findings: No rash.   Neurological:      Mental Status: He is alert and oriented to person, place, and time.      Cranial Nerves: No cranial nerve deficit.      Deep Tendon Reflexes: Reflexes normal.   Psychiatric:         Mood and Affect: Mood normal.         Behavior: Behavior normal. "           LABS  Results for orders placed or performed in visit on 01/04/23   Comprehensive Metabolic Panel    Specimen: Blood   Result Value Ref Range    Glucose 99 65 - 99 mg/dL    BUN 24 (H) 8 - 23 mg/dL    Creatinine 1.23 0.76 - 1.27 mg/dL    Sodium 146 (H) 136 - 145 mmol/L    Potassium 4.5 3.5 - 5.2 mmol/L    Chloride 105 98 - 107 mmol/L    CO2 29.5 (H) 22.0 - 29.0 mmol/L    Calcium 8.6 8.6 - 10.5 mg/dL    Total Protein 5.9 (L) 6.0 - 8.5 g/dL    Albumin 4.1 3.5 - 5.2 g/dL    ALT (SGPT) 11 1 - 41 U/L    AST (SGOT) 13 1 - 40 U/L    Alkaline Phosphatase 59 39 - 117 U/L    Total Bilirubin 0.4 0.0 - 1.2 mg/dL    Globulin 1.8 gm/dL    A/G Ratio 2.3 g/dL    BUN/Creatinine Ratio 19.5 7.0 - 25.0    Anion Gap 11.5 5.0 - 15.0 mmol/L    eGFR 56.5 (L) >60.0 mL/min/1.73   Lipid Panel    Specimen: Blood   Result Value Ref Range    Total Cholesterol 106 0 - 200 mg/dL    Triglycerides 77 0 - 150 mg/dL    HDL Cholesterol 46 40 - 60 mg/dL    LDL Cholesterol  44 0 - 100 mg/dL    VLDL Cholesterol 16 5 - 40 mg/dL    LDL/HDL Ratio 0.97    TSH    Specimen: Blood   Result Value Ref Range    TSH 3.030 0.270 - 4.200 uIU/mL   Microalbumin / Creatinine Urine Ratio - Urine, Clean Catch    Specimen: Urine, Clean Catch   Result Value Ref Range    Microalbumin/Creatinine Ratio      Creatinine, Urine 144.9 mg/dL    Microalbumin, Urine <1.2 mg/dL   Hemoglobin A1c    Specimen: Blood   Result Value Ref Range    Hemoglobin A1C 6.00 (H) 4.80 - 5.60 %   CK    Specimen: Blood   Result Value Ref Range    Creatine Kinase 43 20 - 200 U/L   T4, Free    Specimen: Blood   Result Value Ref Range    Free T4 1.24 0.93 - 1.70 ng/dL   Uric Acid    Specimen: Blood   Result Value Ref Range    Uric Acid 8.2 (H) 3.4 - 7.0 mg/dL   Sedimentation Rate    Specimen: Blood   Result Value Ref Range    Sed Rate 2 0 - 20 mm/hr   C-reactive Protein    Specimen: Blood   Result Value Ref Range    C-Reactive Protein <0.30 0.00 - 0.50 mg/dL   CBC Auto Differential    Specimen:  Blood   Result Value Ref Range    WBC 3.95 3.40 - 10.80 10*3/mm3    RBC 4.13 (L) 4.14 - 5.80 10*6/mm3    Hemoglobin 11.0 (L) 13.0 - 17.7 g/dL    Hematocrit 35.2 (L) 37.5 - 51.0 %    MCV 85.2 79.0 - 97.0 fL    MCH 26.6 26.6 - 33.0 pg    MCHC 31.3 (L) 31.5 - 35.7 g/dL    RDW 12.6 12.3 - 15.4 %    RDW-SD 39.3 37.0 - 54.0 fl    MPV 10.4 6.0 - 12.0 fL    Platelets 124 (L) 140 - 450 10*3/mm3    Neutrophil % 51.3 42.7 - 76.0 %    Lymphocyte % 39.0 19.6 - 45.3 %    Monocyte % 8.4 5.0 - 12.0 %    Eosinophil % 0.5 0.3 - 6.2 %    Basophil % 0.3 0.0 - 1.5 %    Neutrophils, Absolute 2.03 1.70 - 7.00 10*3/mm3    Lymphocytes, Absolute 1.54 0.70 - 3.10 10*3/mm3    Monocytes, Absolute 0.33 0.10 - 0.90 10*3/mm3    Eosinophils, Absolute 0.02 0.00 - 0.40 10*3/mm3    Basophils, Absolute 0.01 0.00 - 0.20 10*3/mm3   Urinalysis without microscopic (no culture) - Urine, Clean Catch    Specimen: Urine, Clean Catch   Result Value Ref Range    Color, UA Yellow Yellow, Straw    Appearance, UA Clear Clear    pH, UA 6.5 5.0 - 8.0    Specific Gravity, UA 1.023 1.005 - 1.030    Glucose, UA Negative Negative    Ketones, UA Trace (A) Negative    Bilirubin, UA Negative Negative    Blood, UA Negative Negative    Protein, UA Trace (A) Negative    Leuk Esterase, UA Negative Negative    Nitrite, UA Negative Negative    Urobilinogen, UA 0.2 E.U./dL 0.2 - 1.0 E.U./dL   Urinalysis, Microscopic Only - Urine, Clean Catch    Specimen: Urine, Clean Catch   Result Value Ref Range    RBC, UA None Seen None Seen, 0-2 /HPF    WBC, UA None Seen None Seen, 0-2 /HPF    Bacteria, UA None Seen None Seen /HPF    Squamous Epithelial Cells, UA None Seen None Seen, 0-2 /HPF    Hyaline Casts, UA None Seen None Seen /LPF    Methodology Manual Light Microscopy      9/22 Cr 1.06, GFR 56.5    7/22 A1C 6.0    4.22 uric 7.3      ECG 12 Lead    Date/Time: 1/12/2023 11:25 AM  Performed by: Delilah Tatum MD  Authorized by: Delilah Tatum MD   Comparison: compared with previous ECG  from 4/5/2022  Similar to previous ECG  Rhythm: sinus rhythm and sinus bradycardia  Rate: normal  BPM: 46  Conduction: conduction normal  Q waves: V1 and V2    ST Segments: ST segments normal  T Waves: T waves normal  QRS axis: left  Other findings: poor R wave progression  Clinical impression comment: stable EKG              ASSESSMENT/PLAN    Diagnoses and all orders for this visit:    1. Medicare annual wellness visit, subsequent (Primary)  Assessment & Plan:  Health maintenance - COVID19 vacc done, incl 4th dose booster, but strongly rec getting new COVID19 vacc (GIVEN TODAY); flu vacc 10/22; Prevnar 12/15, PVX 12/14, Td due (last Tdap 10/11, good for 10 yrs) - rec getting at pharmacy; Zostavax 9/09; HAV done; rec Shingrix - counseling given; colonosc 8/22,  per Dr. Holland; no further prostate CA screening w/ age/comorbidities, confirmed with pt; eye exam UTD per pt; dental exam UTD per pt; (+) seat belt use    Consultants:  Patient Care Team:  Delilah Tatum MD as PCP - General  Burton Holland MD as Consulting Physician (Colon and Rectal Surgery)  Douglas Lazaro MD as Consulting Physician (General Surgery)  Rubio Rossi MD as Consulting Physician (Dermatology)  Jim Black MD as Consulting Physician (Cardiothoracic Surgery)  Yusef Bowles MD as Consulting Physician (Nephrology)  Tio Mcnally MD (Inactive) as Consulting Physician (Hand Surgery)  Chayito Quick OD (Optometry)  Radha Hirsch APRN as Nurse Practitioner (Otolaryngology)  Dionisio Valenzuela MD as Consulting Physician (Ophthalmology)  Elisa Conde MD as Consulting Physician (Pulmonary Disease)  Dillon Judge MD as Consulting Physician (Anesthesiology)  Jamey Gonzalez IV, MD as Consulting Physician (Interventional Cardiology)  Jamey Gonzalez IV, MD as Consulting Physician (Interventional Cardiology)  Gregory Granados Jr., MD as Consulting Physician (Orthopedic  Surgery)            2. Type 2 diabetes mellitus with diabetic polyneuropathy, without long-term current use of insulin (HCC)  Assessment & Plan:  BG control stable with A1C 6.0; no meds; encouraged reg phys activity to decr insulin resistance, moderation in unhealthy starches/sweets; f/u A1C in 6 mos        3. Hyperlipidemia LDL goal <70  Assessment & Plan:  Lipids stable and at goal with LDL 44; cont atorvastatin 40mg QD #90, 3RF    Orders:  -     atorvastatin (LIPITOR) 40 MG tablet; Take 1 tablet by mouth Daily.  Dispense: 90 tablet; Refill: 3    4. Essential hypertension  Assessment & Plan:  BP stable 118/68; cont metoprolol XL 50mg QD; also on bumex 0.5mg QD, spironolactone 6.25mg QD, entresto 97/103 BID    Orders:  -     ECG 12 Lead    5. Thrombocytopenia (HCC)  Assessment & Plan:  Chronic/stable plts 124k      6. Hypothyroidism  Assessment & Plan:  Euthyroid; cont levothyroxine 50mcg QD #90, 3RF    Orders:  -     levothyroxine (SYNTHROID, LEVOTHROID) 50 MCG tablet; Take 1 tablet by mouth Daily.  Dispense: 90 tablet; Refill: 3    7. Stage 3b chronic kidney disease (HCC)  Assessment & Plan:  Renal function worsened with Cr 1.23, GFR 56.5; advised to drink enough water daily; patient already avoiding NSAIDs; repeat BMP in well-hydrated/nonfasting state      8. Microalbuminuria  Assessment & Plan:  Undetectable microalb/Cr; cont with good BG and BP control; note worsened renal function - will repeat BMP      9. Gout  Assessment & Plan:  asx but worsened uric 8.4 with goal < 6.0; also likely exac'd by diuretics; pt opts to forego meds; rec gout diet      10. Diabetic peripheral neuropathy (HCC)  Assessment & Plan:  Cont'd judicious and appropriate use of lyrica 75mg BID# 180, 0RF (exception made due to insurance). LETHA was reviewed and appropriate (hydrocodone per Dr. Granados in 4/22).  The patient has read and signed the [facility] Controlled Substance Contract 7/28/22. UDS 10/22 (+) lyrica but no zolpidem -  will be unable to provide further prescriptions if not appropriate. Patient has been counseled and is aware of side effects, risks, potential for addiction/tolerance, interactions, and how to take medication correctly.  RTC 3 mos for next RX with LETHA (contract 7/22)     Patient verbalizes understanding of additional risks of this medication in individuals > 65 yrs of age and wants to continue lyrica 75mg BID and zolpidem 10mg QHS    Orders:  -     pregabalin (Lyrica) 75 MG capsule; Take 1 capsule by mouth 2 (Two) Times a Day.  Dispense: 180 capsule; Refill: 0    11. Insomnia  Assessment & Plan:  Judicious and appropriate use of zolpidem 10mg QHS #90, 0RF. LETHA was reviewed and appropriate  (hydrocodone per Dr. Granados in 4/22).  The patient has read and signed the [facility] Controlled Substance Contract 7/28/22. UDS 10/22 (+) lyrica but no zolpidem - will be unable to provide further prescriptions if not appropriate.  Patient has been counseled and is aware of side effects, risks, potential for addiction/tolerance, interactions, and how to take medication correctly.  RTC 3 mos for next RX with LETHA (contract 7/22)    Patient verbalizes understanding of additional risks of this medication in individuals > 65 yrs of age and wants to continue zolpidem 10mg QHS and lyrica 75mg BID with additional risks of falls, decreased mental clarity, confusion.      Orders:  -     zolpidem (AMBIEN) 10 MG tablet; Take 1 tablet by mouth Every Night.  Dispense: 90 tablet; Refill: 0    12. BPH  Assessment & Plan:  Stable on tamsulosin 0.4mg QHS #90, 3RF    Orders:  -     tamsulosin (FLOMAX) 0.4 MG capsule 24 hr capsule; Take 1 capsule by mouth Daily.  Dispense: 90 capsule; Refill: 3    13. Gastroesophageal reflux disease without esophagitis  Assessment & Plan:  Stable on omeprazole 20mg QD #90, 3RF    Orders:  -     omeprazole (priLOSEC) 20 MG capsule; Take 1 capsule by mouth Daily.  Dispense: 90 capsule; Refill: 3    14.  Long-term use of high-risk medication  -     Compliance Drug Analysis, Ur - Urine, Clean Catch; Future  -     Compliance Drug Analysis, Ur - Urine, Clean Catch    15. Need for vaccination  -     COVID-19 Bivalent Booster (Pfizer) 12+yrs      FOLLOW-UP  1. Needs appropriate UDS  2. Needs to drop by nonfasting/well-hydrated for f/u BMP  3. RTC 3 mos for next zolpidem and lyrica RXs with LETHA if appropriate UDS  4. Will also need f/u A1C, BMP, uric in 6 mos    Electronically signed by:    Delilah Tatum MD, FACP  01/12/2023

## 2023-01-11 NOTE — ASSESSMENT & PLAN NOTE
Health maintenance - COVID19 vacc done, incl 4th dose booster, but strongly rec getting new COVID19 vacc (GIVEN TODAY); flu vacc 10/22; Prevnar 12/15, PVX 12/14, Td due (last Tdap 10/11, good for 10 yrs) - rec getting at pharmacy; Zostavax 9/09; HAV done; rec Shingrix - counseling given; colonosc 8/22,  per Dr. Holland; no further prostate CA screening w/ age/comorbidities, confirmed with pt; eye exam UTD per pt; dental exam UTD per pt; (+) seat belt use    Consultants:  Patient Care Team:  Delilah Tatum MD as PCP - General  Burton Holland MD as Consulting Physician (Colon and Rectal Surgery)  Douglas Lazaro MD as Consulting Physician (General Surgery)  Rubio Rossi MD as Consulting Physician (Dermatology)  Jim Black MD as Consulting Physician (Cardiothoracic Surgery)  Yusef Bowles MD as Consulting Physician (Nephrology)  Tio Mcnally MD (Inactive) as Consulting Physician (Hand Surgery)  Chayito Quick OD (Optometry)  Radha Hirsch APRN as Nurse Practitioner (Otolaryngology)  Dionisio Valenzuela MD as Consulting Physician (Ophthalmology)  Elisa Conde MD as Consulting Physician (Pulmonary Disease)  Dillon Judge MD as Consulting Physician (Anesthesiology)  Jamey Gonzalez IV, MD as Consulting Physician (Interventional Cardiology)  Jamey Gonzalez IV, MD as Consulting Physician (Interventional Cardiology)  Gregory Granados Jr., MD as Consulting Physician (Orthopedic Surgery)

## 2023-01-11 NOTE — ASSESSMENT & PLAN NOTE
asx but worsened uric 8.4 with goal < 6.0; also likely exac'd by diuretics; pt opts to forego meds; rec gout diet

## 2023-01-11 NOTE — ASSESSMENT & PLAN NOTE
Cont'd judicious and appropriate use of lyrica 75mg BID# 180, 0RF (exception made due to insurance). LETHA was reviewed and appropriate (hydrocodone per Dr. Granados in 4/22).  The patient has read and signed the [facility] Controlled Substance Contract 7/28/22. UDS 10/22 (+) lyrica but no zolpidem - will be unable to provide further prescriptions if not appropriate. Patient has been counseled and is aware of side effects, risks, potential for addiction/tolerance, interactions, and how to take medication correctly.  RTC 3 mos for next RX with LETHA (contract 7/22)     Patient verbalizes understanding of additional risks of this medication in individuals > 65 yrs of age and wants to continue lyrica 75mg BID and zolpidem 10mg QHS

## 2023-01-11 NOTE — ASSESSMENT & PLAN NOTE
BG control stable with A1C 6.0; no meds; encouraged reg phys activity to decr insulin resistance, moderation in unhealthy starches/sweets; f/u A1C in 6 mos

## 2023-01-11 NOTE — ASSESSMENT & PLAN NOTE
Renal function worsened with Cr 1.23, GFR 56.5; advised to drink enough water daily; patient already avoiding NSAIDs; repeat BMP in well-hydrated/nonfasting state

## 2023-01-11 NOTE — ASSESSMENT & PLAN NOTE
Undetectable microalb/Cr; cont with good BG and BP control; note worsened renal function - will repeat BMP

## 2023-01-12 ENCOUNTER — OFFICE VISIT (OUTPATIENT)
Dept: INTERNAL MEDICINE | Facility: CLINIC | Age: 88
End: 2023-01-12
Payer: MEDICARE

## 2023-01-12 VITALS
DIASTOLIC BLOOD PRESSURE: 68 MMHG | BODY MASS INDEX: 26.66 KG/M2 | HEART RATE: 51 BPM | WEIGHT: 160 LBS | OXYGEN SATURATION: 98 % | SYSTOLIC BLOOD PRESSURE: 118 MMHG | HEIGHT: 65 IN

## 2023-01-12 DIAGNOSIS — M10.042 ACUTE IDIOPATHIC GOUT OF LEFT HAND: Chronic | ICD-10-CM

## 2023-01-12 DIAGNOSIS — F51.01 PRIMARY INSOMNIA: Chronic | ICD-10-CM

## 2023-01-12 DIAGNOSIS — E11.42 TYPE 2 DIABETES MELLITUS WITH DIABETIC POLYNEUROPATHY, WITHOUT LONG-TERM CURRENT USE OF INSULIN: Chronic | ICD-10-CM

## 2023-01-12 DIAGNOSIS — D69.6 THROMBOCYTOPENIA: ICD-10-CM

## 2023-01-12 DIAGNOSIS — E78.5 HYPERLIPIDEMIA LDL GOAL <70: Chronic | ICD-10-CM

## 2023-01-12 DIAGNOSIS — E03.9 ACQUIRED HYPOTHYROIDISM: Chronic | ICD-10-CM

## 2023-01-12 DIAGNOSIS — Z79.899 LONG-TERM USE OF HIGH-RISK MEDICATION: ICD-10-CM

## 2023-01-12 DIAGNOSIS — N40.1 BENIGN NON-NODULAR PROSTATIC HYPERPLASIA WITH LOWER URINARY TRACT SYMPTOMS: ICD-10-CM

## 2023-01-12 DIAGNOSIS — Z23 NEED FOR VACCINATION: ICD-10-CM

## 2023-01-12 DIAGNOSIS — K21.9 GASTROESOPHAGEAL REFLUX DISEASE WITHOUT ESOPHAGITIS: ICD-10-CM

## 2023-01-12 DIAGNOSIS — R80.9 MICROALBUMINURIA: Chronic | ICD-10-CM

## 2023-01-12 DIAGNOSIS — N18.32 STAGE 3B CHRONIC KIDNEY DISEASE: Chronic | ICD-10-CM

## 2023-01-12 DIAGNOSIS — E11.42 DIABETIC PERIPHERAL NEUROPATHY: Chronic | ICD-10-CM

## 2023-01-12 DIAGNOSIS — I10 ESSENTIAL HYPERTENSION: Chronic | ICD-10-CM

## 2023-01-12 DIAGNOSIS — Z00.00 MEDICARE ANNUAL WELLNESS VISIT, SUBSEQUENT: Primary | Chronic | ICD-10-CM

## 2023-01-12 PROCEDURE — 91312 COVID-19 (PFIZER) BIVALENT BOOSTER 12+YRS: CPT | Performed by: INTERNAL MEDICINE

## 2023-01-12 PROCEDURE — 1159F MED LIST DOCD IN RCRD: CPT | Performed by: INTERNAL MEDICINE

## 2023-01-12 PROCEDURE — 93000 ELECTROCARDIOGRAM COMPLETE: CPT | Performed by: INTERNAL MEDICINE

## 2023-01-12 PROCEDURE — 99497 ADVNCD CARE PLAN 30 MIN: CPT | Performed by: INTERNAL MEDICINE

## 2023-01-12 PROCEDURE — 1170F FXNL STATUS ASSESSED: CPT | Performed by: INTERNAL MEDICINE

## 2023-01-12 PROCEDURE — 80307 DRUG TEST PRSMV CHEM ANLYZR: CPT | Performed by: INTERNAL MEDICINE

## 2023-01-12 PROCEDURE — G0439 PPPS, SUBSEQ VISIT: HCPCS | Performed by: INTERNAL MEDICINE

## 2023-01-12 PROCEDURE — G0483 DRUG TEST DEF 22+ CLASSES: HCPCS | Performed by: INTERNAL MEDICINE

## 2023-01-12 PROCEDURE — 99397 PER PM REEVAL EST PAT 65+ YR: CPT | Performed by: INTERNAL MEDICINE

## 2023-01-12 PROCEDURE — 1126F AMNT PAIN NOTED NONE PRSNT: CPT | Performed by: INTERNAL MEDICINE

## 2023-01-12 PROCEDURE — 0124A PR ADM SARSCOV2 30MCG/0.3ML BST: CPT | Performed by: INTERNAL MEDICINE

## 2023-01-12 RX ORDER — PREGABALIN 75 MG/1
75 CAPSULE ORAL 2 TIMES DAILY
Qty: 180 CAPSULE | Refills: 0 | Status: SHIPPED | OUTPATIENT
Start: 2023-01-12

## 2023-01-12 RX ORDER — OMEPRAZOLE 20 MG/1
20 CAPSULE, DELAYED RELEASE ORAL DAILY
Qty: 90 CAPSULE | Refills: 3 | Status: SHIPPED | OUTPATIENT
Start: 2023-01-12

## 2023-01-12 RX ORDER — ZOLPIDEM TARTRATE 10 MG/1
10 TABLET ORAL NIGHTLY
Qty: 90 TABLET | Refills: 0 | Status: SHIPPED | OUTPATIENT
Start: 2023-01-12

## 2023-01-12 RX ORDER — ATORVASTATIN CALCIUM 40 MG/1
40 TABLET, FILM COATED ORAL DAILY
Qty: 90 TABLET | Refills: 3 | Status: SHIPPED | OUTPATIENT
Start: 2023-01-12 | End: 2023-02-03 | Stop reason: SDUPTHER

## 2023-01-12 RX ORDER — LEVOTHYROXINE SODIUM 0.05 MG/1
50 TABLET ORAL DAILY
Qty: 90 TABLET | Refills: 3 | Status: SHIPPED | OUTPATIENT
Start: 2023-01-12

## 2023-01-12 RX ORDER — TAMSULOSIN HYDROCHLORIDE 0.4 MG/1
1 CAPSULE ORAL DAILY
Qty: 90 CAPSULE | Refills: 3 | Status: SHIPPED | OUTPATIENT
Start: 2023-01-12

## 2023-01-12 NOTE — PROGRESS NOTES
Immunization  Immunization performed in left deltoid by Marge Ybarra MA. Patient tolerated the procedure well without complications.  01/12/23   Marge Ybarra MA

## 2023-01-25 NOTE — TELEPHONE ENCOUNTER
Caller: Taylor Hamilton    Relationship to patient: Emergency Contact    Best call back number: [phone]    Chief complaint: lung issues    Type of visit: office visit    Requested date: ASAP       without difficulty

## 2023-01-26 LAB — DRUGS UR: NORMAL

## 2023-02-03 ENCOUNTER — TELEPHONE (OUTPATIENT)
Dept: INTERNAL MEDICINE | Facility: CLINIC | Age: 88
End: 2023-02-03
Payer: MEDICARE

## 2023-02-03 DIAGNOSIS — E78.5 HYPERLIPIDEMIA LDL GOAL <70: Chronic | ICD-10-CM

## 2023-02-03 RX ORDER — ATORVASTATIN CALCIUM 40 MG/1
40 TABLET, FILM COATED ORAL DAILY
Qty: 7 TABLET | Refills: 0 | Status: SHIPPED | OUTPATIENT
Start: 2023-02-03

## 2023-02-03 NOTE — TELEPHONE ENCOUNTER
Caller: Taylor Hamilton    Relationship: Emergency Contact    Best call back number: [phone]    Requested Prescriptions:   Requested Prescriptions     Pending Prescriptions Disp Refills   • atorvastatin (LIPITOR) 40 MG tablet 90 tablet 3     Sig: Take 1 tablet by mouth Daily.        Pharmacy where request should be sent: [id number] - [place], [place] - [address] AT [facility] [address] [facility] [phone] [facility] [phone] FX     Additional details provided by patient: PATIENT HAS 2 DAYS REMAINING.    PATIENT'S WIFE REQUESTED A 7 DAY SHORT SUPPLY OF THIS MEDICATION PER HER PHARMACY AND SHE ALSO STATED THAT THIS MEDICATION DOES NOT HAVE ANY MORE REFILLS.     Does the patient have less than a 3 day supply:  [x] Yes  [] No    Would you like a call back once the refill request has been completed: [x] Yes [] No    If the office needs to give you a call back, can they leave a voicemail: [x] Yes [] No    Juan Luis Desai Rep   02/03/23 10:51 EST

## 2023-04-15 PROBLEM — C44.229 SCC (SQUAMOUS CELL CARCINOMA), EAR, LEFT: Status: ACTIVE | Noted: 2023-04-15

## 2023-04-15 PROBLEM — C44.712 BASAL CELL CARCINOMA (BCC) OF SKIN OF RIGHT LOWER EXTREMITY INCLUDING HIP: Status: ACTIVE | Noted: 2023-04-15

## 2023-04-15 NOTE — ASSESSMENT & PLAN NOTE
Judicious and appropriate use of zolpidem 10mg QHS #90, 0RF. LETHA was reviewed and appropriate  (hydrocodone per Dr. Granados in 4/22).  The patient has read and signed the [facility] Controlled Substance Contract 7/28/22. UDS 1/23.  Patient has been counseled and is aware of side effects, risks, potential for addiction/tolerance, interactions, and how to take medication correctly.  RTC 3 mos for next RX with LETHA and updated contract    Patient verbalizes understanding of additional risks of this medication in individuals > 65 yrs of age and wants to continue zolpidem 10mg QHS and lyrica 75mg BID with additional risks of falls, decreased mental clarity, confusion.

## 2023-04-15 NOTE — ASSESSMENT & PLAN NOTE
Still needs f/u BMP in nonfasting state; encouraged patient again to drink enough water on daily basis

## 2023-04-15 NOTE — ASSESSMENT & PLAN NOTE
Cont'd judicious and appropriate use of lyrica 75mg BID# 180, 0RF (exception made due to insurance). LETHA was reviewed and appropriate (hydrocodone per Dr. Granados in 4/22).  The patient has read and signed the [facility] Controlled Substance Contract 7/28/22. UDS 1/23. Patient has been counseled and is aware of side effects, risks, potential for addiction/tolerance, interactions, and how to take medication correctly.  RTC 3 mos for next RX with LETHA and contract     Patient verbalizes understanding of additional risks of this medication in individuals > 65 yrs of age and wants to continue lyrica 75mg BID and zolpidem 10mg QHS

## 2023-04-15 NOTE — PROGRESS NOTES
"Chief Complaint   Patient presents with   • Insomnia       History of Present Illness  89 y.o.  gentleman, accompanied by his wife, presents for zolpidem and lyrica refill. Denies s/e with either medication and wants to continue.    Got Shingrix last week. Still has knot on his right arm.    Reports increased hernia right groin, described as burning sensation, symptomatic when he lifts 45-50 lbs. Reports nothing is pushing out in that area.     Review of Systems  ROS (+) for chronic insomnia.  Denies falls. ROS (+)  for burning pain in right groin d/t hernia.All other ROS reviewed and negative.    Current Outpatient Medications:   •  aspirin 81 MG QD  •  atorvastatin (LIPITOR) 40 MG QD  •  bumetanide (BUMEX) 1 MG 1/2 QD  •  cholecalciferol (VITAMIN D3) 10 MCG (400 UNIT) QD  •  clopidogrel (PLAVIX) 75 MG QD  •  guaiFENesin (MUCINEX) 600 MG prn  •  ketoconazole (NIZORAL) 2 % shampoo AD  •  levothyroxine 50 MCG QD  •  loratadine (Claritin) 5 MG QD  •  metoprolol succinate XL 50 MG QD  •  omeprazole (priLOSEC) 20 MG QD  •  Pancrelipase, Lip-Prot-Amyl, (ZENPEP) 88365-840897 units 3 QD  •  pregabalin (Lyrica) 75 MG BID  •  sacubitril-valsartan (ENTRESTO)  MG QD  •  spironolactone (ALDACTONE) 12.5 MG QD  •  zolpidem (AMBIEN) 10 MG QHS    VITALS:  /62   Pulse (!) 48   Ht 163.8 cm (64.5\")   Wt 73 kg (161 lb)   SpO2 100%   BMI 27.21 kg/m²     Physical Exam  Vitals and nursing note reviewed.   Constitutional:       General: He is not in acute distress.     Appearance: Normal appearance. He is not ill-appearing.   Eyes:      Extraocular Movements: Extraocular movements intact.      Conjunctiva/sclera: Conjunctivae normal.   Pulmonary:      Effort: Pulmonary effort is normal. No respiratory distress.   Neurological:      Mental Status: He is alert and oriented to person, place, and time. Mental status is at baseline.   Psychiatric:         Mood and Affect: Mood normal.         Behavior: Behavior normal. "         LABS  Results for orders placed or performed in visit on 01/12/23   Compliance Drug Analysis, Ur - Urine, Clean Catch    Specimen: Urine, Clean Catch   Result Value Ref Range    Report Summary FINAL      1/4/23 A1C 6.0, Cr 1.23, GFR 56.5, uric 8.2    ASSESSMENT/PLAN    Diagnoses and all orders for this visit:    1. Diabetic peripheral neuropathy (Primary)  Assessment & Plan:  Cont'd judicious and appropriate use of lyrica 75mg BID# 180, 0RF (exception made due to insurance). LETHA was reviewed and appropriate (hydrocodone per Dr. Granados in 4/22).  The patient has read and signed the [facility] Substance Contract 7/28/22. UDS 1/23. Patient has been counseled and is aware of side effects, risks, potential for addiction/tolerance, interactions, and how to take medication correctly.  RTC 3 mos for next RX with LETHA and contract     Patient verbalizes understanding of additional risks of this medication in individuals > 65 yrs of age and wants to continue lyrica 75mg BID and zolpidem 10mg QHS    Orders:  -     pregabalin (Lyrica) 75 MG capsule; Take 1 capsule by mouth 2 (Two) Times a Day.  Dispense: 180 capsule; Refill: 0    2. Insomnia  Assessment & Plan:  Judicious and appropriate use of zolpidem 10mg QHS #90, 0RF. LETHA was reviewed and appropriate  (hydrocodone per Dr. Granados in 4/22).  The patient has read and signed the [facility] Substance Contract 7/28/22. UDS 1/23.  Patient has been counseled and is aware of side effects, risks, potential for addiction/tolerance, interactions, and how to take medication correctly.  RTC 3 mos for next RX with LETHA and updated contract    Patient verbalizes understanding of additional risks of this medication in individuals > 65 yrs of age and wants to continue zolpidem 10mg QHS and lyrica 75mg BID with additional risks of falls, decreased mental clarity, confusion.      Orders:  -     zolpidem (AMBIEN) 10 MG tablet; Take 1 tablet by  mouth Every Night.  Dispense: 90 tablet; Refill: 0    3. Stage 3b chronic kidney disease  Assessment & Plan:  Still needs f/u BMP in nonfasting state; encouraged patient again to drink enough water on daily basis    Orders:  -     Basic Metabolic Panel; Future    4. Vaccine counseling  Comments:  reminder overdue for Td - rec getting at pharmacy; Shingrix #2 due 2-6 mos after 1st shot    5. Right inguinal hernia  Assessment & Plan:  rec limiting lifting/straining; rec consideration for inguinal hernia girdle for support; if sxs worsen, rec surg consultation - with his med hx, benefits need to outweigh the risks        FOLLOW-UP  1. Health maintenance - COVID19 vacc done, incl new COVID19 vacc; reminder long overdue for Td (had Tdap 10/11, good for 10 yrs); Shingrix #1 don  2. BMP on the way out the door  3. RTC 3 mos for zolpidem RX with LETHA/contract (UDS 1/23) and A1C, BMP, uric acid level    Electronically signed by:    Delilah Tatum MD, FACP  04/17/2023

## 2023-04-17 ENCOUNTER — LAB (OUTPATIENT)
Dept: LAB | Facility: HOSPITAL | Age: 88
End: 2023-04-17
Payer: MEDICARE

## 2023-04-17 ENCOUNTER — OFFICE VISIT (OUTPATIENT)
Dept: INTERNAL MEDICINE | Facility: CLINIC | Age: 88
End: 2023-04-17
Payer: MEDICARE

## 2023-04-17 VITALS
OXYGEN SATURATION: 100 % | WEIGHT: 161 LBS | SYSTOLIC BLOOD PRESSURE: 110 MMHG | DIASTOLIC BLOOD PRESSURE: 62 MMHG | BODY MASS INDEX: 26.82 KG/M2 | HEIGHT: 65 IN | HEART RATE: 48 BPM

## 2023-04-17 DIAGNOSIS — Z71.85 VACCINE COUNSELING: ICD-10-CM

## 2023-04-17 DIAGNOSIS — K40.90 RIGHT INGUINAL HERNIA: ICD-10-CM

## 2023-04-17 DIAGNOSIS — N18.32 STAGE 3B CHRONIC KIDNEY DISEASE: Chronic | ICD-10-CM

## 2023-04-17 DIAGNOSIS — F51.01 PRIMARY INSOMNIA: Chronic | ICD-10-CM

## 2023-04-17 DIAGNOSIS — E11.42 DIABETIC PERIPHERAL NEUROPATHY: Primary | Chronic | ICD-10-CM

## 2023-04-17 PROCEDURE — 80048 BASIC METABOLIC PNL TOTAL CA: CPT

## 2023-04-17 RX ORDER — SPIRONOLACTONE 25 MG/1
TABLET ORAL
COMMUNITY
Start: 2023-02-15

## 2023-04-17 RX ORDER — ZOLPIDEM TARTRATE 10 MG/1
10 TABLET ORAL NIGHTLY
Qty: 90 TABLET | Refills: 0 | Status: SHIPPED | OUTPATIENT
Start: 2023-04-17

## 2023-04-17 RX ORDER — PREGABALIN 75 MG/1
75 CAPSULE ORAL 2 TIMES DAILY
Qty: 180 CAPSULE | Refills: 0 | Status: SHIPPED | OUTPATIENT
Start: 2023-04-17

## 2023-04-17 RX ORDER — PANCRELIPASE 36000; 180000; 114000 [USP'U]/1; [USP'U]/1; [USP'U]/1
CAPSULE, DELAYED RELEASE PELLETS ORAL
COMMUNITY
Start: 2023-02-15

## 2023-04-17 RX ORDER — BUDESONIDE 3 MG/1
CAPSULE, COATED PELLETS ORAL
COMMUNITY
Start: 2023-04-16

## 2023-04-17 NOTE — ASSESSMENT & PLAN NOTE
rec limiting lifting/straining; rec consideration for inguinal hernia girdle for support; if sxs worsen, rec surg consultation - with his med hx, benefits need to outweigh the risks

## 2023-04-18 LAB
ANION GAP SERPL CALCULATED.3IONS-SCNC: 6 MMOL/L (ref 5–15)
BUN SERPL-MCNC: 21 MG/DL (ref 8–23)
BUN/CREAT SERPL: 18.9 (ref 7–25)
CALCIUM SPEC-SCNC: 8.5 MG/DL (ref 8.6–10.5)
CHLORIDE SERPL-SCNC: 107 MMOL/L (ref 98–107)
CO2 SERPL-SCNC: 30 MMOL/L (ref 22–29)
CREAT SERPL-MCNC: 1.11 MG/DL (ref 0.76–1.27)
EGFRCR SERPLBLD CKD-EPI 2021: 63.5 ML/MIN/1.73
GLUCOSE SERPL-MCNC: 137 MG/DL (ref 65–99)
POTASSIUM SERPL-SCNC: 4.6 MMOL/L (ref 3.5–5.2)
SODIUM SERPL-SCNC: 143 MMOL/L (ref 136–145)

## 2023-05-31 DIAGNOSIS — K52.832 LYMPHOCYTIC COLITIS: Primary | ICD-10-CM

## 2023-05-31 RX ORDER — BUDESONIDE 3 MG/1
9 CAPSULE, COATED PELLETS ORAL EVERY MORNING
COMMUNITY
Start: 2023-05-26

## 2023-05-31 RX ORDER — PYRIDOSTIGMINE BROMIDE 60 MG/1
30 TABLET ORAL
COMMUNITY
Start: 2023-05-26

## 2023-08-07 ENCOUNTER — OFFICE VISIT (OUTPATIENT)
Dept: INTERNAL MEDICINE | Facility: CLINIC | Age: 88
End: 2023-08-07
Payer: MEDICARE

## 2023-08-07 VITALS
DIASTOLIC BLOOD PRESSURE: 62 MMHG | BODY MASS INDEX: 25.99 KG/M2 | WEIGHT: 156 LBS | HEART RATE: 59 BPM | SYSTOLIC BLOOD PRESSURE: 146 MMHG | OXYGEN SATURATION: 97 % | HEIGHT: 65 IN

## 2023-08-07 DIAGNOSIS — K40.90 RIGHT INGUINAL HERNIA: ICD-10-CM

## 2023-08-07 DIAGNOSIS — R10.84 GENERALIZED ABDOMINAL PAIN: Primary | ICD-10-CM

## 2023-08-07 DIAGNOSIS — K55.9 ISCHEMIC BOWEL DISEASE: Chronic | ICD-10-CM

## 2023-08-07 DIAGNOSIS — I10 ESSENTIAL HYPERTENSION: Chronic | ICD-10-CM

## 2023-08-07 PROCEDURE — 1160F RVW MEDS BY RX/DR IN RCRD: CPT | Performed by: INTERNAL MEDICINE

## 2023-08-07 PROCEDURE — 99214 OFFICE O/P EST MOD 30 MIN: CPT | Performed by: INTERNAL MEDICINE

## 2023-08-07 PROCEDURE — 1159F MED LIST DOCD IN RCRD: CPT | Performed by: INTERNAL MEDICINE

## 2023-08-07 RX ORDER — DAPAGLIFLOZIN 10 MG/1
TABLET, FILM COATED ORAL
COMMUNITY

## 2023-08-07 NOTE — PROGRESS NOTES
"Chief Complaint   Patient presents with    Abdominal Pain       History of Present Illness  89 y.o.  gentleman, accompanied by his wife, presents for further eval of chronic abd pain, ongoing for several months or more, \"long time\" per patient.    Reports diffuse abd pain, described as somewhere between achy and crampy, worsened with phys activity, such as yardwork or showering, better with sitting. Develops the pain about 15-20 min of yardwork, resolves in about 10 min after sitting.  Pain has not improved or exacerbated by eating.  Also unaffected by bowel movements.  Denies any diarrhea, nausea, vomiting, black stools.  Symptoms seem to be worsening but again ongoing for several months.  When he was at the [facility], he was told that he had an hernia.  He does note that the pain occasionally radiates to the right groin to the right thigh.  Denies any associated urinary symptoms.  Reports he does not have any protuberance from his abdomen or groin area.    Review of Systems  ROS (+) for diffuse abd pain, crampy and achy, occasionally with radiation into the right groin/thigh.  No associated nausea, vomiting, diarrhea, melena, hematochezia, dysuria.  Not affected by eating or bowel movements.  Physical activity makes it worse, resting makes it better.      ROS (+) for easy bruising; also notes easy scratches, such as when doing yardwork. All other ROS reviewed and negative.      Current Outpatient Medications:     aspirin 81 MG QD    atorvastatin (LIPITOR) 40 MG QD    Budesonide (ENTOCORT EC) 3 MG 3 QD    bumetanide (BUMEX) 1 MG 1/2 QD    cholecalciferol (VITAMIN D3) 10 MCG (400 UNIT) QD    clopidogrel (PLAVIX) 75 MG QD    dapagliflozin Propanediol (Farxiga) 10 MG QD    guaiFENesin (MUCINEX) 600 MG  prn    ketoconazole (NIZORAL) 2 % shampoo AD    levothyroxine 50 MCG  QD    loratadine (Claritin) 5 MG QD    metoprolol succinate XL (TOPROL-XL) 50 MG QD    omeprazole (priLOSEC) 20 MG QD    " "Pancrelipase, Lip-Prot-Amyl, (CREON) 43573-153737 units TID    pregabalin (Lyrica) 75 MG BID    pyridostigmine (MESTINON) 60 MG 1/2 TID    sacubitril-valsartan (ENTRESTO)  MG BID    spironolactone (ALDACTONE) 25 MG QD    tamsulosin (FLOMAX) 0.4 MG QHS    zolpidem (AMBIEN) 10 MG QHS      VITALS:  /62   Pulse 59   Ht 163.8 cm (64.5\")   Wt 70.8 kg (156 lb)   SpO2 97%   BMI 26.36 kg/mý     Physical Exam  Vitals and nursing note reviewed.   Constitutional:       General: He is not in acute distress.     Appearance: Normal appearance. He is not ill-appearing.   Eyes:      Extraocular Movements: Extraocular movements intact.      Conjunctiva/sclera: Conjunctivae normal.   Cardiovascular:      Rate and Rhythm: Normal rate and regular rhythm.      Comments: Distant heart sounds  Pulmonary:      Effort: Pulmonary effort is normal. No respiratory distress.   Abdominal:      General: Bowel sounds are normal. There is no distension.      Palpations: Abdomen is soft.      Tenderness: There is no abdominal tenderness. There is no guarding or rebound.      Hernia: No hernia is present.      Comments: No reproducible tenderness with palpation, no masses, no rebound/guarding   Skin:     Findings: Bruising (Scattered bruising on both arms and both legs, healing phases) present.   Neurological:      Mental Status: He is alert and oriented to person, place, and time. Mental status is at baseline.   Psychiatric:         Mood and Affect: Mood normal.         Behavior: Behavior normal.       LABS  Results for orders placed or performed in visit on 07/11/23   Basic Metabolic Panel    Specimen: Blood   Result Value Ref Range    Glucose 117 (H) 65 - 99 mg/dL    BUN 15 8 - 23 mg/dL    Creatinine 1.01 0.76 - 1.27 mg/dL    Sodium 140 136 - 145 mmol/L    Potassium 4.8 3.5 - 5.2 mmol/L    Chloride 104 98 - 107 mmol/L    CO2 27.0 22.0 - 29.0 mmol/L    Calcium 9.4 8.6 - 10.5 mg/dL    BUN/Creatinine Ratio 14.9 7.0 - 25.0    Anion Gap " "9.0 5.0 - 15.0 mmol/L    eGFR 71.1 >60.0 mL/min/1.73   Uric Acid    Specimen: Blood   Result Value Ref Range    Uric Acid 4.3 3.4 - 7.0 mg/dL     4/5/22 CT abd/pelvis - peribronchial thickening in RML/RLL, granulomatous calcifications in the spleen; inflammatory fat stranding around splenic flexure, small area of bowel mesenteric edema in LUQ; \"whirl sign\" with decreased caliber sm bowel adjacent to distal colon but no evidence of significant bowel obstruction    ASSESSMENT/PLAN    Diagnoses and all orders for this visit:    1. Generalized abdominal pain (Primary)  Comments:  chronic, > 6 mos; ddx ischemic bowel disease, hernia, IBS; check CT abd/pelvis for further eval  Orders:  -     CT Abdomen Pelvis With Contrast; Future    2. Ischemic bowel disease  Assessment & Plan:  Reviewed abnormal CT scan from 4/22; currently has diffuse abdominal pain, worsened with physical activity; not worsened with eating; follow-up CT abdomen/pelvis; ddx hernia as etiology of his abdominal pain    Orders:  -     CT Abdomen Pelvis With Contrast; Future    3. Essential hypertension  Assessment & Plan:  BP mildly elevated today but he reports better BPs at home; cont home monitoring on metoprolol XL 50mg QD; also takes entresto 97/103 BID and bumex 0.5mg QD      4. Right inguinal hernia  Assessment & Plan:  Discussed diffuse abdominal pain is unlikely to be caused solely by his right inguinal hernia; he denies any protrusion in that area when he does the yard work; follow clinically but discussed wearing abdominal brace when doing yard work for added support    Orders:  -     CT Abdomen Pelvis With Contrast; Future        FOLLOW-UP  Health maintenance - rec flu vacc, new COVID19 vacc, and RSV vacc in the fall; reminder about being overdue for Td - he will get at pharmacy along with Shingrix #2  RTC 10/10/23 as scheduled (or reschedule to come on same day as wife) for lyrica and zolpidem RXs with LETHA (contract 7/23, UDS " 1/23)    Electronically signed by:    Delilah Tatum MD, FACP  08/07/2023    EMR Dragon/Transcription Utilization  Please note that portions of this note were completed with a voice recognition program.

## 2023-08-07 NOTE — ASSESSMENT & PLAN NOTE
Reviewed abnormal CT scan from 4/22; currently has diffuse abdominal pain, worsened with physical activity; not worsened with eating; follow-up CT abdomen/pelvis; ddx hernia as etiology of his abdominal pain

## 2023-08-07 NOTE — ASSESSMENT & PLAN NOTE
Discussed diffuse abdominal pain is unlikely to be caused solely by his right inguinal hernia; he denies any protrusion in that area when he does the yard work; follow clinically but discussed wearing abdominal brace when doing yard work for added support

## 2023-08-07 NOTE — ASSESSMENT & PLAN NOTE
BP mildly elevated today but he reports better BPs at home; cont home monitoring on metoprolol XL 50mg QD; also takes entresto 97/103 BID and bumex 0.5mg QD

## 2023-08-30 NOTE — ASSESSMENT & PLAN NOTE
Judicious and appropriate use of zolpidem 10mg qhs prn #90, 0RF (exception made due to insurance).  LETHA was reviewed and appropriate.  The patient has read and signed the [facility] Controlled Substance Contract 10/16. Patient has been counseled and is aware of side effects, risks, potential for addiction/tolerance, interactions, and how to take medication correctly.      Left message for Pt to call office back.

## 2023-09-08 ENCOUNTER — HOSPITAL ENCOUNTER (OUTPATIENT)
Dept: CT IMAGING | Facility: HOSPITAL | Age: 88
Discharge: HOME | End: 2023-09-08
Admitting: INTERNAL MEDICINE
Payer: MEDICARE

## 2023-09-08 DIAGNOSIS — K55.9 ISCHEMIC BOWEL DISEASE: Chronic | ICD-10-CM

## 2023-09-08 DIAGNOSIS — K40.90 RIGHT INGUINAL HERNIA: ICD-10-CM

## 2023-09-08 DIAGNOSIS — R10.84 GENERALIZED ABDOMINAL PAIN: ICD-10-CM

## 2023-09-08 PROCEDURE — 25510000001 IOPAMIDOL 61 % SOLUTION: Performed by: INTERNAL MEDICINE

## 2023-09-08 PROCEDURE — 74177 CT ABD & PELVIS W/CONTRAST: CPT

## 2023-09-08 RX ADMIN — IOPAMIDOL 85 ML: 612 INJECTION, SOLUTION INTRAVENOUS at 15:27

## 2023-10-04 PROBLEM — K76.0 HEPATIC STEATOSIS: Chronic | Status: ACTIVE | Noted: 2023-10-04

## 2023-10-04 PROBLEM — K59.04 CHRONIC IDIOPATHIC CONSTIPATION: Status: ACTIVE | Noted: 2023-10-04

## 2023-10-04 PROBLEM — K76.0 HEPATIC STEATOSIS: Status: ACTIVE | Noted: 2023-10-04

## 2023-10-04 PROBLEM — K59.04 CHRONIC IDIOPATHIC CONSTIPATION: Chronic | Status: ACTIVE | Noted: 2023-10-04

## 2023-10-04 PROBLEM — K22.89 ESOPHAGEAL THICKENING: Status: ACTIVE | Noted: 2023-10-04

## 2023-10-05 NOTE — ASSESSMENT & PLAN NOTE
Cont'd judicious and appropriate use of lyrica 75mg BID #180, 0RF (exception made due to insurance). LETHA was reviewed and appropriate.  The patient has read and signed the [facility] Controlled Substance Contract 7/11/23 UDS 1/23. Patient has been counseled and is aware of side effects, risks, potential for addiction/tolerance, interactions, and how to take medication correctly.  RTC 3 mos for next RX with LETHA and updated UDS     Patient verbalizes understanding of additional risks of this medication in individuals > 65 yrs of age and wants to continue lyrica 75mg BID and zolpidem 10mg QHS

## 2023-10-05 NOTE — ASSESSMENT & PLAN NOTE
Discussed abd pain could be due to constipation; advised fiber supplement QD and increased water intake on daily basis; then if needed, could add miralax QD; for now, rec bowel cleanout with Mg citrate

## 2023-10-05 NOTE — PROGRESS NOTES
"Chief Complaint   Patient presents with    Insomnia       History of Present Illness  89 y.o.  male, accompanied by his wife, presents for zolpidem and lyrica RFs as well as f/u on abd pain and CT findings. Reports persistent mild abd pain, not worsened and no assoc'd bowel changes. Denies s/e with zolpidem or lyrica and wants to cont medication.     Reports recurrent of swelling at the R elbow; not completely tight and swollen but increased in size. Does not want to go back to Dr. Granados who performed previous bursectomy.    Review of Systems  ROS (+) for chronic insomnia. ROS (+) for recurrent R olecranon bursitis. All other ROS reviewed and negative.    Current Outpatient Medications:     aspirin 81 MG QD    atorvastatin (LIPITOR) 40 MG QD    Budesonide (ENTOCORT EC) 3 MG 3 QD    bumetanide (BUMEX) 1 MG 1/2 QD    cholecalciferol (VITAMIN D3) 10 MCG (400 UNIT) D    clopidogrel (PLAVIX) 75 MG QD    dapagliflozin Propanediol (Farxiga) 10 MG QD    guaiFENesin (MUCINEX) 600 MG q12 prn    ketoconazole (NIZORAL) 2 % shampoo AD    levothyroxine 50 MCG QD    loratadine (Claritin) 5 MG chewable QD prn    metoprolol succinate XL 50 MG QD. Increased per Dr. Ramirez    omeprazole (priLOSEC) 20 MG QD    Pancrelipase, Lip-Prot-Amyl, (CREON) 48355-252535 units TID    pregabalin (Lyrica) 75 MG BID    pyridostigmine (MESTINON) 60 MG 1/2 TID    sacubitril-valsartan (ENTRESTO)  MG BID    spironolactone (ALDACTONE) 25 MG QD    tamsulosin (FLOMAX) 0.4 MG QHS    zolpidem (AMBIEN) 10 MG qhs      VITALS:  /62   Pulse 54   Ht 162.6 cm (64\")   Wt 69.4 kg (153 lb)   SpO2 99%   BMI 26.26 kg/mý     Physical Exam  Vitals and nursing note reviewed.   Constitutional:       General: He is not in acute distress.     Appearance: Normal appearance. He is not ill-appearing.   Eyes:      Extraocular Movements: Extraocular movements intact.      Conjunctiva/sclera: Conjunctivae normal.   Pulmonary:      Effort: Pulmonary effort " is normal. No respiratory distress.   Neurological:      Mental Status: He is alert and oriented to person, place, and time. Mental status is at baseline.   Psychiatric:         Mood and Affect: Mood normal.         Behavior: Behavior normal.         LABS  Results for orders placed or performed in visit on 07/11/23   Basic Metabolic Panel    Specimen: Blood   Result Value Ref Range    Glucose 117 (H) 65 - 99 mg/dL    BUN 15 8 - 23 mg/dL    Creatinine 1.01 0.76 - 1.27 mg/dL    Sodium 140 136 - 145 mmol/L    Potassium 4.8 3.5 - 5.2 mmol/L    Chloride 104 98 - 107 mmol/L    CO2 27.0 22.0 - 29.0 mmol/L    Calcium 9.4 8.6 - 10.5 mg/dL    BUN/Creatinine Ratio 14.9 7.0 - 25.0    Anion Gap 9.0 5.0 - 15.0 mmol/L    eGFR 71.1 >60.0 mL/min/1.73   Uric Acid    Specimen: Blood   Result Value Ref Range    Uric Acid 4.3 3.4 - 7.0 mg/dL     9/8/23 CT abd/pelvis - colonic fecal retention sugg of constipation; hepatic steatosis sugg of cirrhosis, sm hiatal hernia with circumferential thickening of distal esophagus; poss hemodynamically significant 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 Diabetic peripheral neuropathy (Primary)  Assessment & Plan:  Cont'd judicious and appropriate use of lyrica 75mg BID #180, 0RF (exception made due to insurance). LETHA was reviewed and appropriate.  The patient has read and signed the [facility] Controlled Substance Contract 7/11/23 UDS 1/23. Patient has been counseled and is aware of side effects, risks, potential for addiction/tolerance, interactions, and how to take medication correctly.  RTC 3 mos for next RX with LETHA and updated UDS     Patient verbalizes understanding of additional risks of this medication in individuals > 65 yrs of age and wants to continue lyrica 75mg BID and zolpidem 10mg QHS    Orders:  -     pregabalin (Lyrica) 75 MG capsule;  Take 1 capsule by mouth 2 (Two) Times a Day.  Dispense: 180 capsule; Refill: 0    2. Insomnia  Assessment & Plan:  Judicious and appropriate use of zolpidem 10mg QHS #90, 0RF. LETHA was reviewed and appropriate.  The patient has read and signed the [facility] Controlled Substance Contract 7/11/23. UDS 1/23.  Patient has been counseled and is aware of side effects, risks, potential for addiction/tolerance, interactions, and how to take medication correctly.  RTC 3 mos for next RX with LETHA and updated UDS    Patient verbalizes understanding of additional risks of this medication in individuals > 65 yrs of age and wants to continue zolpidem 10mg QHS and lyrica 75mg BID with additional risks of falls, decreased mental clarity, confusion.      Orders:  -     zolpidem (AMBIEN) 10 MG tablet; Take 1 tablet by mouth Every Night.  Dispense: 90 tablet; Refill: 0    3. Ischemic bowel disease  Assessment & Plan:  Reviewed CT abd/pelvis findings in the setting of known PAD/CAD with multiple previous vascular surgeries; note workup of abd pain, which incl issues with constipation, hepatic steatosis and poss stenosis of celiac trunk and SMA; already on atorvastatin, clopidogrel, ASA; next step would be vasc surgery consultation - pt also declines at this time      4. Constipation  Assessment & Plan:  Discussed abd pain could be due to constipation; advised fiber supplement QD and increased water intake on daily basis; then if needed, could add miralax QD; for now, rec bowel cleanout with Mg citrate      5. Hepatic steatosis  Assessment & Plan:  Reviewed CT abd/pelvis findings, noting nodular changes sugg of early cirrhosis; reviewed nl LFTs 5/23, which could either be normal or due to cirrhosis; reviewed option for GI consultation - pt declines for now.      6. Esophageal thickening  Assessment & Plan:  NEW finding on CT abd/pelvis; on omeprazole 20mg QD; rec EGD for further evaluation if he wants to workup this abnormality  - pt declines for now      7. Olecranon bursitis, right elbow  Assessment & Plan:  Recurrent, only mild at this time but patient requesting ortho consultation    Orders:  -     Ambulatory Referral to Orthopedic Surgery    8. Essential hypertension  Assessment & Plan:  BP mildly elevated; rec home monitoring with goal < 130/80; cont metoprolol XL 50mg QD, also on entresto 97/103 BID and spironolactone 25mg QD      9. Vaccine counseling  Comments:  rec flu vacc, COVID19 vacc, and RSV vacc; also overdue for Td - get at pharmacy        FOLLOW-UP  Health maintenance - rec flu vaccine, RSV vaccine, and COVID19 vaccine, counseling given; reminder again overdue for Td (had Tdap 10/11); pt reports getting Shingrix #1 but now overdue for #2 dose  RTC for wellness 1/19/24; fasting labs prior to appt (CBC, CMP, TSH, lipids, UA/micro, A1C, microalb, CPK, FT4, uric, Mg), LETHA, and zolpidem/lyrica RXs and UDS    Electronically signed by:    Delilah Tatum MD, FACP  10/10/2023

## 2023-10-05 NOTE — ASSESSMENT & PLAN NOTE
NEW finding on CT abd/pelvis; on omeprazole 20mg QD; rec EGD for further evaluation if he wants to workup this abnormality - pt declines for now

## 2023-10-05 NOTE — ASSESSMENT & PLAN NOTE
Reviewed CT abd/pelvis findings in the setting of known PAD/CAD with multiple previous vascular surgeries; note workup of abd pain, which incl issues with constipation, hepatic steatosis and poss stenosis of celiac trunk and SMA; already on atorvastatin, clopidogrel, ASA; next step would be vasc surgery consultation - pt also declines at this time

## 2023-10-05 NOTE — ASSESSMENT & PLAN NOTE
Judicious and appropriate use of zolpidem 10mg QHS #90, 0RF. LETHA was reviewed and appropriate.  The patient has read and signed the [facility] Controlled Substance Contract 7/11/23. UDS 1/23.  Patient has been counseled and is aware of side effects, risks, potential for addiction/tolerance, interactions, and how to take medication correctly.  RTC 3 mos for next RX with LETHA and updated UDS    Patient verbalizes understanding of additional risks of this medication in individuals > 65 yrs of age and wants to continue zolpidem 10mg QHS and lyrica 75mg BID with additional risks of falls, decreased mental clarity, confusion.

## 2023-10-05 NOTE — ASSESSMENT & PLAN NOTE
Reviewed CT abd/pelvis findings, noting nodular changes sugg of early cirrhosis; reviewed nl LFTs 5/23, which could either be normal or due to cirrhosis; reviewed option for GI consultation - pt declines for now.

## 2023-10-10 ENCOUNTER — OFFICE VISIT (OUTPATIENT)
Dept: INTERNAL MEDICINE | Facility: CLINIC | Age: 88
End: 2023-10-10
Payer: MEDICARE

## 2023-10-10 VITALS
BODY MASS INDEX: 26.12 KG/M2 | WEIGHT: 153 LBS | DIASTOLIC BLOOD PRESSURE: 62 MMHG | HEIGHT: 64 IN | HEART RATE: 54 BPM | SYSTOLIC BLOOD PRESSURE: 138 MMHG | OXYGEN SATURATION: 99 %

## 2023-10-10 DIAGNOSIS — K22.89 ESOPHAGEAL THICKENING: ICD-10-CM

## 2023-10-10 DIAGNOSIS — M70.21 OLECRANON BURSITIS, RIGHT ELBOW: ICD-10-CM

## 2023-10-10 DIAGNOSIS — K55.9 ISCHEMIC BOWEL DISEASE: Chronic | ICD-10-CM

## 2023-10-10 DIAGNOSIS — Z71.85 VACCINE COUNSELING: ICD-10-CM

## 2023-10-10 DIAGNOSIS — K76.0 HEPATIC STEATOSIS: ICD-10-CM

## 2023-10-10 DIAGNOSIS — I10 ESSENTIAL HYPERTENSION: Chronic | ICD-10-CM

## 2023-10-10 DIAGNOSIS — E11.42 DIABETIC PERIPHERAL NEUROPATHY: Primary | Chronic | ICD-10-CM

## 2023-10-10 DIAGNOSIS — F51.01 PRIMARY INSOMNIA: Chronic | ICD-10-CM

## 2023-10-10 DIAGNOSIS — K59.04 CHRONIC IDIOPATHIC CONSTIPATION: ICD-10-CM

## 2023-10-10 PROCEDURE — 1160F RVW MEDS BY RX/DR IN RCRD: CPT | Performed by: INTERNAL MEDICINE

## 2023-10-10 PROCEDURE — 1159F MED LIST DOCD IN RCRD: CPT | Performed by: INTERNAL MEDICINE

## 2023-10-10 PROCEDURE — 99214 OFFICE O/P EST MOD 30 MIN: CPT | Performed by: INTERNAL MEDICINE

## 2023-10-10 RX ORDER — PREGABALIN 75 MG/1
75 CAPSULE ORAL 2 TIMES DAILY
Qty: 180 CAPSULE | Refills: 0 | Status: SHIPPED | OUTPATIENT
Start: 2023-10-10

## 2023-10-10 RX ORDER — ZOLPIDEM TARTRATE 10 MG/1
10 TABLET ORAL NIGHTLY
Qty: 90 TABLET | Refills: 0 | Status: SHIPPED | OUTPATIENT
Start: 2023-10-10

## 2023-10-11 NOTE — ASSESSMENT & PLAN NOTE
BP mildly elevated; rec home monitoring with goal < 130/80; cont metoprolol XL 50mg QD, also on entresto 97/103 BID and spironolactone 25mg QD

## 2023-11-11 PROBLEM — K22.89 ESOPHAGEAL THICKENING: Chronic | Status: ACTIVE | Noted: 2023-10-04

## 2023-11-11 PROBLEM — C44.229 SCC (SQUAMOUS CELL CARCINOMA), EAR, LEFT: Chronic | Status: ACTIVE | Noted: 2023-04-15

## 2023-11-11 PROBLEM — K52.832 LYMPHOCYTIC COLITIS: Chronic | Status: ACTIVE | Noted: 2022-11-14

## 2023-11-11 PROBLEM — C44.712 BASAL CELL CARCINOMA (BCC) OF SKIN OF RIGHT LOWER EXTREMITY INCLUDING HIP: Chronic | Status: ACTIVE | Noted: 2023-04-15

## 2023-11-11 PROBLEM — E83.42 HYPOMAGNESEMIA: Chronic | Status: ACTIVE | Noted: 2022-04-17

## 2023-11-20 ENCOUNTER — PATIENT MESSAGE (OUTPATIENT)
Dept: INTERNAL MEDICINE | Facility: CLINIC | Age: 88
End: 2023-11-20
Payer: MEDICARE

## 2023-11-20 DIAGNOSIS — E11.42 TYPE 2 DIABETES MELLITUS WITH DIABETIC POLYNEUROPATHY, WITHOUT LONG-TERM CURRENT USE OF INSULIN: Chronic | ICD-10-CM

## 2023-11-20 DIAGNOSIS — I10 ESSENTIAL HYPERTENSION: Primary | Chronic | ICD-10-CM

## 2023-11-20 NOTE — TELEPHONE ENCOUNTER
From: Filipe Hamilton Jr.  To: Delilah Tatum  Sent: 11/20/2023 1:04 PM EST  Subject: Basic metabolic panel.    Dr Ramirez would like a bmp for Dad. During office visit with Dr. Ramirez, she said his request to have the blood at your location would be fine. He visited for blood draw this a.m. but was refused due to no record of request. Would you be kind enough to enter the order so that he can get this work done?

## 2023-11-21 ENCOUNTER — LAB (OUTPATIENT)
Dept: LAB | Facility: HOSPITAL | Age: 88
End: 2023-11-21
Payer: MEDICARE

## 2023-11-21 DIAGNOSIS — E11.42 TYPE 2 DIABETES MELLITUS WITH DIABETIC POLYNEUROPATHY, WITHOUT LONG-TERM CURRENT USE OF INSULIN: Chronic | ICD-10-CM

## 2023-11-21 DIAGNOSIS — I10 ESSENTIAL HYPERTENSION: Chronic | ICD-10-CM

## 2023-11-21 LAB
ANION GAP SERPL CALCULATED.3IONS-SCNC: 9.5 MMOL/L (ref 5–15)
BUN SERPL-MCNC: 14 MG/DL (ref 8–23)
BUN/CREAT SERPL: 14 (ref 7–25)
CALCIUM SPEC-SCNC: 8.7 MG/DL (ref 8.6–10.5)
CHLORIDE SERPL-SCNC: 106 MMOL/L (ref 98–107)
CO2 SERPL-SCNC: 27.5 MMOL/L (ref 22–29)
CREAT SERPL-MCNC: 1 MG/DL (ref 0.76–1.27)
EGFRCR SERPLBLD CKD-EPI 2021: 71.9 ML/MIN/1.73
GLUCOSE SERPL-MCNC: 114 MG/DL (ref 65–99)
HBA1C MFR BLD: 5.8 % (ref 4.8–5.6)
POTASSIUM SERPL-SCNC: 3.9 MMOL/L (ref 3.5–5.2)
SODIUM SERPL-SCNC: 143 MMOL/L (ref 136–145)

## 2023-11-21 PROCEDURE — 80048 BASIC METABOLIC PNL TOTAL CA: CPT

## 2023-11-21 PROCEDURE — 83036 HEMOGLOBIN GLYCOSYLATED A1C: CPT

## 2023-11-27 ENCOUNTER — TELEPHONE (OUTPATIENT)
Dept: INTERNAL MEDICINE | Facility: CLINIC | Age: 88
End: 2023-11-27
Payer: MEDICARE

## 2023-11-27 NOTE — TELEPHONE ENCOUNTER
----- Message from Delilah Tatum MD sent at 11/22/2023  7:22 PM EST -----  BG sugar control is stable with A1C 5.8 (unchanged from 7/2023) - this is good; electrolytes including potassium and kidney function are normal.  Remember to get fasting labs the week prior to upcoming wellness appt (1/19/24)

## 2023-11-27 NOTE — TELEPHONE ENCOUNTER
Name: Josue Hamilton    Relationship: Emergency Contact    Best Callback Number: [phone]     HUB PROVIDED THE RELAY MESSAGE FROM THE OFFICE   PATIENT VOICED UNDERSTANDING AND HAS NO FURTHER QUESTIONS AT THIS TIME    ADDITIONAL INFORMATION:

## 2023-12-19 DIAGNOSIS — Z00.00 MEDICARE ANNUAL WELLNESS VISIT, SUBSEQUENT: Primary | Chronic | ICD-10-CM

## 2023-12-19 DIAGNOSIS — E11.42 TYPE 2 DIABETES MELLITUS WITH DIABETIC POLYNEUROPATHY, WITHOUT LONG-TERM CURRENT USE OF INSULIN: Chronic | ICD-10-CM

## 2023-12-19 DIAGNOSIS — E78.5 HYPERLIPIDEMIA LDL GOAL <70: Chronic | ICD-10-CM

## 2023-12-19 DIAGNOSIS — E83.42 HYPOMAGNESEMIA: Chronic | ICD-10-CM

## 2023-12-19 DIAGNOSIS — M10.042 ACUTE IDIOPATHIC GOUT OF LEFT HAND: Chronic | ICD-10-CM

## 2023-12-19 DIAGNOSIS — E03.9 ACQUIRED HYPOTHYROIDISM: Chronic | ICD-10-CM

## 2024-01-04 ENCOUNTER — LAB (OUTPATIENT)
Dept: LAB | Facility: HOSPITAL | Age: 89
End: 2024-01-04
Payer: MEDICARE

## 2024-01-04 ENCOUNTER — TELEPHONE (OUTPATIENT)
Dept: INTERNAL MEDICINE | Facility: CLINIC | Age: 89
End: 2024-01-04
Payer: MEDICARE

## 2024-01-04 DIAGNOSIS — E78.5 HYPERLIPIDEMIA LDL GOAL <70: Chronic | ICD-10-CM

## 2024-01-04 DIAGNOSIS — E03.9 ACQUIRED HYPOTHYROIDISM: Chronic | ICD-10-CM

## 2024-01-04 DIAGNOSIS — Z00.00 MEDICARE ANNUAL WELLNESS VISIT, SUBSEQUENT: ICD-10-CM

## 2024-01-04 DIAGNOSIS — E11.42 TYPE 2 DIABETES MELLITUS WITH DIABETIC POLYNEUROPATHY, WITHOUT LONG-TERM CURRENT USE OF INSULIN: Chronic | ICD-10-CM

## 2024-01-04 DIAGNOSIS — E83.42 HYPOMAGNESEMIA: Chronic | ICD-10-CM

## 2024-01-04 DIAGNOSIS — M10.042 ACUTE IDIOPATHIC GOUT OF LEFT HAND: Chronic | ICD-10-CM

## 2024-01-04 LAB
ALBUMIN SERPL-MCNC: 3.8 G/DL (ref 3.5–5.2)
ALBUMIN/GLOB SERPL: 1.4 G/DL
ALP SERPL-CCNC: 85 U/L (ref 39–117)
ALT SERPL W P-5'-P-CCNC: 5 U/L (ref 1–41)
ANION GAP SERPL CALCULATED.3IONS-SCNC: 11.5 MMOL/L (ref 5–15)
AST SERPL-CCNC: 8 U/L (ref 1–40)
BASOPHILS # BLD AUTO: 0 10*3/MM3 (ref 0–0.2)
BASOPHILS NFR BLD AUTO: 0 % (ref 0–1.5)
BILIRUB SERPL-MCNC: 0.4 MG/DL (ref 0–1.2)
BUN SERPL-MCNC: 19 MG/DL (ref 8–23)
BUN/CREAT SERPL: 18.4 (ref 7–25)
CALCIUM SPEC-SCNC: 8.6 MG/DL (ref 8.6–10.5)
CHLORIDE SERPL-SCNC: 105 MMOL/L (ref 98–107)
CHOLEST SERPL-MCNC: 80 MG/DL (ref 0–200)
CK SERPL-CCNC: 38 U/L (ref 20–200)
CO2 SERPL-SCNC: 23.5 MMOL/L (ref 22–29)
CREAT SERPL-MCNC: 1.03 MG/DL (ref 0.76–1.27)
DEPRECATED RDW RBC AUTO: 43.7 FL (ref 37–54)
EGFRCR SERPLBLD CKD-EPI 2021: 69.4 ML/MIN/1.73
EOSINOPHIL # BLD AUTO: 0 10*3/MM3 (ref 0–0.4)
EOSINOPHIL NFR BLD AUTO: 0 % (ref 0.3–6.2)
ERYTHROCYTE [DISTWIDTH] IN BLOOD BY AUTOMATED COUNT: 14.3 % (ref 12.3–15.4)
GLOBULIN UR ELPH-MCNC: 2.8 GM/DL
GLUCOSE SERPL-MCNC: 103 MG/DL (ref 65–99)
HCT VFR BLD AUTO: 32.9 % (ref 37.5–51)
HDLC SERPL-MCNC: 34 MG/DL (ref 40–60)
HGB BLD-MCNC: 9.7 G/DL (ref 13–17.7)
IMM GRANULOCYTES # BLD AUTO: 0.03 10*3/MM3 (ref 0–0.05)
IMM GRANULOCYTES NFR BLD AUTO: 0.5 % (ref 0–0.5)
LDLC SERPL CALC-MCNC: 27 MG/DL (ref 0–100)
LDLC/HDLC SERPL: 0.8 {RATIO}
LYMPHOCYTES # BLD AUTO: 1.54 10*3/MM3 (ref 0.7–3.1)
LYMPHOCYTES NFR BLD AUTO: 27.5 % (ref 19.6–45.3)
MAGNESIUM SERPL-MCNC: 2.2 MG/DL (ref 1.6–2.4)
MCH RBC QN AUTO: 24.7 PG (ref 26.6–33)
MCHC RBC AUTO-ENTMCNC: 29.5 G/DL (ref 31.5–35.7)
MCV RBC AUTO: 83.7 FL (ref 79–97)
MONOCYTES # BLD AUTO: 0.42 10*3/MM3 (ref 0.1–0.9)
MONOCYTES NFR BLD AUTO: 7.5 % (ref 5–12)
NEUTROPHILS NFR BLD AUTO: 3.61 10*3/MM3 (ref 1.7–7)
NEUTROPHILS NFR BLD AUTO: 64.5 % (ref 42.7–76)
NRBC BLD AUTO-RTO: 0 /100 WBC (ref 0–0.2)
PLATELET # BLD AUTO: 165 10*3/MM3 (ref 140–450)
PMV BLD AUTO: 9.6 FL (ref 6–12)
POTASSIUM SERPL-SCNC: 3.9 MMOL/L (ref 3.5–5.2)
PROT SERPL-MCNC: 6.6 G/DL (ref 6–8.5)
RBC # BLD AUTO: 3.93 10*6/MM3 (ref 4.14–5.8)
SODIUM SERPL-SCNC: 140 MMOL/L (ref 136–145)
T4 FREE SERPL-MCNC: 1.1 NG/DL (ref 0.93–1.7)
TRIGL SERPL-MCNC: 94 MG/DL (ref 0–150)
TSH SERPL DL<=0.05 MIU/L-ACNC: 2.01 UIU/ML (ref 0.27–4.2)
URATE SERPL-MCNC: 4 MG/DL (ref 3.4–7)
VLDLC SERPL-MCNC: 19 MG/DL (ref 5–40)
WBC NRBC COR # BLD AUTO: 5.6 10*3/MM3 (ref 3.4–10.8)

## 2024-01-04 PROCEDURE — 83036 HEMOGLOBIN GLYCOSYLATED A1C: CPT

## 2024-01-04 PROCEDURE — 83735 ASSAY OF MAGNESIUM: CPT

## 2024-01-04 PROCEDURE — 84439 ASSAY OF FREE THYROXINE: CPT

## 2024-01-04 PROCEDURE — 81001 URINALYSIS AUTO W/SCOPE: CPT

## 2024-01-04 PROCEDURE — 84550 ASSAY OF BLOOD/URIC ACID: CPT

## 2024-01-04 PROCEDURE — 82570 ASSAY OF URINE CREATININE: CPT

## 2024-01-04 PROCEDURE — 80053 COMPREHEN METABOLIC PANEL: CPT

## 2024-01-04 PROCEDURE — 85025 COMPLETE CBC W/AUTO DIFF WBC: CPT

## 2024-01-04 PROCEDURE — 80061 LIPID PANEL: CPT

## 2024-01-04 PROCEDURE — 82043 UR ALBUMIN QUANTITATIVE: CPT

## 2024-01-04 PROCEDURE — 84443 ASSAY THYROID STIM HORMONE: CPT

## 2024-01-04 PROCEDURE — 82550 ASSAY OF CK (CPK): CPT

## 2024-01-04 NOTE — TELEPHONE ENCOUNTER
----- Message from Delilah Tatum MD sent at 1/3/2024  7:23 PM EST -----  Regarding: fasting PE labs  Pls call son Josue   Has wellness upcoming - please ask patient to drop by fasting next week to get labs done. I just received the labs from [facility] from 12/16/23 and his blood counts showed worsened anemia so we need the labs done prior to appt so we can review together any concerns

## 2024-01-05 DIAGNOSIS — N40.1 BENIGN NON-NODULAR PROSTATIC HYPERPLASIA WITH LOWER URINARY TRACT SYMPTOMS: ICD-10-CM

## 2024-01-05 DIAGNOSIS — E78.5 HYPERLIPIDEMIA LDL GOAL <70: Chronic | ICD-10-CM

## 2024-01-05 DIAGNOSIS — E03.9 ACQUIRED HYPOTHYROIDISM: Chronic | ICD-10-CM

## 2024-01-05 DIAGNOSIS — K21.9 GASTROESOPHAGEAL REFLUX DISEASE WITHOUT ESOPHAGITIS: ICD-10-CM

## 2024-01-05 LAB — HBA1C MFR BLD: 5.9 % (ref 4.8–5.6)

## 2024-01-05 RX ORDER — LEVOTHYROXINE SODIUM 0.05 MG/1
50 TABLET ORAL DAILY
Qty: 90 TABLET | Refills: 3 | OUTPATIENT
Start: 2024-01-05

## 2024-01-05 RX ORDER — TAMSULOSIN HYDROCHLORIDE 0.4 MG/1
1 CAPSULE ORAL DAILY
Qty: 90 CAPSULE | Refills: 3 | OUTPATIENT
Start: 2024-01-05

## 2024-01-05 RX ORDER — OMEPRAZOLE 20 MG/1
20 CAPSULE, DELAYED RELEASE ORAL DAILY
Qty: 90 CAPSULE | Refills: 3 | OUTPATIENT
Start: 2024-01-05

## 2024-01-05 RX ORDER — ATORVASTATIN CALCIUM 40 MG/1
40 TABLET, FILM COATED ORAL DAILY
Qty: 90 TABLET | Refills: 3 | OUTPATIENT
Start: 2024-01-05

## 2024-01-06 LAB
ALBUMIN UR-MCNC: 2.3 MG/DL
BACTERIA UR QL AUTO: ABNORMAL /HPF
BILIRUB UR QL STRIP: NEGATIVE
CLARITY UR: CLEAR
COLOR UR: YELLOW
CREAT UR-MCNC: 96.2 MG/DL
GLUCOSE UR STRIP-MCNC: ABNORMAL MG/DL
HGB UR QL STRIP.AUTO: NEGATIVE
HYALINE CASTS UR QL AUTO: ABNORMAL /LPF
KETONES UR QL STRIP: NEGATIVE
LEUKOCYTE ESTERASE UR QL STRIP.AUTO: NEGATIVE
MICROALBUMIN/CREAT UR: 23.9 MG/G (ref 0–29)
NITRITE UR QL STRIP: NEGATIVE
PH UR STRIP.AUTO: 5.5 [PH] (ref 5–8)
PROT UR QL STRIP: ABNORMAL
RBC # UR STRIP: ABNORMAL /HPF
REF LAB TEST METHOD: ABNORMAL
SP GR UR STRIP: >=1.03 (ref 1–1.03)
SQUAMOUS #/AREA URNS HPF: ABNORMAL /HPF
UROBILINOGEN UR QL STRIP: ABNORMAL
WBC # UR STRIP: ABNORMAL /HPF

## 2024-01-12 DIAGNOSIS — E11.42 DIABETIC PERIPHERAL NEUROPATHY: Chronic | ICD-10-CM

## 2024-01-12 RX ORDER — PREGABALIN 75 MG/1
75 CAPSULE ORAL 2 TIMES DAILY
Qty: 14 CAPSULE | Refills: 0 | Status: SHIPPED | OUTPATIENT
Start: 2024-01-12

## 2024-01-14 PROBLEM — A41.9 SEPSIS WITH ACUTE RENAL FAILURE WITHOUT SEPTIC SHOCK, DUE TO UNSPECIFIED ORGANISM, UNSPECIFIED ACUTE RENAL FAILURE TYPE: Status: RESOLVED | Noted: 2022-04-05 | Resolved: 2024-01-14

## 2024-01-14 PROBLEM — M70.21 OLECRANON BURSITIS, RIGHT ELBOW: Chronic | Status: ACTIVE | Noted: 2022-04-28

## 2024-01-14 PROBLEM — N17.9 SEPSIS WITH ACUTE RENAL FAILURE WITHOUT SEPTIC SHOCK, DUE TO UNSPECIFIED ORGANISM, UNSPECIFIED ACUTE RENAL FAILURE TYPE: Status: RESOLVED | Noted: 2022-04-05 | Resolved: 2024-01-14

## 2024-01-14 PROBLEM — H34.232 BRANCH RETINAL ARTERY OCCLUSION OF LEFT EYE: Chronic | Status: ACTIVE | Noted: 2021-10-15

## 2024-01-14 PROBLEM — R65.20 SEPSIS WITH ACUTE RENAL FAILURE WITHOUT SEPTIC SHOCK, DUE TO UNSPECIFIED ORGANISM, UNSPECIFIED ACUTE RENAL FAILURE TYPE: Status: RESOLVED | Noted: 2022-04-05 | Resolved: 2024-01-14

## 2024-01-14 PROBLEM — I50.22 CHRONIC SYSTOLIC HEART FAILURE: Chronic | Status: ACTIVE | Noted: 2021-05-05

## 2024-01-17 ENCOUNTER — TELEPHONE (OUTPATIENT)
Dept: INTERNAL MEDICINE | Facility: CLINIC | Age: 89
End: 2024-01-17

## 2024-01-17 DIAGNOSIS — F51.01 PRIMARY INSOMNIA: Chronic | ICD-10-CM

## 2024-01-17 DIAGNOSIS — E11.42 DIABETIC PERIPHERAL NEUROPATHY: Chronic | ICD-10-CM

## 2024-01-17 RX ORDER — ZOLPIDEM TARTRATE 10 MG/1
10 TABLET ORAL NIGHTLY
Qty: 23 TABLET | Refills: 0 | Status: SHIPPED | OUTPATIENT
Start: 2024-01-17

## 2024-01-17 RX ORDER — PREGABALIN 75 MG/1
75 CAPSULE ORAL 2 TIMES DAILY
Qty: 64 CAPSULE | Refills: 0 | Status: SHIPPED | OUTPATIENT
Start: 2024-01-17

## 2024-01-17 NOTE — TELEPHONE ENCOUNTER
Caller: Taylor Hamilton    Relationship: Emergency Contact    Best call back number: [phone]     Requested Prescriptions:   zolpidem (AMBIEN) 10 MG tablet      Pharmacy where request should be sent: [id number] - [place], [place] - [address] [facility] [phone] [facility] [phone]      Last office visit with prescribing clinician: 10/10/2023   Last telemedicine visit with prescribing clinician: Visit date not found   Next office visit with prescribing clinician: 1/19/2024     Additional details provided by patient: HAS BEEN OUT OF MEDICATION FOR SEVERAL DAYS.     Does the patient have less than a 3 day supply:  [x] Yes  [] No    Would you like a call back once the refill request has been completed: [] Yes [x] No    If the office needs to give you a call back, can they leave a voicemail: [] Yes [x] No    Juan Luis Sullivan Rep   01/17/24 10:05 EST

## 2024-01-17 NOTE — TELEPHONE ENCOUNTER
He shouldn't be out of both medications bc 7d supply of lyrica was sent to the pharmacy on 1/12/24, which should have lasted him until 1/19/24.    If he has been out of lyarica for several days, then he is taking the medication inappropriately, in which case we might not be able to continue filling these prescriptions for him.    I will send him enough lyrica and zolpidem to tide him over to his rescheduled wellness 2/9/24; however he needs to go ahead and submit an updated UDS    LETHA reviewed.  Contract signed 7/23  Due for UDS (Last 1/2023)  RX lyrica #64, 0RF and zolpidem #23, 0RF

## 2024-01-29 PROBLEM — K22.70 BARRETT'S ESOPHAGUS: Status: ACTIVE | Noted: 2023-10-04

## 2024-02-04 PROBLEM — K22.70 BARRETT'S ESOPHAGUS: Chronic | Status: ACTIVE | Noted: 2023-10-04

## 2024-02-07 DIAGNOSIS — Z79.899 LONG-TERM USE OF HIGH-RISK MEDICATION: Primary | ICD-10-CM

## 2024-02-07 DIAGNOSIS — Z79.899 LONG-TERM USE OF HIGH-RISK MEDICATION: ICD-10-CM

## 2024-02-09 ENCOUNTER — OFFICE VISIT (OUTPATIENT)
Dept: INTERNAL MEDICINE | Facility: CLINIC | Age: 89
End: 2024-02-09
Payer: MEDICARE

## 2024-02-09 VITALS
OXYGEN SATURATION: 100 % | HEART RATE: 51 BPM | HEIGHT: 67 IN | TEMPERATURE: 96.3 F | WEIGHT: 147 LBS | BODY MASS INDEX: 23.07 KG/M2 | DIASTOLIC BLOOD PRESSURE: 68 MMHG | SYSTOLIC BLOOD PRESSURE: 140 MMHG

## 2024-02-09 DIAGNOSIS — N40.1 BENIGN NON-NODULAR PROSTATIC HYPERPLASIA WITH LOWER URINARY TRACT SYMPTOMS: ICD-10-CM

## 2024-02-09 DIAGNOSIS — E11.42 TYPE 2 DIABETES MELLITUS WITH DIABETIC POLYNEUROPATHY, WITHOUT LONG-TERM CURRENT USE OF INSULIN: Chronic | ICD-10-CM

## 2024-02-09 DIAGNOSIS — E83.42 HYPOMAGNESEMIA: Chronic | ICD-10-CM

## 2024-02-09 DIAGNOSIS — K21.00 GASTROESOPHAGEAL REFLUX DISEASE WITH ESOPHAGITIS WITHOUT HEMORRHAGE: Chronic | ICD-10-CM

## 2024-02-09 DIAGNOSIS — D64.9 NORMOCYTIC ANEMIA: Chronic | ICD-10-CM

## 2024-02-09 DIAGNOSIS — Z00.00 MEDICARE ANNUAL WELLNESS VISIT, SUBSEQUENT: Primary | ICD-10-CM

## 2024-02-09 DIAGNOSIS — E03.9 ACQUIRED HYPOTHYROIDISM: Chronic | ICD-10-CM

## 2024-02-09 DIAGNOSIS — M10.042 ACUTE IDIOPATHIC GOUT OF LEFT HAND: Chronic | ICD-10-CM

## 2024-02-09 DIAGNOSIS — E11.42 DIABETIC PERIPHERAL NEUROPATHY: Chronic | ICD-10-CM

## 2024-02-09 DIAGNOSIS — N18.32 STAGE 3B CHRONIC KIDNEY DISEASE: Chronic | ICD-10-CM

## 2024-02-09 DIAGNOSIS — I10 ESSENTIAL HYPERTENSION: Chronic | ICD-10-CM

## 2024-02-09 DIAGNOSIS — E78.5 HYPERLIPIDEMIA LDL GOAL <70: Chronic | ICD-10-CM

## 2024-02-09 DIAGNOSIS — F51.01 PRIMARY INSOMNIA: Chronic | ICD-10-CM

## 2024-02-09 DIAGNOSIS — K40.90 RIGHT INGUINAL HERNIA: ICD-10-CM

## 2024-02-09 RX ORDER — PANTOPRAZOLE SODIUM 40 MG/1
40 TABLET, DELAYED RELEASE ORAL DAILY
Qty: 90 TABLET | Refills: 3 | Status: SHIPPED | OUTPATIENT
Start: 2024-02-09

## 2024-02-09 RX ORDER — PREGABALIN 75 MG/1
75 CAPSULE ORAL 2 TIMES DAILY
Qty: 60 CAPSULE | Refills: 1 | Status: SHIPPED | OUTPATIENT
Start: 2024-02-09

## 2024-02-09 RX ORDER — OMEPRAZOLE 20 MG/1
20 CAPSULE, DELAYED RELEASE ORAL DAILY
COMMUNITY
End: 2024-02-09 | Stop reason: ALTCHOICE

## 2024-02-09 RX ORDER — ATORVASTATIN CALCIUM 40 MG/1
40 TABLET, FILM COATED ORAL DAILY
Qty: 90 TABLET | Refills: 3 | Status: SHIPPED | OUTPATIENT
Start: 2024-02-09

## 2024-02-09 RX ORDER — PREGABALIN 75 MG/1
75 CAPSULE ORAL 2 TIMES DAILY
Qty: 180 CAPSULE | Refills: 0 | Status: SHIPPED | OUTPATIENT
Start: 2024-02-09 | End: 2024-02-09 | Stop reason: SDUPTHER

## 2024-02-09 RX ORDER — ZOLPIDEM TARTRATE 10 MG/1
10 TABLET ORAL NIGHTLY
Qty: 30 TABLET | Refills: 1 | Status: SHIPPED | OUTPATIENT
Start: 2024-02-09

## 2024-02-09 RX ORDER — LEVOTHYROXINE SODIUM 0.05 MG/1
50 TABLET ORAL DAILY
Qty: 90 TABLET | Refills: 3 | Status: SHIPPED | OUTPATIENT
Start: 2024-02-09

## 2024-02-09 RX ORDER — TAMSULOSIN HYDROCHLORIDE 0.4 MG/1
1 CAPSULE ORAL DAILY
Qty: 90 CAPSULE | Refills: 3 | Status: SHIPPED | OUTPATIENT
Start: 2024-02-09

## 2024-02-09 RX ORDER — ZOLPIDEM TARTRATE 10 MG/1
10 TABLET ORAL NIGHTLY
Qty: 90 TABLET | Refills: 0 | Status: SHIPPED | OUTPATIENT
Start: 2024-02-09 | End: 2024-02-09 | Stop reason: SDUPTHER

## 2024-02-09 NOTE — PROGRESS NOTES
ANNUAL WELLNESS VISIT    DRUG AND ALCOHOL USE      alcohol intake:2 beers per week, tobacco use: smokeless , and caffeine intake: 2 cups of caffeinated coffee per day    DIET AND PHYSICAL ACTIVITY     Diet: general    Exercise: daily   Exercise Details: walking    MOOD DISORDER AND COGNITIVE SCREENING     PHQ-2 Depression Screening - components reviewed with patient; screening is negative for active depression.    Little interest or pleasure in doing things? 2-->more than half the days   Feeling down, depressed, or hopeless? 0-->not at all   PHQ-2 Total Score 2      Anxiety Screening Tool Used YES       Mini-Cog Performed   Yes    1. Tell Patient 3 Words Car house boat    2. Administer Clock Test normal    3. Recall 3 words  Car house boat    4. Number Correct Items 3    FUNCTIONAL ABILITY AND LEVEL OF SAFETY   Hearing mild hearing loss     Wears Hearing Aids No       Current Activities Independent      none  - see Funct/Cog Status Intake     Fall Risk Assessment       Has difficulty with walking or balance  No         Timed Up and Go (TUG) Test  10 sec.       If >12 sec, normal    ADVANCED DIRECTIVE  Advance Care Planning   ACP discussion was held with the patient during this visit. Patient has an advance directive (not in EMR), copy requested.  Advance Care Planning Discussion:  Patient has advanced directive and living will.  Reviewed desires for end of life care, which is to have comfort care.  Patient does not want extraordinary life-sustaining measures, including no prolonged artificial life support. Encouraged patient to ensure family is aware of desires/preferences. Confirmed wife Adelina is his healthcare surrogate and son Josue is the alternate. Requesting bringing copy for [facility] records.    PAIN SCREENING Do you have pain right now? yes      If so, 1-10 scale: 3     Intermittent     Do you have pain every day? No      Probable chronic pain: Yes     Recent Hospitalizations:  No hospitalization(s) within  the last year..     MEDICATION REVIEW   - updated and reviewed (see Medication List).   - reviewed for potentially harmful drug-disease interactions in the elderly.   - reviewed for high risk medications in the elderly.   - aspirin use: Yes, 81mg QD ; also clopidogrel 75mg QD    BMI  Body mass index is 23.02 kg/m².  BMI is >= 25 and <30. (Overweight) The following options were offered after discussion;: exercise counseling/recommendations and nutrition counseling/recommendations       _________________________________________________________    Chief Complaint   Patient presents with    Medicare Wellness-subsequent    Diabetes    Hypertension    Hyperlipidemia       History of Present Illness  89 y.o.  male, accompanied by his son Josue, presents for updated phys examination and wellness visit as well as f/u on BP, cholesterol, sugars, thyroid and med RFs. Has chronic insomnia and neuropathy and reports no s/e with zolpidem or lyrica - wants to cont meds.    Reports persistent abdominal pain has been diagnosed as abdominal hernia.  Has surgery scheduled for March with Dr. Lazaro.  Just had an echo done for cardiac clearance.  Saw Dr. Ramirez yesterday.    Verbalizes medication interaction between omeprazole and clopidogrel; requesting to go on pantoprazole.    Review of Systems  ROS (+) for chronic insomnia and neuropathy.  Denies chest pain, palpitations, shortness of breath.  Has not had recent falls.  Notes persistent abdominal pain, attributed to hernia, surgery pending.  Denies bowel changes.  Has been taking his pancreatic enzymes.  All other ROS reviewed and negative.      Current Outpatient Medications:     aspirin 81 MG QD    atorvastatin (LIPITOR) 40 MG QD    Budesonide (ENTOCORT EC) 3 MG 24 hr 3 QAM    bumetanide (BUMEX) 1 MG t 1/2 QD    cholecalciferol (VITAMIN D3) 10 MCG (400 UNIT)QD    clopidogrel (PLAVIX) 75 MG QD    ketoconazole (NIZORAL) 2 % shampo AD    levothyroxine (SYNTHROID, LEVOTHROID)  "50 MCGQD    loratadine (Claritin) 5 MG QD    metoprolol succinate XL (TOPROL-XL) 50 MG  QD    Pancrelipase, Lip-Prot-Amyl, (CREON) 27268-202165 units capsule TID w/ meals    pregabalin (LYRICA) 75 MG BID    pyridostigmine (MESTINON) 60 MG 1/2 TID    sacubitril-valsartan (ENTRESTO)  MG BID    spironolactone (ALDACTONE) 25 MG QD    tamsulosin (FLOMAX) 0.4 MG QHS    zolpidem (AMBIEN) 10 MG QHS      OPIOID SCREENING:  The patient does not have opioid prescription on his medication list; however has sedating medications to include zolpidem and lyrica. Medication list has been reviewed with the patient regarding potentially high risk medications and harmful drug interactions in patients over age 65.    VITALS:  /68   Pulse 51   Temp 96.3 °F (35.7 °C)   Ht 170.2 cm (67\")   Wt 66.7 kg (147 lb)   SpO2 100%   BMI 23.02 kg/m²     Physical Exam  Vitals and nursing note reviewed.   Constitutional:       General: He is not in acute distress.     Appearance: Normal appearance. He is well-developed.   HENT:      Head: Normocephalic.      Right Ear: Ear canal and external ear normal.      Left Ear: Ear canal and external ear normal.      Nose: Nose normal.   Eyes:      Extraocular Movements: Extraocular movements intact.      Conjunctiva/sclera: Conjunctivae normal.      Pupils: Pupils are equal, round, and reactive to light.      Comments: Wearing glasses   Neck:      Vascular: No carotid bruit (bilaterally).   Cardiovascular:      Rate and Rhythm: Normal rate and regular rhythm.      Heart sounds: Normal heart sounds.   Pulmonary:      Effort: Pulmonary effort is normal. No respiratory distress.      Breath sounds: Normal breath sounds.   Abdominal:      General: Bowel sounds are normal. There is no distension.      Palpations: Abdomen is soft. There is no mass.      Tenderness: There is no abdominal tenderness.   Musculoskeletal:      Cervical back: Normal range of motion and neck supple.   Lymphadenopathy:      " Cervical: No cervical adenopathy.   Skin:     General: Skin is warm and dry.      Findings: No rash.   Neurological:      Mental Status: He is alert.   Psychiatric:         Mood and Affect: Mood normal.         Behavior: Behavior normal.         LABS  Results for orders placed or performed in visit on 01/04/24   Comprehensive Metabolic Panel    Specimen: Blood   Result Value Ref Range    Glucose 103 (H) 65 - 99 mg/dL    BUN 19 8 - 23 mg/dL    Creatinine 1.03 0.76 - 1.27 mg/dL    Sodium 140 136 - 145 mmol/L    Potassium 3.9 3.5 - 5.2 mmol/L    Chloride 105 98 - 107 mmol/L    CO2 23.5 22.0 - 29.0 mmol/L    Calcium 8.6 8.6 - 10.5 mg/dL    Total Protein 6.6 6.0 - 8.5 g/dL    Albumin 3.8 3.5 - 5.2 g/dL    ALT (SGPT) 5 1 - 41 U/L    AST (SGOT) 8 1 - 40 U/L    Alkaline Phosphatase 85 39 - 117 U/L    Total Bilirubin 0.4 0.0 - 1.2 mg/dL    Globulin 2.8 gm/dL    A/G Ratio 1.4 g/dL    BUN/Creatinine Ratio 18.4 7.0 - 25.0    Anion Gap 11.5 5.0 - 15.0 mmol/L    eGFR 69.4 >60.0 mL/min/1.73   Lipid Panel    Specimen: Blood   Result Value Ref Range    Total Cholesterol 80 0 - 200 mg/dL    Triglycerides 94 0 - 150 mg/dL    HDL Cholesterol 34 (L) 40 - 60 mg/dL    LDL Cholesterol  27 0 - 100 mg/dL    VLDL Cholesterol 19 5 - 40 mg/dL    LDL/HDL Ratio 0.80    TSH    Specimen: Blood   Result Value Ref Range    TSH 2.010 0.270 - 4.200 uIU/mL   Microalbumin / Creatinine Urine Ratio - Urine, Clean Catch    Specimen: Urine, Clean Catch   Result Value Ref Range    Microalbumin/Creatinine Ratio 23.9 0.0 - 29.0 mg/g    Creatinine, Urine 96.2 mg/dL    Microalbumin, Urine 2.3 mg/dL   Hemoglobin A1c    Specimen: Blood   Result Value Ref Range    Hemoglobin A1C 5.90 (H) 4.80 - 5.60 %   CK    Specimen: Blood   Result Value Ref Range    Creatine Kinase 38 20 - 200 U/L   T4, Free    Specimen: Blood   Result Value Ref Range    Free T4 1.10 0.93 - 1.70 ng/dL   Uric Acid    Specimen: Blood   Result Value Ref Range    Uric Acid 4.0 3.4 - 7.0 mg/dL    Magnesium    Specimen: Blood   Result Value Ref Range    Magnesium 2.2 1.6 - 2.4 mg/dL   CBC Auto Differential    Specimen: Blood   Result Value Ref Range    WBC 5.60 3.40 - 10.80 10*3/mm3    RBC 3.93 (L) 4.14 - 5.80 10*6/mm3    Hemoglobin 9.7 (L) 13.0 - 17.7 g/dL    Hematocrit 32.9 (L) 37.5 - 51.0 %    MCV 83.7 79.0 - 97.0 fL    MCH 24.7 (L) 26.6 - 33.0 pg    MCHC 29.5 (L) 31.5 - 35.7 g/dL    RDW 14.3 12.3 - 15.4 %    RDW-SD 43.7 37.0 - 54.0 fl    MPV 9.6 6.0 - 12.0 fL    Platelets 165 140 - 450 10*3/mm3    Neutrophil % 64.5 42.7 - 76.0 %    Lymphocyte % 27.5 19.6 - 45.3 %    Monocyte % 7.5 5.0 - 12.0 %    Eosinophil % 0.0 (L) 0.3 - 6.2 %    Basophil % 0.0 0.0 - 1.5 %    Immature Grans % 0.5 0.0 - 0.5 %    Neutrophils, Absolute 3.61 1.70 - 7.00 10*3/mm3    Lymphocytes, Absolute 1.54 0.70 - 3.10 10*3/mm3    Monocytes, Absolute 0.42 0.10 - 0.90 10*3/mm3    Eosinophils, Absolute 0.00 0.00 - 0.40 10*3/mm3    Basophils, Absolute 0.00 0.00 - 0.20 10*3/mm3    Immature Grans, Absolute 0.03 0.00 - 0.05 10*3/mm3    nRBC 0.0 0.0 - 0.2 /100 WBC   Urinalysis without microscopic (no culture) - Urine, Clean Catch    Specimen: Urine, Clean Catch   Result Value Ref Range    Color, UA Yellow Yellow, Straw    Appearance, UA Clear Clear    pH, UA 5.5 5.0 - 8.0    Specific Gravity, UA >=1.030 1.005 - 1.030    Glucose,  mg/dL (2+) (A) Negative    Ketones, UA Negative Negative    Bilirubin, UA Negative Negative    Blood, UA Negative Negative    Protein, UA Trace (A) Negative    Leuk Esterase, UA Negative Negative    Nitrite, UA Negative Negative    Urobilinogen, UA 0.2 E.U./dL 0.2 - 1.0 E.U./dL   Urinalysis, Microscopic Only - Urine, Clean Catch    Specimen: Urine, Clean Catch   Result Value Ref Range    RBC, UA None Seen None Seen, 0-2 /HPF    WBC, UA None Seen None Seen, 0-2 /HPF    Bacteria, UA Trace (A) None Seen /HPF    Squamous Epithelial Cells, UA 0-2 None Seen, 0-2 /HPF    Hyaline Casts, UA None Seen None Seen /LPF     Methodology Manual Light Microscopy      1/25/24 EGD/Dr. Moore - chr gastritis with chem gastropathy, mild gastroesophagitis, neg for H.pylori, metaplasia, dysplasia    12/16/23 H&H 9.0/31.1    5/11/23 H&H 11.0/36.6    - reports EKG done at  this week    2/8/24 ECHO - EF 57%, mild AR, tr ME    ASSESSMENT/PLAN    Diagnoses and all orders for this visit:    1. Medicare annual wellness visit, subsequent (Primary)  Assessment & Plan:  Health maintenance - strongly rec updated COVID19 vacc  (refused) a; flu vacc done per pt; also rec RSV vacc, counseling given and rec getting at pharmacy; Shingrix done per pt's son; colonosc 8/22,  per Dr. Holland; no further prostate CA screening w/ age/comorbidities, confirmed with pt; eye exam TBD; dental exam UTD per pt; (+) seat belt use    Consultants:  Patient Care Team:  Delilah Tatum MD as PCP - General  Burton Holland MD as Consulting Physician (Colon and Rectal Surgery)  Douglas Lazaro MD as Consulting Physician (General Surgery)  Rubio Rossi MD as Consulting Physician (Dermatology)  Jim Black MD as Consulting Physician (Cardiothoracic Surgery)  Yusef Bowles MD (Inactive) as Consulting Physician (Nephrology)  Tio Mcnally MD (Inactive) as Consulting Physician (Hand Surgery)  Chayito Quick OD (Optometry)  Radha Hirsch APRN as Nurse Practitioner (Otolaryngology)  Dionisio Valenzuela MD as Consulting Physician (Ophthalmology)  Elisa Conde MD as Consulting Physician (Pulmonary Disease)  Dillon Judge MD as Consulting Physician (Anesthesiology)  Jamey Gonzalez IV, MD as Consulting Physician (Interventional Cardiology)  Jamey Gonzalez IV, MD as Consulting Physician (Interventional Cardiology)  Gregory Granados Jr., MD as Consulting Physician (Orthopedic Surgery)  Stephenie Ramirez MD as Consulting Physician (Cardiology)        2. Essential hypertension  Assessment & Plan:  BP mildly elevated 140/68;  rec home monitoring with goal < 130/80; cont metoprolol XL 50mg QD, also on entresto 97/103 BID and spironolactone 25mg QD (RXs per cards)      3. Hyperlipidemia LDL goal <70  Assessment & Plan:  Lipids stable and at goal with LDL 27; cont atorvastatin 40mg QD #90, 3RF    Orders:  -     atorvastatin (LIPITOR) 40 MG tablet; Take 1 tablet by mouth Daily.  Dispense: 90 tablet; Refill: 3    4. Type 2 diabetes mellitus with diabetic polyneuropathy, without long-term current use of insulin  Assessment & Plan:  BG control stable with A1C 5.9; encouraged reg phys activity to decr insulin resistance, moderation in unhealthy starches/sweets; f/u A1C in 6 mos        5. Hypothyroidism  Assessment & Plan:  Euthyroid; cont levothyroxine 50mcg QD #90, 3RF; reviewed taking levothyroxine by itself, not with other meds (he is currently taking levothyroxine with a.m. meds)    Orders:  -     levothyroxine (SYNTHROID, LEVOTHROID) 50 MCG tablet; Take 1 tablet by mouth Daily.  Dispense: 90 tablet; Refill: 3    6. Stage 3b chronic kidney disease  Assessment & Plan:  Renal function improved      7. Gout  Assessment & Plan:  Asx with uric 4.0; no meds      8. BPH  Assessment & Plan:  Stable on tamsulosin 0.4mg QHS #90, 3RF    Orders:  -     tamsulosin (FLOMAX) 0.4 MG capsule 24 hr capsule; Take 1 capsule by mouth Daily.  Dispense: 90 capsule; Refill: 3    9. Diabetic peripheral neuropathy  Assessment & Plan:  Cont'd judicious and appropriate use of lyrica 75mg BID #60, 1RF (2 mos worth since he wants to sync next visit with his wife's, which is end of Mar). LETHA was reviewed and appropriate.  The patient has read and signed the [facility] Controlled Substance Contract 7/11/23 UDS obtained. Patient has been counseled and is aware of side effects, risks, potential for addiction/tolerance, interactions, and how to take medication correctly.  RTC 3 mos for next RX with LETHA     Patient verbalizes understanding of additional risks of this  medication in individuals > 65 yrs of age and wants to continue lyrica 75mg BID and zolpidem 10mg QHS    Orders:  -     Discontinue: pregabalin (LYRICA) 75 MG capsule; Take 1 capsule by mouth 2 (Two) Times a Day.  Dispense: 180 capsule; Refill: 0  -     pregabalin (LYRICA) 75 MG capsule; Take 1 capsule by mouth 2 (Two) Times a Day.  Dispense: 60 capsule; Refill: 1    10. Insomnia  Assessment & Plan:  Judicious and appropriate use of zolpidem 10mg QHS #30, 1RF (2 mos worth since he wants to sync next visit with his wife's, which is end of Mar).. LETHA was reviewed and appropriate.  The patient has read and signed the [facility] Controlled Substance Contract 7/11/23. UDS obtained.  Patient has been counseled and is aware of side effects, risks, potential for addiction/tolerance, interactions, and how to take medication correctly.  RTC 3 mos for next RX with LETHA    Patient verbalizes understanding of additional risks of this medication in individuals > 65 yrs of age and wants to continue zolpidem 10mg QHS and lyrica 75mg BID with additional risks of falls, decreased mental clarity, confusion.      Orders:  -     Discontinue: zolpidem (AMBIEN) 10 MG tablet; Take 1 tablet by mouth Every Night.  Dispense: 90 tablet; Refill: 0  -     zolpidem (AMBIEN) 10 MG tablet; Take 1 tablet by mouth Every Night.  Dispense: 30 tablet; Refill: 1    11. Normocytic anemia  Assessment & Plan:  Persistent anemia, a little better compared to 12/23; f/u CBC in 3 mos for serial examination    Orders:  -     CBC & Differential; Future    12. Hypomagnesemia  Assessment & Plan:  Stable Mg 2.2; no meds      13. Gastroesophageal reflux disease with esophagitis without hemorrhage  Assessment & Plan:  Note EGD results showing mild acute gastritis and esophagitis; changing omeprazole to pantoprazole 40 mg QD #90, 3RF due to interaction with clopidogrel; discussed no correlating equivalent of omeprazole 20 mg QD, which is what he has been  taking.    Orders:  -     pantoprazole (PROTONIX) 40 MG EC tablet; Take 1 tablet by mouth Daily.  Dispense: 90 tablet; Refill: 3    14. Right inguinal hernia  Assessment & Plan:  Reports pending hernia surgery in March with Dr. Lazaro          FOLLOW-UP  RTC end of Mar 2024 for next zolpidem and lyrica RXs with LETHA (contract 7/23, UDS 2/24) with CBC (escribed) - wants to sync appt with his wife's next appt  - will need updated A1C in 6 mos    Electronically signed by:    Delilah Tatum MD, FACP  02/09/2024      EMR Dragon/Transcription Utilization  Please note that portions of this note were completed with a voice recognition program.

## 2024-02-09 NOTE — ASSESSMENT & PLAN NOTE
Cont'd judicious and appropriate use of lyrica 75mg BID #60, 1RF (2 mos worth since he wants to sync next visit with his wife's, which is end of Mar). LETHA was reviewed and appropriate.  The patient has read and signed the [facility] Controlled Substance Contract 7/11/23 UDS obtained. Patient has been counseled and is aware of side effects, risks, potential for addiction/tolerance, interactions, and how to take medication correctly.  RTC 3 mos for next RX with LETHA     Patient verbalizes understanding of additional risks of this medication in individuals > 65 yrs of age and wants to continue lyrica 75mg BID and zolpidem 10mg QHS

## 2024-02-09 NOTE — Clinical Note
23274 goes with elev BP, persistent anemia, GERD - change of meds; RX mgmt, 3+ labs reviewed; thanks

## 2024-02-09 NOTE — ASSESSMENT & PLAN NOTE
Judicious and appropriate use of zolpidem 10mg QHS #30, 1RF (2 mos worth since he wants to sync next visit with his wife's, which is end of Mar).. LETHA was reviewed and appropriate.  The patient has read and signed the [facility] Controlled Substance Contract 7/11/23. UDS obtained.  Patient has been counseled and is aware of side effects, risks, potential for addiction/tolerance, interactions, and how to take medication correctly.  RTC 3 mos for next RX with LETHA    Patient verbalizes understanding of additional risks of this medication in individuals > 65 yrs of age and wants to continue zolpidem 10mg QHS and lyrica 75mg BID with additional risks of falls, decreased mental clarity, confusion.

## 2024-02-09 NOTE — ASSESSMENT & PLAN NOTE
BP mildly elevated 140/68; rec home monitoring with goal < 130/80; cont metoprolol XL 50mg QD, also on entresto 97/103 BID and spironolactone 25mg QD (RXs per cards)

## 2024-02-09 NOTE — ASSESSMENT & PLAN NOTE
Note EGD results showing mild acute gastritis and esophagitis; changing omeprazole to pantoprazole 40 mg QD #90, 3RF due to interaction with clopidogrel; discussed no correlating equivalent of omeprazole 20 mg QD, which is what he has been taking.

## 2024-02-09 NOTE — ASSESSMENT & PLAN NOTE
Euthyroid; cont levothyroxine 50mcg QD #90, 3RF; reviewed taking levothyroxine by itself, not with other meds (he is currently taking levothyroxine with a.m. meds)

## 2024-02-09 NOTE — ASSESSMENT & PLAN NOTE
Health maintenance - strongly rec updated COVID19 vacc  (refused) a; flu vacc done per pt; also rec RSV vacc, counseling given and rec getting at pharmacy; Shingrix done per pt's son; colonosc 8/22,  per Dr. Holland; no further prostate CA screening w/ age/comorbidities, confirmed with pt; eye exam TBD; dental exam UTD per pt; (+) seat belt use    Consultants:  Patient Care Team:  Delilah Tatum MD as PCP - General  Skyler, Burton ECHEVARRIA MD as Consulting Physician (Colon and Rectal Surgery)  Douglas Lazaro MD as Consulting Physician (General Surgery)  Rubio Rossi MD as Consulting Physician (Dermatology)  Jim Black MD as Consulting Physician (Cardiothoracic Surgery)  Yusef Bowles MD (Inactive) as Consulting Physician (Nephrology)  Tio Mcnally MD (Inactive) as Consulting Physician (Hand Surgery)  Chayito Quick OD (Optometry)  Radha Hirsch APRN as Nurse Practitioner (Otolaryngology)  Dionisio Valenzuela MD as Consulting Physician (Ophthalmology)  Elisa Conde MD as Consulting Physician (Pulmonary Disease)  Dillon Judge MD as Consulting Physician (Anesthesiology)  Jamey Gonzalez IV, MD as Consulting Physician (Interventional Cardiology)  Jamey Gonzalez IV, MD as Consulting Physician (Interventional Cardiology)  Gregory Granados Jr., MD as Consulting Physician (Orthopedic Surgery)  Stephenie Ramirez MD as Consulting Physician (Cardiology)

## 2024-02-11 PROBLEM — K40.90 RIGHT INGUINAL HERNIA: Chronic | Status: ACTIVE | Noted: 2023-04-17

## 2024-02-14 LAB — DRUGS UR: NORMAL

## 2024-03-26 ENCOUNTER — OFFICE VISIT (OUTPATIENT)
Dept: INTERNAL MEDICINE | Facility: CLINIC | Age: 89
End: 2024-03-26
Payer: MEDICARE

## 2024-03-26 ENCOUNTER — TELEPHONE (OUTPATIENT)
Dept: INTERNAL MEDICINE | Facility: CLINIC | Age: 89
End: 2024-03-26

## 2024-03-26 VITALS
HEIGHT: 67 IN | BODY MASS INDEX: 23.64 KG/M2 | SYSTOLIC BLOOD PRESSURE: 142 MMHG | TEMPERATURE: 96.9 F | WEIGHT: 150.6 LBS | OXYGEN SATURATION: 100 % | HEART RATE: 72 BPM | DIASTOLIC BLOOD PRESSURE: 60 MMHG

## 2024-03-26 DIAGNOSIS — Z71.85 VACCINE COUNSELING: ICD-10-CM

## 2024-03-26 DIAGNOSIS — E11.42 TYPE 2 DIABETES MELLITUS WITH DIABETIC POLYNEUROPATHY, WITHOUT LONG-TERM CURRENT USE OF INSULIN: Chronic | ICD-10-CM

## 2024-03-26 DIAGNOSIS — F51.01 PRIMARY INSOMNIA: Chronic | ICD-10-CM

## 2024-03-26 DIAGNOSIS — E11.42 DIABETIC PERIPHERAL NEUROPATHY: Primary | Chronic | ICD-10-CM

## 2024-03-26 RX ORDER — PREGABALIN 75 MG/1
75 CAPSULE ORAL 2 TIMES DAILY
Qty: 180 CAPSULE | Refills: 0 | Status: SHIPPED | OUTPATIENT
Start: 2024-03-26

## 2024-03-26 RX ORDER — ZOLPIDEM TARTRATE 10 MG/1
10 TABLET ORAL NIGHTLY
Qty: 90 TABLET | Refills: 0 | Status: SHIPPED | OUTPATIENT
Start: 2024-03-26

## 2024-03-26 RX ORDER — DAPAGLIFLOZIN 10 MG/1
TABLET, FILM COATED ORAL
COMMUNITY

## 2024-03-26 RX ORDER — TIOTROPIUM BROMIDE INHALATION SPRAY 3.12 UG/1
2 SPRAY, METERED RESPIRATORY (INHALATION) DAILY
COMMUNITY

## 2024-03-26 NOTE — ASSESSMENT & PLAN NOTE
Judicious and appropriate use of zolpidem 10mg QHS #30, 1RF (2 mos worth since he wants to sync next visit with his wife's, which is end of Mar).. LETHA was reviewed and appropriate.  The patient has read and signed the [facility] Controlled Substance Contract 7/11/23. UDS 2/24.  Patient has been counseled and is aware of side effects, risks, potential for addiction/tolerance, interactions, and how to take medication correctly.  RTC 3 mos for next RX with LETHA/contract    Patient verbalizes understanding of additional risks of this medication in individuals > 65 yrs of age and wants to continue zolpidem 10mg QHS and lyrica 75mg BID with additional risks of falls, decreased mental clarity, confusion.

## 2024-03-26 NOTE — TELEPHONE ENCOUNTER
Patient is attempting to get his Rx filled pharmacy is saying it is too soon, but the patient is out of medication.   pregabalin (LYRICA) 75 MG capsule   zolpidem (AMBIEN) 10 MG tablet   Phone: [phone]

## 2024-03-26 NOTE — TELEPHONE ENCOUNTER
Spoke with patient and he states the zolpidem was sent to [facility] and was picked up on March 7th for qty#30, advised he will not be due until 4/7 or they can fill 2 days early but that he should not be out, he states he will just have to wait until time to pick them up

## 2024-03-26 NOTE — ASSESSMENT & PLAN NOTE
Cont'd judicious and appropriate use of lyrica 75mg BID #180, 2RF LETHA was reviewed and appropriate.  The patient has read and signed the [facility] Controlled Substance Contract 7/11/23 UDS 2/24. Patient has been counseled and is aware of side effects, risks, potential for addiction/tolerance, interactions, and how to take medication correctly.  RTC 3 mos for next RX with LETHA/contract     Patient verbalizes understanding of additional risks of this medication in individuals > 65 yrs of age and wants to continue lyrica 75mg BID and zolpidem 10mg QHS

## 2024-05-11 PROBLEM — D69.6 THROMBOCYTOPENIA: Chronic | Status: ACTIVE | Noted: 2023-01-04

## 2024-06-03 PROBLEM — I63.9 CVA (CEREBRAL VASCULAR ACCIDENT): Chronic | Status: ACTIVE | Noted: 2024-06-03

## 2024-06-03 PROBLEM — G45.0 TIA INVOLVING VERTEBRAL ARTERY: Status: ACTIVE | Noted: 2024-06-03

## 2024-06-03 PROBLEM — I63.9 CVA (CEREBRAL VASCULAR ACCIDENT): Status: ACTIVE | Noted: 2024-06-03

## 2024-06-03 PROBLEM — G45.0 TIA INVOLVING VERTEBRAL ARTERY: Chronic | Status: ACTIVE | Noted: 2024-06-03

## 2024-06-16 NOTE — PROGRESS NOTES
"Chief Complaint   Patient presents with    Insomnia    Diabetic Peripheral Neuropathy       History of Present Illness  89 y.o.  male, accompanied by his wife, presents for hospital follow-up as well as RFs on zolpidem and lyrica and f/u on sugars. Denies s/e with meds.    Had recent ER visit/hospitalization overnight for TIA with left-sided weakness and slurred speech.  He feels that his left-sided weakness is back to baseline.  He complains of right leg pain, seemingly originating from the right knee but emanating both up to the right hip as well as down the leg with a heavy feeling.  Has not fallen.  Has had a history of partial right knee replacement.  Has been trying to do increased yard work but is limited by this right leg discomfort.    Has baseline swelling and notes that he takes water pill.    Review of Systems  ROS (+) for chronic insomnia and neuropathy, currently also complaining of right leg pain and discomfort, from the hip, to the knee, down the proximal right lower leg.  ROS (+) for chronic BLE edema per patient. Currently denies CP, SOB, abd pain. All other ROS reviewed and negative.    Current Outpatient Medications:     aspirin 81 MG QD    atorvastatin 40 MG QD    Budesonide (ENTOCORT EC) 3 MG 3 QD    bumetanide 1 MG 1/2 QDl:     cholecalciferol (VITAMIN D3) 10 MCG (400 UNIT) QD    clopidogrel 75 MG QD    dapagliflozin Propanediol (Farxiga) 10 MG QD    ketoconazole (NIZORAL) 2 % shampoo AD    levothyroxine 50 MCG QD    loratadine 5 MG QD    metoprolol succinate XL 50 MG QD    Pancrelipase, Lip-Prot-Amyl, (CREON) 22378-361220 units  TID    pantoprazole 40 MG QD    pregabalin 75 MG BID    pyridostigmine 60 MG 1/2 TID    sacubitril-valsartan  MG BID    spironolactone  25 MG QD    tamsulosin 0.4 MG QHS    tiotropium bromide monohydrate (Spiriva Respimat) 2.5 MCG/ACT 2  puffs QD    zolpidem 10 MG QHS    VITALS:  /68   Pulse 58   Temp 97.1 °F (36.2 °C)   Ht 170.2 cm (67\")   Wt " 69.5 kg (153 lb 3.2 oz)   SpO2 100%   BMI 23.99 kg/m²     Physical Exam  Vitals and nursing note reviewed.   Constitutional:       General: He is not in acute distress.     Appearance: Normal appearance. He is not ill-appearing.   Eyes:      Extraocular Movements: Extraocular movements intact.      Conjunctiva/sclera: Conjunctivae normal.   Pulmonary:      Effort: Pulmonary effort is normal. No respiratory distress.   Musculoskeletal:         General: Deformity (Arthritic changes right knee without increased warmth or swelling; nontender to palpation, flexion, extension) present. No tenderness (No tenderness with palpation of the right greater trochanter, nonreproducible pain at the right lateral hip extending down the right outer thigh).      Right lower leg: Edema (2+ pitting edema BLE R>L, hyperpigmentation L>R) present.      Left lower leg: Edema present.      Comments: Right hip good ROM, does not reproduce the pain with mov't   Neurological:      Mental Status: He is alert. Mental status is at baseline.   Psychiatric:         Mood and Affect: Mood normal.         Behavior: Behavior normal.         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, no R carotid stenosis, dense calcification with at least 70% stenosis of prox left carotid; mild ethmoid sinus mucosal thickening, degen changes in cervical spine with multilevel bony foraminal stenosis, significant calcification L vertebral artery with probable high-grade stenosis    5/31/24 ECHO - EF 40%, diastolic dysfunction, mod MV annular calcification with mod MR, mild AR/TR, tr NM    5/31/24 pCXR - incr'd interstitial  markings, no consolidation    5/31/24 venous duplex RLE - no DVT, (+) interstitial edema at distal to proximal calf    5/31/24 MRI brain - mod diffuse cortical atrophy, nl sinuses    5/31/24 CBC with H&H 9.5/34.7, stable CMP, TSH, hs troponin,     __________  3/11/24  CBC with H&H 9.3/33.4    1/4/24 A1C 5.9, H&H 9.7/32.9    ASSESSMENT/PLAN    Diagnoses and all orders for this visit:    1. Insomnia (Primary)  Assessment & Plan:  Judicious and appropriate use of zolpidem 10mg QHS #90 0RF LETHA was reviewed and appropriate.  The patient has read and signed the [facility] Substance Contract  today 6/18/24. UDS 2/24.  Patient has been counseled and is aware of side effects, risks, potential for addiction/tolerance, interactions, and how to take medication correctly.  RTC 3 mos for next RX with LETHA/    Patient verbalizes understanding of additional risks of this medication in individuals > 65 yrs of age and wants to continue zolpidem 10mg QHS and lyrica 75mg BID with additional risks of falls, decreased mental clarity, confusion.      Orders:  -     zolpidem (AMBIEN) 10 MG tablet; Take 1 tablet by mouth Every Night.  Dispense: 90 tablet; Refill: 0    2. Diabetic peripheral neuropathy  Assessment & Plan:  Cont'd judicious and appropriate use of lyrica 75mg BID #180, 2RF LETHA was reviewed and appropriate.  The patient has read and signed the [facility] Substance Contract today 6/18/24. UDS 2/24. Patient has been counseled and is aware of side effects, risks, potential for addiction/tolerance, interactions, and how to take medication correctly.  RTC 3 mos for next RX with LETHA     Patient verbalizes understanding of additional risks of this medication in individuals > 65 yrs of age and wants to continue lyrica 75mg BID and zolpidem 10mg QHS    Orders:  -     pregabalin (LYRICA) 75 MG capsule; Take 1 capsule by mouth 2 (Two) Times a Day.  Dispense: 180 capsule; Refill: 0    3. Type 2  diabetes mellitus with diabetic polyneuropathy, without long-term current use of insulin  Assessment & Plan:  BG control stable/improved with A1C 5.6; cont farxiga 10mg QD    Orders:  -     POC Glycosylated Hemoglobin (Hb A1C)    4. Normocytic anemia  Assessment & Plan:  Anemia was stable this hospitalization      5. TIA involving vertebral artery  Assessment & Plan:  [facility] records reviewed; cont ASA 81mg QD and clopidogrel 75mg QD; patient declines PT referral as he feels his left-sided weakness is back to baseline      6. Left-sided weakness  Comments:  s/p recent TIA; CTA head/neck findings as noted; PT referral as requested    7. Vaccine counseling  Comments:  rec COVID19 vacc, RSV, vacc Shingrix, and updated Tdap - get at pharmacy    8. Chronic right-sided low back pain without sciatica  Comments:  RLE pain likely stemming from back/hip area, not the hip joint or knee; check L-spine Xray for further eval; likely will rec PT  Orders:  -     XR Spine Lumbar 2 or 3 View; Future    9. Right leg pain  Comments:  no reproducible pain at the R. knee although likelycomponent of OA; with entire R leg pain, check L-spine Xray for further eval; defer R knee Xray  Orders:  -     XR Spine Lumbar 2 or 3 View; Future    10. Peripheral arterial disease  Assessment & Plan:  Hyperpigmentation L>R; continue clopidogrel and ASA 81mgQD      11. Bilateral lower extremity edema  Assessment & Plan:  On Bumex 1 mg 1/2 QD and entresto 97/103 BID; rec leg elevation          FOLLOW-UP  Health maintenance - [id number] and RSV vaccines, counseling given; rec Shingrix, counseling given, perez with increased' MCR coverage; rec updated Tdap, perez in light of current pertussis outbreak in [facility]  RTC 3 mos for next zolpidem RX with LETHA (contract 6/24, UDS 2/24)    Electronically signed by:    Delilah Tatum MD, FACP  06/18/2024

## 2024-06-17 NOTE — ASSESSMENT & PLAN NOTE
Judicious and appropriate use of zolpidem 10mg QHS #90 0RF LETHA was reviewed and appropriate.  The patient has read and signed the [facility] Controlled Substance Contract  today 6/18/24. UDS 2/24.  Patient has been counseled and is aware of side effects, risks, potential for addiction/tolerance, interactions, and how to take medication correctly.  RTC 3 mos for next RX with LETHA/    Patient verbalizes understanding of additional risks of this medication in individuals > 65 yrs of age and wants to continue zolpidem 10mg QHS and lyrica 75mg BID with additional risks of falls, decreased mental clarity, confusion.

## 2024-06-17 NOTE — ASSESSMENT & PLAN NOTE
[facility] records reviewed; cont ASA 81mg QD and clopidogrel 75mg QD; patient declines PT referral as he feels his left-sided weakness is back to baseline

## 2024-06-17 NOTE — ASSESSMENT & PLAN NOTE
Cont'd judicious and appropriate use of lyrica 75mg BID #180, 2RF LETHA was reviewed and appropriate.  The patient has read and signed the [facility] Controlled Substance Contract today 6/18/24. UDS 2/24. Patient has been counseled and is aware of side effects, risks, potential for addiction/tolerance, interactions, and how to take medication correctly.  RTC 3 mos for next RX with LETHA     Patient verbalizes understanding of additional risks of this medication in individuals > 65 yrs of age and wants to continue lyrica 75mg BID and zolpidem 10mg QHS

## 2024-06-18 ENCOUNTER — OFFICE VISIT (OUTPATIENT)
Dept: INTERNAL MEDICINE | Facility: CLINIC | Age: 89
End: 2024-06-18
Payer: MEDICARE

## 2024-06-18 VITALS
SYSTOLIC BLOOD PRESSURE: 137 MMHG | DIASTOLIC BLOOD PRESSURE: 68 MMHG | TEMPERATURE: 97.1 F | WEIGHT: 153.2 LBS | HEIGHT: 67 IN | BODY MASS INDEX: 24.04 KG/M2 | HEART RATE: 58 BPM | OXYGEN SATURATION: 100 %

## 2024-06-18 DIAGNOSIS — I73.9 PERIPHERAL ARTERIAL DISEASE: Chronic | ICD-10-CM

## 2024-06-18 DIAGNOSIS — E11.42 TYPE 2 DIABETES MELLITUS WITH DIABETIC POLYNEUROPATHY, WITHOUT LONG-TERM CURRENT USE OF INSULIN: Chronic | ICD-10-CM

## 2024-06-18 DIAGNOSIS — M54.50 CHRONIC RIGHT-SIDED LOW BACK PAIN WITHOUT SCIATICA: ICD-10-CM

## 2024-06-18 DIAGNOSIS — G45.0 TIA INVOLVING VERTEBRAL ARTERY: Chronic | ICD-10-CM

## 2024-06-18 DIAGNOSIS — Z71.85 VACCINE COUNSELING: ICD-10-CM

## 2024-06-18 DIAGNOSIS — E11.42 DIABETIC PERIPHERAL NEUROPATHY: Chronic | ICD-10-CM

## 2024-06-18 DIAGNOSIS — M79.604 RIGHT LEG PAIN: ICD-10-CM

## 2024-06-18 DIAGNOSIS — R60.0 BILATERAL LOWER EXTREMITY EDEMA: ICD-10-CM

## 2024-06-18 DIAGNOSIS — F51.01 PRIMARY INSOMNIA: Primary | Chronic | ICD-10-CM

## 2024-06-18 DIAGNOSIS — R53.1 LEFT-SIDED WEAKNESS: ICD-10-CM

## 2024-06-18 DIAGNOSIS — D64.9 NORMOCYTIC ANEMIA: Chronic | ICD-10-CM

## 2024-06-18 DIAGNOSIS — G89.29 CHRONIC RIGHT-SIDED LOW BACK PAIN WITHOUT SCIATICA: ICD-10-CM

## 2024-06-18 LAB
EXPIRATION DATE: NORMAL
HBA1C MFR BLD: 5.6 % (ref 4.5–5.7)
Lab: NORMAL

## 2024-06-18 PROCEDURE — 99214 OFFICE O/P EST MOD 30 MIN: CPT | Performed by: INTERNAL MEDICINE

## 2024-06-18 PROCEDURE — 1160F RVW MEDS BY RX/DR IN RCRD: CPT | Performed by: INTERNAL MEDICINE

## 2024-06-18 PROCEDURE — G2211 COMPLEX E/M VISIT ADD ON: HCPCS | Performed by: INTERNAL MEDICINE

## 2024-06-18 PROCEDURE — 3044F HG A1C LEVEL LT 7.0%: CPT | Performed by: INTERNAL MEDICINE

## 2024-06-18 PROCEDURE — 83036 HEMOGLOBIN GLYCOSYLATED A1C: CPT | Performed by: INTERNAL MEDICINE

## 2024-06-18 PROCEDURE — 1125F AMNT PAIN NOTED PAIN PRSNT: CPT | Performed by: INTERNAL MEDICINE

## 2024-06-18 PROCEDURE — 1159F MED LIST DOCD IN RCRD: CPT | Performed by: INTERNAL MEDICINE

## 2024-06-18 RX ORDER — PREGABALIN 75 MG/1
75 CAPSULE ORAL 2 TIMES DAILY
Qty: 180 CAPSULE | Refills: 0 | Status: SHIPPED | OUTPATIENT
Start: 2024-06-18

## 2024-06-18 RX ORDER — ZOLPIDEM TARTRATE 10 MG/1
10 TABLET ORAL NIGHTLY
Qty: 90 TABLET | Refills: 0 | Status: SHIPPED | OUTPATIENT
Start: 2024-06-18

## 2024-07-01 ENCOUNTER — HOSPITAL ENCOUNTER (OUTPATIENT)
Dept: GENERAL RADIOLOGY | Facility: HOSPITAL | Age: 89
Discharge: HOME | End: 2024-07-01
Admitting: INTERNAL MEDICINE
Payer: MEDICARE

## 2024-07-01 DIAGNOSIS — G89.29 CHRONIC RIGHT-SIDED LOW BACK PAIN WITHOUT SCIATICA: ICD-10-CM

## 2024-07-01 DIAGNOSIS — M79.604 RIGHT LEG PAIN: ICD-10-CM

## 2024-07-01 DIAGNOSIS — M54.50 CHRONIC RIGHT-SIDED LOW BACK PAIN WITHOUT SCIATICA: ICD-10-CM

## 2024-07-01 PROCEDURE — 72100 X-RAY EXAM L-S SPINE 2/3 VWS: CPT

## 2024-07-03 ENCOUNTER — PATIENT MESSAGE (OUTPATIENT)
Dept: INTERNAL MEDICINE | Facility: CLINIC | Age: 89
End: 2024-07-03
Payer: MEDICARE

## 2024-07-03 ENCOUNTER — TELEPHONE (OUTPATIENT)
Dept: INTERNAL MEDICINE | Facility: CLINIC | Age: 89
End: 2024-07-03
Payer: MEDICARE

## 2024-07-03 DIAGNOSIS — M54.41 CHRONIC RIGHT-SIDED LOW BACK PAIN WITH RIGHT-SIDED SCIATICA: Primary | ICD-10-CM

## 2024-07-03 DIAGNOSIS — M79.604 RIGHT LEG PAIN: ICD-10-CM

## 2024-07-03 DIAGNOSIS — G89.29 CHRONIC RIGHT-SIDED LOW BACK PAIN WITH RIGHT-SIDED SCIATICA: Primary | ICD-10-CM

## 2024-07-03 NOTE — TELEPHONE ENCOUNTER
----- Message from Delilah Tatum sent at 7/3/2024  2:36 PM EDT -----  Low back Xray shows multi-level arthritis changes and bone spurs, as we predicted. No compression fractures. Recommend proceeding with physical therapy as discussed. Where do you want the referral sent?

## 2024-07-03 NOTE — TELEPHONE ENCOUNTER
Spoke with patient regarding results and recommended PT. Patient would prefer going wherever his wife and he could go together. Would appreciate suggestion from provider.

## 2024-07-04 NOTE — TELEPHONE ENCOUNTER
From: Filipe Hamilton Jr.  To: Delilah Tatum  Sent: 7/3/2024 3:04 PM EDT  Subject: Physical therapy    Let's refer to [facility] in [place].

## 2024-07-18 PROBLEM — G89.29 CHRONIC RIGHT-SIDED LOW BACK PAIN WITH RIGHT-SIDED SCIATICA: Status: ACTIVE | Noted: 2024-07-18

## 2024-07-18 PROBLEM — M54.41 CHRONIC RIGHT-SIDED LOW BACK PAIN WITH RIGHT-SIDED SCIATICA: Status: ACTIVE | Noted: 2024-07-18

## 2024-08-14 ENCOUNTER — LAB (OUTPATIENT)
Dept: LAB | Facility: HOSPITAL | Age: 89
End: 2024-08-14
Payer: MEDICARE

## 2024-08-14 ENCOUNTER — TRANSCRIBE ORDERS (OUTPATIENT)
Dept: LAB | Facility: HOSPITAL | Age: 89
End: 2024-08-14
Payer: MEDICARE

## 2024-08-14 DIAGNOSIS — Z91.81 PERSONAL HISTORY OF FALL: ICD-10-CM

## 2024-08-14 DIAGNOSIS — Z79.02 ENCOUNTER FOR LONG-TERM (CURRENT) USE OF ANTIPLATELETS/ANTITHROMBOTICS: ICD-10-CM

## 2024-08-14 DIAGNOSIS — Z91.81 PERSONAL HISTORY OF FALL: Primary | ICD-10-CM

## 2024-08-14 LAB
ANION GAP SERPL CALCULATED.3IONS-SCNC: 7 MMOL/L (ref 5–15)
BASOPHILS # BLD AUTO: 0 10*3/MM3 (ref 0–0.2)
BASOPHILS NFR BLD AUTO: 0 % (ref 0–1.5)
BUN SERPL-MCNC: 17 MG/DL (ref 8–23)
BUN/CREAT SERPL: 16.8 (ref 7–25)
CALCIUM SPEC-SCNC: 8.5 MG/DL (ref 8.2–9.6)
CHLORIDE SERPL-SCNC: 109 MMOL/L (ref 98–107)
CO2 SERPL-SCNC: 26 MMOL/L (ref 22–29)
CREAT SERPL-MCNC: 1.01 MG/DL (ref 0.76–1.27)
DEPRECATED RDW RBC AUTO: 40.9 FL (ref 37–54)
EGFRCR SERPLBLD CKD-EPI 2021: 70.6 ML/MIN/1.73
EOSINOPHIL # BLD AUTO: 0 10*3/MM3 (ref 0–0.4)
EOSINOPHIL NFR BLD AUTO: 0 % (ref 0.3–6.2)
ERYTHROCYTE [DISTWIDTH] IN BLOOD BY AUTOMATED COUNT: 14 % (ref 12.3–15.4)
GLUCOSE SERPL-MCNC: 111 MG/DL (ref 65–99)
HCT VFR BLD AUTO: 29.8 % (ref 37.5–51)
HGB BLD-MCNC: 8 G/DL (ref 13–17.7)
IMM GRANULOCYTES # BLD AUTO: 0.01 10*3/MM3 (ref 0–0.05)
IMM GRANULOCYTES NFR BLD AUTO: 0.4 % (ref 0–0.5)
LYMPHOCYTES # BLD AUTO: 1.17 10*3/MM3 (ref 0.7–3.1)
LYMPHOCYTES NFR BLD AUTO: 43.2 % (ref 19.6–45.3)
MCH RBC QN AUTO: 21.6 PG (ref 26.6–33)
MCHC RBC AUTO-ENTMCNC: 26.8 G/DL (ref 31.5–35.7)
MCV RBC AUTO: 80.3 FL (ref 79–97)
MONOCYTES # BLD AUTO: 0.26 10*3/MM3 (ref 0.1–0.9)
MONOCYTES NFR BLD AUTO: 9.6 % (ref 5–12)
NEUTROPHILS NFR BLD AUTO: 1.27 10*3/MM3 (ref 1.7–7)
NEUTROPHILS NFR BLD AUTO: 46.8 % (ref 42.7–76)
NRBC BLD AUTO-RTO: 0 /100 WBC (ref 0–0.2)
PLATELET # BLD AUTO: 156 10*3/MM3 (ref 140–450)
PMV BLD AUTO: 9 FL (ref 6–12)
POTASSIUM SERPL-SCNC: 4.1 MMOL/L (ref 3.5–5.2)
RBC # BLD AUTO: 3.71 10*6/MM3 (ref 4.14–5.8)
SODIUM SERPL-SCNC: 142 MMOL/L (ref 136–145)
WBC NRBC COR # BLD AUTO: 2.71 10*3/MM3 (ref 3.4–10.8)

## 2024-08-14 PROCEDURE — 80048 BASIC METABOLIC PNL TOTAL CA: CPT

## 2024-08-14 PROCEDURE — 36415 COLL VENOUS BLD VENIPUNCTURE: CPT

## 2024-08-14 PROCEDURE — 85025 COMPLETE CBC W/AUTO DIFF WBC: CPT

## 2024-09-17 ENCOUNTER — OFFICE VISIT (OUTPATIENT)
Dept: INTERNAL MEDICINE | Facility: CLINIC | Age: 89
End: 2024-09-17
Payer: MEDICARE

## 2024-09-17 ENCOUNTER — LAB (OUTPATIENT)
Dept: LAB | Facility: HOSPITAL | Age: 89
End: 2024-09-17
Payer: MEDICARE

## 2024-09-17 VITALS
DIASTOLIC BLOOD PRESSURE: 62 MMHG | HEIGHT: 67 IN | WEIGHT: 141.2 LBS | HEART RATE: 52 BPM | SYSTOLIC BLOOD PRESSURE: 122 MMHG | OXYGEN SATURATION: 98 % | BODY MASS INDEX: 22.16 KG/M2

## 2024-09-17 DIAGNOSIS — I10 ESSENTIAL HYPERTENSION: Chronic | ICD-10-CM

## 2024-09-17 DIAGNOSIS — Z71.85 VACCINE COUNSELING: ICD-10-CM

## 2024-09-17 DIAGNOSIS — R05.1 ACUTE COUGH: ICD-10-CM

## 2024-09-17 DIAGNOSIS — E11.42 DIABETIC PERIPHERAL NEUROPATHY: Primary | Chronic | ICD-10-CM

## 2024-09-17 DIAGNOSIS — F51.01 PRIMARY INSOMNIA: Chronic | ICD-10-CM

## 2024-09-17 DIAGNOSIS — D64.9 NORMOCYTIC ANEMIA: Chronic | ICD-10-CM

## 2024-09-17 DIAGNOSIS — D64.9 NORMOCYTIC ANEMIA: ICD-10-CM

## 2024-09-17 DIAGNOSIS — D72.819 LEUKOPENIA, UNSPECIFIED TYPE: ICD-10-CM

## 2024-09-17 LAB
BASOPHILS # BLD AUTO: 0.01 10*3/MM3 (ref 0–0.2)
BASOPHILS NFR BLD AUTO: 0.1 % (ref 0–1.5)
DEPRECATED RDW RBC AUTO: 42.7 FL (ref 37–54)
EOSINOPHIL # BLD AUTO: 0.01 10*3/MM3 (ref 0–0.4)
EOSINOPHIL NFR BLD AUTO: 0.1 % (ref 0.3–6.2)
ERYTHROCYTE [DISTWIDTH] IN BLOOD BY AUTOMATED COUNT: 14.5 % (ref 12.3–15.4)
EXPIRATION DATE: NORMAL
FLUAV AG UPPER RESP QL IA.RAPID: NOT DETECTED
FLUBV AG UPPER RESP QL IA.RAPID: NOT DETECTED
HCT VFR BLD AUTO: 31.2 % (ref 37.5–51)
HGB BLD-MCNC: 8.5 G/DL (ref 13–17.7)
IMM GRANULOCYTES # BLD AUTO: 0.09 10*3/MM3 (ref 0–0.05)
IMM GRANULOCYTES NFR BLD AUTO: 1.2 % (ref 0–0.5)
INTERNAL CONTROL: NORMAL
LYMPHOCYTES # BLD AUTO: 1.39 10*3/MM3 (ref 0.7–3.1)
LYMPHOCYTES NFR BLD AUTO: 18 % (ref 19.6–45.3)
Lab: NORMAL
MCH RBC QN AUTO: 22.3 PG (ref 26.6–33)
MCHC RBC AUTO-ENTMCNC: 27.2 G/DL (ref 31.5–35.7)
MCV RBC AUTO: 81.7 FL (ref 79–97)
MONOCYTES # BLD AUTO: 0.47 10*3/MM3 (ref 0.1–0.9)
MONOCYTES NFR BLD AUTO: 6.1 % (ref 5–12)
NEUTROPHILS NFR BLD AUTO: 5.74 10*3/MM3 (ref 1.7–7)
NEUTROPHILS NFR BLD AUTO: 74.5 % (ref 42.7–76)
NRBC BLD AUTO-RTO: 0 /100 WBC (ref 0–0.2)
PLATELET # BLD AUTO: 245 10*3/MM3 (ref 140–450)
PMV BLD AUTO: 9.4 FL (ref 6–12)
RBC # BLD AUTO: 3.82 10*6/MM3 (ref 4.14–5.8)
SARS-COV-2 AG UPPER RESP QL IA.RAPID: NOT DETECTED
WBC NRBC COR # BLD AUTO: 7.71 10*3/MM3 (ref 3.4–10.8)

## 2024-09-17 PROCEDURE — 85025 COMPLETE CBC W/AUTO DIFF WBC: CPT

## 2024-09-17 PROCEDURE — G2211 COMPLEX E/M VISIT ADD ON: HCPCS | Performed by: INTERNAL MEDICINE

## 2024-09-17 PROCEDURE — 87428 SARSCOV & INF VIR A&B AG IA: CPT | Performed by: INTERNAL MEDICINE

## 2024-09-17 PROCEDURE — 1125F AMNT PAIN NOTED PAIN PRSNT: CPT | Performed by: INTERNAL MEDICINE

## 2024-09-17 PROCEDURE — 99214 OFFICE O/P EST MOD 30 MIN: CPT | Performed by: INTERNAL MEDICINE

## 2024-09-17 PROCEDURE — 1159F MED LIST DOCD IN RCRD: CPT | Performed by: INTERNAL MEDICINE

## 2024-09-17 PROCEDURE — 1160F RVW MEDS BY RX/DR IN RCRD: CPT | Performed by: INTERNAL MEDICINE

## 2024-09-17 RX ORDER — PREGABALIN 75 MG/1
75 CAPSULE ORAL 2 TIMES DAILY
Qty: 180 CAPSULE | Refills: 0 | Status: SHIPPED | OUTPATIENT
Start: 2024-09-17

## 2024-09-17 RX ORDER — ZOLPIDEM TARTRATE 10 MG/1
10 TABLET ORAL NIGHTLY
Qty: 90 TABLET | Refills: 0 | Status: SHIPPED | OUTPATIENT
Start: 2024-09-17

## 2024-09-18 ENCOUNTER — TELEPHONE (OUTPATIENT)
Dept: INTERNAL MEDICINE | Facility: CLINIC | Age: 89
End: 2024-09-18
Payer: MEDICARE

## 2024-10-08 PROCEDURE — 87186 SC STD MICRODIL/AGAR DIL: CPT

## 2024-10-08 PROCEDURE — 87205 SMEAR GRAM STAIN: CPT

## 2024-10-08 PROCEDURE — 87077 CULTURE AEROBIC IDENTIFY: CPT

## 2024-10-08 PROCEDURE — 87070 CULTURE OTHR SPECIMN AEROBIC: CPT

## 2024-10-08 PROCEDURE — 87147 CULTURE TYPE IMMUNOLOGIC: CPT

## 2024-10-09 ENCOUNTER — PATIENT ROUNDING (BHMG ONLY) (OUTPATIENT)
Dept: URGENT CARE | Facility: CLINIC | Age: 89
End: 2024-10-09
Payer: MEDICARE

## 2024-10-18 DIAGNOSIS — E11.42 DIABETIC PERIPHERAL NEUROPATHY: Chronic | ICD-10-CM

## 2024-10-18 RX ORDER — PREGABALIN 75 MG/1
75 CAPSULE ORAL 2 TIMES DAILY
Qty: 180 CAPSULE | Refills: 0 | OUTPATIENT
Start: 2024-10-18

## 2024-10-18 NOTE — TELEPHONE ENCOUNTER
PATIENT IS REQUESTING A RX REFILL OF pregabalin (LYRICA) 75 MG capsule. PATIENTS WIFE STATED THEY WERE HERE A COUPLE OF WKS GO THE RX IS USUALLY SENT OVER BY  EVERY 3 MONTHS AND THE PHARMACY DID NOT RECEIVE IT THIS TIME.    PLEASE CONTACT IF THERE ARE ANY QUESTIONS OR CONCERNS [phone]

## 2024-10-23 DIAGNOSIS — E11.42 DIABETIC PERIPHERAL NEUROPATHY: Chronic | ICD-10-CM

## 2024-10-23 RX ORDER — PREGABALIN 75 MG/1
75 CAPSULE ORAL 2 TIMES DAILY
Qty: 180 CAPSULE | Refills: 0 | OUTPATIENT
Start: 2024-10-23

## 2024-10-23 NOTE — TELEPHONE ENCOUNTER
Caller: Taylor Hamilton    Relationship: Emergency Contact    Best call back number: [phone]     Requested Prescriptions:   Requested Prescriptions     Pending Prescriptions Disp Refills    pregabalin (LYRICA) 75 MG capsule 180 capsule 0     Sig: Take 1 capsule by mouth 2 (Two) Times a Day.        Pharmacy where request should be sent: [facility]/PHARMACY #[id number] - [address] [phone] [facility] [phone]      Last office visit with prescribing clinician: 9/17/2024   Last telemedicine visit with prescribing clinician: Visit date not found   Next office visit with prescribing clinician: 12/17/2024     Additional details provided by patient:     Does the patient have less than a 3 day supply:  [x] Yes  [] No    Would you like a call back once the refill request has been completed: [] Yes [x] No    If the office needs to give you a call back, can they leave a voicemail: [] Yes [x] No    Juan Luis Han Rep   10/23/24 12:28 EDT

## 2024-10-31 DIAGNOSIS — I25.118 CORONARY ARTERY DISEASE OF NATIVE HEART WITH STABLE ANGINA PECTORIS, UNSPECIFIED VESSEL OR LESION TYPE: ICD-10-CM

## 2024-10-31 RX ORDER — CLOPIDOGREL BISULFATE 75 MG/1
75 TABLET ORAL DAILY
Qty: 30 TABLET | OUTPATIENT
Start: 2024-10-31

## 2024-10-31 NOTE — TELEPHONE ENCOUNTER
Caller: Taylor Hamilton    Relationship: Emergency Contact    Best call back number:[phone]     Requested Prescriptions:   Requested Prescriptions     Pending Prescriptions Disp Refills    clopidogrel (PLAVIX) 75 MG tablet 30 tablet      Sig: Take 1 tablet by mouth Daily.        Pharmacy where request should be sent: [facility]/PHARMACY #[id number] - [address] [phone] [facility] [phone]      Last office visit with prescribing clinician: 9/17/2024   Last telemedicine visit with prescribing clinician: Visit date not found   Next office visit with prescribing clinician: 12/17/2024     Additional details provided by patient:     Does the patient have less than a 3 day supply:  [x] Yes  [] No    Would you like a call back once the refill request has been completed: [] Yes [x] No    If the office needs to give you a call back, can they leave a voicemail: [] Yes [x] No    Juan Luis Han Rep   10/31/24 13:34 EDT

## 2024-12-16 NOTE — ASSESSMENT & PLAN NOTE
Judicious and appropriate use of zolpidem 10mg QHS #90 0RF. LETHA was reviewed and appropriate.  The patient has read and signed the [facility] Controlled Substance Contract  6/18/24. UDS obtained.  Patient has been counseled and is aware of side effects, risks, potential for addiction/tolerance, interactions, and how to take medication correctly.  RTC 3 mos for next RX with LETHA    Patient verbalizes understanding of additional risks of this medication in individuals > 65 yrs of age and wants to continue zolpidem 10mg QHS and lyrica 75mg BID with additional risks of falls, decreased mental clarity, confusion.

## 2024-12-16 NOTE — PROGRESS NOTES
"Chief Complaint   Patient presents with    Insomnia    Hypertension    Peripheral Neuropathy       History of Present Illness  89 y.o.  male, accompanied by his wife, presents for zolpidem and lyrica refills as well as f/u on BP.  Denies side effect with medications.    Reports progressively worsening R knee pain and weakness. Has been wearing knee brace for support. Unsure whether it has been swollen. Reports L knee is asx.    Also complains of LBP, starting at low left back, radiating across the low back, and then radiating down the right leg. Left leg is asx. Denies falls.    Review of Systems  Denies CP, SOB, falls. All other ROS reviewed and negative.    Current Outpatient Medications:     aspirin 81 MG QD    atorvastatin 40 MG QD    Budesonide (ENTOCORT EC) 3 MG 3 QD    bumetanide 1 MG 1/2 QD    cholecalciferol (VITAMIN D3) 10 MCG (400 UNIT) QD    clopidogrel 75 MG QD    dapagliflozin Propanediol (Farxiga) 10 MG QD    ketoconazole (NIZORAL) 2 % shampoo 2x/wk    levothyroxine 50 MCG QD    loratadine (Claritin) 5 MG QD    metoprolol succinate XL 50 MG QD    mupirocin (BACTROBAN) 2 % oint AD    Pancrelipase, Lip-Prot-Amyl, (CREON) 59801-351975 units TID w/ meals    pantoprazole 40 MG QD    pregabalin 75 MG BID    pyridostigmine (MESTINON) 60 MG 1/2 TID    sacubitril-valsartan (ENTRESTO)  MGBID    spironolactone 25 MG QD    tamsulosin 0.4 MG QHS    tiotropium bromide monohydrate (Spiriva Respimat) 2.5 MCG/ACT 2 puffs QD    zolpidem (AMBIEN) 10 MG QHS    VITALS:  /60   Pulse 50   Ht 170.2 cm (67\")   Wt 66.4 kg (146 lb 6.4 oz)   SpO2 100%   BMI 22.93 kg/m²     Physical Exam  Vitals and nursing note reviewed.   Constitutional:       General: He is not in acute distress.     Appearance: Normal appearance. He is not ill-appearing.   Eyes:      Extraocular Movements: Extraocular movements intact.      Conjunctiva/sclera: Conjunctivae normal.      Comments: Wearing glasses   Pulmonary:      " Effort: Pulmonary effort is normal. No respiratory distress.   Musculoskeletal:      Comments: Bilat knees with crepitus R>L; bilat knees FROM; R knee in brace   Neurological:      Mental Status: He is alert and oriented to person, place, and time. Mental status is at baseline.   Psychiatric:         Mood and Affect: Mood normal.         Behavior: Behavior normal.         LABS  Results for orders placed or performed in visit on 12/17/24   POC Glycosylated Hemoglobin (Hb A1C)    Collection Time: 12/17/24 11:13 AM    Specimen: Blood   Result Value Ref Range    Hemoglobin A1C 5.4 4.5 - 5.7 %    Lot Number [id number]     Expiration Date 09/16/2026 6/18/24 A1C 5.6    1/4/24 A1C 5.9    ASSESSMENT/PLAN    Diagnoses and all orders for this visit:    1. Insomnia (Primary)  Assessment & Plan:  Judicious and appropriate use of zolpidem 10mg QHS #90 0RF. LETHA was reviewed and appropriate.  The patient has read and signed the [facility] Substance Contract  6/18/24. UDS obtained.  Patient has been counseled and is aware of side effects, risks, potential for addiction/tolerance, interactions, and how to take medication correctly.  RTC 3 mos for next RX with LETHA    Patient verbalizes understanding of additional risks of this medication in individuals > 65 yrs of age and wants to continue zolpidem 10mg QHS and lyrica 75mg BID with additional risks of falls, decreased mental clarity, confusion.      Orders:  -     zolpidem (AMBIEN) 10 MG tablet; Take 1 tablet by mouth Every Night.  Dispense: 90 tablet; Refill: 0    2. Diabetic peripheral neuropathy  Assessment & Plan:  Cont'd judicious and appropriate use of lyrica 75mg BID #180, 0RF. LETHA was reviewed and appropriate.  The patient has read and signed the [facility] Substance Contract 6/18/24. UDS obtained. Patient has been counseled and is aware of side effects, risks, potential for addiction/tolerance, interactions, and how to take  medication correctly.  RTC 3 mos for next RX with LETHA     Patient verbalizes understanding of additional risks of this medication in individuals > 65 yrs of age and wants to continue lyrica 75mg BID and zolpidem 10mg QHS    Orders:  -     pregabalin (LYRICA) 75 MG capsule; Take 1 capsule by mouth 2 (Two) Times a Day.  Dispense: 180 capsule; Refill: 0  -     POC Glycosylated Hemoglobin (Hb A1C)    3. Essential hypertension  Assessment & Plan:  BP back to normal 120/60; cont spironolactone 25mg QD, metoprolol XL 50mg QD, bumex 0.5mg QD, and entresto 97/103 QD per cards      4. Type 2 diabetes mellitus with diabetic polyneuropathy, without long-term current use of insulin  Assessment & Plan:  BG control improved/good with A1C 5.4; cont farxiga 10mg QD        5. Chronic right-sided low back pain with right-sided sciatica  Assessment & Plan:  Acute on chronic LBP; discussed increased left LBP may be compensatory for R knee pain; discussed that he may need to repeat PT and/or see ortho for injection options; update L-spine Xray    Orders:  -     XR Spine Lumbar 2 or 3 View; Future    6. Osteoarthritis of both knees  Assessment & Plan:  Currently R knee pain; note h/o R TKR; update R knee Xray; advised he mostly likely should go to PT but maybe ortho would consider injection therapy    Orders:  -     XR Knee 1 or 2 View Right; Future    7. Vaccine counseling  Comments:  rec flu vacc and COVID19 vacc; rec RSV vacc and Tdap - get both of these at pharmacy        FOLLOW-UP  Health maintenance - flu vacc 11/24; rec COVID19 vacc, counseling given (refused); also rec again RSV vacc, counseling given; also again advised patient he is long overdue for updated Tdap, which is important in light of ongoing pertussis outbreak in [facility], counseling given  UDS ordered - patient will bring back sample  RTC for wellness patient wants to move 2/24/25 appt to mid-March 2025 so he can come at same time as wifels 3-month follow-up;  fasting labs prior to appt (CBC, CMP, TSH, lipids, UA/micro, A1C, microalbCr, CPK, FT4, uric, Mg), LETHA, and zolpidem/lyrica RXs    Electronically signed by:    Delilah Tatum MD, FACP  12/17/2024

## 2024-12-16 NOTE — ASSESSMENT & PLAN NOTE
BP back to normal 120/60; cont spironolactone 25mg QD, metoprolol XL 50mg QD, bumex 0.5mg QD, and entresto 97/103 QD per cards

## 2024-12-16 NOTE — ASSESSMENT & PLAN NOTE
Cont'd judicious and appropriate use of lyrica 75mg BID #180, 0RF. LETHA was reviewed and appropriate.  The patient has read and signed the [facility] Controlled Substance Contract 6/18/24. UDS obtained. Patient has been counseled and is aware of side effects, risks, potential for addiction/tolerance, interactions, and how to take medication correctly.  RTC 3 mos for next RX with LETHA     Patient verbalizes understanding of additional risks of this medication in individuals > 65 yrs of age and wants to continue lyrica 75mg BID and zolpidem 10mg QHS

## 2024-12-17 ENCOUNTER — OFFICE VISIT (OUTPATIENT)
Dept: INTERNAL MEDICINE | Facility: CLINIC | Age: 89
End: 2024-12-17
Payer: MEDICARE

## 2024-12-17 VITALS
BODY MASS INDEX: 22.98 KG/M2 | SYSTOLIC BLOOD PRESSURE: 120 MMHG | WEIGHT: 146.4 LBS | OXYGEN SATURATION: 100 % | HEIGHT: 67 IN | HEART RATE: 50 BPM | DIASTOLIC BLOOD PRESSURE: 60 MMHG

## 2024-12-17 DIAGNOSIS — G89.29 CHRONIC RIGHT-SIDED LOW BACK PAIN WITH RIGHT-SIDED SCIATICA: ICD-10-CM

## 2024-12-17 DIAGNOSIS — Z71.85 VACCINE COUNSELING: ICD-10-CM

## 2024-12-17 DIAGNOSIS — I10 ESSENTIAL HYPERTENSION: Chronic | ICD-10-CM

## 2024-12-17 DIAGNOSIS — E11.42 TYPE 2 DIABETES MELLITUS WITH DIABETIC POLYNEUROPATHY, WITHOUT LONG-TERM CURRENT USE OF INSULIN: Chronic | ICD-10-CM

## 2024-12-17 DIAGNOSIS — M17.0 PRIMARY OSTEOARTHRITIS OF BOTH KNEES: Chronic | ICD-10-CM

## 2024-12-17 DIAGNOSIS — F51.01 PRIMARY INSOMNIA: Primary | Chronic | ICD-10-CM

## 2024-12-17 DIAGNOSIS — M54.41 CHRONIC RIGHT-SIDED LOW BACK PAIN WITH RIGHT-SIDED SCIATICA: ICD-10-CM

## 2024-12-17 DIAGNOSIS — E11.42 DIABETIC PERIPHERAL NEUROPATHY: Chronic | ICD-10-CM

## 2024-12-17 LAB
EXPIRATION DATE: NORMAL
HBA1C MFR BLD: 5.4 % (ref 4.5–5.7)
Lab: NORMAL

## 2024-12-17 PROCEDURE — 1125F AMNT PAIN NOTED PAIN PRSNT: CPT | Performed by: INTERNAL MEDICINE

## 2024-12-17 PROCEDURE — G2211 COMPLEX E/M VISIT ADD ON: HCPCS | Performed by: INTERNAL MEDICINE

## 2024-12-17 PROCEDURE — 1160F RVW MEDS BY RX/DR IN RCRD: CPT | Performed by: INTERNAL MEDICINE

## 2024-12-17 PROCEDURE — 99214 OFFICE O/P EST MOD 30 MIN: CPT | Performed by: INTERNAL MEDICINE

## 2024-12-17 PROCEDURE — 3044F HG A1C LEVEL LT 7.0%: CPT | Performed by: INTERNAL MEDICINE

## 2024-12-17 PROCEDURE — 1159F MED LIST DOCD IN RCRD: CPT | Performed by: INTERNAL MEDICINE

## 2024-12-17 PROCEDURE — 83036 HEMOGLOBIN GLYCOSYLATED A1C: CPT | Performed by: INTERNAL MEDICINE

## 2024-12-17 RX ORDER — PREGABALIN 75 MG/1
75 CAPSULE ORAL 2 TIMES DAILY
Qty: 180 CAPSULE | Refills: 0 | Status: SHIPPED | OUTPATIENT
Start: 2024-12-17

## 2024-12-17 RX ORDER — ZOLPIDEM TARTRATE 10 MG/1
10 TABLET ORAL NIGHTLY
Qty: 90 TABLET | Refills: 0 | Status: SHIPPED | OUTPATIENT
Start: 2024-12-17

## 2024-12-17 NOTE — ASSESSMENT & PLAN NOTE
Currently R knee pain; note h/o R TKR; update R knee Xray; advised he mostly likely should go to PT but maybe ortho would consider injection therapy

## 2024-12-17 NOTE — ASSESSMENT & PLAN NOTE
Acute on chronic LBP; discussed increased left LBP may be compensatory for R knee pain; discussed that he may need to repeat PT and/or see ortho for injection options; update L-spine Xray

## 2024-12-19 DIAGNOSIS — Z79.899 LONG-TERM USE OF HIGH-RISK MEDICATION: Primary | ICD-10-CM

## 2024-12-31 ENCOUNTER — TELEPHONE (OUTPATIENT)
Dept: INTERNAL MEDICINE | Facility: CLINIC | Age: 89
End: 2024-12-31
Payer: MEDICARE

## 2024-12-31 LAB — DRUGS UR: NORMAL

## 2024-12-31 NOTE — TELEPHONE ENCOUNTER
PATIENT CALLED AND STATED THAT [facility] CAN'T FILL zolpidem (AMBIEN) 10 MG tablet UNTIL JAN 7TH      PATIENT STATES THAT HE IS TAKING THE LAST PILL TONIGHT.       PATIENT WOULD LIKE ANOTHER SCRIPT CALLED IN TODAY.      PHARM: [facility] [phone]      PATIENT CALL BACK: [phone]

## 2024-12-31 NOTE — TELEPHONE ENCOUNTER
Patient advised that I spoke with the pharmacist and he picked up #90 on 10/1 and he will be able to  medication tomorrow

## 2025-01-01 ENCOUNTER — TELEPHONE (OUTPATIENT)
Dept: INTERNAL MEDICINE | Facility: CLINIC | Age: OVER 89
End: 2025-01-01

## 2025-01-16 DIAGNOSIS — E03.9 ACQUIRED HYPOTHYROIDISM: Chronic | ICD-10-CM

## 2025-01-16 DIAGNOSIS — N40.1 BENIGN NON-NODULAR PROSTATIC HYPERPLASIA WITH LOWER URINARY TRACT SYMPTOMS: ICD-10-CM

## 2025-01-16 DIAGNOSIS — E78.5 HYPERLIPIDEMIA LDL GOAL <70: Chronic | ICD-10-CM

## 2025-01-16 RX ORDER — LEVOTHYROXINE SODIUM 50 UG/1
50 TABLET ORAL
OUTPATIENT
Start: 2025-01-16

## 2025-01-16 RX ORDER — TAMSULOSIN HYDROCHLORIDE 0.4 MG/1
1 CAPSULE ORAL DAILY
Qty: 30 CAPSULE | Refills: 0 | Status: SHIPPED | OUTPATIENT
Start: 2025-01-16

## 2025-01-16 RX ORDER — ATORVASTATIN CALCIUM 40 MG/1
40 TABLET, FILM COATED ORAL DAILY
Qty: 30 TABLET | Refills: 0 | Status: SHIPPED | OUTPATIENT
Start: 2025-01-16

## 2025-01-16 RX ORDER — TAMSULOSIN HYDROCHLORIDE 0.4 MG/1
1 CAPSULE ORAL DAILY
Qty: 30 CAPSULE | Refills: 0 | OUTPATIENT
Start: 2025-01-16

## 2025-01-16 RX ORDER — LEVOTHYROXINE SODIUM 50 UG/1
50 TABLET ORAL DAILY
Qty: 30 TABLET | Refills: 0 | Status: SHIPPED | OUTPATIENT
Start: 2025-01-16

## 2025-01-16 RX ORDER — ATORVASTATIN CALCIUM 40 MG/1
40 TABLET, FILM COATED ORAL DAILY
Qty: 30 TABLET | Refills: 0 | OUTPATIENT
Start: 2025-01-16

## 2025-01-16 NOTE — TELEPHONE ENCOUNTER
Last appointment: 12/17/2024  Next appointment: 3/17/2025       Last Refill: 2/9/2024 quantity of 90 with 3 refills

## 2025-01-16 NOTE — TELEPHONE ENCOUNTER
Spoke with patient and he requested a 30 day supply be sent to [facility]. Medication sent.

## 2025-01-27 DIAGNOSIS — K21.00 GASTROESOPHAGEAL REFLUX DISEASE WITH ESOPHAGITIS WITHOUT HEMORRHAGE: Chronic | ICD-10-CM

## 2025-01-27 RX ORDER — PANTOPRAZOLE SODIUM 40 MG/1
40 TABLET, DELAYED RELEASE ORAL DAILY
Qty: 90 TABLET | Refills: 0 | Status: SHIPPED | OUTPATIENT
Start: 2025-01-27

## 2025-02-03 DIAGNOSIS — E78.5 HYPERLIPIDEMIA LDL GOAL <70: Chronic | ICD-10-CM

## 2025-02-03 RX ORDER — ATORVASTATIN CALCIUM 40 MG/1
40 TABLET, FILM COATED ORAL DAILY
Qty: 30 TABLET | Refills: 0 | Status: SHIPPED | OUTPATIENT
Start: 2025-02-03

## 2025-02-03 NOTE — TELEPHONE ENCOUNTER
Caller: Taylor Hamilton    Relationship: Emergency Contact    Best call back number: [phone]    Requested Prescriptions:   Requested Prescriptions     Pending Prescriptions Disp Refills    atorvastatin (LIPITOR) 40 MG tablet 30 tablet 0     Sig: Take 1 tablet by mouth Daily.        Pharmacy where request should be sent: [facility] [address] [phone] [facility] [phone]      Last office visit with prescribing clinician: 12/17/2024   Last telemedicine visit with prescribing clinician: Visit date not found   Next office visit with prescribing clinician: 3/17/2025     Additional details provided by patient: PT HAS 1 WEEK LEFT AND NEEDS THIS CALLED INTO THE MAIL ORDER ASAP    Does the patient have less than a 3 day supply:  [x] Yes  [] No    Would you like a call back once the refill request has been completed: [x] Yes [] No    If the office needs to give you a call back, can they leave a voicemail: [x] Yes [] No    Juan Luis Laek Rep   02/03/25 10:42 EST

## 2025-02-13 DIAGNOSIS — E03.9 ACQUIRED HYPOTHYROIDISM: Chronic | ICD-10-CM

## 2025-02-13 DIAGNOSIS — E78.5 HYPERLIPIDEMIA LDL GOAL <70: Chronic | ICD-10-CM

## 2025-02-13 DIAGNOSIS — N40.1 BENIGN NON-NODULAR PROSTATIC HYPERPLASIA WITH LOWER URINARY TRACT SYMPTOMS: ICD-10-CM

## 2025-02-13 RX ORDER — TAMSULOSIN HYDROCHLORIDE 0.4 MG/1
1 CAPSULE ORAL DAILY
Qty: 30 CAPSULE | Refills: 0 | Status: SHIPPED | OUTPATIENT
Start: 2025-02-13

## 2025-02-13 RX ORDER — ATORVASTATIN CALCIUM 40 MG/1
40 TABLET, FILM COATED ORAL DAILY
Qty: 30 TABLET | Refills: 0 | Status: SHIPPED | OUTPATIENT
Start: 2025-02-13

## 2025-02-13 RX ORDER — LEVOTHYROXINE SODIUM 50 UG/1
50 TABLET ORAL DAILY
Qty: 30 TABLET | Refills: 0 | Status: SHIPPED | OUTPATIENT
Start: 2025-02-13

## 2025-02-13 NOTE — TELEPHONE ENCOUNTER
Patient has changed appointment until March 17, 2025. 30 days sent to pharmacy to last until appointment. [facility]. Sent 30 day to [facility]

## 2025-02-18 DIAGNOSIS — E78.5 HYPERLIPIDEMIA LDL GOAL <70: Chronic | ICD-10-CM

## 2025-02-18 RX ORDER — ATORVASTATIN CALCIUM 40 MG/1
40 TABLET, FILM COATED ORAL DAILY
Qty: 30 TABLET | Refills: 11 | OUTPATIENT
Start: 2025-02-18

## 2025-02-27 DIAGNOSIS — E03.9 ACQUIRED HYPOTHYROIDISM: Chronic | ICD-10-CM

## 2025-02-27 DIAGNOSIS — Z00.00 MEDICARE ANNUAL WELLNESS VISIT, SUBSEQUENT: Primary | Chronic | ICD-10-CM

## 2025-02-27 DIAGNOSIS — E83.42 HYPOMAGNESEMIA: Chronic | ICD-10-CM

## 2025-02-27 DIAGNOSIS — E78.5 HYPERLIPIDEMIA LDL GOAL <70: Chronic | ICD-10-CM

## 2025-02-27 DIAGNOSIS — M10.042 ACUTE IDIOPATHIC GOUT OF LEFT HAND: Chronic | ICD-10-CM

## 2025-02-27 DIAGNOSIS — E11.42 TYPE 2 DIABETES MELLITUS WITH DIABETIC POLYNEUROPATHY, WITHOUT LONG-TERM CURRENT USE OF INSULIN: Chronic | ICD-10-CM

## 2025-03-12 ENCOUNTER — LAB (OUTPATIENT)
Dept: LAB | Facility: HOSPITAL | Age: OVER 89
End: 2025-03-12
Payer: MEDICARE

## 2025-03-12 DIAGNOSIS — Z00.00 MEDICARE ANNUAL WELLNESS VISIT, SUBSEQUENT: ICD-10-CM

## 2025-03-12 DIAGNOSIS — M10.042 ACUTE IDIOPATHIC GOUT OF LEFT HAND: Chronic | ICD-10-CM

## 2025-03-12 DIAGNOSIS — E11.42 TYPE 2 DIABETES MELLITUS WITH DIABETIC POLYNEUROPATHY, WITHOUT LONG-TERM CURRENT USE OF INSULIN: Chronic | ICD-10-CM

## 2025-03-12 DIAGNOSIS — E83.42 HYPOMAGNESEMIA: Chronic | ICD-10-CM

## 2025-03-12 DIAGNOSIS — E03.9 ACQUIRED HYPOTHYROIDISM: Chronic | ICD-10-CM

## 2025-03-12 DIAGNOSIS — E78.5 HYPERLIPIDEMIA LDL GOAL <70: Chronic | ICD-10-CM

## 2025-03-12 LAB
ALBUMIN UR-MCNC: 2.3 MG/DL
CREAT UR-MCNC: 82.7 MG/DL
MICROALBUMIN/CREAT UR: 27.8 MG/G (ref 0–29)

## 2025-03-12 PROCEDURE — 80061 LIPID PANEL: CPT

## 2025-03-12 PROCEDURE — 81001 URINALYSIS AUTO W/SCOPE: CPT

## 2025-03-12 PROCEDURE — 84550 ASSAY OF BLOOD/URIC ACID: CPT

## 2025-03-12 PROCEDURE — 82043 UR ALBUMIN QUANTITATIVE: CPT

## 2025-03-12 PROCEDURE — 84439 ASSAY OF FREE THYROXINE: CPT

## 2025-03-12 PROCEDURE — 83036 HEMOGLOBIN GLYCOSYLATED A1C: CPT

## 2025-03-12 PROCEDURE — 85025 COMPLETE CBC W/AUTO DIFF WBC: CPT

## 2025-03-12 PROCEDURE — 84443 ASSAY THYROID STIM HORMONE: CPT

## 2025-03-12 PROCEDURE — 83735 ASSAY OF MAGNESIUM: CPT

## 2025-03-12 PROCEDURE — 82570 ASSAY OF URINE CREATININE: CPT

## 2025-03-12 PROCEDURE — 82550 ASSAY OF CK (CPK): CPT

## 2025-03-12 PROCEDURE — 80053 COMPREHEN METABOLIC PANEL: CPT

## 2025-03-13 PROBLEM — G89.29 CHRONIC RIGHT-SIDED LOW BACK PAIN WITH RIGHT-SIDED SCIATICA: Chronic | Status: ACTIVE | Noted: 2024-07-18

## 2025-03-13 PROBLEM — M54.41 CHRONIC RIGHT-SIDED LOW BACK PAIN WITH RIGHT-SIDED SCIATICA: Chronic | Status: ACTIVE | Noted: 2024-07-18

## 2025-03-13 PROBLEM — R60.0 BILATERAL LOWER EXTREMITY EDEMA: Chronic | Status: ACTIVE | Noted: 2024-06-18

## 2025-03-13 LAB
ALBUMIN SERPL-MCNC: 3.8 G/DL (ref 3.5–5.2)
ALBUMIN/GLOB SERPL: 1.6 G/DL
ALP SERPL-CCNC: 117 U/L (ref 39–117)
ALT SERPL W P-5'-P-CCNC: 11 U/L (ref 1–41)
ANION GAP SERPL CALCULATED.3IONS-SCNC: 12.5 MMOL/L (ref 5–15)
AST SERPL-CCNC: 17 U/L (ref 1–40)
BACTERIA UR QL AUTO: NORMAL /HPF
BASOPHILS # BLD AUTO: 0 10*3/MM3 (ref 0–0.2)
BASOPHILS NFR BLD AUTO: 0 % (ref 0–1.5)
BILIRUB SERPL-MCNC: 0.7 MG/DL (ref 0–1.2)
BILIRUB UR QL STRIP: NEGATIVE
BUN SERPL-MCNC: 26 MG/DL (ref 8–23)
BUN/CREAT SERPL: 23.9 (ref 7–25)
CALCIUM SPEC-SCNC: 8.4 MG/DL (ref 8.2–9.6)
CHLORIDE SERPL-SCNC: 104 MMOL/L (ref 98–107)
CHOLEST SERPL-MCNC: 69 MG/DL (ref 0–200)
CK SERPL-CCNC: 39 U/L (ref 20–200)
CLARITY UR: CLEAR
CO2 SERPL-SCNC: 24.5 MMOL/L (ref 22–29)
COLOR UR: YELLOW
CREAT SERPL-MCNC: 1.09 MG/DL (ref 0.76–1.27)
DEPRECATED RDW RBC AUTO: 40.4 FL (ref 37–54)
EGFRCR SERPLBLD CKD-EPI 2021: 64.1 ML/MIN/1.73
EOSINOPHIL # BLD AUTO: 0 10*3/MM3 (ref 0–0.4)
EOSINOPHIL NFR BLD AUTO: 0 % (ref 0.3–6.2)
ERYTHROCYTE [DISTWIDTH] IN BLOOD BY AUTOMATED COUNT: 14 % (ref 12.3–15.4)
GLOBULIN UR ELPH-MCNC: 2.4 GM/DL
GLUCOSE SERPL-MCNC: 92 MG/DL (ref 65–99)
GLUCOSE UR STRIP-MCNC: ABNORMAL MG/DL
HBA1C MFR BLD: 5.1 % (ref 4.8–5.6)
HCT VFR BLD AUTO: 28.2 % (ref 37.5–51)
HDLC SERPL-MCNC: 34 MG/DL (ref 40–60)
HGB BLD-MCNC: 7.3 G/DL (ref 13–17.7)
HGB UR QL STRIP.AUTO: NEGATIVE
HYALINE CASTS UR QL AUTO: NORMAL /LPF
IMM GRANULOCYTES # BLD AUTO: 0.01 10*3/MM3 (ref 0–0.05)
IMM GRANULOCYTES NFR BLD AUTO: 0.3 % (ref 0–0.5)
KETONES UR QL STRIP: NEGATIVE
LDLC SERPL CALC-MCNC: 20 MG/DL (ref 0–100)
LDLC/HDLC SERPL: 0.66 {RATIO}
LEUKOCYTE ESTERASE UR QL STRIP.AUTO: NEGATIVE
LYMPHOCYTES # BLD AUTO: 0.72 10*3/MM3 (ref 0.7–3.1)
LYMPHOCYTES NFR BLD AUTO: 23.6 % (ref 19.6–45.3)
MAGNESIUM SERPL-MCNC: 2.1 MG/DL (ref 1.7–2.3)
MCH RBC QN AUTO: 20.6 PG (ref 26.6–33)
MCHC RBC AUTO-ENTMCNC: 25.9 G/DL (ref 31.5–35.7)
MCV RBC AUTO: 79.4 FL (ref 79–97)
MONOCYTES # BLD AUTO: 0.34 10*3/MM3 (ref 0.1–0.9)
MONOCYTES NFR BLD AUTO: 11.1 % (ref 5–12)
NEUTROPHILS NFR BLD AUTO: 1.98 10*3/MM3 (ref 1.7–7)
NEUTROPHILS NFR BLD AUTO: 65 % (ref 42.7–76)
NITRITE UR QL STRIP: NEGATIVE
PH UR STRIP.AUTO: 5.5 [PH] (ref 5–8)
PLATELET # BLD AUTO: 109 10*3/MM3 (ref 140–450)
PMV BLD AUTO: 10.4 FL (ref 6–12)
POTASSIUM SERPL-SCNC: 3.5 MMOL/L (ref 3.5–5.2)
PROT SERPL-MCNC: 6.2 G/DL (ref 6–8.5)
PROT UR QL STRIP: ABNORMAL
RBC # BLD AUTO: 3.55 10*6/MM3 (ref 4.14–5.8)
RBC # UR STRIP: NORMAL /HPF
REF LAB TEST METHOD: NORMAL
SODIUM SERPL-SCNC: 141 MMOL/L (ref 136–145)
SP GR UR STRIP: 1.02 (ref 1–1.03)
SQUAMOUS #/AREA URNS HPF: NORMAL /HPF
T4 FREE SERPL-MCNC: 1.27 NG/DL (ref 0.92–1.68)
TRIGL SERPL-MCNC: 63 MG/DL (ref 0–150)
TSH SERPL DL<=0.05 MIU/L-ACNC: 2.63 UIU/ML (ref 0.27–4.2)
URATE SERPL-MCNC: 5 MG/DL (ref 3.4–7)
UROBILINOGEN UR QL STRIP: ABNORMAL
VLDLC SERPL-MCNC: 15 MG/DL (ref 5–40)
WBC # UR STRIP: NORMAL /HPF
WBC NRBC COR # BLD AUTO: 3.05 10*3/MM3 (ref 3.4–10.8)

## 2025-03-14 ENCOUNTER — TELEPHONE (OUTPATIENT)
Dept: URGENT CARE | Facility: CLINIC | Age: OVER 89
End: 2025-03-14

## 2025-03-15 ENCOUNTER — PATIENT ROUNDING (BHMG ONLY) (OUTPATIENT)
Dept: URGENT CARE | Facility: CLINIC | Age: OVER 89
End: 2025-03-15
Payer: MEDICARE

## 2025-03-16 NOTE — PROGRESS NOTES
ANNUAL WELLNESS VISIT    DRUG AND ALCOHOL USE      alcohol intake:1 beers per occasionally, tobacco use: tobacco pouches, and caffeine intake: 1 1/2  cups of caffeinated coffee per day    DIET AND PHYSICAL ACTIVITY     Diet: general, limited junk food    Exercise:  daily    Exercise Details: weight     MOOD DISORDER AND COGNITIVE SCREENING     PHQ-2 Depression Screening - components reviewed with patient; screening is positive for active depression.    Little interest or pleasure in doing things? Over half   Feeling down, depressed, or hopeless? Several days   PHQ-2 Total Score 3       Anxiety Screening Tool Used YES     AUDIT screening 1     Mini-Cog Performed   Yes    1. Tell Patient 3 Words Apple green tree    2. Administer Clock Test normal    3. Recall 3 words  Apple green tree    4. Number Correct Items 3    FUNCTIONAL ABILITY AND LEVEL OF SAFETY   Hearing mild hearing loss     Wears Hearing Aids No       Current Activities Independent      none  - see Funct/Cog Status Intake     Fall Risk Assessment       Has difficulty with walking or balance  No         Timed Up and Go (TUG) Test  10 sec.       If >12 sec, normal    ADVANCED DIRECTIVE  Advance Care Planning   ACP discussion was held with the patient during this visit. Patient has an advance directive (not in EMR), copy requested.    PAIN SCREENING Do you have pain right now? yes      If so, 1-10 scale: 3     Intermittent     Do you have pain every day? Yes      Probable chronic pain: Yes     Recent Hospitalizations:  No hospitalization(s) within the last year..     MEDICATION REVIEW   - updated and reviewed (see Medication List).   - reviewed for potentially harmful drug-disease interactions in the elderly.   - reviewed for high risk medications in the elderly.   - aspirin use: Yes, 81mg QD    BMI  Body mass index is 23.12 kg/m².  BMI is within normal parameters. No other follow-up for BMI required.     ________________________________________    Chief  Complaint   Patient presents with    Medicare Wellness-subsequent    Diabetes    Hypertension    Hyperlipidemia       History of Present Illness  89 y.o.  male, accompanied by his wife, presents for updated phys examination and wellness visit as well as f/u on DM, BP, cholesterol, thyroid, and RX refills, perez zolpidem.    Reports [facility] visit last Friday, dx'd with pneumonia, and is taking abx as well as steroids. States still with cough and wheezing but getting better. Also taking mucinex. Denies fevers. Wife is not sick. CXR was not done.    Reports eye exam to be scheduled.     Denies s/e or concerns with lyrica and/or zolpidem.    Review of Systems  ROS (+) for chronic insomnia. ROS (+) for cough, wheezing, chest congestion due to URI/pneumonia x 5 days. Denies CP, abd pain, n/v, bowel changes, urinary changes. ROS (+) for fall a few months ago - was at hospital helping son and fell at nurses station, no particular reason. Has recovered. ROS (+) for easy bruising; does not use razor anymore - uses electric razor for his face.  All other ROS reviewed and negative. ROS (+) for fatigue and decreased energy. Denies melena or hematochezia. Has started iron pill once per day; denies constipation. ROS (+) for chronic leg swelling, usually L>R. All other ROS reviewed and negative/unremarkable.    Current Outpatient Medications:     aspirin 81 MG QD    atorvastatin 40 MG QD    Budesonide (ENTOCORT EC) 3 MG 3 QAM    bumetanide 1 MG 1/2 QD    cholecalciferol (VITAMIN D3) 10 MCG (400 UNIT) QD    clopidogrel 75 MG QD    dapagliflozin Propanediol 10 MG QD    doxycycline 100 MG BID until 3/20/25    ketoconazole (NIZORAL) 2 % shampoo AD    levothyroxine 50 MCG QD    loratadine 5 MG QD prn    mupirocin (BACTROBAN) 2 % oint AD    Pancrelipase, Lip-Prot-Amyl, (CREON) 20661-925431 units TID w/ meals    pantoprazole (PROTONIX) 40 MG QD    predniSONE 10 MG taper     pregabalin 75mg BID    pyridostigmine (MESTINON) 60 MG TID     "sacubitril-valsartan (ENTRESTO)  MG BID    spironolactone 25 MG QD    tamsulosin 0.4 MG QHS    tiotropium bromide monohydrate (Spiriva Respimat) 2.5 MCG/ACT 2 puffs QD    zolpidem (AMBIEN) 10 MG QHS      OPIOID SCREENING:  The patient does not have opioid prescription on his medication list; however note sedating effects of zolpidem and pregabalin. Medication list has been reviewed with the patient regarding potentially high risk medications and harmful drug interactions in patients over age 65.    VITALS:  /66   Pulse 60   Temp 97.4 °F (36.3 °C)   Ht 170.2 cm (67\")   Wt 67 kg (147 lb 9.6 oz)   SpO2 100%   BMI 23.12 kg/m²     Physical Exam  Vitals and nursing note reviewed.   Constitutional:       General: He is not in acute distress.     Appearance: Normal appearance.   HENT:      Right Ear: External ear normal.      Left Ear: External ear normal.   Eyes:      Extraocular Movements: Extraocular movements intact.      Conjunctiva/sclera: Conjunctivae normal.   Neck:      Vascular: No carotid bruit (bilaterally).   Cardiovascular:      Rate and Rhythm: Normal rate and regular rhythm.      Heart sounds: Normal heart sounds.   Pulmonary:      Effort: Pulmonary effort is normal. No respiratory distress.      Breath sounds: Rhonchi (diffuse scattered rhonci and rales bilaterally) and rales present.      Comments: Speaking in complete sentences; no coughing during OV  Abdominal:      General: Bowel sounds are normal. There is no distension.      Palpations: Abdomen is soft.      Tenderness: There is no abdominal tenderness. There is no guarding.   Musculoskeletal:      Right lower leg: Edema (2+ pitting edema to below knees bilaterally) present.      Left lower leg: Edema present.   Neurological:      Mental Status: He is alert and oriented to person, place, and time. Mental status is at baseline.      Gait: Gait abnormal (mild kyphotic posture).   Psychiatric:         Mood and Affect: Mood normal.       "   Behavior: Behavior normal.         LABS  Results for orders placed or performed in visit on 03/12/25   Comprehensive Metabolic Panel    Collection Time: 03/12/25 12:08 PM    Specimen: Blood   Result Value Ref Range    Glucose 92 65 - 99 mg/dL    BUN 26 (H) 8 - 23 mg/dL    Creatinine 1.09 0.76 - 1.27 mg/dL    Sodium 141 136 - 145 mmol/L    Potassium 3.5 3.5 - 5.2 mmol/L    Chloride 104 98 - 107 mmol/L    CO2 24.5 22.0 - 29.0 mmol/L    Calcium 8.4 8.2 - 9.6 mg/dL    Total Protein 6.2 6.0 - 8.5 g/dL    Albumin 3.8 3.5 - 5.2 g/dL    ALT (SGPT) 11 1 - 41 U/L    AST (SGOT) 17 1 - 40 U/L    Alkaline Phosphatase 117 39 - 117 U/L    Total Bilirubin 0.7 0.0 - 1.2 mg/dL    Globulin 2.4 gm/dL    A/G Ratio 1.6 g/dL    BUN/Creatinine Ratio 23.9 7.0 - 25.0    Anion Gap 12.5 5.0 - 15.0 mmol/L    eGFR 64.1 >60.0 mL/min/1.73   Lipid Panel    Collection Time: 03/12/25 12:08 PM    Specimen: Blood   Result Value Ref Range    Total Cholesterol 69 0 - 200 mg/dL    Triglycerides 63 0 - 150 mg/dL    HDL Cholesterol 34 (L) 40 - 60 mg/dL    LDL Cholesterol  20 0 - 100 mg/dL    VLDL Cholesterol 15 5 - 40 mg/dL    LDL/HDL Ratio 0.66    TSH    Collection Time: 03/12/25 12:08 PM    Specimen: Blood   Result Value Ref Range    TSH 2.630 0.270 - 4.200 uIU/mL   Microalbumin / Creatinine Urine Ratio - Urine, Clean Catch    Collection Time: 03/12/25 12:08 PM    Specimen: Urine, Clean Catch   Result Value Ref Range    Microalbumin/Creatinine Ratio 27.8 0.0 - 29.0 mg/g    Creatinine, Urine 82.7 mg/dL    Microalbumin, Urine 2.3 mg/dL   Hemoglobin A1c    Collection Time: 03/12/25 12:08 PM    Specimen: Blood   Result Value Ref Range    Hemoglobin A1C 5.10 4.80 - 5.60 %   CK    Collection Time: 03/12/25 12:08 PM    Specimen: Blood   Result Value Ref Range    Creatine Kinase 39 20 - 200 U/L   Uric Acid    Collection Time: 03/12/25 12:08 PM    Specimen: Blood   Result Value Ref Range    Uric Acid 5.0 3.4 - 7.0 mg/dL   T4, Free    Collection Time: 03/12/25  12:08 PM    Specimen: Blood   Result Value Ref Range    Free T4 1.27 0.92 - 1.68 ng/dL   Magnesium    Collection Time: 03/12/25 12:08 PM    Specimen: Blood   Result Value Ref Range    Magnesium 2.1 1.7 - 2.3 mg/dL   CBC Auto Differential    Collection Time: 03/12/25 12:08 PM    Specimen: Blood   Result Value Ref Range    WBC 3.05 (L) 3.40 - 10.80 10*3/mm3    RBC 3.55 (L) 4.14 - 5.80 10*6/mm3    Hemoglobin 7.3 (L) 13.0 - 17.7 g/dL    Hematocrit 28.2 (L) 37.5 - 51.0 %    MCV 79.4 79.0 - 97.0 fL    MCH 20.6 (L) 26.6 - 33.0 pg    MCHC 25.9 (L) 31.5 - 35.7 g/dL    RDW 14.0 12.3 - 15.4 %    RDW-SD 40.4 37.0 - 54.0 fl    MPV 10.4 6.0 - 12.0 fL    Platelets 109 (L) 140 - 450 10*3/mm3    Neutrophil % 65.0 42.7 - 76.0 %    Lymphocyte % 23.6 19.6 - 45.3 %    Monocyte % 11.1 5.0 - 12.0 %    Eosinophil % 0.0 (L) 0.3 - 6.2 %    Basophil % 0.0 0.0 - 1.5 %    Immature Grans % 0.3 0.0 - 0.5 %    Neutrophils, Absolute 1.98 1.70 - 7.00 10*3/mm3    Lymphocytes, Absolute 0.72 0.70 - 3.10 10*3/mm3    Monocytes, Absolute 0.34 0.10 - 0.90 10*3/mm3    Eosinophils, Absolute 0.00 0.00 - 0.40 10*3/mm3    Basophils, Absolute 0.00 0.00 - 0.20 10*3/mm3    Immature Grans, Absolute 0.01 0.00 - 0.05 10*3/mm3   Urinalysis without microscopic (no culture) - Urine, Clean Catch    Collection Time: 03/12/25 12:08 PM    Specimen: Urine, Clean Catch   Result Value Ref Range    Color, UA Yellow Yellow, Straw    Appearance, UA Clear Clear    pH, UA 5.5 5.0 - 8.0    Specific Gravity, UA 1.024 1.005 - 1.030    Glucose, UA >=1000 mg/dL (3+) (A) Negative    Ketones, UA Negative Negative    Bilirubin, UA Negative Negative    Blood, UA Negative Negative    Protein, UA Trace (A) Negative    Leuk Esterase, UA Negative Negative    Nitrite, UA Negative Negative    Urobilinogen, UA 0.2 E.U./dL 0.2 - 1.0 E.U./dL   Urinalysis, Microscopic Only - Urine, Clean Catch    Collection Time: 03/12/25 12:08 PM    Specimen: Urine, Clean Catch   Result Value Ref Range    RBC, UA  0-2 None Seen, 0-2 /HPF    WBC, UA 0-2 None Seen, 0-2 /HPF    Bacteria, UA None Seen None Seen /HPF    Squamous Epithelial Cells, UA 0-2 None Seen, 0-2 /HPF    Hyaline Casts, UA None Seen None Seen /LPF    Methodology Automated Microscopy      9/17/24 H&H 8.5/31.2, plts 265k    1/12/23 EKG - sinus jocelyn, LAD    ASSESSMENT/PLAN    Diagnoses and all orders for this visit:    1. Medicare annual wellness visit, subsequent (Primary)  Assessment & Plan:  Health maintenance - rec COVID19 vacc, counseling given (pt and wife agreeable to getting COVID19 vacc and RSV vacc at pharmacy); flu vacc 11/24; Prevnar 12/15, PVX 12/14,rec RSV vacc, counseling given; pt states [facility] was [facility] - need date; Zostavax 9/09; HAV done; rec Shingrix, counseling given; colonosc 8/22, no repeat per Dr. Holland; no further prostate CA screening w/ age/comorbidities, confirmed with pt; eye exam TBD per pt; dental exam w/in last 6 mos per pt; (+) seat belt use    Consultants:  Patient Care Team:  Delilah Tatum MD as PCP - General  Burton Holland MD as Consulting Physician (Colon and Rectal Surgery)  Douglas Lazaro MD as Consulting Physician (General Surgery)  Rubio Rossi MD as Consulting Physician (Dermatology)  Jim Black MD as Consulting Physician (Cardiothoracic Surgery)  Yusef Bowles MD (Inactive) as Consulting Physician (Nephrology)  Tio Mcnally MD (Inactive) as Consulting Physician (Hand Surgery)  Chayito Quick OD (Optometry)  Radha Hirsch APRN as Nurse Practitioner (Otolaryngology)  Dionisio Valenzuela MD as Consulting Physician (Ophthalmology)  Elisa Conde MD as Consulting Physician (Pulmonary Disease)  Dillon Judge MD as Consulting Physician (Anesthesiology)  Jamey Gonzalez IV, MD as Consulting Physician (Interventional Cardiology)  Jamey Gonzalez IV, MD as Consulting Physician (Interventional Cardiology)  Gregory Granados Jr., MD as  Consulting Physician (Orthopedic Surgery)  Stephenie Ramirez MD as Consulting Physician (Cardiology)        2. Type 2 diabetes mellitus with diabetic polyneuropathy, without long-term current use of insulin  Assessment & Plan:  BG control good with A1C 5.1; encouraged reg phys activity to decr insulin resistance, moderation in unhealthy starches/sweets; f/u A1C in 12 mos        3. Essential hypertension  Assessment & Plan:  BP mildly elevated today, likely exac'd by current URI/bronchitis; cont spironolactone 25mg QD, bumex 0.5mg QD, and entresto 97/103 BID per cards; rec home BP monitoring; f/u in 3 mos      4. Hyperlipidemia LDL goal <70  Assessment & Plan:  Lipids stable and at goal with LDL 20; cont atorvastatin 40mg QD #90, 3RF    Orders:  -     atorvastatin (LIPITOR) 40 MG tablet; Take 1 tablet by mouth Daily.  Dispense: 90 tablet; Refill: 3    5. Hypothyroidism  Assessment & Plan:  Euthyroid; cont levothyroxine 50mcg QD #90, 3RF    Orders:  -     levothyroxine (SYNTHROID, LEVOTHROID) 50 MCG tablet; Take 1 tablet by mouth Daily.  Dispense: 90 tablet; Refill: 3    6. Stage 3b chronic kidney disease  Assessment & Plan:  Stable renal function with Cr 1.09, GFR 64.1      7. Hypomagnesemia  Assessment & Plan:  Stable Mg 2.1; no meds      8. Microalbuminuria  Assessment & Plan:  Acceptable microalb/Cr 27.8 with goal < 30      9. BPH  Assessment & Plan:  Stable on tamsulosin 0.4mg QHS #90, 3RF    Orders:  -     tamsulosin (FLOMAX) 0.4 MG capsule 24 hr capsule; Take 1 capsule by mouth Daily.  Dispense: 90 capsule; Refill: 3    10. Thrombocytopenia  Assessment & Plan:  Slightly lower plts 109; asx; chronically on clopidogrel and ASA; f/u CBC in 1 month    Orders:  -     CBC & Differential; Future    11. Normocytic anemia  Assessment & Plan:  Worsened anemia (and worsened t'cytopenia) with H&H 7.3/28.2; reviewed s/sxs of anemia and implications in light of medical history and age - patient symptomatic with decreased  energy; last colonosc 8/22, recommended not to repeat per Dr. Holland; is currently taking Fe supplement QD - denies constipating s/e; f/u CBC in 1 month for serial examination    Orders:  -     CBC & Differential; Future    12. Diabetic peripheral neuropathy  Assessment & Plan:  Cont'd judicious and appropriate use of lyrica 75mg BID #180, 0RF. LETHA was reviewed and appropriate.  The patient has read and signed the [facility] Substance Contract 6/18/24. UDS 12/24. Patient has been counseled and is aware of side effects, risks, potential for addiction/tolerance, interactions, and how to take medication correctly.  RTC 3 mos for next RX with LETHA and updated contract     Patient verbalizes understanding of additional risks of this medication in individuals > 65 yrs of age and wants to continue lyrica 75mg BID and zolpidem 10mg QHS    Orders:  -     pregabalin (LYRICA) 75 MG capsule; Take 1 capsule by mouth 2 (Two) Times a Day.  Dispense: 180 capsule; Refill: 0    13. Insomnia  Assessment & Plan:  Judicious and appropriate use of zolpidem 10mg QHS #90 0RF. LETHA was reviewed and appropriate.  The patient has read and signed the [facility] Substance Contract  6/18/24. UDS 12/24  Patient has been counseled and is aware of side effects, risks, potential for addiction/tolerance, interactions, and how to take medication correctly.  RTC 3 mos for next RX with LETHA and updated contract    Patient verbalizes understanding of additional risks of this medication in individuals > 65 yrs of age and wants to continue zolpidem 10mg QHS and lyrica 75mg BID with additional risks of falls, decreased mental clarity, confusion.      Orders:  -     zolpidem (AMBIEN) 10 MG tablet; Take 1 tablet by mouth Every Night.  Dispense: 90 tablet; Refill: 0    14. Bronchitis  Comments:  x5d on doxy and prednisone; cont mucinex DM; check CXR  Orders:  -     XR Chest PA & Lateral; Future    15. Bilateral lower  extremity edema  Assessment & Plan:  On Bumex 1 mg 1/2 QD and entresto 97/103 BID; rec leg elevation, low Na diet, and rec compression stockings - rec thigh-high version      16. Fall, sequela  Comments:  last fall 2mos ago, unknown etiology; reviewed risks on ASA/clopidogrel; fall precautions reviewed        FOLLOW-UP  F/u CBC in 1 month for serial examination (escribed)  RTC 3 mos for next zolpidem and lyrica RXs with LETHA and updated contract; also f/u on (+) depression screening    Electronically signed by:    Delilah Tatum MD, FACP  03/17/2025

## 2025-03-16 NOTE — ASSESSMENT & PLAN NOTE
Cont'd judicious and appropriate use of lyrica 75mg BID #180, 0RF. LETHA was reviewed and appropriate.  The patient has read and signed the [facility] Controlled Substance Contract 6/18/24. UDS 12/24. Patient has been counseled and is aware of side effects, risks, potential for addiction/tolerance, interactions, and how to take medication correctly.  RTC 3 mos for next RX with LETHA and updated contract     Patient verbalizes understanding of additional risks of this medication in individuals > 65 yrs of age and wants to continue lyrica 75mg BID and zolpidem 10mg QHS

## 2025-03-16 NOTE — ASSESSMENT & PLAN NOTE
Worsened anemia (and worsened t'cytopenia) with H&H 7.3/28.2; reviewed s/sxs of anemia and implications in light of medical history and age - patient symptomatic with decreased energy; last colonosc 8/22, recommended not to repeat per Dr. Holland; is currently taking Fe supplement QD - denies constipating s/e; f/u CBC in 1 month for serial examination

## 2025-03-16 NOTE — ASSESSMENT & PLAN NOTE
Judicious and appropriate use of zolpidem 10mg QHS #90 0RF. LETHA was reviewed and appropriate.  The patient has read and signed the [facility] Controlled Substance Contract  6/18/24. UDS 12/24  Patient has been counseled and is aware of side effects, risks, potential for addiction/tolerance, interactions, and how to take medication correctly.  RTC 3 mos for next RX with LETHA and updated contract    Patient verbalizes understanding of additional risks of this medication in individuals > 65 yrs of age and wants to continue zolpidem 10mg QHS and lyrica 75mg BID with additional risks of falls, decreased mental clarity, confusion.

## 2025-03-16 NOTE — ASSESSMENT & PLAN NOTE
BP mildly elevated today, likely exac'd by current URI/bronchitis; cont spironolactone 25mg QD, bumex 0.5mg QD, and entresto 97/103 BID per cards; rec home BP monitoring; f/u in 3 mos

## 2025-03-16 NOTE — ASSESSMENT & PLAN NOTE
Health maintenance - rec COVID19 vacc, counseling given (pt and wife agreeable to getting COVID19 vacc and RSV vacc at pharmacy); flu vacc 11/24; Prevnar 12/15, PVX 12/14,rec RSV vacc, counseling given; pt states [facility]dap was [facility] - need date; Zostavax 9/09; HAV done; rec Shingrix, counseling given; colonosc 8/22, no repeat per Dr. Holland; no further prostate CA screening w/ age/comorbidities, confirmed with pt; eye exam TBD per pt; dental exam w/in last 6 mos per pt; (+) seat belt use    Consultants:  Patient Care Team:  Delilah Tatum MD as PCP - General  [facility], Burton ECHEVARRIA MD as Consulting Physician (Colon and Rectal Surgery)  Douglas Lazaro MD as Consulting Physician (General Surgery)  Rubio Rossi MD as Consulting Physician (Dermatology)  Jim Black MD as Consulting Physician (Cardiothoracic Surgery)  Yusef Bowles MD (Inactive) as Consulting Physician (Nephrology)  Tio Mcnally MD (Inactive) as Consulting Physician (Hand Surgery)  Chayito Quick OD (Optometry)  Radha Hirsch APRN as Nurse Practitioner (Otolaryngology)  Dionisio Valenzuela MD as Consulting Physician (Ophthalmology)  Elisa Conde MD as Consulting Physician (Pulmonary Disease)  Dillon Judge MD as Consulting Physician (Anesthesiology)  Jamey Gonzalez IV, MD as Consulting Physician (Interventional Cardiology)  Jamey Gonzalez IV, MD as Consulting Physician (Interventional Cardiology)  Gregory Granados Jr., MD as Consulting Physician (Orthopedic Surgery)  Stephenie Ramirez MD as Consulting Physician (Cardiology)

## 2025-03-16 NOTE — ASSESSMENT & PLAN NOTE
BG control good with A1C 5.1; encouraged reg phys activity to decr insulin resistance, moderation in unhealthy starches/sweets; f/u A1C in 12 mos

## 2025-03-17 ENCOUNTER — OFFICE VISIT (OUTPATIENT)
Dept: INTERNAL MEDICINE | Facility: CLINIC | Age: OVER 89
End: 2025-03-17
Payer: MEDICARE

## 2025-03-17 ENCOUNTER — RESULTS FOLLOW-UP (OUTPATIENT)
Dept: GENERAL RADIOLOGY | Facility: HOSPITAL | Age: OVER 89
End: 2025-03-17
Payer: MEDICARE

## 2025-03-17 ENCOUNTER — HOSPITAL ENCOUNTER (OUTPATIENT)
Dept: GENERAL RADIOLOGY | Facility: HOSPITAL | Age: OVER 89
Discharge: HOME | End: 2025-03-17
Admitting: INTERNAL MEDICINE
Payer: MEDICARE

## 2025-03-17 VITALS
WEIGHT: 147.6 LBS | OXYGEN SATURATION: 100 % | DIASTOLIC BLOOD PRESSURE: 66 MMHG | HEIGHT: 67 IN | TEMPERATURE: 97.4 F | SYSTOLIC BLOOD PRESSURE: 140 MMHG | HEART RATE: 60 BPM | BODY MASS INDEX: 23.17 KG/M2

## 2025-03-17 DIAGNOSIS — R60.0 BILATERAL LOWER EXTREMITY EDEMA: Chronic | ICD-10-CM

## 2025-03-17 DIAGNOSIS — D69.6 THROMBOCYTOPENIA: Chronic | ICD-10-CM

## 2025-03-17 DIAGNOSIS — F51.01 PRIMARY INSOMNIA: Chronic | ICD-10-CM

## 2025-03-17 DIAGNOSIS — Z00.00 MEDICARE ANNUAL WELLNESS VISIT, SUBSEQUENT: Primary | ICD-10-CM

## 2025-03-17 DIAGNOSIS — E78.5 HYPERLIPIDEMIA LDL GOAL <70: Chronic | ICD-10-CM

## 2025-03-17 DIAGNOSIS — E83.42 HYPOMAGNESEMIA: Chronic | ICD-10-CM

## 2025-03-17 DIAGNOSIS — E03.9 ACQUIRED HYPOTHYROIDISM: Chronic | ICD-10-CM
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
## 2025-03-17 DIAGNOSIS — J40 BRONCHITIS: ICD-10-CM

## 2025-03-17 DIAGNOSIS — I10 ESSENTIAL HYPERTENSION: Chronic | ICD-10-CM

## 2025-03-17 DIAGNOSIS — D64.9 NORMOCYTIC ANEMIA: Chronic | ICD-10-CM

## 2025-03-17 DIAGNOSIS — E11.42 TYPE 2 DIABETES MELLITUS WITH DIABETIC POLYNEUROPATHY, WITHOUT LONG-TERM CURRENT USE OF INSULIN: Chronic | ICD-10-CM

## 2025-03-17 DIAGNOSIS — N40.1 BENIGN NON-NODULAR PROSTATIC HYPERPLASIA WITH LOWER URINARY TRACT SYMPTOMS: ICD-10-CM

## 2025-03-17 DIAGNOSIS — N18.32 STAGE 3B CHRONIC KIDNEY DISEASE: Chronic | ICD-10-CM

## 2025-03-17 DIAGNOSIS — R80.9 MICROALBUMINURIA: Chronic | ICD-10-CM

## 2025-03-17 DIAGNOSIS — E11.42 DIABETIC PERIPHERAL NEUROPATHY: Chronic | ICD-10-CM

## 2025-03-17 PROCEDURE — 99214 OFFICE O/P EST MOD 30 MIN: CPT | Performed by: INTERNAL MEDICINE

## 2025-03-17 PROCEDURE — G2211 COMPLEX E/M VISIT ADD ON: HCPCS | Performed by: INTERNAL MEDICINE

## 2025-03-17 PROCEDURE — 1160F RVW MEDS BY RX/DR IN RCRD: CPT | Performed by: INTERNAL MEDICINE

## 2025-03-17 PROCEDURE — 1170F FXNL STATUS ASSESSED: CPT | Performed by: INTERNAL MEDICINE

## 2025-03-17 PROCEDURE — G0439 PPPS, SUBSEQ VISIT: HCPCS | Performed by: INTERNAL MEDICINE

## 2025-03-17 PROCEDURE — 71046 X-RAY EXAM CHEST 2 VIEWS: CPT

## 2025-03-17 PROCEDURE — 1125F AMNT PAIN NOTED PAIN PRSNT: CPT | Performed by: INTERNAL MEDICINE

## 2025-03-17 PROCEDURE — 99397 PER PM REEVAL EST PAT 65+ YR: CPT | Performed by: INTERNAL MEDICINE

## 2025-03-17 PROCEDURE — 1159F MED LIST DOCD IN RCRD: CPT | Performed by: INTERNAL MEDICINE

## 2025-03-17 RX ORDER — ATORVASTATIN CALCIUM 40 MG/1
40 TABLET, FILM COATED ORAL DAILY
Qty: 90 TABLET | Refills: 3 | Status: SHIPPED | OUTPATIENT
Start: 2025-03-17

## 2025-03-17 RX ORDER — ZOLPIDEM TARTRATE 10 MG/1
10 TABLET ORAL NIGHTLY
Qty: 90 TABLET | Refills: 0 | Status: SHIPPED | OUTPATIENT
Start: 2025-03-17

## 2025-03-17 RX ORDER — TAMSULOSIN HYDROCHLORIDE 0.4 MG/1
1 CAPSULE ORAL DAILY
Qty: 90 CAPSULE | Refills: 3 | Status: SHIPPED | OUTPATIENT
Start: 2025-03-17

## 2025-03-17 RX ORDER — LEVOTHYROXINE SODIUM 50 UG/1
50 TABLET ORAL DAILY
Qty: 90 TABLET | Refills: 3 | Status: SHIPPED | OUTPATIENT
Start: 2025-03-17

## 2025-03-17 RX ORDER — PREGABALIN 75 MG/1
75 CAPSULE ORAL 2 TIMES DAILY
Qty: 180 CAPSULE | Refills: 0 | Status: SHIPPED | OUTPATIENT
Start: 2025-03-17

## 2025-03-17 NOTE — ASSESSMENT & PLAN NOTE
On Bumex 1 mg 1/2 QD and entresto 97/103 BID; rec leg elevation, low Na diet, and rec compression stockings - rec thigh-high version

## 2025-04-28 DIAGNOSIS — K21.00 GASTROESOPHAGEAL REFLUX DISEASE WITH ESOPHAGITIS WITHOUT HEMORRHAGE: Chronic | ICD-10-CM

## 2025-04-28 RX ORDER — PANTOPRAZOLE SODIUM 40 MG/1
40 TABLET, DELAYED RELEASE ORAL DAILY
Qty: 60 TABLET | Refills: 0 | Status: SHIPPED | OUTPATIENT
Start: 2025-04-28

## 2025-05-05 DIAGNOSIS — K21.00 GASTROESOPHAGEAL REFLUX DISEASE WITH ESOPHAGITIS WITHOUT HEMORRHAGE: Chronic | ICD-10-CM

## 2025-05-05 RX ORDER — TIOTROPIUM BROMIDE INHALATION SPRAY 3.12 UG/1
2 SPRAY, METERED RESPIRATORY (INHALATION) DAILY
Status: CANCELLED | OUTPATIENT
Start: 2025-05-05

## 2025-05-05 RX ORDER — PANTOPRAZOLE SODIUM 40 MG/1
40 TABLET, DELAYED RELEASE ORAL DAILY
Qty: 60 TABLET | Refills: 0 | Status: CANCELLED | OUTPATIENT
Start: 2025-05-05

## 2025-05-05 NOTE — PROGRESS NOTES
"Chief Complaint   Patient presents with    Insomnia    Diabetis Perpheral Neuropathy       History of Present Illness  89 y.o.  male, accompanied by his wife, presents for zolpidem and lyrica RFs; denies s/e.  Just had hernia surgery about 1 and half weeks ago.  Abdomen is still sore but he knows the sick feeling has resolved.  Is limited to lifting less than 10 pounds.  Is anxious to start doing gardening and yard work.    Denies side effects with Lyrica and zolpidem, which she still takes regularly.    Has been instructed to take MiraLAX to to decrease likelihood of straining for bowel movements after hernia surgery.    Review of Systems  ROS (+) for chronic insomnia.  ROS (+) for postop abdominal pain from hernia surgery.  All other ROS reviewed and negative.      Current Outpatient Medications:     aspirin 81 MG QD    atorvastatin 40 MG QD    Budesonide (ENTOCORT EC) 3 MG 3 QD    bumetanide 1 MG  1/2 QD    cholecalciferol (VITAMIN D3) 10 MCG (400 UNIT) QD    clopidogrel 75 MG QD    ketoconazole (NIZORAL) 2 % shampoo AD     levothyroxine 50 MCG QD    loratadine (Claritin) 5 MG QD    metoprolol succinate XL (TOPROL-XL) 50 MG QD per Dr. Ramirez    Pancrelipase, Lip-Prot-Amyl, (CREON) 14397-493448 units TID    pantoprazole 40 MG QD    pregabalin 75 MG BID    pyridostigmine (MESTINON) 60 MG 1/2 TID    sacubitril-valsartan (ENTRESTO)  MG BID    spironolactone 25 MG QD    tamsulosin 0.4 MG QHS    zolpidem 10 MG QHS      VITALS:  /60   Pulse 72   Temp 96.9 °F (36.1 °C)   Ht 170.2 cm (67\")   Wt 68.3 kg (150 lb 9.6 oz)   SpO2 100%   BMI 23.59 kg/m²     Physical Exam  Vitals and nursing note reviewed.   Constitutional:       General: He is not in acute distress.     Appearance: Normal appearance. He is not ill-appearing.   Eyes:      Extraocular Movements: Extraocular movements intact.      Conjunctiva/sclera: Conjunctivae normal.      Comments: Wearing glasses   Pulmonary:      Effort: Pulmonary " effort is normal. No respiratory distress.   Neurological:      Mental Status: He is alert and oriented to person, place, and time. Mental status is at baseline.   Psychiatric:         Mood and Affect: Mood normal.         Behavior: Behavior normal.         LABS  Results for orders placed or performed in visit on 02/07/24   Compliance Drug Analysis, Ur -   Result Value Ref Range    Report Summary FINAL      1/4/24 A1C 5.9    ASSESSMENT/PLAN    Diagnoses and all orders for this visit:    1. Diabetic peripheral neuropathy (Primary)  Assessment & Plan:  Cont'd judicious and appropriate use of lyrica 75mg BID #180, 2RF LETHA was reviewed and appropriate.  The patient has read and signed the [facility] Substance Contract 7/11/23 UDS 2/24. Patient has been counseled and is aware of side effects, risks, potential for addiction/tolerance, interactions, and how to take medication correctly.  RTC 3 mos for next RX with [facility]     Patient verbalizes understanding of additional risks of this medication in individuals > 65 yrs of age and wants to continue lyrica 75mg BID and zolpidem 10mg QHS    Orders:  -     pregabalin (LYRICA) 75 MG capsule; Take 1 capsule by mouth 2 (Two) Times a Day.  Dispense: 180 capsule; Refill: 0    2. Insomnia  Assessment & Plan:  Judicious and appropriate use of zolpidem 10mg QHS #30, 1RF (2 mos worth since he wants to sync next visit with his wife's, which is end of Mar).. LETHA was reviewed and appropriate.  The patient has read and signed the [facility] Substance Contract 7/11/23. UDS 2/24.  Patient has been counseled and is aware of side effects, risks, potential for addiction/tolerance, interactions, and how to take medication correctly.  RTC 3 mos for next RX with [facility]    Patient verbalizes understanding of additional risks of this medication in individuals > 65 yrs of age and wants to continue zolpidem 10mg QHS and lyrica 75mg BID with  additional risks of falls, decreased mental clarity, confusion.      Orders:  -     zolpidem (AMBIEN) 10 MG tablet; Take 1 tablet by mouth Every Night.  Dispense: 90 tablet; Refill: 0    3. Type 2 diabetes mellitus with diabetic polyneuropathy, without long-term current use of insulin  Assessment & Plan:  F/u A1C in 3 mos; no meds; reminder needs annual diabetic eye exam (need office note)      4. Vaccine counseling  Comments:  rec again flu vacc, COVID19 vacc, RSV vacc, and updated Tdap - can get at pharmacy        FOLLOW-UP  Health maintenance - rec again flu vacc and COVID19 vacc (refused today); rec again RSV vacc; rec again Tdap (latter 2 should be done at pharmacy; reminder needs annual DM eye exam  RTC 3 mos for zolpidem and lyrica RXs with LETHA/contract (UDS 2/24) with A1C    Electronically signed by:    Delilah Tatum MD, FACP  03/26/2024       warm

## 2025-05-05 NOTE — TELEPHONE ENCOUNTER
Last appointment: 3/17/2025  Next appointment: 6/17/2025     Last Refill: filled by historical provider.

## 2025-05-07 NOTE — TELEPHONE ENCOUNTER
I have not RX'd this for patient since 2021.  I believe he needs to contact Dr. Conde, pulmonologist, for the RX

## 2025-06-17 ENCOUNTER — OFFICE VISIT (OUTPATIENT)
Dept: INTERNAL MEDICINE | Facility: CLINIC | Age: OVER 89
End: 2025-06-17
Payer: MEDICARE

## 2025-06-17 ENCOUNTER — LAB (OUTPATIENT)
Dept: LAB | Facility: HOSPITAL | Age: OVER 89
End: 2025-06-17
Payer: MEDICARE

## 2025-06-17 VITALS
HEART RATE: 58 BPM | WEIGHT: 134.6 LBS | BODY MASS INDEX: 21.12 KG/M2 | SYSTOLIC BLOOD PRESSURE: 128 MMHG | DIASTOLIC BLOOD PRESSURE: 58 MMHG | OXYGEN SATURATION: 98 % | HEIGHT: 67 IN

## 2025-06-17 DIAGNOSIS — E11.42 DIABETIC PERIPHERAL NEUROPATHY: Primary | Chronic | ICD-10-CM

## 2025-06-17 DIAGNOSIS — I25.10 CORONARY ARTERY DISEASE INVOLVING NATIVE CORONARY ARTERY OF NATIVE HEART WITHOUT ANGINA PECTORIS: Chronic | ICD-10-CM

## 2025-06-17 DIAGNOSIS — D69.6 THROMBOCYTOPENIA: ICD-10-CM

## 2025-06-17 DIAGNOSIS — R63.4 WEIGHT LOSS: ICD-10-CM

## 2025-06-17 DIAGNOSIS — D64.9 NORMOCYTIC ANEMIA: ICD-10-CM

## 2025-06-17 DIAGNOSIS — D64.9 NORMOCYTIC ANEMIA: Chronic | ICD-10-CM

## 2025-06-17 DIAGNOSIS — F51.01 PRIMARY INSOMNIA: Chronic | ICD-10-CM

## 2025-06-17 LAB
BASOPHILS # BLD AUTO: 0 10*3/MM3 (ref 0–0.2)
BASOPHILS NFR BLD AUTO: 0 % (ref 0–1.5)
DEPRECATED RDW RBC AUTO: 43.1 FL (ref 37–54)
EOSINOPHIL # BLD AUTO: 0 10*3/MM3 (ref 0–0.4)
EOSINOPHIL NFR BLD AUTO: 0 % (ref 0.3–6.2)
ERYTHROCYTE [DISTWIDTH] IN BLOOD BY AUTOMATED COUNT: 14.5 % (ref 12.3–15.4)
HCT VFR BLD AUTO: 28 % (ref 37.5–51)
HGB BLD-MCNC: 7.5 G/DL (ref 13–17.7)
IMM GRANULOCYTES # BLD AUTO: 0.03 10*3/MM3 (ref 0–0.05)
IMM GRANULOCYTES NFR BLD AUTO: 0.5 % (ref 0–0.5)
LYMPHOCYTES # BLD AUTO: 1.11 10*3/MM3 (ref 0.7–3.1)
LYMPHOCYTES NFR BLD AUTO: 17.9 % (ref 19.6–45.3)
MCH RBC QN AUTO: 21.9 PG (ref 26.6–33)
MCHC RBC AUTO-ENTMCNC: 26.8 G/DL (ref 31.5–35.7)
MCV RBC AUTO: 81.9 FL (ref 79–97)
MONOCYTES # BLD AUTO: 0.26 10*3/MM3 (ref 0.1–0.9)
MONOCYTES NFR BLD AUTO: 4.2 % (ref 5–12)
NEUTROPHILS NFR BLD AUTO: 4.81 10*3/MM3 (ref 1.7–7)
NEUTROPHILS NFR BLD AUTO: 77.4 % (ref 42.7–76)
PLATELET # BLD AUTO: 102 10*3/MM3 (ref 140–450)
PMV BLD AUTO: 10.7 FL (ref 6–12)
RBC # BLD AUTO: 3.42 10*6/MM3 (ref 4.14–5.8)
WBC NRBC COR # BLD AUTO: 6.21 10*3/MM3 (ref 3.4–10.8)

## 2025-06-17 PROCEDURE — 85025 COMPLETE CBC W/AUTO DIFF WBC: CPT

## 2025-06-17 RX ORDER — PREGABALIN 75 MG/1
75 CAPSULE ORAL 2 TIMES DAILY
Qty: 180 CAPSULE | Refills: 0 | Status: SHIPPED | OUTPATIENT
Start: 2025-06-17

## 2025-06-17 RX ORDER — ZOLPIDEM TARTRATE 10 MG/1
10 TABLET ORAL NIGHTLY
Qty: 90 TABLET | Refills: 0 | Status: SHIPPED | OUTPATIENT
Start: 2025-06-17

## 2025-06-17 RX ORDER — METOPROLOL SUCCINATE 25 MG/1
25 TABLET, EXTENDED RELEASE ORAL DAILY
COMMUNITY

## 2025-06-17 NOTE — ASSESSMENT & PLAN NOTE
Judicious and appropriate use of zolpidem 10mg QHS #90 0RF. LETHA was reviewed and appropriate.  The patient has read and signed the [facility] Controlled Substance Contract today 6/17/24. UDS 12/24  Patient has been counseled and is aware of side effects, risks, potential for addiction/tolerance, interactions, and how to take medication correctly.  RTC 3 mos for next RX with LETHA  Patient verbalizes understanding of additional risks of this medication in individuals > 65 yrs of age, perez at his age of 89, and wants to continue zolpidem 10mg QHS and lyrica 75mg BID with additional risks of falls, decreased mental clarity, confusion.

## 2025-06-17 NOTE — PROGRESS NOTES
"Chief Complaint   Patient presents with    Insomnia    Diabetic Peripheral Neuropathy       History of Present Illness  89 y.o.  male, accompanied by his wife, presents for zolpidem and lyrica RF, taken chronically and nightly; denies s/e or other concerns with medication. Despite age and risks, wants to cont these medication.    Wife states he also needs clopidogrel RX ( looks like it has been RX'd by cards in the past).     Reports unintentional wt loss; also reports mild decreased oral intake. Is drinking protein shake at nighttime.    Review of Systems  ROS(+) for chronic insomnia.  ROS (+) for wt loss. All other ROS reviewed and negative.    Current Outpatient Medications:     aspirin 81 MG QD    atorvastatin 40 MG QD    Budesonide (ENTOCORT EC) 3 MG 2 QD     bumetanide 1 MG 1/2 QD    cholecalciferol (VITAMIN D3) 10 MCG (400 UNIT) QD    clopidogrel 75 MG QD    dapagliflozin Propanediol 10 MG QD    ketoconazole (NIZORAL) 2 % shampoo AD    levothyroxine 50 MCG QD    loratadine (Claritin) 5 MG QD    Pancrelipase, Lip-Prot-Amyl, (CREON) 61859-279267 units TID    pantoprazole 40 MG QD    pregabalin 75 MG BID    pyridostigmine (MESTINON) 60 MG1/2 TID    sacubitril-valsartan (ENTRESTO)  MG BID    spironolactone 25 MG QD    tamsulosin 0.4 MG QD    tiotropium bromide monohydrate (Spiriva Respimat) 2.5 MCG/ACT2 puffs QD    zolpidem 10 MG QHS    VITAS:  /58   Pulse 58   Ht 170.2 cm (67\")   Wt 61.1 kg (134 lb 9.6 oz)   SpO2 98%   BMI 21.08 kg/m²     Physical Exam  Vitals and nursing note reviewed.   Constitutional:       General: He is not in acute distress.     Appearance: Normal appearance. He is not ill-appearing.      Comments: Down 13 lbs over the last 3 mos   Eyes:      Extraocular Movements: Extraocular movements intact.      Conjunctiva/sclera: Conjunctivae normal.   Pulmonary:      Effort: Pulmonary effort is normal. No respiratory distress.   Neurological:      Mental Status: He is " alert. Mental status is at baseline.      Gait: Gait abnormal (using rolling walker).   Psychiatric:         Mood and Affect: Mood normal.         Behavior: Behavior normal.         LABS  Results for orders placed or performed in visit on 03/12/25   Comprehensive Metabolic Panel    Collection Time: 03/12/25 12:08 PM    Specimen: Blood   Result Value Ref Range    Glucose 92 65 - 99 mg/dL    BUN 26 (H) 8 - 23 mg/dL    Creatinine 1.09 0.76 - 1.27 mg/dL    Sodium 141 136 - 145 mmol/L    Potassium 3.5 3.5 - 5.2 mmol/L    Chloride 104 98 - 107 mmol/L    CO2 24.5 22.0 - 29.0 mmol/L    Calcium 8.4 8.2 - 9.6 mg/dL    Total Protein 6.2 6.0 - 8.5 g/dL    Albumin 3.8 3.5 - 5.2 g/dL    ALT (SGPT) 11 1 - 41 U/L    AST (SGOT) 17 1 - 40 U/L    Alkaline Phosphatase 117 39 - 117 U/L    Total Bilirubin 0.7 0.0 - 1.2 mg/dL    Globulin 2.4 gm/dL    A/G Ratio 1.6 g/dL    BUN/Creatinine Ratio 23.9 7.0 - 25.0    Anion Gap 12.5 5.0 - 15.0 mmol/L    eGFR 64.1 >60.0 mL/min/1.73   Lipid Panel    Collection Time: 03/12/25 12:08 PM    Specimen: Blood   Result Value Ref Range    Total Cholesterol 69 0 - 200 mg/dL    Triglycerides 63 0 - 150 mg/dL    HDL Cholesterol 34 (L) 40 - 60 mg/dL    LDL Cholesterol  20 0 - 100 mg/dL    VLDL Cholesterol 15 5 - 40 mg/dL    LDL/HDL Ratio 0.66    TSH    Collection Time: 03/12/25 12:08 PM    Specimen: Blood   Result Value Ref Range    TSH 2.630 0.270 - 4.200 uIU/mL   Microalbumin / Creatinine Urine Ratio - Urine, Clean Catch    Collection Time: 03/12/25 12:08 PM    Specimen: Urine, Clean Catch   Result Value Ref Range    Microalbumin/Creatinine Ratio 27.8 0.0 - 29.0 mg/g    Creatinine, Urine 82.7 mg/dL    Microalbumin, Urine 2.3 mg/dL   Hemoglobin A1c    Collection Time: 03/12/25 12:08 PM    Specimen: Blood   Result Value Ref Range    Hemoglobin A1C 5.10 4.80 - 5.60 %   CK    Collection Time: 03/12/25 12:08 PM    Specimen: Blood   Result Value Ref Range    Creatine Kinase 39 20 - 200 U/L   Uric Acid     Collection Time: 03/12/25 12:08 PM    Specimen: Blood   Result Value Ref Range    Uric Acid 5.0 3.4 - 7.0 mg/dL   T4, Free    Collection Time: 03/12/25 12:08 PM    Specimen: Blood   Result Value Ref Range    Free T4 1.27 0.92 - 1.68 ng/dL   Magnesium    Collection Time: 03/12/25 12:08 PM    Specimen: Blood   Result Value Ref Range    Magnesium 2.1 1.7 - 2.3 mg/dL   CBC Auto Differential    Collection Time: 03/12/25 12:08 PM    Specimen: Blood   Result Value Ref Range    WBC 3.05 (L) 3.40 - 10.80 10*3/mm3    RBC 3.55 (L) 4.14 - 5.80 10*6/mm3    Hemoglobin 7.3 (L) 13.0 - 17.7 g/dL    Hematocrit 28.2 (L) 37.5 - 51.0 %    MCV 79.4 79.0 - 97.0 fL    MCH 20.6 (L) 26.6 - 33.0 pg    MCHC 25.9 (L) 31.5 - 35.7 g/dL    RDW 14.0 12.3 - 15.4 %    RDW-SD 40.4 37.0 - 54.0 fl    MPV 10.4 6.0 - 12.0 fL    Platelets 109 (L) 140 - 450 10*3/mm3    Neutrophil % 65.0 42.7 - 76.0 %    Lymphocyte % 23.6 19.6 - 45.3 %    Monocyte % 11.1 5.0 - 12.0 %    Eosinophil % 0.0 (L) 0.3 - 6.2 %    Basophil % 0.0 0.0 - 1.5 %    Immature Grans % 0.3 0.0 - 0.5 %    Neutrophils, Absolute 1.98 1.70 - 7.00 10*3/mm3    Lymphocytes, Absolute 0.72 0.70 - 3.10 10*3/mm3    Monocytes, Absolute 0.34 0.10 - 0.90 10*3/mm3    Eosinophils, Absolute 0.00 0.00 - 0.40 10*3/mm3    Basophils, Absolute 0.00 0.00 - 0.20 10*3/mm3    Immature Grans, Absolute 0.01 0.00 - 0.05 10*3/mm3   Urinalysis without microscopic (no culture) - Urine, Clean Catch    Collection Time: 03/12/25 12:08 PM    Specimen: Urine, Clean Catch   Result Value Ref Range    Color, UA Yellow Yellow, Straw    Appearance, UA Clear Clear    pH, UA 5.5 5.0 - 8.0    Specific Gravity, UA 1.024 1.005 - 1.030    Glucose, UA >=1000 mg/dL (3+) (A) Negative    Ketones, UA Negative Negative    Bilirubin, UA Negative Negative    Blood, UA Negative Negative    Protein, UA Trace (A) Negative    Leuk Esterase, UA Negative Negative    Nitrite, UA Negative Negative    Urobilinogen, UA 0.2 E.U./dL 0.2 - 1.0 E.U./dL    Urinalysis, Microscopic Only - Urine, Clean Catch    Collection Time: 03/12/25 12:08 PM    Specimen: Urine, Clean Catch   Result Value Ref Range    RBC, UA 0-2 None Seen, 0-2 /HPF    WBC, UA 0-2 None Seen, 0-2 /HPF    Bacteria, UA None Seen None Seen /HPF    Squamous Epithelial Cells, UA 0-2 None Seen, 0-2 /HPF    Hyaline Casts, UA None Seen None Seen /LPF    Methodology Automated Microscopy      9/17/24 H&H 8.5/31.2    ASSESSMENT/PLAN    Diagnoses and all orders for this visit:    1. Diabetic peripheral neuropathy (Primary)  Assessment & Plan:  Cont'd judicious and appropriate use of lyrica 75mg BID #180, 0RF. LETHA was reviewed and appropriate.  The patient has read and signed the [facility] Substance Contract today 6/17/25. UDS 12/24. Patient has been counseled and is aware of side effects, risks, potential for addiction/tolerance, interactions, and how to take medication correctly.  RTC 3 mos for next RX with LETHA      Patient verbalizes understanding of additional risks of this medication in individuals > 65 yrs of age, perez at his age of 89, and wants to continue lyrica 75mg BID and zolpidem 10mg QHS    Orders:  -     pregabalin (LYRICA) 75 MG capsule; Take 1 capsule by mouth 2 (Two) Times a Day.  Dispense: 180 capsule; Refill: 0    2. Insomnia  Assessment & Plan:  Judicious and appropriate use of zolpidem 10mg QHS #90 0RF. LETHA was reviewed and appropriate.  The patient has read and signed the [facility] Substance Contract today 6/17/24. UDS 12/24  Patient has been counseled and is aware of side effects, risks, potential for addiction/tolerance, interactions, and how to take medication correctly.  RTC 3 mos for next RX with LETHA  Patient verbalizes understanding of additional risks of this medication in individuals > 65 yrs of age, perez at his age of 89, and wants to continue zolpidem 10mg QHS and lyrica 75mg BID with additional risks of falls, decreased mental clarity,  confusion.      Orders:  -     zolpidem (AMBIEN) 10 MG tablet; Take 1 tablet by mouth Every Night.  Dispense: 90 tablet; Refill: 0    3. Normocytic anemia  Assessment & Plan:  Overdue for f/u CBC; reviewed fatigue due to anemia      4. Coronary artery disease involving native coronary artery of native heart without angina pectoris  Assessment & Plan:  Clopidogrel per cards - he will get RX from Dr. Reynolds; asx/stable at this time      5. Weight loss  Comments:  losing muscle mass; advised to incr protein shakes to TID; f/u as needed, latest in 3 mos        FOLLOW-UP  Health maintenance - rec flu vacc and COVID19 vacc in the fall; rec RSV vacc in the fall; rec Shingrix, counseling given (get at pharmacy)  CBC on the way out the door  RTC 3 mos for next lyrica and zolpidem RXs with [facility]HA (contract 6/25, UDS 12/24)    Electronically signed by:    Delilah Tatum MD, FACP  06/17/2025

## 2025-06-17 NOTE — ASSESSMENT & PLAN NOTE
Cont'd judicious and appropriate use of lyrica 75mg BID #180, 0RF. LETHA was reviewed and appropriate.  The patient has read and signed the [facility] Controlled Substance Contract today 6/17/25. UDS 12/24. Patient has been counseled and is aware of side effects, risks, potential for addiction/tolerance, interactions, and how to take medication correctly.  RTC 3 mos for next RX with LETHA      Patient verbalizes understanding of additional risks of this medication in individuals > 65 yrs of age, perez at his age of 89, and wants to continue lyrica 75mg BID and zolpidem 10mg QHS

## 2025-06-18 ENCOUNTER — RESULTS FOLLOW-UP (OUTPATIENT)
Dept: LAB | Facility: HOSPITAL | Age: OVER 89
End: 2025-06-18
Payer: MEDICARE

## 2025-06-18 NOTE — TELEPHONE ENCOUNTER
Called and spoke to pt's son. Gave message from provider as to Anemia, Platelet count  and myelodysplasia.   Pt's son voiced understanding and appreciation.   Pt's son asked what could be done / tx for myelodysplasia - as to referral to hematology  Please advise.    37.7

## 2025-06-18 NOTE — TELEPHONE ENCOUNTER
Called and spoke to pt's son. Gave message from provider as to information on information on tx w/ myelodysplasia. Pt's son voiced understanding and appreciation.

## 2025-06-26 DIAGNOSIS — K21.00 GASTROESOPHAGEAL REFLUX DISEASE WITH ESOPHAGITIS WITHOUT HEMORRHAGE: Chronic | ICD-10-CM

## 2025-06-26 RX ORDER — PANTOPRAZOLE SODIUM 40 MG/1
40 TABLET, DELAYED RELEASE ORAL DAILY
Qty: 60 TABLET | Refills: 2 | Status: SHIPPED | OUTPATIENT
Start: 2025-06-26

## 2025-07-29 PROBLEM — K63.1 SMALL BOWEL PERFORATION: Status: ACTIVE | Noted: 2025-01-01

## (undated) DEVICE — DRAPE,TOP,102X53,STERILE: Brand: MEDLINE

## (undated) DEVICE — GRADUATE CONTN 1000ML

## (undated) DEVICE — ARM SLING: Brand: DEROYAL

## (undated) DEVICE — SYR LUERLOK 50ML

## (undated) DEVICE — CONTN GRAD MEAS TRIANG 32OZ BLK

## (undated) DEVICE — ANTIBACTERIAL UNDYED BRAIDED (POLYGLACTIN 910), SYNTHETIC ABSORBABLE SUTURE: Brand: COATED VICRYL

## (undated) DEVICE — BANDAGE,GAUZE,BULKEE II,4.5"X4.1YD,STRL: Brand: MEDLINE

## (undated) DEVICE — HYBRID CO2 TUBING/CAP SET FOR OLYMPUS® SCOPES & CO2 SOURCE: Brand: ERBE

## (undated) DEVICE — THE BITE BLOCK MAXI, LATEX FREE STRAP IS USED TO PROTECT THE ENDOSCOPE INSERTION TUBE FROM BEING BITTEN BY THE PATIENT.

## (undated) DEVICE — BNDG ELAS ELITE V/CLOSE 4IN 5YD LF STRL

## (undated) DEVICE — INTRO ACCSR BLNT TP

## (undated) DEVICE — UNDERCAST PADDING: Brand: DEROYAL

## (undated) DEVICE — GLV SURG PREMIERPRO MIC LTX PF SZ8 BRN

## (undated) DEVICE — SPNG ENDO BEDSIDE TUB ENZYM

## (undated) DEVICE — DISPOSABLE TOURNIQUET CUFF SINGLE BLADDER, DUAL PORT AND QUICK CONNECT CONNECTOR: Brand: COLOR CUFF

## (undated) DEVICE — SOL IRR H2O BTL 1000ML STRL

## (undated) DEVICE — LUBE GEL ENDOGLIDE 1.1OZ

## (undated) DEVICE — SINGLE-USE BIOPSY FORCEPS: Brand: RADIAL JAW 4

## (undated) DEVICE — PK EXTREM UPPR 10

## (undated) DEVICE — PROXIMATE RH ROTATING HEAD SKIN STAPLERS (35 WIDE) CONTAINS 35 STAINLESS STEEL STAPLES: Brand: PROXIMATE

## (undated) DEVICE — DRSNG GZ PETROLTM XEROFORM CURAD 1X8IN STRL

## (undated) DEVICE — TUBING, SUCTION, 1/4" X 10', STRAIGHT: Brand: MEDLINE

## (undated) DEVICE — GOWN,REINFORCE,POLY,SIRUS,BREATH SLV,XLG: Brand: MEDLINE

## (undated) DEVICE — GLV SURG PREMIERPRO GAMMEX NEOPRN PF SZ8 GRN

## (undated) DEVICE — KT ORCA ORCAPOD DISP STRL